# Patient Record
Sex: MALE | Race: WHITE | Employment: OTHER | RURAL
[De-identification: names, ages, dates, MRNs, and addresses within clinical notes are randomized per-mention and may not be internally consistent; named-entity substitution may affect disease eponyms.]

---

## 2017-03-14 RX ORDER — ATORVASTATIN CALCIUM 40 MG/1
40 TABLET, FILM COATED ORAL
Qty: 90 TAB | Refills: 2 | Status: SHIPPED | OUTPATIENT
Start: 2017-03-14 | End: 2017-12-26 | Stop reason: SDUPTHER

## 2017-03-14 RX ORDER — AMLODIPINE BESYLATE 5 MG/1
5 TABLET ORAL DAILY
Qty: 90 TAB | Refills: 2 | Status: SHIPPED | OUTPATIENT
Start: 2017-03-14 | End: 2018-01-08 | Stop reason: SDUPTHER

## 2017-06-28 ENCOUNTER — OFFICE VISIT (OUTPATIENT)
Dept: CARDIOLOGY CLINIC | Age: 82
End: 2017-06-28

## 2017-06-28 VITALS
RESPIRATION RATE: 14 BRPM | WEIGHT: 202 LBS | SYSTOLIC BLOOD PRESSURE: 184 MMHG | OXYGEN SATURATION: 98 % | BODY MASS INDEX: 28.28 KG/M2 | HEART RATE: 73 BPM | HEIGHT: 71 IN | DIASTOLIC BLOOD PRESSURE: 82 MMHG

## 2017-06-28 DIAGNOSIS — E11.9 TYPE 2 DIABETES MELLITUS WITHOUT COMPLICATION, WITH LONG-TERM CURRENT USE OF INSULIN (HCC): ICD-10-CM

## 2017-06-28 DIAGNOSIS — I25.10 ATHEROSCLEROSIS OF NATIVE CORONARY ARTERY OF NATIVE HEART WITHOUT ANGINA PECTORIS: Primary | ICD-10-CM

## 2017-06-28 DIAGNOSIS — I11.9 HYPERTENSIVE HEART DISEASE WITHOUT HEART FAILURE: ICD-10-CM

## 2017-06-28 DIAGNOSIS — Z79.4 TYPE 2 DIABETES MELLITUS WITHOUT COMPLICATION, WITH LONG-TERM CURRENT USE OF INSULIN (HCC): ICD-10-CM

## 2017-06-28 DIAGNOSIS — E78.00 PURE HYPERCHOLESTEROLEMIA: ICD-10-CM

## 2017-06-28 DIAGNOSIS — I77.9 CAROTID ARTERY DISEASE, UNSPECIFIED LATERALITY (HCC): ICD-10-CM

## 2017-06-28 RX ORDER — LOSARTAN POTASSIUM 50 MG/1
50 TABLET ORAL DAILY
Qty: 90 TAB | Refills: 1 | Status: SHIPPED | OUTPATIENT
Start: 2017-06-28 | End: 2018-01-09 | Stop reason: ALTCHOICE

## 2017-06-28 NOTE — MR AVS SNAPSHOT
Visit Information Date & Time Provider Department Dept. Phone Encounter #  
 6/28/2017 11:20 AM Mai Lin MD [facility] [place] [facility] [phone] Your Appointments 1/2/2018 11:00 AM  
ESTABLISHED PATIENT with Mai Lin MD  
[address] [facility]) Appt Note: 6 mo fu $cp  
 [address] [id number] [phone]  
  
   
 [address] [place] [id number] Upcoming Health Maintenance Date Due  
 FOOT EXAM Q1 12/30/1938 EYE EXAM RETINAL OR DILATED Q1 12/30/1938 DTaP/Tdap/Td series (1 - Tdap) 12/30/1949 ZOSTER VACCINE AGE 60> 12/30/1988 GLAUCOMA SCREENING Q2Y 12/30/1993 Pneumococcal 65+ Low/Medium Risk (1 of 2 - PCV13) 12/30/1993 MEDICARE YEARLY EXAM 12/30/1993 LIPID PANEL Q1 7/29/2015 HEMOGLOBIN A1C Q6M 5/4/2016 MICROALBUMIN Q1 11/4/2016 INFLUENZA AGE 9 TO ADULT 8/1/2017 Allergies as of 6/28/2017  Review Complete On: 6/28/2017 By: Mai Lin MD  
  
 Severity Noted Reaction Type Reactions Aspirin  06/14/2016    Other (comments) NOSE BLEED  MG IS TAKEN Pcn [Penicillins]  10/17/2013    Shortness of Breath Itching/hives Current Immunizations  Never Reviewed Name Date Influenza High Dose Vaccine PF 10/11/2016 Not reviewed this visit You Were Diagnosed With   
  
 Codes Comments Atherosclerosis of native coronary artery of native heart without angina pectoris    -  Primary ICD-10-CM: I25.10 ICD-9-CM: 414.01 Hypertensive heart disease without heart failure     ICD-10-CM: I11.9 ICD-9-CM: 402.90 Pure hypercholesterolemia     ICD-10-CM: E78.00 ICD-9-CM: 272.0 Type 2 diabetes mellitus without complication, with long-term current use of insulin (HCC)     ICD-10-CM: E11.9, Z79.4 ICD-9-CM: 250.00, V58.67   
 Carotid artery disease, unspecified laterality ([facility]     ICD-10-CM: I77.9 ICD-9-CM: 776. 9 Vitals BP Pulse Resp Height(growth percentile) Weight(growth percentile) SpO2  
 184/82 (BP 1 Location: Right arm, BP Patient Position: Sitting) 73 14 5' 11\" (1.803 m) 202 lb (91.6 kg) 98% BMI Smoking Status 28.17 kg/m2 Former Smoker Vitals History BMI and BSA Data Body Mass Index Body Surface Area  
 28.17 kg/m 2 2.14 m 2 Preferred Pharmacy Pharmacy Name Phone [facility] [place] [address] [phone] Your Updated Medication List  
  
   
This list is accurate as of: 6/28/17 11:46 AM.  Always use your most recent med list. amLODIPine 5 mg tablet Commonly known as:  Thao Rafter Take 1 Tab by mouth daily. aspirin delayed-release 81 mg tablet Take 81 mg by mouth daily. atorvastatin 40 mg tablet Commonly known as:  LIPITOR Take 1 Tab by mouth nightly. HumaLOG Mix 75-25 100 unit/mL (75-25) injection Generic drug:  insulin lispro protamine/insulin lispro  
by SubCUTAneous route. 32 units in the AM  
  
 Iron 325 mg (65 mg iron) tablet Generic drug:  ferrous sulfate Take  by mouth Daily (before breakfast). losartan 50 mg tablet Commonly known as:  COZAAR Take 1 Tab by mouth daily. VITAMIN D3 2,000 unit Tab Generic drug:  cholecalciferol (vitamin D3) Take  by mouth daily. Prescriptions Sent to Pharmacy Refills  
 losartan (COZAAR) 50 mg tablet 1 Sig: Take 1 Tab by mouth daily. Class: Normal  
 Pharmacy: [address] [facility] [address] Ph #: [phone] Route: Oral  
  
We Performed the Following AMB POC EKG ROUTINE W/ 12 LEADS, INTER & REP [79867 CPT(R)] Introducing [facility]!    
 Lynnette Judge introduces [employer] patient portal. Now you can access parts of your medical record, email your doctor's office, and request medication refills online. 1. In your internet browser, go to [email or web address] 2. Click on the First Time User? Click Here link in the Sign In box. You will see the New Member Sign Up page. 3. Enter your [employer] Access Code exactly as it appears below. You will not need to use this code after youve completed the sign-up process. If you do not sign up before the expiration date, you must request a new code. · [employer] Access Code: [email or web address] Expires: 2017 11:46 AM 
 
4. Enter the last four digits of your Social Security Number (xxxx) and Date of Birth (mm/dd/yyyy) as indicated and click Submit. You will be taken to the next sign-up page. 5. Create a [employer] ID. This will be your [employer] login ID and cannot be changed, so think of one that is secure and easy to remember. 6. Create a [employer] password. You can change your password at any time. 7. Enter your Password Reset Question and Answer. This can be used at a later time if you forget your password. 8. Enter your e-mail address. You will receive e-mail notification when new information is available in [address] 9. Click Sign Up. You can now view and download portions of your medical record. 10. Click the Download Summary menu link to download a portable copy of your medical information. If you have questions, please visit the Frequently Asked Questions section of the [employer] website. Remember, [employer] is NOT to be used for urgent needs. For medical emergencies, dial 911. Now available from your iPhone and Android! Please provide this summary of care documentation to your next provider. If you have any questions after today's visit, please call [phone].

## 2017-06-28 NOTE — PROGRESS NOTES
PATIENT ID VERIFIED WITH TWO PATIENT IDENTIFIERS. MEDICATION REVIEWED AND APPROVED BY DR. Humberto Brown.

## 2017-06-28 NOTE — PROGRESS NOTES
Fredy Larkin is a 80 y.o. male is here for routine f/u. Overall doing ok. Lost wife last November. Stays active. Continues to see Endocrine (Dr Tana Hinkle). Checks BP, glucose at home. Labile BP, some elevation. Occas dizziness. No CV sx or complaints. The patient denies chest pain/ shortness of breath, orthopnea, PND, LE edema, palpitations, syncope, presyncope or fatigue. Patient Active Problem List    Diagnosis Date Noted    Coronary atherosclerosis of native coronary artery     HCVD (hypertensive cardiovascular disease)     Pure hypercholesterolemia     Diabetes mellitus, type II ([facility]     [facility]       No primary care provider on file. Past Medical History:   Diagnosis Date    [facility]     Coronary atherosclerosis of native coronary artery     Diabetes mellitus, type II (HCC)     DJD (degenerative joint disease)     HCVD (hypertensive cardiovascular disease)     Pure hypercholesterolemia       Past Surgical History:   Procedure Laterality Date    HX CATARACT REMOVAL      HX HEART CATHETERIZATION  2/2010    LVEDP 9, mild ant hypo EF 55-60%, LAD 70% 99%, mod D1, OM3 30%, PDA 90%    HX PTCA  2/2010    Stent-LAD 2.5X18 ANDREA    HX PTCA  3/2010    Stent PDA ANDREA     Allergies   Allergen Reactions    Aspirin Other (comments)     NOSE BLEED  MG IS TAKEN    Pcn [Penicillins] Shortness of Breath     Itching/hives      Family History   Problem Relation Age of Onset    Heart Disease Mother     Cancer Father       Social History     Social History    Marital status:      Spouse name: N/A    Number of children: N/A    Years of education: N/A     Occupational History    Not on file.      Social History Main Topics    Smoking status: Former Smoker     Quit date: 10/17/1963    Smokeless tobacco: Not on file    Alcohol use Not on file    Drug use: Not on file    Sexual activity: Not on file     Other Topics Concern    Not on file Social History Narrative      Current Outpatient Prescriptions   Medication Sig    amLODIPine (NORVASC) 5 mg tablet Take 1 Tab by mouth daily.  atorvastatin (LIPITOR) 40 mg tablet Take 1 Tab by mouth nightly.  losartan (COZAAR) 25 mg tablet Take 1 Tab by mouth daily.  cholecalciferol, vitamin D3, (VITAMIN D3) 2,000 unit tab Take  by mouth daily.  aspirin delayed-release 81 mg tablet Take 81 mg by mouth daily.  ferrous sulfate (IRON) 325 mg (65 mg iron) tablet Take  by mouth Daily (before breakfast).  insulin mixture 75-25 (HUMALOG MIX 75-25) 100 unit/mL (75-25) Susp by SubCUTAneous route. 32 units in the AM     No current facility-administered medications for this visit. Review of Symptoms:    CONST  No weight change. No fever, chills, sweats    ENT No visual changes, URI sx, sore throat    CV  See HPI   RESP  No cough, or sputum, wheezing. Also see HPI   GI  No abdominal pain or change in bowel habits. No heartburn or dysphagia. No melena or rectal bleeding.   No dysuria, urgency, frequency, hematuria   MSKEL  No joint pain, swelling. No muscle pain. SKIN  No rash or lesions. NEURO  No headache, syncope, or seizure. No weakness, loss of sensation, or paresthesias. PSYCH  No low mood or depression  No anxiety. HE/LYMPH  No easy bruising, abnormal bleeding, or enlarged glands.         Physical ExamPhysical Exam:    Visit Vitals    BP (!) 210/88 (BP 1 Location: Right arm, BP Patient Position: Sitting)    Pulse 73    Resp 14    Ht 5' 11\" (1.803 m)    Wt 202 lb (91.6 kg)    SpO2 98%    BMI 28.17 kg/m2     Gen: NAD  HEENT:  PERRL, throat clear  Neck: no adenopathy, no thyromegaly, no JVD   Heart:  Regular,Nl S1S2,  no murmur, gallop or rub.   Lungs:  clear  Abdomen:   Soft, non-tender, bowel sounds are active.   Extremities:  No edema  Pulse: symmetric  Neuro: A&O times 3, No focal neuro deficits    Cardiographics    ECG: normal EKG, normal sinus rhythm, unchanged from previous tracings    Labs:   Lab Results   Component Value Date/Time    Sodium 140 02/24/2010 04:10 AM    Sodium 135 02/23/2010 09:30 AM    Sodium 137 02/04/2010 04:00 AM    Potassium 4.1 02/24/2010 04:10 AM    Potassium 4.6 02/23/2010 09:30 AM    Potassium 4.0 02/04/2010 04:00 AM    Chloride 105 02/24/2010 04:10 AM    Chloride 100 02/23/2010 09:30 AM    Chloride 102 02/04/2010 04:00 AM    CO2 24 02/24/2010 04:10 AM    CO2 28 02/23/2010 09:30 AM    CO2 29 02/04/2010 04:00 AM    Anion gap 11 02/24/2010 04:10 AM    Anion gap 7 02/23/2010 09:30 AM    Anion gap 6 02/04/2010 04:00 AM    Glucose 172 02/24/2010 04:10 AM    Glucose 206 02/23/2010 09:30 AM    Glucose 109 02/04/2010 04:00 AM    BUN 22 02/24/2010 04:10 AM    BUN 23 02/23/2010 09:30 AM    BUN 23 02/04/2010 04:00 AM    Creatinine 1.2 02/24/2010 04:10 AM    Creatinine 1.5 02/23/2010 09:30 AM    Creatinine 1.2 02/04/2010 04:00 AM    BUN/Creatinine ratio 18 02/24/2010 04:10 AM    BUN/Creatinine ratio 15 02/23/2010 09:30 AM    BUN/Creatinine ratio 19 02/04/2010 04:00 AM    GFR est AA >60 02/24/2010 04:10 AM    GFR est AA 58 02/23/2010 09:30 AM    GFR est AA >60 02/04/2010 04:00 AM    GFR est non-AA >60 02/24/2010 04:10 AM    GFR est non-AA 48 02/23/2010 09:30 AM    GFR est non-AA >60 02/04/2010 04:00 AM    Calcium 8.8 02/24/2010 04:10 AM    Calcium 9.4 02/23/2010 09:30 AM    Calcium 8.7 02/04/2010 04:00 AM    Bilirubin, total 0.6 02/23/2010 09:30 AM    AST (SGOT) 35 02/23/2010 09:30 AM    Alk. phosphatase 93 02/23/2010 09:30 AM    Protein, total 7.9 02/23/2010 09:30 AM    Albumin 4.3 02/23/2010 09:30 AM    Globulin 3.6 02/23/2010 09:30 AM    A-G Ratio 1.2 02/23/2010 09:30 AM    ALT (SGPT) 42 02/23/2010 09:30 AM     No results found for: CPK, CPKX, CPX  No results found for: CHOL, CHOLX, CHLST, CHOLV, 941111, HDL, LDL, LDLC, DLDLP, TGLX, TRIGL, TRIGP, CHHD, CHHDX  No results found for this or any previous visit.     Assessment:         Patient Active Problem List    Diagnosis Date Noted    Coronary atherosclerosis of native coronary artery     HCVD (hypertensive cardiovascular disease)     Pure hypercholesterolemia     Diabetes mellitus, type II ([facility]     Carotid artery disease ([facility]       Overall doing ok. Lost wife last November. Stays active. Continues to see Endocrine (Dr Katya Edge). Checks BP, glucose at home. Labile BP, some elevation. Occas dizziness. No CV sx or complaints. Plan:     Doing well with no adverse cardiac symptoms, however BP with suboptimal control--will increase losartan to 50mg every day, continue amlodipine 5mg. DM and Lipids and labs followed by Endocrine. Home BP monitoring. .  Continue current care and f/u in 6 months.     Leslie Carson MD

## 2017-12-27 RX ORDER — ATORVASTATIN CALCIUM 40 MG/1
TABLET, FILM COATED ORAL
Qty: 90 TAB | Refills: 2 | Status: SHIPPED | OUTPATIENT
Start: 2017-12-27 | End: 2018-09-18 | Stop reason: SDUPTHER

## 2018-01-09 ENCOUNTER — OFFICE VISIT (OUTPATIENT)
Dept: CARDIOLOGY CLINIC | Age: 83
End: 2018-01-09

## 2018-01-09 VITALS
BODY MASS INDEX: 28.98 KG/M2 | WEIGHT: 207 LBS | HEART RATE: 77 BPM | HEIGHT: 71 IN | RESPIRATION RATE: 16 BRPM | SYSTOLIC BLOOD PRESSURE: 142 MMHG | DIASTOLIC BLOOD PRESSURE: 80 MMHG | OXYGEN SATURATION: 98 %

## 2018-01-09 DIAGNOSIS — I11.9 HYPERTENSIVE HEART DISEASE WITHOUT HEART FAILURE: ICD-10-CM

## 2018-01-09 DIAGNOSIS — E11.9 TYPE 2 DIABETES MELLITUS WITHOUT COMPLICATION, WITH LONG-TERM CURRENT USE OF INSULIN (HCC): ICD-10-CM

## 2018-01-09 DIAGNOSIS — I25.10 ATHEROSCLEROSIS OF NATIVE CORONARY ARTERY OF NATIVE HEART WITHOUT ANGINA PECTORIS: Primary | ICD-10-CM

## 2018-01-09 DIAGNOSIS — I77.9 CAROTID ARTERY DISEASE, UNSPECIFIED LATERALITY (HCC): ICD-10-CM

## 2018-01-09 DIAGNOSIS — E78.00 PURE HYPERCHOLESTEROLEMIA: ICD-10-CM

## 2018-01-09 DIAGNOSIS — Z79.4 TYPE 2 DIABETES MELLITUS WITHOUT COMPLICATION, WITH LONG-TERM CURRENT USE OF INSULIN (HCC): ICD-10-CM

## 2018-01-09 RX ORDER — AMLODIPINE BESYLATE 5 MG/1
TABLET ORAL
Qty: 90 TAB | Refills: 2 | Status: SHIPPED | OUTPATIENT
Start: 2018-01-09 | End: 2018-12-07 | Stop reason: SDUPTHER

## 2018-01-09 RX ORDER — LISINOPRIL 10 MG/1
10 TABLET ORAL DAILY
Qty: 90 TAB | Refills: 3 | Status: SHIPPED | OUTPATIENT
Start: 2018-01-09 | End: 2018-12-29 | Stop reason: SDUPTHER

## 2018-01-09 NOTE — PROGRESS NOTES
Verified patient with two patient identifiers. Medications reviewed/approved by Dr. Rylan Villegas. Verbal from Dr. Rylan Villegas to remove the medications that were deleted during the visit. Chief Complaint   Patient presents with    Coronary Artery Disease     6 month follow up    Hypertension    Cholesterol Problem     1. Have you been to the ER, urgent care clinic since your last visit? Hospitalized since your last visit? Yes.    2. Have you seen or consulted any other health care providers outside of the [address] [place] [address] since your last visit? Include any pap smears or colon screening.  Dr. Johanny Miller (endrocrinologist)

## 2018-01-09 NOTE — PROGRESS NOTES
Cata Wiseman is a 80 y.o. male is here for routine f/u. Overall doing ok. Stays active. Continues to see Endocrine (Dr Purnima Crain). Checks BP, glucose at home. Labile BP, some elevation. Occas dizziness--stopped taking the losartan a month ago, sx resolved. No CV sx or complaints. The patient denies chest pain/ shortness of breath, orthopnea, PND, LE edema, palpitations, syncope, presyncope or fatigue. Patient Active Problem List    Diagnosis Date Noted    Coronary atherosclerosis of native coronary artery     HCVD (hypertensive cardiovascular disease)     Pure hypercholesterolemia     Diabetes mellitus, type II ([facility]     [facility]       No primary care provider on file. Past Medical History:   Diagnosis Date    [facility]ot[facility]     Coronary atherosclerosis of native coronary artery     Diabetes mellitus, type II (HCC)     DJD (degenerative joint disease)     HCVD (hypertensive cardiovascular disease)     Pure hypercholesterolemia       Past Surgical History:   Procedure Laterality Date    HX CATARACT REMOVAL      HX HEART CATHETERIZATION  2/2010    LVEDP 9, mild ant hypo EF 55-60%, LAD 70% 99%, mod D1, OM3 30%, PDA 90%    HX PTCA  2/2010    Stent-LAD 2.5X18 ANDREA    HX PTCA  3/2010    Stent PDA ANDREA     Allergies   Allergen Reactions    Aspirin Other (comments)     NOSE BLEED  MG IS TAKEN    Pcn [Penicillins] Shortness of Breath     Itching/hives      Family History   Problem Relation Age of Onset    Heart Disease Mother     Cancer Father       Social History     Social History    Marital status:      Spouse name: N/A    Number of children: N/A    Years of education: N/A     Occupational History    Not on file.      Social History Main Topics    Smoking status: Former Smoker     Quit date: 10/17/1963    Smokeless tobacco: Never Used    Alcohol use Not on file    Drug use: Not on file    Sexual activity: Not on file     Other Topics Concern    Not on file     Social History Narrative      Current Outpatient Prescriptions   Medication Sig    amLODIPine (NORVASC) 5 mg tablet TAKE ONE TABLET BY MOUTH ONCE DAILY    atorvastatin (LIPITOR) 40 mg tablet TAKE ONE TABLET BY MOUTH NIGHTLY    cholecalciferol, vitamin D3, (VITAMIN D3) 2,000 unit tab Take  by mouth daily.  aspirin delayed-release 81 mg tablet Take 81 mg by mouth daily.  ferrous sulfate (IRON) 325 mg (65 mg iron) tablet Take  by mouth Daily (before breakfast).  insulin mixture 75-25 (HUMALOG MIX 75-25) 100 unit/mL (75-25) Susp by SubCUTAneous route. 32 units in the AM    losartan (COZAAR) 50 mg tablet Take 1 Tab by mouth daily. No current facility-administered medications for this visit. Review of Symptoms:    CONST  No weight change. No fever, chills, sweats    ENT No visual changes, URI sx, sore throat    CV  See HPI   RESP  No cough, or sputum, wheezing. Also see HPI   GI  No abdominal pain or change in bowel habits. No heartburn or dysphagia. No melena or rectal bleeding.   No dysuria, urgency, frequency, hematuria   MSKEL  No joint pain, swelling. No muscle pain. SKIN  No rash or lesions. NEURO  No headache, syncope, or seizure. No weakness, loss of sensation, or paresthesias. PSYCH  No low mood or depression  No anxiety. HE/LYMPH  No easy bruising, abnormal bleeding, or enlarged glands.         Physical ExamPhysical Exam:    Visit Vitals    /80 (BP 1 Location: Right arm, BP Patient Position: Sitting)    Pulse 77    Resp 16    Ht 5' 11\" (1.803 m)    Wt 207 lb (93.9 kg)    SpO2 98%    BMI 28.87 kg/m2     Gen: NAD  HEENT:  PERRL, throat clear  Neck: no adenopathy, no thyromegaly, no JVD   Heart:  Regular,Nl S1S2,  no murmur, gallop or rub.   Lungs:  clear  Abdomen:   Soft, non-tender, bowel sounds are active.   Extremities:  No edema  Pulse: symmetric  Neuro: A&O times 3, No focal neuro deficits    Cardiographics    ECG: NSR, PRWP    Labs:   Lab Results   Component Value Date/Time    Sodium 140 02/24/2010 04:10 AM    Sodium 135 02/23/2010 09:30 AM    Sodium 137 02/04/2010 04:00 AM    Potassium 4.1 02/24/2010 04:10 AM    Potassium 4.6 02/23/2010 09:30 AM    Potassium 4.0 02/04/2010 04:00 AM    Chloride 105 02/24/2010 04:10 AM    Chloride 100 02/23/2010 09:30 AM    Chloride 102 02/04/2010 04:00 AM    CO2 24 02/24/2010 04:10 AM    CO2 28 02/23/2010 09:30 AM    CO2 29 02/04/2010 04:00 AM    Anion gap 11 02/24/2010 04:10 AM    Anion gap 7 02/23/2010 09:30 AM    Anion gap 6 02/04/2010 04:00 AM    Glucose 172 02/24/2010 04:10 AM    Glucose 206 02/23/2010 09:30 AM    Glucose 109 02/04/2010 04:00 AM    BUN 22 02/24/2010 04:10 AM    BUN 23 02/23/2010 09:30 AM    BUN 23 02/04/2010 04:00 AM    Creatinine 1.2 02/24/2010 04:10 AM    Creatinine 1.5 02/23/2010 09:30 AM    Creatinine 1.2 02/04/2010 04:00 AM    BUN/Creatinine ratio 18 02/24/2010 04:10 AM    BUN/Creatinine ratio 15 02/23/2010 09:30 AM    BUN/Creatinine ratio 19 02/04/2010 04:00 AM    GFR est AA >60 02/24/2010 04:10 AM    GFR est AA 58 02/23/2010 09:30 AM    GFR est AA >60 02/04/2010 04:00 AM    GFR est non-AA >60 02/24/2010 04:10 AM    GFR est non-AA 48 02/23/2010 09:30 AM    GFR est non-AA >60 02/04/2010 04:00 AM    Calcium 8.8 02/24/2010 04:10 AM    Calcium 9.4 02/23/2010 09:30 AM    Calcium 8.7 02/04/2010 04:00 AM    Bilirubin, total 0.6 02/23/2010 09:30 AM    AST (SGOT) 35 02/23/2010 09:30 AM    Alk. phosphatase 93 02/23/2010 09:30 AM    Protein, total 7.9 02/23/2010 09:30 AM    Albumin 4.3 02/23/2010 09:30 AM    Globulin 3.6 02/23/2010 09:30 AM    A-G Ratio 1.2 02/23/2010 09:30 AM    ALT (SGPT) 42 02/23/2010 09:30 AM     No results found for: CPK, CPKX, CPX  No results found for: CHOL, CHOLX, CHLST, CHOLV, 656446, HDL, LDL, LDLC, DLDLP, TGLX, TRIGL, TRIGP, CHHD, CHHDX  No results found for this or any previous visit.     Assessment: Patient Active Problem List    Diagnosis Date Noted    Coronary atherosclerosis of native coronary artery     HCVD (hypertensive cardiovascular disease)     Pure hypercholesterolemia     Diabetes mellitus, type II ([facility]     Carotid artery disease ([facility]      Overall doing ok. Stays active. Continues to see Endocrine (Dr Katiuska Almanza). Checks BP, glucose at home. Labile BP, some elevation. Occas dizziness--stopped taking the losartan a month ago, sx resolved. No CV sx or complaints. Plan:     Doing well with no adverse cardiac symptoms. Recent lipids/labs per Endocrine reviewed--at target. BP up a bit, and should be on ACEI or ARB--will Lipids and labs followed by PCP. Continue current care and f/u in 6 months.     Melissa Giles MD

## 2018-01-09 NOTE — MR AVS SNAPSHOT
Visit Information Date & Time Provider Department Dept. Phone Encounter #  
 1/9/2018 11:20 AM Kaley Dorsey MD [facility] [phone] [id number] Follow-up Instructions Return in about 6 months (around 7/9/2018). Follow-up and Disposition History Upcoming Health Maintenance Date Due  
 FOOT EXAM Q1 12[birth year]30/1938 EYE EXAM RETINAL OR DILATED Q1 12/30/1938 DTaP/Tdap/Td series (1 - Tdap) 12/30/1949 ZOSTER VACCINE AGE 60> 10/30/1988 GLAUCOMA SCREENING Q2Y 12/30/1993 Pneumococcal 65+ Low/Medium Risk (1 of 2 - PCV13) 12/30/1993 MEDICARE YEARLY EXAM 12/30/1993 LIPID PANEL Q1 7/29/2015 MICROALBUMIN Q1 11/4/2016 HEMOGLOBIN A1C Q6M 6/6/2017 Influenza Age 5 to Adult 8/1/2017 Allergies as of 1/9/2018  Review Complete On: 1/9/2018 By: Kaley Dorsey MD  
  
 Severity Noted Reaction Type Reactions Aspirin  06/14/2016    Other (comments) NOSE BLEED  MG IS TAKEN Pcn [Penicillins]  10/17/2013    Shortness of Breath Itching/hives Current Immunizations  Never Reviewed Name Date Influenza High Dose Vaccine PF 10/11/2016 Not reviewed this visit You Were Diagnosed With   
  
 Codes Comments Atherosclerosis of native coronary artery of native heart without angina pectoris    -  Primary ICD-10-CM: I25.10 ICD-9-CM: 414.01 Hypertensive heart disease without heart failure     ICD-10-CM: I11.9 ICD-9-CM: 402.90 Pure hypercholesterolemia     ICD-10-CM: E78.00 ICD-9-CM: 272.0 Type 2 diabetes mellitus without complication, with long-term current use of insulin (HCC)     ICD-10-CM: E11.9, Z79.4 ICD-9-CM: 250.00, V58.67 Carotid artery disease, unspecified laterality ([facility]     ICD-10-CM: I77.9 ICD-9-CM: 737. 9 Vitals  BP Pulse Resp Height(growth percentile) Weight(growth percentile) SpO2  
 142/80 (BP 1 Location: Right arm, BP Patient Position: Sitting) 77 16 5' 11\" (1.803 m) 207 lb (93.9 kg) 98% BMI Smoking Status 28.87 kg/m2 Former Smoker Vitals History BMI and BSA Data Body Mass Index Body Surface Area  
 28.87 kg/m 2 2.17 m 2 Preferred Pharmacy Pharmacy Name Phone [facility] [address] [phone] Your Updated Medication List  
  
   
This list is accurate as of: 1/9/18 11:56 AM.  Always use your most recent med list. amLODIPine 5 mg tablet Commonly known as:  Gavi Parish TAKE ONE TABLET BY MOUTH ONCE DAILY  
  
 aspirin delayed-release 81 mg tablet Take 81 mg by mouth daily. atorvastatin 40 mg tablet Commonly known as:  LIPITOR  
TAKE ONE TABLET BY MOUTH NIGHTLY HumaLOG Mix 75-25 100 unit/mL (75-25) injection Generic drug:  insulin lispro protamine/insulin lispro  
by SubCUTAneous route. 32 units in the AM  
  
 Iron 325 mg (65 mg iron) tablet Generic drug:  ferrous sulfate Take  by mouth Daily (before breakfast). lisinopril 10 mg tablet Commonly known as:  Luetta Manzanilla Take 1 Tab by mouth daily. losartan 50 mg tablet Commonly known as:  COZAAR Take 1 Tab by mouth daily. VITAMIN D3 2,000 unit Tab Generic drug:  cholecalciferol (vitamin D3) Take  by mouth daily. Prescriptions Sent to Pharmacy Refills  
 lisinopril (PRINIVIL, ZESTRIL) 10 mg tablet 3 Sig: Take 1 Tab by mouth daily. Class: Normal  
 Pharmacy: [facility] [address] Ph #: [phone] Route: Oral  
  
We Performed the Following AMB POC EKG ROUTINE W/ 12 LEADS, INTER & REP [44729 CPT(R)] Follow-up Instructions Return in about 6 months (around 7/9/2018). Introducing hospitals & HEALTH SERVICES! [facility] Bejar[facility] introduces [employer] patient portal. Now you can access parts of your medical record, email your doctor's office, and request medication refills online. 1. In your internet browser, go to [email or web address] 2. Click on the First Time User? Click Here link in the Sign In box. You will see the New Member Sign Up page. 3. Enter your Brandtone Access Code exactly as it appears below. You will not need to use this code after youve completed the sign-up process. If you do not sign up before the expiration date, you must request a new code. · Brandtone Access Code: [email or web address] Expires: 4/9/2018 11:56 AM 
 
4. Enter the last four digits of your Social Security Number (xxxx) and Date of Birth (mm/dd/yyyy) as indicated and click Submit. You will be taken to the next sign-up page. 5. Create a [employer] ID. This will be your Brandtone login ID and cannot be changed, so think of one that is secure and easy to remember. 6. Create a Brandtone password. You can change your password at any time. 7. Enter your Password Reset Question and Answer. This can be used at a later time if you forget your password. 8. Enter your e-mail address. You will receive e-mail notification when new information is available in [address] 9. Click Sign Up. You can now view and download portions of your medical record. 10. Click the Download Summary menu link to download a portable copy of your medical information. If you have questions, please visit the Frequently Asked Questions section of the Brandtone website. Remember, Brandtone is NOT to be used for urgent needs. For medical emergencies, dial 911. Now available from your iPhone and Android! Please provide this summary of care documentation to your next provider. If you have any questions after today's visit, please call [phone].

## 2018-07-26 ENCOUNTER — OFFICE VISIT (OUTPATIENT)
Dept: CARDIOLOGY CLINIC | Age: 83
End: 2018-07-26

## 2018-07-26 VITALS
HEART RATE: 78 BPM | WEIGHT: 198 LBS | HEIGHT: 71 IN | SYSTOLIC BLOOD PRESSURE: 132 MMHG | RESPIRATION RATE: 12 BRPM | BODY MASS INDEX: 27.72 KG/M2 | DIASTOLIC BLOOD PRESSURE: 78 MMHG | OXYGEN SATURATION: 99 %

## 2018-07-26 DIAGNOSIS — Z79.4 TYPE 2 DIABETES MELLITUS WITHOUT COMPLICATION, WITH LONG-TERM CURRENT USE OF INSULIN (HCC): ICD-10-CM

## 2018-07-26 DIAGNOSIS — E11.9 TYPE 2 DIABETES MELLITUS WITHOUT COMPLICATION, WITH LONG-TERM CURRENT USE OF INSULIN (HCC): ICD-10-CM

## 2018-07-26 DIAGNOSIS — E78.00 PURE HYPERCHOLESTEROLEMIA: ICD-10-CM

## 2018-07-26 DIAGNOSIS — I25.10 ATHEROSCLEROSIS OF NATIVE CORONARY ARTERY OF NATIVE HEART WITHOUT ANGINA PECTORIS: Primary | ICD-10-CM

## 2018-07-26 DIAGNOSIS — I10 ESSENTIAL HYPERTENSION: ICD-10-CM

## 2018-07-26 DIAGNOSIS — I77.9 CAROTID ARTERY DISEASE, UNSPECIFIED LATERALITY (HCC): ICD-10-CM

## 2018-07-26 DIAGNOSIS — I11.9 HYPERTENSIVE HEART DISEASE WITHOUT HEART FAILURE: ICD-10-CM

## 2018-07-26 NOTE — PROGRESS NOTES
Jonatan Wilhelm is a 80 y.o. male is here for routine f/u.  Overall doing ok.  Stays active.  Continues to see Endocrine (Dr Shane Chaparro BP, glucose at home. Wandy Arrieta BP, some elevation.  Occas dizziness--stopped taking the losartan a month ago, sx resolved.  No CV sx or complaints. Lifeline screening 4/28/18 with mild bilat ICA, EKG ok, JASON's normal.  The patient denies chest pain/ shortness of breath, orthopnea, PND, LE edema, palpitations, syncope, presyncope or fatigue. Patient Active Problem List  
 Diagnosis Date Noted  Coronary atherosclerosis of native coronary artery  HCVD (hypertensive cardiovascular disease)  Pure hypercholesterolemia  Diabetes mellitus, type II ([facility]  [facility] No primary care provider on file. Past Medical History:  
Diagnosis Date  [facility]  Coronary atherosclerosis of native coronary artery  Diabetes mellitus, type II ([facility]  DJD (degenerative joint disease)  HCVD (hypertensive cardiovascular disease)  Pure hypercholesterolemia Past Surgical History:  
Procedure Laterality Date  HX CATARACT REMOVAL    
 HX HEART CATHETERIZATION  2/2010 LVEDP 9, mild ant hypo EF 55-60%, LAD 70% 99%, mod D1, OM3 30%, PDA 90%  HX PTCA  2/2010 Stent-LAD 2.5X18 ANDREA  
 HX PTCA  3/2010 Stent PDA ANDREA Allergies Allergen Reactions  Aspirin Other (comments) NOSE BLEED  MG IS TAKEN  
 Pcn [Penicillins] Shortness of Breath Itching/hives Family History Problem Relation Age of Onset  Heart Disease Mother  Cancer Father Social History Social History  Marital status:  Spouse name: N/A  
 Number of children: N/A  
 Years of education: N/A Occupational History  Not on file. Social History Main Topics  Smoking status: Former Smoker Quit date: 10/17/1963  Smokeless tobacco: Never Used  Alcohol use Not on file  Drug use: Not on file  Sexual activity: Not on file Other Topics Concern  Not on file Social History Narrative Current Outpatient Prescriptions Medication Sig  
 insulin aspart prot/insuln asp (NOVOLOG MIX 70-30 U-100 INSULN SC) by SubCUTAneous route as needed.  amLODIPine (NORVASC) 5 mg tablet TAKE ONE TABLET BY MOUTH ONCE DAILY  lisinopril (PRINIVIL, ZESTRIL) 10 mg tablet Take 1 Tab by mouth daily.  atorvastatin (LIPITOR) 40 mg tablet TAKE ONE TABLET BY MOUTH NIGHTLY  cholecalciferol, vitamin D3, (VITAMIN D3) 2,000 unit tab Take  by mouth daily.  aspirin delayed-release 81 mg tablet Take 81 mg by mouth daily.  ferrous sulfate (IRON) 325 mg (65 mg iron) tablet Take  by mouth Daily (before breakfast).  insulin mixture 75-25 (HUMALOG MIX 75-25) 100 unit/mL (75-25) Susp by SubCUTAneous route. 32 units in the AM, 10 units at night No current facility-administered medications for this visit. Review of Symptoms: 
 
CONST  No weight change. No fever, chills, sweats ENT No visual changes, URI sx, sore throat CV  See HPI  
RESP  No cough, or sputum, wheezing. Also see HPI  
GI  No abdominal pain or change in bowel habits. No heartburn or dysphagia. No melena or rectal bleeding.   No dysuria, urgency, frequency, hematuria MSKEL  No joint pain, swelling. No muscle pain. SKIN  No rash or lesions. NEURO  No headache, syncope, or seizure. No weakness, loss of sensation, or paresthesias. PSYCH  No low mood or depression No anxiety. HE/LYMPH  No easy bruising, abnormal bleeding, or enlarged glands. Physical ExamPhysical Exam:   
Visit Vitals  Ht 5' 11\" (1.803 m)  Wt 198 lb (89.8 kg)  BMI 27.62 kg/m2 Gen: NAD HEENT:  PERRL, throat clear Neck: no adenopathy, no thyromegaly, no JVD Heart:  Regular,Nl S1S2,  no murmur, gallop or rub.  
Lungs:  clear Abdomen:   Soft, non-tender, bowel sounds are active.  
Extremities: No edema Pulse: symmetric Neuro: A&O times 3, No focal neuro deficits Cardiographics ECG: NSR, first degree AV block, no acute changes Labs:  
Lab Results Component Value Date/Time Sodium 140 02/24/2010 04:10 AM  
 Sodium 135 (L) 02/23/2010 09:30 AM  
 Sodium 137 02/04/2010 04:00 AM  
 Potassium 4.1 02/24/2010 04:10 AM  
 Potassium 4.6 02/23/2010 09:30 AM  
 Potassium 4.0 02/04/2010 04:00 AM  
 Chloride 105 02/24/2010 04:10 AM  
 Chloride 100 02/23/2010 09:30 AM  
 Chloride 102 02/04/2010 04:00 AM  
 CO2 24 02/24/2010 04:10 AM  
 CO2 28 02/23/2010 09:30 AM  
 CO2 29 02/04/2010 04:00 AM  
 Anion gap 11 02/24/2010 04:10 AM  
 Anion gap 7 02/23/2010 09:30 AM  
 Anion gap 6 02/04/2010 04:00 AM  
 Glucose 172 (H) 02/24/2010 04:10 AM  
 Glucose 206 (H) 02/23/2010 09:30 AM  
 Glucose 109 (H) 02/04/2010 04:00 AM  
 BUN 22 (H) 02/24/2010 04:10 AM  
 BUN 23 (H) 02/23/2010 09:30 AM  
 BUN 23 (H) 02/04/2010 04:00 AM  
 Creatinine 1.2 02/24/2010 04:10 AM  
 Creatinine 1.5 (H) 02/23/2010 09:30 AM  
 Creatinine 1.2 02/04/2010 04:00 AM  
 BUN/Creatinine ratio 18 02/24/2010 04:10 AM  
 BUN/Creatinine ratio 15 02/23/2010 09:30 AM  
 BUN/Creatinine ratio 19 02/04/2010 04:00 AM  
 GFR est AA >60 02/24/2010 04:10 AM  
 GFR est AA 58 (L) 02/23/2010 09:30 AM  
 GFR est AA >60 02/04/2010 04:00 AM  
 GFR est non-AA >60 02/24/2010 04:10 AM  
 GFR est non-AA 48 (L) 02/23/2010 09:30 AM  
 GFR est non-AA >60 02/04/2010 04:00 AM  
 Calcium 8.8 02/24/2010 04:10 AM  
 Calcium 9.4 02/23/2010 09:30 AM  
 Calcium 8.7 02/04/2010 04:00 AM  
 Bilirubin, total 0.6 02/23/2010 09:30 AM  
 AST (SGOT) 35 02/23/2010 09:30 AM  
 Alk. phosphatase 93 02/23/2010 09:30 AM  
 Protein, total 7.9 02/23/2010 09:30 AM  
 Albumin 4.3 02/23/2010 09:30 AM  
 Globulin 3.6 02/23/2010 09:30 AM  
 A-G Ratio 1.2 02/23/2010 09:30 AM  
 ALT (SGPT) 42 02/23/2010 09:30 AM  
 
 
Assessment:  
  
  
Patient Active Problem List  
 Diagnosis Date Noted  Coronary atherosclerosis of native coronary artery  HCVD (hypertensive cardiovascular disease)  Pure hypercholesterolemia  Diabetes mellitus, type II ([facility]  Carotid artery disease ([facility]   
 
 Overall doing ok.  Stays active.  Continues to see Endocrine (Dr Sylwia Balderas BP, glucose at home.  Labile BP, some elevation.  Occas dizziness--stopped taking the losartan a month ago, sx resolved.  No CV sx or complaints. Lifeline screening 4/28/18 with mild bilat ICA, EKG ok, JASON's normal. 
 
 Plan:  
 
Doing well with no adverse cardiac symptoms. Lipids and labs followed by PCP. Continue current care and f/u in 6 months.  
 
Sobia Mcgregor MD

## 2018-07-26 NOTE — MR AVS SNAPSHOT
[address] [place] [address] 
 
 
 [address] [id number] [phone] Patient: Slim Born MRN: [id number] Visit Information Date & Time Provider Department Dept. Phone Encounter #  
 7/26/2018 10:00 AM Karan Martinez, [facility] [phone] Follow-up Instructions Return in about 6 months (around 1/26/2019). Follow-up and Disposition History Upcoming Health Maintenance Date Due  
 FOOT EXAM Q1 12/30/1938 EYE EXAM RETINAL OR DILATED Q1 12/30/1938 DTaP/Tdap/Td series (1 - Tdap) 12/30/1949 ZOSTER VACCINE AGE 60> 10/30/1988 GLAUCOMA SCREENING Q2Y 12/30/1993 Pneumococcal 65+ Low/Medium Risk (1 of 2 - PCV13) 12/30/1993 MICROALBUMIN Q1 11/4/2016 MEDICARE YEARLY EXAM 3/14/2018 HEMOGLOBIN A1C Q6M 3/25/2018 Influenza Age 5 to Adult 8/1/2018 LIPID PANEL Q1 9/25/2018 Allergies as of 7/26/2018  Review Complete On: 7/26/2018 By: Karan Martinez MD  
  
 Severity Noted Reaction Type Reactions Aspirin  06/14/2016    Other (comments) NOSE BLEED  MG IS TAKEN Pcn [Penicillins]  10/17/2013    Shortness of Breath Itching/hives Current Immunizations  Never Reviewed Name Date Influenza High Dose Vaccine PF 10/11/2016 Not reviewed this visit You Were Diagnosed With   
  
 Codes Comments Atherosclerosis of native coronary artery of native heart without angina pectoris    -  Primary ICD-10-CM: I25.10 ICD-9-CM: 414.01 Essential hypertension     ICD-10-CM: I10 
ICD-9-CM: 401.9 Hypertensive heart disease without heart failure     ICD-10-CM: I11.9 ICD-9-CM: 402.90 Pure hypercholesterolemia     ICD-10-CM: E78.00 ICD-9-CM: 272.0 Type 2 diabetes mellitus without complication, with long-term current use of insulin (HCC)     ICD-10-CM: E11.9, Z79.4 ICD-9-CM: 250.00, V58.67   
 Carotid artery disease, unspecified laterality ([facility]     ICD-10-CM: I77.9 ICD-9-CM: 308. 9 Vitals BP Pulse Resp Height(growth percentile) Weight(growth percentile) SpO2  
 132/78 (BP 1 Location: Right arm, BP Patient Position: Sitting) 78 12 5' 11\" (1.803 m) 198 lb (89.8 kg) 99% BMI Smoking Status 27.62 kg/m2 Former Smoker Vitals History BMI and BSA Data Body Mass Index Body Surface Area  
 27.62 kg/m 2 2.12 m 2 Preferred Pharmacy Pharmacy Name Phone [address] [phone] Your Updated Medication List  
  
   
This list is accurate as of 7/26/18 10:39 AM.  Always use your most recent med list. amLODIPine 5 mg tablet Commonly known as:  Caterina Darting TAKE ONE TABLET BY MOUTH ONCE DAILY  
  
 aspirin delayed-release 81 mg tablet Take 81 mg by mouth daily. atorvastatin 40 mg tablet Commonly known as:  LIPITOR  
TAKE ONE TABLET BY MOUTH NIGHTLY HumaLOG Mix 75-25(U-100)Insuln 100 unit/mL (75-25) injection Generic drug:  insulin lispro protamine/insulin lispro  
by SubCUTAneous route. 32 units in the AM, 10 units at night Iron 325 mg (65 mg iron) tablet Generic drug:  ferrous sulfate Take  by mouth Daily (before breakfast). lisinopril 10 mg tablet Commonly known as:  Marilynne Shows Take 1 Tab by mouth daily. NOVOLOG MIX 70-30 U-100 INSULN SC  
by SubCUTAneous route as needed. VITAMIN D3 2,000 unit Tab Generic drug:  cholecalciferol (vitamin D3) Take  by mouth daily. We Performed the Following AMB POC EKG ROUTINE W/ 12 LEADS, INTER & REP [04516 CPT(R)] Follow-up Instructions Return in about 6 months (around 1/26/2019). Introducing [facility] & HEALTH SERVICES!    
 [facility] introduces [employer] patient portal. Now you can access parts of your medical record, email your doctor's office, and request medication refills online. 1. In your internet browser, go to [email or web address] 2. Click on the First Time User? Click Here link in the Sign In box. You will see the New Member Sign Up page. 3. Enter your [employer] Access Code exactly as it appears below. You will not need to use this code after youve completed the sign-up process. If you do not sign up before the expiration date, you must request a new code. · [employer] Access Code: [email or web address] Expires: 10/24/2018 10:39 AM 
 
4. Enter the last four digits of your Social Security Number (xxxx) and Date of Birth (mm/dd/yyyy) as indicated and click Submit. You will be taken to the next sign-up page. 5. Create a [employer] ID. This will be your [employer] login ID and cannot be changed, so think of one that is secure and easy to remember. 6. Create a [employer] password. You can change your password at any time. 7. Enter your Password Reset Question and Answer. This can be used at a later time if you forget your password. 8. Enter your e-mail address. You will receive e-mail notification when new information is available in [address] 9. Click Sign Up. You can now view and download portions of your medical record. 10. Click the Download Summary menu link to download a portable copy of your medical information. If you have questions, please visit the Frequently Asked Questions section of the [employer] website. Remember, [employer] is NOT to be used for urgent needs. For medical emergencies, dial 911. Now available from your iPhone and Android! Please provide this summary of care documentation to your next provider. If you have any questions after today's visit, please call [phone].

## 2018-07-26 NOTE — PROGRESS NOTES
PATIENT ID VERIFIED WITH TWO PATIENT IDENTIFIERS. PATIENT MEDICATIONS REVIEWED AND APPROVED BY DR. Ravindra Mason. MEDICATIONS THAT WERE REMOVED FROM THIS VISIT HAVE BEEN APPROVED BY DR. Ravindra Mason. Chief Complaint   Patient presents with    Hypertension     6 MONTH FOLLOW UP    Coronary Artery Disease    Cholesterol Problem       1. Have you been to the ER, urgent care clinic since your last visit? Hospitalized since your last visit? NO    2. Have you seen or consulted any other health care providers outside of the [address] [place] [address] since your last visit? Include any pap smears or colon screening. Endocrinology-Dr. Marco A Chris saw last week for diabetic follow up

## 2018-09-18 RX ORDER — ATORVASTATIN CALCIUM 40 MG/1
TABLET, FILM COATED ORAL
Qty: 90 TAB | Refills: 2 | Status: SHIPPED | OUTPATIENT
Start: 2018-09-18 | End: 2019-06-17 | Stop reason: SDUPTHER

## 2018-12-07 RX ORDER — AMLODIPINE BESYLATE 5 MG/1
TABLET ORAL
Qty: 90 TAB | Refills: 2 | Status: SHIPPED | OUTPATIENT
Start: 2018-12-07 | End: 2019-10-09 | Stop reason: SDUPTHER

## 2019-01-02 RX ORDER — LISINOPRIL 10 MG/1
TABLET ORAL
Qty: 90 TAB | Refills: 0 | Status: SHIPPED | OUTPATIENT
Start: 2019-01-02 | End: 2019-02-06

## 2019-02-06 ENCOUNTER — OFFICE VISIT (OUTPATIENT)
Dept: CARDIOLOGY CLINIC | Age: 84
End: 2019-02-06

## 2019-02-06 VITALS
SYSTOLIC BLOOD PRESSURE: 180 MMHG | HEART RATE: 72 BPM | BODY MASS INDEX: 26.6 KG/M2 | HEIGHT: 71 IN | WEIGHT: 190 LBS | DIASTOLIC BLOOD PRESSURE: 80 MMHG | OXYGEN SATURATION: 99 % | RESPIRATION RATE: 18 BRPM

## 2019-02-06 DIAGNOSIS — E78.00 PURE HYPERCHOLESTEROLEMIA: ICD-10-CM

## 2019-02-06 DIAGNOSIS — E11.9 TYPE 2 DIABETES MELLITUS WITHOUT COMPLICATION, WITH LONG-TERM CURRENT USE OF INSULIN (HCC): ICD-10-CM

## 2019-02-06 DIAGNOSIS — I11.9 HYPERTENSIVE HEART DISEASE WITHOUT HEART FAILURE: ICD-10-CM

## 2019-02-06 DIAGNOSIS — I25.10 ATHEROSCLEROSIS OF NATIVE CORONARY ARTERY OF NATIVE HEART WITHOUT ANGINA PECTORIS: Primary | ICD-10-CM

## 2019-02-06 DIAGNOSIS — I65.29 STENOSIS OF CAROTID ARTERY, UNSPECIFIED LATERALITY: ICD-10-CM

## 2019-02-06 DIAGNOSIS — I10 ESSENTIAL HYPERTENSION: ICD-10-CM

## 2019-02-06 DIAGNOSIS — Z79.4 TYPE 2 DIABETES MELLITUS WITHOUT COMPLICATION, WITH LONG-TERM CURRENT USE OF INSULIN (HCC): ICD-10-CM

## 2019-02-06 RX ORDER — LISINOPRIL 10 MG/1
10 TABLET ORAL 2 TIMES DAILY
Qty: 180 TAB | Refills: 2 | Status: SHIPPED | OUTPATIENT
Start: 2019-02-06 | End: 2020-03-12

## 2019-02-06 NOTE — PROGRESS NOTES
Verified patient with two patient identifiers. Medications reviewed/approved by Dr. Mili Dickens. A verbal from Dr. Mili Dickens was given to remove any medications that were deleted during the visit. Medication(s) removed: none    Chief Complaint   Patient presents with    Coronary Artery Disease     6 month follow up    Hypertension    Carotid Artery Stenosis     disease    Cholesterol Problem     1. Have you been to the ER, urgent care clinic since your last visit? Hospitalized since your last visit?no  2. Have you seen or consulted any other health care providers outside of the [address] [place] [address] since your last visit? Include any pap smears or colon screening.  no

## 2019-02-06 NOTE — PROGRESS NOTES
Randy King is a 80 y.o. male is here for routine f/u. Overall doing ok.  Stays active.  Continues to see Endocrine (Dr Guillen BP, glucose at home. Breanna Moya BP, some elevation.    No CV sx or complaints. [facility] screening 4/28/18 with mild bilat ICA, EKG ok, JASON's normal.   The patient denies chest pain/ shortness of breath, orthopnea, PND, LE edema, palpitations, syncope, presyncope or fatigue. Patient Active Problem List    Diagnosis Date Noted    Coronary atherosclerosis of native coronary artery     HCVD (hypertensive cardiovascular disease)     Pure hypercholesterolemia     [facility] mellitus, [facility] II ([facility]     [facility] disease ([facility]       No primary care provider on file.   Past Medical History:   Diagnosis Date    [facility]ot[facility] artery disease ([facility]     Coronary atherosclerosis of native coronary artery     Diabetes mellitus, type II (HCC)     DJD (degenerative joint disease)     HCVD (hypertensive cardiovascular disease)     Pure hypercholesterolemia       Past Surgical History:   Procedure Laterality Date    HX CATARACT REMOVAL      HX HEART CATHETERIZATION  2/2010    LVEDP 9, mild ant hypo EF 55-60%, LAD 70% 99%, mod D1, OM3 30%, PDA 90%    HX PTCA  2/2010    Stent-LAD 2.5X18 ANDREA    HX PTCA  3/2010    Stent PDA ANDREA     Allergies   Allergen Reactions    Aspirin Other (comments)     NOSE BLEED  MG IS TAKEN    Pcn [Penicillins] Shortness of Breath     Itching/hives      Family History   Problem Relation Age of Onset    Heart Disease Mother     Cancer Father       Social History     Socioeconomic History    Marital status:      Spouse name: Not on file    Number of children: Not on file    Years of education: Not on file    Highest education level: Not on file   Social Needs    Financial resource strain: Not on file    Food insecurity - worry: Not on file    Food insecurity - inability: Not on file    Transportation needs - medical: Not on file  Transportation needs - non-medical: Not on file   Occupational History    Not on file   Tobacco Use    Smoking status: Former Smoker     Last attempt to quit: 10/17/1963     Years since quittin.3    Smokeless tobacco: Never Used   Substance and Sexual Activity    Alcohol use: Not on file    Drug use: Not on file    Sexual activity: Not on file   Other Topics Concern    Not on file   Social History Narrative    Not on file      Current Outpatient Medications   Medication Sig    lisinopril (PRINIVIL, ZESTRIL) 10 mg tablet TAKE ONE TABLET BY MOUTH ONCE DAILY    amLODIPine (NORVASC) 5 mg tablet TAKE ONE TABLET BY MOUTH ONCE DAILY    atorvastatin (LIPITOR) 40 mg tablet TAKE 1 TABLET BY MOUTH NIGHTLY    insulin aspart prot/insuln asp (NOVOLOG MIX 70-30 U-100 INSULN SC) by SubCUTAneous route as needed.  cholecalciferol, vitamin D3, (VITAMIN D3) 2,000 unit tab Take  by mouth daily.  aspirin delayed-release 81 mg tablet Take 81 mg by mouth daily.  ferrous sulfate (IRON) 325 mg (65 mg iron) tablet Take  by mouth Daily (before breakfast).  insulin mixture 75-25 (HUMALOG MIX 75-25) 100 unit/mL (75-25) Susp by SubCUTAneous route. 32 units in the AM, 10 units at night     No current facility-administered medications for this visit. Review of Symptoms:    CONST  No weight change. No fever, chills, sweats    ENT No visual changes, URI sx, sore throat    CV  See HPI   RESP  No cough, or sputum, wheezing. Also see HPI   GI  No abdominal pain or change in bowel habits. No heartburn or dysphagia. No melena or rectal bleeding.   No dysuria, urgency, frequency, hematuria   MSKEL  No joint pain, swelling. No muscle pain. SKIN  No rash or lesions. NEURO  No headache, syncope, or seizure. No weakness, loss of sensation, or paresthesias. PSYCH  No low mood or depression  No anxiety. HE/LYMPH  No easy bruising, abnormal bleeding, or enlarged glands.         Physical ExamPhysical Exam:    Visit Vitals  Resp 18   Ht 5' 11\" (1.803 m)   Wt 190 lb (86.2 kg)   BMI 26.50 kg/m²     Gen: NAD  HEENT:  PERRL, throat clear  Neck: no adenopathy, no thyromegaly, no JVD   Heart:  Regular,Nl S1S2,  no murmur, gallop or rub.   Lungs:  clear  Abdomen:   Soft, non-tender, bowel sounds are active.   Extremities:  No edema  Pulse: symmetric  Neuro: A&O times 3, No focal neuro deficits    Cardiographics    ECG: normal EKG, normal sinus rhythm, unchanged from previous tracings      Labs:   Lab Results   Component Value Date/Time    Sodium 140 02/24/2010 04:10 AM    Sodium 135 (L) 02/23/2010 09:30 AM    Sodium 137 02/04/2010 04:00 AM    Potassium 4.1 02/24/2010 04:10 AM    Potassium 4.6 02/23/2010 09:30 AM    Potassium 4.0 02/04/2010 04:00 AM    Chloride 105 02/24/2010 04:10 AM    Chloride 100 02/23/2010 09:30 AM    Chloride 102 02/04/2010 04:00 AM    CO2 24 02/24/2010 04:10 AM    CO2 28 02/23/2010 09:30 AM    CO2 29 02/04/2010 04:00 AM    Anion gap 11 02/24/2010 04:10 AM    Anion gap 7 02/23/2010 09:30 AM    Anion gap 6 02/04/2010 04:00 AM    Glucose 172 (H) 02/24/2010 04:10 AM    Glucose 206 (H) 02/23/2010 09:30 AM    Glucose 109 (H) 02/04/2010 04:00 AM    BUN 22 (H) 02/24/2010 04:10 AM    BUN 23 (H) 02/23/2010 09:30 AM    BUN 23 (H) 02/04/2010 04:00 AM    Creatinine 1.2 02/24/2010 04:10 AM    Creatinine 1.5 (H) 02/23/2010 09:30 AM    Creatinine 1.2 02/04/2010 04:00 AM    BUN/Creatinine ratio 18 02/24/2010 04:10 AM    BUN/Creatinine ratio 15 02/23/2010 09:30 AM    BUN/Creatinine ratio 19 02/04/2010 04:00 AM    GFR est AA >60 02/24/2010 04:10 AM    GFR est AA 58 (L) 02/23/2010 09:30 AM    GFR est AA >60 02/04/2010 04:00 AM    GFR est non-AA >60 02/24/2010 04:10 AM    GFR est non-AA 48 (L) 02/23/2010 09:30 AM    GFR est non-AA >60 02/04/2010 04:00 AM    Calcium 8.8 02/24/2010 04:10 AM    Calcium 9.4 02/23/2010 09:30 AM    Calcium 8.7 02/04/2010 04:00 AM    Bilirubin, total 0.6 02/23/2010 09:30 AM    AST (SGOT) 35 02/23/2010 09:30 AM    Alk. phosphatase 93 02/23/2010 09:30 AM    Protein, total 7.9 02/23/2010 09:30 AM    Albumin 4.3 02/23/2010 09:30 AM    Globulin 3.6 02/23/2010 09:30 AM    A-G Ratio 1.2 02/23/2010 09:30 AM    ALT (SGPT) 42 02/23/2010 09:30 AM     No results found for: CPK, CPKX, CPX  No results found for: CHOL, CHOLX, CHLST, CHOLV, 730223, HDL, LDL, LDLC, DLDLP, TGLX, TRIGL, TRIGP, CHHD, CHHDX  No results found for this or any previous visit. Assessment:         Patient Active Problem List    Diagnosis Date Noted    Coronary atherosclerosis of native coronary artery     HCVD (hypertensive cardiovascular disease)     Pure hypercholesterolemia     Diabetes mellitus, type II ([facility]     Carotid artery disease ([facility]         Plan:     Doing well with no adverse cardiac symptoms. BP elevated--will increase the lisinopril to 10mg bid (now on every day)  Lipids and labs followed by Endocrine  Some concerns about \"bug\" infestation at home. Continue current care and f/u in 6 months.     Jalen Leone MD

## 2019-02-07 NOTE — PROGRESS NOTES
I roomed this pt on 2/6/19. At first I noticed bite marks on his neck and arms. Asked the pt to lift up his shirt so the ekg could be performed and the pt was reluctant at first but proceeded to slowly lift up his shirt. Once the shirt was removed I noticed open sores, bite marks, scratches on the pts abdomen. I questioned the pt regarding. Pt was hesitant and stated, \"I have a bug problem at home. \"  Asked the pt if he's called an  to evaluate and treat. Pt replied with, \"I have and they told me that they would be back at another time to treat. \" Pt couldn't tell me who the company was or what they had found. Asked the pt if he needed help/assistance but he denied help. I informed Dr. Rofl Irizarry of what I had witnessed post rooming the pt. I did not find any bugs on the pt or in the room post his visit. Closed off the exam room (2) that pt was examined in yesterday and had ENVIRONMENTAL SERVICES clean appropriately. Pt was seen last SUMMER by Dr. Rolf Irizarry (7/26/18). All information from 7/26/18 visit was verbally given to me from MD and rooming MA. Pt was roomed by Josef Cardenas. Dr. Rolf Irizarry and Josef Cardenas approached me after the assessment/rooming process and stated that this particular pt had bugs crawling on him. Pt wasn't questioned regarding during the assessment/rooming process per MA and MD. I personally was not involved in any of the rooming process and all information was provided post.   I closed off that room (exam room 1) as advised by management, Adele Starr and had ENVIRONMENTAL SERVICES examine and clean appropriately. Discussed with my manager Adele Starr regarding yesterdays incident. We have agreed to call Case Management at [facility]. L/m for ron sexton (case management).

## 2019-02-08 NOTE — PROGRESS NOTES
Spoke with Phillip Diaz -  with [facility]. She advised that I call [facility] office and report this case ([employer]). Called [phone]. Spoke with Missael Anaya. Reported this case. They will investigate today.

## 2019-02-08 NOTE — PROGRESS NOTES
Spoke with Genevieve Rodriguez with [facility]. She informed me that all they can do is call the pt and offer funding for extermination of pests. Pt has the right to give his consent for treatment or refuse. They will be in touch.

## 2019-06-17 RX ORDER — ATORVASTATIN CALCIUM 40 MG/1
TABLET, FILM COATED ORAL
Qty: 90 TAB | Refills: 2 | Status: SHIPPED | OUTPATIENT
Start: 2019-06-17 | End: 2019-09-26 | Stop reason: SDUPTHER

## 2019-07-29 ENCOUNTER — TELEPHONE (OUTPATIENT)
Dept: CARDIOLOGY CLINIC | Age: 84
End: 2019-07-29

## 2019-07-29 NOTE — TELEPHONE ENCOUNTER
Called [phone] regarding the APS that was started on 2/6/19. No answer on Reena Palafox ([phone]) and Yandel Hayward ([phone]) extension. L/M for [place] regarding. Called management regarding Ashley Healy). She will discuss with the Practice Administrator, Elvin Parents.     *Pt has an upcoming appt in August.

## 2019-08-22 NOTE — TELEPHONE ENCOUNTER
No return call from DSS. Spoke with management ([facility]) and administration Taco Peralta). Pt will be roomed at the end of the day due to the possibility of having to shut down the office afterwards.  (Per management and administration.)

## 2019-09-26 ENCOUNTER — OFFICE VISIT (OUTPATIENT)
Dept: CARDIOLOGY CLINIC | Age: 84
End: 2019-09-26

## 2019-09-26 VITALS
HEIGHT: 71 IN | WEIGHT: 190 LBS | BODY MASS INDEX: 26.6 KG/M2 | OXYGEN SATURATION: 99 % | RESPIRATION RATE: 16 BRPM | HEART RATE: 95 BPM | SYSTOLIC BLOOD PRESSURE: 162 MMHG | DIASTOLIC BLOOD PRESSURE: 80 MMHG

## 2019-09-26 DIAGNOSIS — I10 ESSENTIAL HYPERTENSION: ICD-10-CM

## 2019-09-26 DIAGNOSIS — I25.10 ATHEROSCLEROSIS OF NATIVE CORONARY ARTERY OF NATIVE HEART WITHOUT ANGINA PECTORIS: Primary | ICD-10-CM

## 2019-09-26 DIAGNOSIS — Z79.4 TYPE 2 DIABETES MELLITUS WITHOUT COMPLICATION, WITH LONG-TERM CURRENT USE OF INSULIN (HCC): ICD-10-CM

## 2019-09-26 DIAGNOSIS — E11.9 TYPE 2 DIABETES MELLITUS WITHOUT COMPLICATION, WITH LONG-TERM CURRENT USE OF INSULIN (HCC): ICD-10-CM

## 2019-09-26 DIAGNOSIS — E78.00 PURE HYPERCHOLESTEROLEMIA: ICD-10-CM

## 2019-09-26 RX ORDER — ATORVASTATIN CALCIUM 40 MG/1
TABLET, FILM COATED ORAL
Qty: 90 TAB | Refills: 2 | Status: SHIPPED | OUTPATIENT
Start: 2019-09-26 | End: 2020-06-17

## 2019-09-26 NOTE — PROGRESS NOTES
Verified patient with two patient identifiers. Medications reviewed/approved by Dr. Jenna Carlin. A verbal order from Dr. Jenna Carlin was received with [facility] to remove any medications that were deleted during the visit. Medication(s) removed:  insulin mixture 75-25 (HUMALOG MIX 75-25) 100 unit/mL (75-25) Susp     Chief Complaint   Patient presents with    Coronary Artery Disease     6 MONTH FOLLOW UP    Hypertension       1. Have you been to the ER, urgent care clinic since your last visit? Hospitalized since your last visit?no  2. Have you seen or consulted any other health care providers outside of the [address] [place] [address] since your last visit? Include any pap smears or colon screening. Yes, ENDOCRINOLOGIST DR. Reinaldo Lancaster LAST SEEN 7/2019 FOR DIABETES.

## 2019-09-26 NOTE — PROGRESS NOTES
Brenda Hartman is a 80 y.o. male is here for routine f/u. Overall doing ok.  Stays active.  Continues to see Endocrine (Dr Pushpa Moncada BP, glucose at home. Clementeen Babinski BP, some elevation.    No CV sx or complaints. [facility] screening 4/28/18 with mild bilat ICA, EKG ok, JASON's normal.  Recent labs with Dr. Rakesh Osman Hb 12.1, glc 138, BUN 28, Cr 1.37, HbA1c 7.2. The patient denies chest pain/ shortness of breath, orthopnea, PND, LE edema, palpitations, syncope, presyncope or fatigue. Patient Active Problem List    Diagnosis Date Noted    Coronary atherosclerosis of native coronary artery     HCVD (hypertensive cardiovascular disease)     Pure hypercholesterolemia     [facility], [facility]     Carotid artery disease ([facility]       No primary care provider on file.   Past Medical History:   Diagnosis Date    [facility]     Coronary atherosclerosis of native coronary artery     Diabetes mellitus, type II (HCC)     DJD (degenerative joint disease)     HCVD (hypertensive cardiovascular disease)     Pure hypercholesterolemia       Past Surgical History:   Procedure Laterality Date    HX CATARACT REMOVAL      HX HEART CATHETERIZATION  2/2010    LVEDP 9, mild ant hypo EF 55-60%, LAD 70% 99%, mod D1, OM3 30%, PDA 90%    HX PTCA  2/2010    Stent-LAD 2.5X18 ANDREA    HX PTCA  3/2010    Stent PDA ANDREA     Allergies   Allergen Reactions    Aspirin Other (comments)     NOSE BLEED  MG IS TAKEN    Pcn [Penicillins] Shortness of Breath     Itching/hives      Family History   Problem Relation Age of Onset    Heart Disease Mother     Cancer Father       Social History     Socioeconomic History    Marital status:      Spouse name: Not on file    Number of children: Not on file    Years of education: Not on file    Highest education level: Not on file   Occupational History    Not on file   Social Needs    Financial resource strain: Not on file   Larimer-Joan insecurity:     Worry: Not on file     Inability: Not on file    Transportation needs:     Medical: Not on file     Non-medical: Not on file   Tobacco Use    Smoking status: Former Smoker     Last attempt to quit: 10/17/1963     Years since quittin.9    Smokeless tobacco: Never Used   Substance and Sexual Activity    Alcohol use: Not on file    Drug use: Not on file    Sexual activity: Not on file   Lifestyle    Physical activity:     Days per week: Not on file     Minutes per session: Not on file    Stress: Not on file   Relationships    Social connections:     Talks on phone: Not on file     Gets together: Not on file     Attends Synagogue service: Not on file     Active member of club or organization: Not on file     Attends meetings of clubs or organizations: Not on file     Relationship status: Not on file    Intimate partner violence:     Fear of current or ex partner: Not on file     Emotionally abused: Not on file     Physically abused: Not on file     Forced sexual activity: Not on file   Other Topics Concern    Not on file   Social History Narrative    Not on file      Current Outpatient Medications   Medication Sig    atorvastatin (LIPITOR) 40 mg tablet TAKE 1 TABLET BY MOUTH NIGHTLY    lisinopril (PRINIVIL, ZESTRIL) 10 mg tablet Take 1 Tab by mouth two (2) times a day.  amLODIPine (NORVASC) 5 mg tablet TAKE ONE TABLET BY MOUTH ONCE DAILY    insulin aspart prot/insuln asp (NOVOLOG MIX 70-30 U-100 INSULN SC) 32 Units by SubCUTAneous route every morning.  cholecalciferol, vitamin D3, (VITAMIN D3) 2,000 unit tab Take  by mouth daily.  aspirin delayed-release 81 mg tablet Take 81 mg by mouth daily.  ferrous sulfate (IRON) 325 mg (65 mg iron) tablet Take  by mouth Daily (before breakfast).  insulin mixture 75-25 (HUMALOG MIX 75-25) 100 unit/mL (75-25) Susp by SubCUTAneous route. 4-5 units as needed     No current facility-administered medications for this visit. Review of Symptoms:    CONST  No weight change. No fever, chills, sweats    ENT No visual changes, URI sx, sore throat    CV  See HPI   RESP  No cough, or sputum, wheezing. Also see HPI   GI  No abdominal pain or change in bowel habits. No heartburn or dysphagia. No melena or rectal bleeding.   No dysuria, urgency, frequency, hematuria   MSKEL  No joint pain, swelling. No muscle pain. SKIN  No rash or lesions. NEURO  No headache, syncope, or seizure. No weakness, loss of sensation, or paresthesias. PSYCH  No low mood or depression  No anxiety. HE/LYMPH  No easy bruising, abnormal bleeding, or enlarged glands.         Physical ExamPhysical Exam:    Visit Vitals  Pulse 95   Resp 16   Ht 5' 11\" (1.803 m)   Wt 190 lb (86.2 kg)   SpO2 99% Comment: ra   BMI 26.50 kg/m²     Gen: NAD  HEENT:  PERRL, throat clear  Neck: no adenopathy, no thyromegaly, no JVD   Heart:  Regular,Nl S1S2,  no murmur, gallop or rub.   Lungs:  clear  Abdomen:   Soft, non-tender, bowel sounds are active.   Extremities:  No edema  Pulse: symmetric  Neuro: A&O times 3, No focal neuro deficits    Cardiographics    ECG: NSR, first degree AV block      Labs:   Lab Results   Component Value Date/Time    Sodium 140 02/24/2010 04:10 AM    Sodium 135 (L) 02/23/2010 09:30 AM    Sodium 137 02/04/2010 04:00 AM    Potassium 4.1 02/24/2010 04:10 AM    Potassium 4.6 02/23/2010 09:30 AM    Potassium 4.0 02/04/2010 04:00 AM    Chloride 105 02/24/2010 04:10 AM    Chloride 100 02/23/2010 09:30 AM    Chloride 102 02/04/2010 04:00 AM    CO2 24 02/24/2010 04:10 AM    CO2 28 02/23/2010 09:30 AM    CO2 29 02/04/2010 04:00 AM    Anion gap 11 02/24/2010 04:10 AM    Anion gap 7 02/23/2010 09:30 AM    Anion gap 6 02/04/2010 04:00 AM    Glucose 172 (H) 02/24/2010 04:10 AM    Glucose 206 (H) 02/23/2010 09:30 AM    Glucose 109 (H) 02/04/2010 04:00 AM    BUN 22 (H) 02/24/2010 04:10 AM    BUN 23 (H) 02/23/2010 09:30 AM    BUN 23 (H) 02/04/2010 04:00 AM Creatinine 1.2 02/24/2010 04:10 AM    Creatinine 1.5 (H) 02/23/2010 09:30 AM    Creatinine 1.2 02/04/2010 04:00 AM    BUN/Creatinine ratio 18 02/24/2010 04:10 AM    BUN/Creatinine ratio 15 02/23/2010 09:30 AM    BUN/Creatinine ratio 19 02/04/2010 04:00 AM    GFR est AA >60 02/24/2010 04:10 AM    GFR est AA 58 (L) 02/23/2010 09:30 AM    GFR est AA >60 02/04/2010 04:00 AM    GFR est non-AA >60 02/24/2010 04:10 AM    GFR est non-AA 48 (L) 02/23/2010 09:30 AM    GFR est non-AA >60 02/04/2010 04:00 AM    Calcium 8.8 02/24/2010 04:10 AM    Calcium 9.4 02/23/2010 09:30 AM    Calcium 8.7 02/04/2010 04:00 AM    Bilirubin, total 0.6 02/23/2010 09:30 AM    AST (SGOT) 35 02/23/2010 09:30 AM    Alk. phosphatase 93 02/23/2010 09:30 AM    Protein, total 7.9 02/23/2010 09:30 AM    Albumin 4.3 02/23/2010 09:30 AM    Globulin 3.6 02/23/2010 09:30 AM    A-G Ratio 1.2 02/23/2010 09:30 AM    ALT (SGPT) 42 02/23/2010 09:30 AM     No results found for: CPK, CPKX, CPX  No results found for: CHOL, CHOLX, CHLST, CHOLV, 987396, HDL, HDLP, LDL, LDLC, DLDLP, TGLX, TRIGL, TRIGP, CHHD, CHHDX  No results found for this or any previous visit. Assessment:         Patient Active Problem List    Diagnosis Date Noted    Coronary atherosclerosis of native coronary artery     HCVD (hypertensive cardiovascular disease)     Pure hypercholesterolemia     [facility]     Carotid artery disease (HCC)      Overall doing ok.  Stays active.  Continues to see Endocrine (Dr Mini Valente BP, glucose at home.  Labile BP, some elevation.    No CV sx or complaints. [facility] screening 4/28/18 with mild bilat ICA, EKG ok, JASON's normal.  Recent labs with Dr. Kenny Silva Hb 12.1, glc 138, BUN 28, Cr 1.37, HbA1c 7.2. Plan:     Doing well with no adverse cardiac symptoms. Recheck lipids/labs--call w/ results  Continue current care and f/u in 6 months.     Jana Lo MD

## 2019-09-27 DIAGNOSIS — I25.10 ATHEROSCLEROSIS OF NATIVE CORONARY ARTERY OF NATIVE HEART WITHOUT ANGINA PECTORIS: ICD-10-CM

## 2019-09-27 DIAGNOSIS — E78.00 PURE HYPERCHOLESTEROLEMIA: ICD-10-CM

## 2019-10-09 RX ORDER — AMLODIPINE BESYLATE 5 MG/1
TABLET ORAL
Qty: 90 TAB | Refills: 2 | Status: SHIPPED | OUTPATIENT
Start: 2019-10-09 | End: 2020-10-15

## 2019-10-11 ENCOUNTER — DOCUMENTATION ONLY (OUTPATIENT)
Dept: CARDIOLOGY CLINIC | Age: 84
End: 2019-10-11

## 2019-10-23 LAB
CHOLEST SERPL-MCNC: 129 MG/DL (ref 100–199)
HDLC SERPL-MCNC: 64 MG/DL
LDLC SERPL CALC-MCNC: 50 MG/DL (ref 0–99)
TRIGL SERPL-MCNC: 74 MG/DL (ref 0–149)
VLDLC SERPL CALC-MCNC: 15 MG/DL (ref 5–40)

## 2020-08-06 ENCOUNTER — VIRTUAL VISIT (OUTPATIENT)
Dept: CARDIOLOGY CLINIC | Age: 85
End: 2020-08-06

## 2020-08-06 DIAGNOSIS — I25.10 ATHEROSCLEROSIS OF NATIVE CORONARY ARTERY OF NATIVE HEART WITHOUT ANGINA PECTORIS: Primary | ICD-10-CM

## 2020-08-06 DIAGNOSIS — I11.9 HYPERTENSIVE HEART DISEASE WITHOUT HEART FAILURE: ICD-10-CM

## 2020-08-06 DIAGNOSIS — Z79.4 TYPE 2 DIABETES MELLITUS WITHOUT COMPLICATION, WITH LONG-TERM CURRENT USE OF INSULIN (HCC): ICD-10-CM

## 2020-08-06 DIAGNOSIS — E78.00 PURE HYPERCHOLESTEROLEMIA: ICD-10-CM

## 2020-08-06 DIAGNOSIS — E11.9 TYPE 2 DIABETES MELLITUS WITHOUT COMPLICATION, WITH LONG-TERM CURRENT USE OF INSULIN (HCC): ICD-10-CM

## 2020-08-06 NOTE — PROGRESS NOTES
The patient was seen by me today by telephone in place of in-person OV ([facility]). Triny Palomares is a 80 y.o. male is here for routine f/u. No current CV sx or complaints. Continues to see Dr. Addison Mcpherson for DM management--following labs. The patient denies chest pain/ shortness of breath, orthopnea, PND, LE edema, palpitations, syncope, presyncope or fatigue. Patient Active Problem List    Diagnosis Date Noted    Coronary atherosclerosis of native coronary artery     HCVD (hypertensive cardiovascular disease)     Pure hypercholesterolemia     Diabetes mellitus, type II ([facility]     [facility]       No primary care provider on file.   Past Medical History:   Diagnosis Date    [facility]     Coronary atherosclerosis of native coronary artery     Diabetes mellitus, type II (HCC)     DJD (degenerative joint disease)     HCVD (hypertensive cardiovascular disease)     Pure hypercholesterolemia       Past Surgical History:   Procedure Laterality Date    HX CATARACT REMOVAL      HX HEART CATHETERIZATION  2/2010    LVEDP 9, mild ant hypo EF 55-60%, LAD 70% 99%, mod D1, OM3 30%, PDA 90%    HX PTCA  2/2010    Stent-LAD 2.5X18 ANDREA    HX PTCA  3/2010    Stent PDA ANDREA     Allergies   Allergen Reactions    Aspirin Other (comments)     NOSE BLEED  MG IS TAKEN    Pcn [Penicillins] Shortness of Breath     Itching/hives      Family History   Problem Relation Age of Onset    Heart Disease Mother     Cancer Father       Social History     Socioeconomic History    Marital status:      Spouse name: Not on file    Number of children: Not on file    Years of education: Not on file    Highest education level: Not on file   Occupational History    Not on file   Social Needs    Financial resource strain: Not on file    Food insecurity     Worry: Not on file     Inability: Not on file    Transportation needs     Medical: Not on file     Non-medical: Not on file   Tobacco Use    Smoking status: Former Smoker     Last attempt to quit: 10/17/1963     Years since quittin.6    Smokeless tobacco: Never Used   Substance and Sexual Activity    Alcohol use: Not on file    Drug use: Not on file    Sexual activity: Not on file   Lifestyle    Physical activity     Days per week: Not on file     Minutes per session: Not on file    Stress: Not on file   Relationships    Social connections     Talks on phone: Not on file     Gets together: Not on file     Attends Hinduism service: Not on file     Active member of club or organization: Not on file     Attends meetings of clubs or organizations: Not on file     Relationship status: Not on file    Intimate partner violence     Fear of current or ex partner: Not on file     Emotionally abused: Not on file     Physically abused: Not on file     Forced sexual activity: Not on file   Other Topics Concern    Not on file   Social History Narrative    Not on file      Current Outpatient Medications   Medication Sig    atorvastatin (LIPITOR) 40 mg tablet Take 1 tablet by mouth nightly    lisinopriL (PRINIVIL, ZESTRIL) 10 mg tablet Take 1 tablet by mouth twice daily    amLODIPine (NORVASC) 5 mg tablet TAKE 1 TABLET BY MOUTH ONCE DAILY    insulin aspart prot/insuln asp (NOVOLOG MIX 70-30 U-100 INSULN SC) 32 Units by SubCUTAneous route every morning.  cholecalciferol, vitamin D3, (VITAMIN D3) 2,000 unit tab Take  by mouth daily.  aspirin delayed-release 81 mg tablet Take 81 mg by mouth daily.  ferrous sulfate (IRON) 325 mg (65 mg iron) tablet Take  by mouth Daily (before breakfast). No current facility-administered medications for this visit. Review of Symptoms:    CONST  No weight change. No fever, chills, sweats    ENT No visual changes, URI sx, sore throat    CV  See HPI   RESP  No cough, or sputum, wheezing. Also see HPI   GI  No abdominal pain or change in bowel habits.   No heartburn or dysphagia. No melena or rectal bleeding.   No dysuria, urgency, frequency, hematuria   MSKEL  No joint pain, swelling. No muscle pain. SKIN  No rash or lesions. NEURO  No headache, syncope, or seizure. No weakness, loss of sensation, or paresthesias. PSYCH  No low mood or depression  No anxiety. HE/LYMPH  No easy bruising, abnormal bleeding, or enlarged glands. Labs:   Lab Results   Component Value Date/Time    Sodium 140 02/24/2010 04:10 AM    Sodium 135 (L) 02/23/2010 09:30 AM    Sodium 137 02/04/2010 04:00 AM    Potassium 4.1 02/24/2010 04:10 AM    Potassium 4.6 02/23/2010 09:30 AM    Potassium 4.0 02/04/2010 04:00 AM    Chloride 105 02/24/2010 04:10 AM    Chloride 100 02/23/2010 09:30 AM    Chloride 102 02/04/2010 04:00 AM    CO2 24 02/24/2010 04:10 AM    CO2 28 02/23/2010 09:30 AM    CO2 29 02/04/2010 04:00 AM    Anion gap 11 02/24/2010 04:10 AM    Anion gap 7 02/23/2010 09:30 AM    Anion gap 6 02/04/2010 04:00 AM    Glucose 172 (H) 02/24/2010 04:10 AM    Glucose 206 (H) 02/23/2010 09:30 AM    Glucose 109 (H) 02/04/2010 04:00 AM    BUN 22 (H) 02/24/2010 04:10 AM    BUN 23 (H) 02/23/2010 09:30 AM    BUN 23 (H) 02/04/2010 04:00 AM    Creatinine 1.2 02/24/2010 04:10 AM    Creatinine 1.5 (H) 02/23/2010 09:30 AM    Creatinine 1.2 02/04/2010 04:00 AM    BUN/Creatinine ratio 18 02/24/2010 04:10 AM    BUN/Creatinine ratio 15 02/23/2010 09:30 AM    BUN/Creatinine ratio 19 02/04/2010 04:00 AM    GFR est AA >60 02/24/2010 04:10 AM    GFR est AA 58 (L) 02/23/2010 09:30 AM    GFR est AA >60 02/04/2010 04:00 AM    GFR est non-AA >60 02/24/2010 04:10 AM    GFR est non-AA 48 (L) 02/23/2010 09:30 AM    GFR est non-AA >60 02/04/2010 04:00 AM    Calcium 8.8 02/24/2010 04:10 AM    Calcium 9.4 02/23/2010 09:30 AM    Calcium 8.7 02/04/2010 04:00 AM    Bilirubin, total 0.6 02/23/2010 09:30 AM    Alk.  phosphatase 93 02/23/2010 09:30 AM    Protein, total 7.9 02/23/2010 09:30 AM    Albumin 4.3 02/23/2010 09:30 AM    Globulin 3.6 02/23/2010 09:30 AM    A-G Ratio 1.2 02/23/2010 09:30 AM    ALT (SGPT) 42 02/23/2010 09:30 AM     No results found for: CPK, CPKX, CPX  Lab Results   Component Value Date/Time    Cholesterol, total 129 10/21/2019 12:00 AM    HDL Cholesterol 64 10/21/2019 12:00 AM    LDL, calculated 50 10/21/2019 12:00 AM    Triglyceride 74 10/21/2019 12:00 AM     No results found for this or any previous visit. Assessment:         Patient Active Problem List    Diagnosis Date Noted    Coronary atherosclerosis of native coronary artery     HCVD (hypertensive cardiovascular disease)     Pure hypercholesterolemia     Diabetes mellitus, type II ([facility]     Carotid artery disease ([facility]      No current CV sx or complaints. Continues to see Dr. Sherif Thompson for DM management--following labs. Plan:     Doing well with no adverse cardiac symptoms. DM,Lipids and labs followed by Dr Verlon Severance 4/28/20 with HbA1c 7.6, cr 1.11, chol 147, LDL 70, HDL 65, TG 60  Continue current care and f/u in 6 months. Cecilio Blank MD    Pursuant to the emergency declaration under the [facility] waiver authority and the [employer] Act, this Virtual Visit was conducted, with patient's consent, to reduce the patient's risk of exposure to COVID-19 and provide continuity of care for an established patient. Services were provided through a telephone discussion virtually to substitute for in-person visit.     Total time spent 15 mins

## 2020-08-06 NOTE — PROGRESS NOTES
Verified patient with two patient identifiers. Medications reviewed/approved by Dr. Qiana Germain. 1. Have you been to the ER, urgent care clinic since your last visit? Hospitalized since your last visit?no    2. Have you seen or consulted any other health care providers outside of the [address] [place] [address] since your last visit? Include any pap smears or colon screening. Yes, endocrinologist - last seen April 2020 by Dr. Babak Tran for diabetes.

## 2020-12-30 ENCOUNTER — HOSPITAL ENCOUNTER (OUTPATIENT)
Dept: PREADMISSION TESTING | Age: 85
Discharge: HOME | End: 2020-12-30
Attending: PLASTIC SURGERY
Payer: MEDICARE

## 2020-12-30 VITALS
HEART RATE: 74 BPM | DIASTOLIC BLOOD PRESSURE: 43 MMHG | BODY MASS INDEX: 26.27 KG/M2 | OXYGEN SATURATION: 100 % | HEIGHT: 71 IN | WEIGHT: 187.61 LBS | SYSTOLIC BLOOD PRESSURE: 169 MMHG | RESPIRATION RATE: 20 BRPM | TEMPERATURE: 98.2 F

## 2020-12-30 LAB
ANION GAP SERPL CALC-SCNC: 4 MMOL/L (ref 5–15)
ATRIAL RATE: 72 BPM
BUN SERPL-MCNC: 29 MG/DL (ref 6–20)
BUN/CREAT SERPL: 21 (ref 12–20)
CALCIUM SERPL-MCNC: 9 MG/DL (ref 8.5–10.1)
CALCULATED P AXIS, ECG09: 88 DEGREES
CALCULATED R AXIS, ECG10: 35 DEGREES
CALCULATED T AXIS, ECG11: 55 DEGREES
CHLORIDE SERPL-SCNC: 103 MMOL/L (ref 97–108)
CO2 SERPL-SCNC: 27 MMOL/L (ref 21–32)
CREAT SERPL-MCNC: 1.35 MG/DL (ref 0.7–1.3)
DIAGNOSIS, 93000: NORMAL
ERYTHROCYTE [DISTWIDTH] IN BLOOD BY AUTOMATED COUNT: 13 % (ref 11.5–14.5)
GLUCOSE SERPL-MCNC: 255 MG/DL (ref 65–100)
HCT VFR BLD AUTO: 35.5 % (ref 36.6–50.3)
HGB BLD-MCNC: 11.5 G/DL (ref 12.1–17)
MCH RBC QN AUTO: 30.2 PG (ref 26–34)
MCHC RBC AUTO-ENTMCNC: 32.4 G/DL (ref 30–36.5)
MCV RBC AUTO: 93.2 FL (ref 80–99)
NRBC # BLD: 0 K/UL (ref 0–0.01)
NRBC BLD-RTO: 0 PER 100 WBC
P-R INTERVAL, ECG05: 216 MS
PLATELET # BLD AUTO: 236 K/UL (ref 150–400)
PMV BLD AUTO: 9.9 FL (ref 8.9–12.9)
POTASSIUM SERPL-SCNC: 4.4 MMOL/L (ref 3.5–5.1)
Q-T INTERVAL, ECG07: 388 MS
QRS DURATION, ECG06: 86 MS
QTC CALCULATION (BEZET), ECG08: 424 MS
RBC # BLD AUTO: 3.81 M/UL (ref 4.1–5.7)
SODIUM SERPL-SCNC: 134 MMOL/L (ref 136–145)
VENTRICULAR RATE, ECG03: 72 BPM
WBC # BLD AUTO: 7 K/UL (ref 4.1–11.1)

## 2020-12-30 PROCEDURE — 36415 COLL VENOUS BLD VENIPUNCTURE: CPT

## 2020-12-30 PROCEDURE — 93005 ELECTROCARDIOGRAM TRACING: CPT

## 2020-12-30 PROCEDURE — 80048 BASIC METABOLIC PNL TOTAL CA: CPT

## 2020-12-30 PROCEDURE — 85027 COMPLETE CBC AUTOMATED: CPT

## 2020-12-30 RX ORDER — INSULIN ASPART 100 [IU]/ML
10 INJECTION, SUSPENSION SUBCUTANEOUS EVERY EVENING
COMMUNITY
End: 2021-01-19 | Stop reason: SDUPTHER

## 2020-12-30 NOTE — PERIOP NOTES
[facility]  Preoperative Instructions    COVID TESTING  Monday January 4 2021  [facility] will call from the hospital to confirm time of arrival    Surgery Date 01/08/21         Time of Arrival 0545  Contact #[phone]    1. On the day of your surgery, please report to the Surgical Services Registration Desk and sign in at your designated time. The Surgery Center is located to the right of the Emergency Room. 2. You must have someone with you to drive you home. You should not drive a car for 24 hours following surgery. Please make arrangements for a friend or family member to stay with you for the first 24 hours after your surgery. 3. Do not have anything to eat or drink (including water, gum, mints, coffee, juice) after midnight ?01/07/21 . ? This may not apply to medications prescribed by your physician. ?(Please note below the special instructions with medications to take the morning of your procedure.)    4. We recommend you do not drink any alcoholic beverages for 24 hours before and after your surgery. 5. Contact your surgeons office for instructions on the following medications: non-steroidal anti-inflammatory drugs (i.e. Advil, Aleve), vitamins, and supplements. (Some surgeons will want you to stop these medications prior to surgery and others may allow you to take them)  **If you are currently taking Plavix, Coumadin, Aspirin and/or other blood-thinning agents, contact your surgeon for instructions. ** Your surgeon will partner with the physician prescribing these medications to determine if it is safe to stop or if you need to continue taking. Please do not stop taking these medications without instructions from your surgeon    6. Wear comfortable clothes. Wear glasses instead of contacts. Do not bring any money or jewelry. Please bring picture ID, insurance card, and any prearranged co-payment or hospital payment.   Do not wear make-up, particularly mascara the morning of your surgery. Do not wear nail polish, particularly if you are having foot /hand surgery. Wear your hair loose or down, no ponytails, buns, rajesh pins or clips. All body piercings must be removed. Please shower with antibacterial soap for three consecutive days before and on the morning of surgery, but do not apply any lotions, powders or deodorants after the shower on the day of surgery. Please use a fresh towels after each shower. Please sleep in clean clothes and change bed linens the night before surgery. Please do not shave for 48 hours prior to surgery. Shaving of the face is acceptable. 7. You should understand that if you do not follow these instructions your surgery may be cancelled. If your physical condition changes (I.e. fever, cold or flu) please contact your surgeon as soon as possible. 8. It is important that you be on time. If a situation occurs where you may be late, please call [phone] (OR Holding Area). 9. If you have any questions and or problems, please call [phone] (Pre-admission Testing). 10. Your surgery time may be subject to change. You will receive a phone call the evening prior if your time changes. 11.  If having outpatient surgery, you must have someone to drive you here, stay with you during the duration of your stay, and to drive you home at time of discharge. Special Instructions:     TAKE ALL MEDICATIONS DAY OF SURGERY EXCEPT:no insulin morning of surgery/no lisinopril      I understand a pre-operative phone call will be made to verify my surgery time. In the event that I am not available, I give permission for a message to be left on my answering service and/or with another person?   yes          ___________________      __________   _________    (Signature of Patient)             (Witness)                (Date and Time)
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
## 2021-01-19 PROBLEM — E11.51 TYPE 2 DIABETES MELLITUS WITH PERIPHERAL VASCULAR DISEASE (HCC): Status: ACTIVE | Noted: 2021-01-19

## 2021-01-19 PROBLEM — Z79.4 CONTROLLED TYPE 2 DIABETES MELLITUS WITHOUT COMPLICATION, WITH LONG-TERM CURRENT USE OF INSULIN (HCC): Status: ACTIVE | Noted: 2021-01-19

## 2021-01-19 PROBLEM — E11.9 CONTROLLED TYPE 2 DIABETES MELLITUS WITHOUT COMPLICATION, WITH LONG-TERM CURRENT USE OF INSULIN (HCC): Status: ACTIVE | Noted: 2021-01-19

## 2021-02-18 ENCOUNTER — VIRTUAL VISIT (OUTPATIENT)
Dept: CARDIOLOGY CLINIC | Age: 86
End: 2021-02-18
Payer: MEDICARE

## 2021-02-18 DIAGNOSIS — I25.10 ATHEROSCLEROSIS OF NATIVE CORONARY ARTERY OF NATIVE HEART WITHOUT ANGINA PECTORIS: Primary | ICD-10-CM

## 2021-02-18 DIAGNOSIS — E78.00 PURE HYPERCHOLESTEROLEMIA: ICD-10-CM

## 2021-02-18 DIAGNOSIS — I11.9 HYPERTENSIVE HEART DISEASE WITHOUT HEART FAILURE: ICD-10-CM

## 2021-02-18 DIAGNOSIS — E11.51 TYPE 2 DIABETES MELLITUS WITH PERIPHERAL VASCULAR DISEASE (HCC): ICD-10-CM

## 2021-02-18 PROCEDURE — 99443 PR PHYS/QHP TELEPHONE EVALUATION 21-30 MIN: CPT | Performed by: INTERNAL MEDICINE

## 2021-02-18 NOTE — PROGRESS NOTES
Verified patient with two patient identifiers. Medications reviewed/approved by Dr. Miah [place]. Chief Complaint   Patient presents with    Coronary Artery Disease     6 month follow up    Hypertension    Cholesterol Problem     1. Have you been to the ER, urgent care clinic since your last visit? Hospitalized since your last visit?no    2. Have you seen or consulted any other health care providers outside of the [address] [place] [address] since your last visit? Include any pap smears or colon screening. No    No reported pain. Glucose 99.

## 2021-02-18 NOTE — PROGRESS NOTES
The patient was seen by me today by telephone visit in place of in-person visit (COVID). Luis Alberto Caraballo is a 80 y.o. male is here for routine f/u. No current CV sx or complaints. Now seeing Dr. Lisa Nguyen as PCP (previously followed by Dr. Anila Merino in [place]--retired) for DM management--following labs The patient denies chest pain/ shortness of breath, orthopnea, PND, LE edema, palpitations, syncope, presyncope or fatigue.        Patient Active Problem List    Diagnosis Date Noted    Type 2 diabetes mellitus with peripheral vascular disease ([facility] 01/19/2021    Bedbug bite 01/19/2021    Controlled type 2 diabetes mellitus without complication, with long-term current use of insulin ([address] 01/19/2021    Coronary atherosclerosis of native coronary artery     HCVD (hypertensive cardiovascular disease)     Pure hypercholesterolemia     Diabetes mellitus, type II ([address]     Carotid artery disease (HCC)       Lowell Cruz MD  Past Medical History:   Diagnosis Date    Carotid artery disease ([address]     Coronary atherosclerosis of native coronary artery     Diabetes mellitus, type II ([address]     DJD (degenerative joint disease)     HCVD (hypertensive cardiovascular disease)     Pure hypercholesterolemia       Past Surgical History:   Procedure Laterality Date    HX CATARACT REMOVAL Bilateral     HX HEART CATHETERIZATION  2/2010    LVEDP 9, mild ant hypo EF 55-60%, LAD 70% 99%, mod D1, OM3 30%, PDA 90%    HX PTCA  2/2010    Stent-LAD 2.5X18 ANDREA    HX PTCA  3/2010    Stent PDA ADNREA     Allergies   Allergen Reactions    Aspirin Other (comments)     NOSE BLEED  MG IS TAKEN  Can take low dose aspirin    Pcn [Penicillins] Shortness of Breath     Itching/hives      Family History   Problem Relation Age of Onset    Heart Disease Mother     Cancer Father       Social History     Socioeconomic History    Marital status: SINGLE     Spouse name: Not on file    Number of children: Not on file    Years of education: Not on file    Highest education level: Not on file   Occupational History    Not on file   Social Needs    Financial resource strain: Not on file    Food insecurity     Worry: Not on file     Inability: Not on file    Transportation needs     Medical: Not on file     Non-medical: Not on file   Tobacco Use    Smoking status: Former Smoker     Quit date: 10/17/1963     Years since quittin.4    Smokeless tobacco: Never Used   Substance and Sexual Activity    Alcohol use: Not on file     Comment: stopped 2018/used to drink heavy    Drug use: Not on file    Sexual activity: Not on file   Lifestyle    Physical activity     Days per week: Not on file     Minutes per session: Not on file    Stress: Not on file   Relationships    Social connections     Talks on phone: Not on file     Gets together: Not on file     Attends Adventist service: Not on file     Active member of club or organization: Not on file     Attends meetings of clubs or organizations: Not on file     Relationship status: Not on file    Intimate partner violence     Fear of current or ex partner: Not on file     Emotionally abused: Not on file     Physically abused: Not on file     Forced sexual activity: Not on file   Other Topics Concern    Not on file   Social History Narrative    Not on file      Current Outpatient Medications   Medication Sig    insulin aspart protamine/insulin aspart (NovoLOG Mix 70-30FlexPen U-100) 100 unit/mL (70-30) inpn 10 Units by SubCUTAneous route every evening.  atorvastatin (LIPITOR) 40 mg tablet Take 1 tablet by mouth nightly    amLODIPine (NORVASC) 5 mg tablet Take 1 tablet by mouth once daily    lisinopriL (PRINIVIL, ZESTRIL) 10 mg tablet Take 1 tablet by mouth twice daily    cholecalciferol, vitamin D3, (VITAMIN D3) 2,000 unit tab Take  by mouth daily.  aspirin delayed-release 81 mg tablet Take 81 mg by mouth daily.     ferrous sulfate (IRON) 325 mg (65 mg iron) tablet Take  by mouth Daily (before breakfast). No current facility-administered medications for this visit. Review of Symptoms:    CONST  No weight change. No fever, chills, sweats    ENT No visual changes, URI sx, sore throat    CV  See HPI   RESP  No cough, or sputum, wheezing. Also see HPI   GI  No abdominal pain or change in bowel habits. No heartburn or dysphagia. No melena or rectal bleeding.   No dysuria, urgency, frequency, hematuria   MSKEL  No joint pain, swelling. No muscle pain. SKIN  No rash or lesions. NEURO  No headache, syncope, or seizure. No weakness, loss of sensation, or paresthesias. PSYCH  No low mood or depression  No anxiety. HE/LYMPH  No easy bruising, abnormal bleeding, or enlarged glands.       Home vitals:      Labs:   Lab Results   Component Value Date/Time    Sodium 134 (L) 12/30/2020 01:27 PM    Sodium 140 02/24/2010 04:10 AM    Sodium 135 (L) 02/23/2010 09:30 AM    Sodium 137 02/04/2010 04:00 AM    Potassium 4.4 12/30/2020 01:27 PM    Potassium 4.1 02/24/2010 04:10 AM    Potassium 4.6 02/23/2010 09:30 AM    Potassium 4.0 02/04/2010 04:00 AM    Chloride 103 12/30/2020 01:27 PM    Chloride 105 02/24/2010 04:10 AM    Chloride 100 02/23/2010 09:30 AM    Chloride 102 02/04/2010 04:00 AM    CO2 27 12/30/2020 01:27 PM    CO2 24 02/24/2010 04:10 AM    CO2 28 02/23/2010 09:30 AM    CO2 29 02/04/2010 04:00 AM    Anion gap 4 (L) 12/30/2020 01:27 PM    Anion gap 11 02/24/2010 04:10 AM    Anion gap 7 02/23/2010 09:30 AM    Anion gap 6 02/04/2010 04:00 AM    Glucose 255 (H) 12/30/2020 01:27 PM    Glucose 172 (H) 02/24/2010 04:10 AM    Glucose 206 (H) 02/23/2010 09:30 AM    Glucose 109 (H) 02/04/2010 04:00 AM    BUN 29 (H) 12/30/2020 01:27 PM    BUN 22 (H) 02/24/2010 04:10 AM    BUN 23 (H) 02/23/2010 09:30 AM    BUN 23 (H) 02/04/2010 04:00 AM    Creatinine 1.35 (H) 12/30/2020 01:27 PM    Creatinine 1.2 02/24/2010 04:10 AM    Creatinine 1.5 (H) 02/23/2010 09:30 AM    Creatinine 1.2 02/04/2010 04:00 AM    BUN/Creatinine ratio 21 (H) 12/30/2020 01:27 PM    BUN/Creatinine ratio 18 02/24/2010 04:10 AM    BUN/Creatinine ratio 15 02/23/2010 09:30 AM    BUN/Creatinine ratio 19 02/04/2010 04:00 AM    GFR est AA 60 (L) 12/30/2020 01:27 PM    GFR est AA >60 02/24/2010 04:10 AM    GFR est AA 58 (L) 02/23/2010 09:30 AM    GFR est AA >60 02/04/2010 04:00 AM    GFR est non-AA 49 (L) 12/30/2020 01:27 PM    GFR est non-AA >60 02/24/2010 04:10 AM    GFR est non-AA 48 (L) 02/23/2010 09:30 AM    GFR est non-AA >60 02/04/2010 04:00 AM    Calcium 9.0 12/30/2020 01:27 PM    Calcium 8.8 02/24/2010 04:10 AM    Calcium 9.4 02/23/2010 09:30 AM    Calcium 8.7 02/04/2010 04:00 AM    Bilirubin, total 0.6 02/23/2010 09:30 AM    Alk. phosphatase 93 02/23/2010 09:30 AM    Protein, total 7.9 02/23/2010 09:30 AM    Albumin 4.3 02/23/2010 09:30 AM    Globulin 3.6 02/23/2010 09:30 AM    A-G Ratio 1.2 02/23/2010 09:30 AM    ALT (SGPT) 42 02/23/2010 09:30 AM     No results found for: CPK, CPKX, CPX  Lab Results   Component Value Date/Time    Cholesterol, total 129 10/21/2019 12:00 AM    HDL Cholesterol 64 10/21/2019 12:00 AM    LDL, calculated 50 10/21/2019 12:00 AM    Triglyceride 74 10/21/2019 12:00 AM     No results found for this or any previous visit. Assessment:         Patient Active Problem List    Diagnosis Date Noted    Type 2 diabetes mellitus with peripheral vascular disease ([facility] 01/19/2021    Bedbug bite 01/19/2021    Controlled type 2 diabetes mellitus without complication, with long-term current use of insulin ([facility] 01/19/2021    Coronary atherosclerosis of native coronary artery     HCVD (hypertensive cardiovascular disease)     Pure hypercholesterolemia     Diabetes mellitus, type II ([facility]     Carotid artery disease ([facility]       No current CV sx or complaints.   Now seeing Dr. Anette Leon as PCP (previously followed by Dr. Althea Carrera in [place]--retired) for DM management--following labs      Plan:     Doing well with no adverse cardiac symptoms. Lipids and labs followed by PCP. Continue current care and f/u in 6 months. Lilliam Echeverria MD     Pursuant to the emergency declaration under the [address] [place] [address], [facility] waiver authority and the [employer] Act, this Virtual Visit was conducted, with patient's consent, to reduce the patient's risk of exposure to COVID-19 and provide continuity of care for an established patient. Services were provided through a telephone  discussion virtually to substitute for in-person visit. TOTAL TIME spent 22 mins.

## 2021-03-10 ENCOUNTER — TELEPHONE (OUTPATIENT)
Dept: FAMILY MEDICINE CLINIC | Age: 86
End: 2021-03-10

## 2021-03-10 NOTE — TELEPHONE ENCOUNTER
Michael Valentin called with [address] [place] [address] and stated Mr. Felix Dominguez is not taking the insulin according to how you prescribed in January but still taking it according to Dr. Mark Stephen.   He is running out since the does  you prescribed was lower so I told them to send refill to Dr. Mark Stephen since he is only following her directions

## 2021-03-11 RX ORDER — INSULIN ASPART 100 [IU]/ML
INJECTION, SUSPENSION SUBCUTANEOUS
Qty: 5 ADJUSTABLE DOSE PRE-FILLED PEN SYRINGE | Refills: 5 | Status: SHIPPED | OUTPATIENT
Start: 2021-03-11

## 2021-03-11 NOTE — TELEPHONE ENCOUNTER
Patient requesting refill on     Requested Prescriptions     Pending Prescriptions Disp Refills    insulin aspart protamine/insulin aspart (NovoLOG Mix 70-30FlexPen U-100) 100 unit/mL (70-30) inpn       Sig: Administer 30 units in the am and 10 units in the evening by subcutaneous injection. Last OV 1/19/2021    Needing refill on new instructions of 30 units in am and 10 in evening per Pharmacy fax.

## 2021-04-20 RX ORDER — ATORVASTATIN CALCIUM 40 MG/1
40 TABLET, FILM COATED ORAL
Qty: 90 TAB | Refills: 1 | Status: SHIPPED | OUTPATIENT
Start: 2021-04-20

## 2021-04-20 RX ORDER — LISINOPRIL 10 MG/1
TABLET ORAL
Qty: 180 TAB | Refills: 1 | Status: SHIPPED | OUTPATIENT
Start: 2021-04-20

## 2021-04-20 RX ORDER — AMLODIPINE BESYLATE 5 MG/1
TABLET ORAL
Qty: 90 TAB | Refills: 1 | Status: SHIPPED | OUTPATIENT
Start: 2021-04-20

## 2021-07-15 ENCOUNTER — APPOINTMENT (OUTPATIENT)
Dept: CT IMAGING | Age: 86
End: 2021-07-15
Attending: EMERGENCY MEDICINE
Payer: MEDICARE

## 2021-07-15 ENCOUNTER — HOSPITAL ENCOUNTER (OUTPATIENT)
Age: 86
Setting detail: OBSERVATION
Discharge: NURSING, REHAB OR LONG-TERM CARE | End: 2021-07-19
Attending: EMERGENCY MEDICINE | Admitting: HOSPITALIST
Payer: MEDICARE

## 2021-07-15 ENCOUNTER — APPOINTMENT (OUTPATIENT)
Dept: GENERAL RADIOLOGY | Age: 86
End: 2021-07-15
Attending: EMERGENCY MEDICINE
Payer: MEDICARE

## 2021-07-15 DIAGNOSIS — R41.82 ALTERED MENTAL STATUS, UNSPECIFIED ALTERED MENTAL STATUS TYPE: Primary | ICD-10-CM

## 2021-07-15 DIAGNOSIS — E11.65 TYPE 2 DIABETES MELLITUS WITH HYPERGLYCEMIA, WITH LONG-TERM CURRENT USE OF INSULIN (HCC): ICD-10-CM

## 2021-07-15 DIAGNOSIS — Z79.4 TYPE 2 DIABETES MELLITUS WITH HYPERGLYCEMIA, WITH LONG-TERM CURRENT USE OF INSULIN (HCC): ICD-10-CM

## 2021-07-15 DIAGNOSIS — N17.9 AKI (ACUTE KIDNEY INJURY) (HCC): ICD-10-CM

## 2021-07-15 LAB
ALBUMIN SERPL-MCNC: 3.6 G/DL (ref 3.5–5)
ALBUMIN/GLOB SERPL: 1 {RATIO} (ref 1.1–2.2)
ALP SERPL-CCNC: 109 U/L (ref 45–117)
ALT SERPL-CCNC: 24 U/L (ref 12–78)
ANION GAP SERPL CALC-SCNC: 11 MMOL/L (ref 5–15)
APPEARANCE UR: CLEAR
AST SERPL-CCNC: 21 U/L (ref 15–37)
ATRIAL RATE: 79 BPM
BACTERIA URNS QL MICRO: ABNORMAL /HPF
BASOPHILS # BLD: 0 K/UL (ref 0–0.1)
BASOPHILS NFR BLD: 0 % (ref 0–1)
BILIRUB SERPL-MCNC: 0.8 MG/DL (ref 0.2–1)
BILIRUB UR QL: NEGATIVE
BUN SERPL-MCNC: 33 MG/DL (ref 6–20)
BUN/CREAT SERPL: 18 (ref 12–20)
CALCIUM SERPL-MCNC: 9.3 MG/DL (ref 8.5–10.1)
CALCULATED R AXIS, ECG10: 61 DEGREES
CALCULATED T AXIS, ECG11: 80 DEGREES
CHLORIDE SERPL-SCNC: 98 MMOL/L (ref 97–108)
CO2 SERPL-SCNC: 28 MMOL/L (ref 21–32)
COLOR UR: ABNORMAL
COMMENT, HOLDF: NORMAL
CREAT SERPL-MCNC: 1.8 MG/DL (ref 0.7–1.3)
DIAGNOSIS, 93000: NORMAL
DIFFERENTIAL METHOD BLD: NORMAL
EOSINOPHIL # BLD: 0.1 K/UL (ref 0–0.4)
EOSINOPHIL NFR BLD: 1 % (ref 0–7)
EPITH CASTS URNS QL MICRO: ABNORMAL /LPF
ERYTHROCYTE [DISTWIDTH] IN BLOOD BY AUTOMATED COUNT: 13 % (ref 11.5–14.5)
GLOBULIN SER CALC-MCNC: 3.7 G/DL (ref 2–4)
GLUCOSE BLD STRIP.AUTO-MCNC: 240 MG/DL (ref 65–117)
GLUCOSE BLD STRIP.AUTO-MCNC: 278 MG/DL (ref 65–117)
GLUCOSE BLD STRIP.AUTO-MCNC: 368 MG/DL (ref 65–117)
GLUCOSE BLD STRIP.AUTO-MCNC: 374 MG/DL (ref 65–117)
GLUCOSE BLD STRIP.AUTO-MCNC: 440 MG/DL (ref 65–117)
GLUCOSE SERPL-MCNC: 400 MG/DL (ref 65–100)
GLUCOSE UR STRIP.AUTO-MCNC: >1000 MG/DL
HCT VFR BLD AUTO: 39.4 % (ref 36.6–50.3)
HGB BLD-MCNC: 13.1 G/DL (ref 12.1–17)
HGB UR QL STRIP: ABNORMAL
IMM GRANULOCYTES # BLD AUTO: 0 K/UL (ref 0–0.04)
IMM GRANULOCYTES NFR BLD AUTO: 0 % (ref 0–0.5)
KETONES UR QL STRIP.AUTO: ABNORMAL MG/DL
LEUKOCYTE ESTERASE UR QL STRIP.AUTO: ABNORMAL
LYMPHOCYTES # BLD: 1.9 K/UL (ref 0.8–3.5)
LYMPHOCYTES NFR BLD: 20 % (ref 12–49)
MCH RBC QN AUTO: 30 PG (ref 26–34)
MCHC RBC AUTO-ENTMCNC: 33.2 G/DL (ref 30–36.5)
MCV RBC AUTO: 90.2 FL (ref 80–99)
MONOCYTES # BLD: 0.6 K/UL (ref 0–1)
MONOCYTES NFR BLD: 6 % (ref 5–13)
NEUTS SEG # BLD: 6.8 K/UL (ref 1.8–8)
NEUTS SEG NFR BLD: 73 % (ref 32–75)
NITRITE UR QL STRIP.AUTO: POSITIVE
NRBC # BLD: 0 K/UL (ref 0–0.01)
NRBC BLD-RTO: 0 PER 100 WBC
P-R INTERVAL, ECG05: 212 MS
PH UR STRIP: 6 [PH] (ref 5–8)
PLATELET # BLD AUTO: 223 K/UL (ref 150–400)
PMV BLD AUTO: 10.9 FL (ref 8.9–12.9)
POTASSIUM SERPL-SCNC: 3.7 MMOL/L (ref 3.5–5.1)
PROT SERPL-MCNC: 7.3 G/DL (ref 6.4–8.2)
PROT UR STRIP-MCNC: NEGATIVE MG/DL
Q-T INTERVAL, ECG07: 390 MS
QRS DURATION, ECG06: 96 MS
QTC CALCULATION (BEZET), ECG08: 447 MS
RBC # BLD AUTO: 4.37 M/UL (ref 4.1–5.7)
RBC #/AREA URNS HPF: ABNORMAL /HPF (ref 0–5)
SAMPLES BEING HELD,HOLD: NORMAL
SERVICE CMNT-IMP: ABNORMAL
SODIUM SERPL-SCNC: 137 MMOL/L (ref 136–145)
SP GR UR REFRACTOMETRY: 1.01 (ref 1–1.03)
TROPONIN I SERPL-MCNC: 0.07 NG/ML
UROBILINOGEN UR QL STRIP.AUTO: 0.2 EU/DL (ref 0.2–1)
VENTRICULAR RATE, ECG03: 79 BPM
WBC # BLD AUTO: 9.4 K/UL (ref 4.1–11.1)
WBC URNS QL MICRO: ABNORMAL /HPF (ref 0–4)

## 2021-07-15 PROCEDURE — 74011250636 HC RX REV CODE- 250/636: Performed by: HOSPITALIST

## 2021-07-15 PROCEDURE — 74011250637 HC RX REV CODE- 250/637: Performed by: HOSPITALIST

## 2021-07-15 PROCEDURE — 74011636637 HC RX REV CODE- 636/637: Performed by: EMERGENCY MEDICINE

## 2021-07-15 PROCEDURE — 99285 EMERGENCY DEPT VISIT HI MDM: CPT

## 2021-07-15 PROCEDURE — 74011250636 HC RX REV CODE- 250/636: Performed by: EMERGENCY MEDICINE

## 2021-07-15 PROCEDURE — 99218 HC RM OBSERVATION: CPT

## 2021-07-15 PROCEDURE — 97116 GAIT TRAINING THERAPY: CPT

## 2021-07-15 PROCEDURE — 70450 CT HEAD/BRAIN W/O DYE: CPT

## 2021-07-15 PROCEDURE — 84484 ASSAY OF TROPONIN QUANT: CPT

## 2021-07-15 PROCEDURE — 96375 TX/PRO/DX INJ NEW DRUG ADDON: CPT

## 2021-07-15 PROCEDURE — 82962 GLUCOSE BLOOD TEST: CPT

## 2021-07-15 PROCEDURE — 80053 COMPREHEN METABOLIC PANEL: CPT

## 2021-07-15 PROCEDURE — 74011636637 HC RX REV CODE- 636/637: Performed by: HOSPITALIST

## 2021-07-15 PROCEDURE — 97162 PT EVAL MOD COMPLEX 30 MIN: CPT

## 2021-07-15 PROCEDURE — 85025 COMPLETE CBC W/AUTO DIFF WBC: CPT

## 2021-07-15 PROCEDURE — 81001 URINALYSIS AUTO W/SCOPE: CPT

## 2021-07-15 PROCEDURE — 71045 X-RAY EXAM CHEST 1 VIEW: CPT

## 2021-07-15 PROCEDURE — 97530 THERAPEUTIC ACTIVITIES: CPT

## 2021-07-15 PROCEDURE — 94761 N-INVAS EAR/PLS OXIMETRY MLT: CPT

## 2021-07-15 PROCEDURE — 93005 ELECTROCARDIOGRAM TRACING: CPT

## 2021-07-15 PROCEDURE — 96361 HYDRATE IV INFUSION ADD-ON: CPT

## 2021-07-15 PROCEDURE — 92610 EVALUATE SWALLOWING FUNCTION: CPT

## 2021-07-15 PROCEDURE — 96374 THER/PROPH/DIAG INJ IV PUSH: CPT

## 2021-07-15 PROCEDURE — 36415 COLL VENOUS BLD VENIPUNCTURE: CPT

## 2021-07-15 RX ORDER — MAGNESIUM SULFATE 100 %
4 CRYSTALS MISCELLANEOUS AS NEEDED
Status: DISCONTINUED | OUTPATIENT
Start: 2021-07-15 | End: 2021-07-19 | Stop reason: HOSPADM

## 2021-07-15 RX ORDER — HYDRALAZINE HYDROCHLORIDE 20 MG/ML
20 INJECTION INTRAMUSCULAR; INTRAVENOUS
Status: DISCONTINUED | OUTPATIENT
Start: 2021-07-15 | End: 2021-07-19 | Stop reason: HOSPADM

## 2021-07-15 RX ORDER — DEXTROSE 50 % IN WATER (D50W) INTRAVENOUS SYRINGE
25-50 AS NEEDED
Status: DISCONTINUED | OUTPATIENT
Start: 2021-07-15 | End: 2021-07-19 | Stop reason: HOSPADM

## 2021-07-15 RX ORDER — FAMOTIDINE 20 MG/1
20 TABLET, FILM COATED ORAL 2 TIMES DAILY
Status: DISCONTINUED | OUTPATIENT
Start: 2021-07-15 | End: 2021-07-19 | Stop reason: HOSPADM

## 2021-07-15 RX ORDER — INSULIN LISPRO 100 [IU]/ML
10 INJECTION, SOLUTION INTRAVENOUS; SUBCUTANEOUS ONCE
Status: COMPLETED | OUTPATIENT
Start: 2021-07-15 | End: 2021-07-15

## 2021-07-15 RX ORDER — ONDANSETRON 2 MG/ML
4 INJECTION INTRAMUSCULAR; INTRAVENOUS
Status: DISCONTINUED | OUTPATIENT
Start: 2021-07-15 | End: 2021-07-19 | Stop reason: HOSPADM

## 2021-07-15 RX ORDER — ENOXAPARIN SODIUM 100 MG/ML
30 INJECTION SUBCUTANEOUS DAILY
Status: DISCONTINUED | OUTPATIENT
Start: 2021-07-16 | End: 2021-07-16

## 2021-07-15 RX ORDER — AMLODIPINE BESYLATE 5 MG/1
5 TABLET ORAL DAILY
Status: DISCONTINUED | OUTPATIENT
Start: 2021-07-16 | End: 2021-07-19 | Stop reason: HOSPADM

## 2021-07-15 RX ORDER — INSULIN GLARGINE 100 [IU]/ML
20 INJECTION, SOLUTION SUBCUTANEOUS
Status: DISCONTINUED | OUTPATIENT
Start: 2021-07-15 | End: 2021-07-16

## 2021-07-15 RX ORDER — ACETAMINOPHEN 650 MG/1
650 SUPPOSITORY RECTAL
Status: DISCONTINUED | OUTPATIENT
Start: 2021-07-15 | End: 2021-07-19 | Stop reason: HOSPADM

## 2021-07-15 RX ORDER — PROMETHAZINE HYDROCHLORIDE 25 MG/1
12.5 TABLET ORAL
Status: DISCONTINUED | OUTPATIENT
Start: 2021-07-15 | End: 2021-07-19 | Stop reason: HOSPADM

## 2021-07-15 RX ORDER — SODIUM CHLORIDE 0.9 % (FLUSH) 0.9 %
5-40 SYRINGE (ML) INJECTION AS NEEDED
Status: DISCONTINUED | OUTPATIENT
Start: 2021-07-15 | End: 2021-07-19 | Stop reason: HOSPADM

## 2021-07-15 RX ORDER — SODIUM CHLORIDE 0.9 % (FLUSH) 0.9 %
5-40 SYRINGE (ML) INJECTION EVERY 8 HOURS
Status: DISCONTINUED | OUTPATIENT
Start: 2021-07-15 | End: 2021-07-19 | Stop reason: HOSPADM

## 2021-07-15 RX ORDER — ASPIRIN 81 MG/1
81 TABLET ORAL DAILY
Status: DISCONTINUED | OUTPATIENT
Start: 2021-07-16 | End: 2021-07-19 | Stop reason: HOSPADM

## 2021-07-15 RX ORDER — INSULIN LISPRO 100 [IU]/ML
INJECTION, SOLUTION INTRAVENOUS; SUBCUTANEOUS
Status: DISCONTINUED | OUTPATIENT
Start: 2021-07-15 | End: 2021-07-19 | Stop reason: HOSPADM

## 2021-07-15 RX ORDER — ATORVASTATIN CALCIUM 40 MG/1
40 TABLET, FILM COATED ORAL
Status: DISCONTINUED | OUTPATIENT
Start: 2021-07-15 | End: 2021-07-19 | Stop reason: HOSPADM

## 2021-07-15 RX ORDER — POLYETHYLENE GLYCOL 3350 17 G/17G
17 POWDER, FOR SOLUTION ORAL DAILY PRN
Status: DISCONTINUED | OUTPATIENT
Start: 2021-07-15 | End: 2021-07-19 | Stop reason: HOSPADM

## 2021-07-15 RX ORDER — ACETAMINOPHEN 325 MG/1
650 TABLET ORAL
Status: DISCONTINUED | OUTPATIENT
Start: 2021-07-15 | End: 2021-07-19 | Stop reason: HOSPADM

## 2021-07-15 RX ADMIN — Medication 10 ML: at 16:14

## 2021-07-15 RX ADMIN — SODIUM CHLORIDE 500 ML: 9 INJECTION, SOLUTION INTRAVENOUS at 11:10

## 2021-07-15 RX ADMIN — INSULIN LISPRO 10 UNITS: 100 INJECTION, SOLUTION INTRAVENOUS; SUBCUTANEOUS at 21:34

## 2021-07-15 RX ADMIN — HYDRALAZINE HYDROCHLORIDE 20 MG: 20 INJECTION INTRAMUSCULAR; INTRAVENOUS at 21:16

## 2021-07-15 RX ADMIN — Medication 10 ML: at 21:14

## 2021-07-15 RX ADMIN — FAMOTIDINE 20 MG: 20 TABLET, FILM COATED ORAL at 17:38

## 2021-07-15 RX ADMIN — HUMAN INSULIN 6 UNITS: 100 INJECTION, SOLUTION SUBCUTANEOUS at 11:58

## 2021-07-15 RX ADMIN — INSULIN LISPRO 3 UNITS: 100 INJECTION, SOLUTION INTRAVENOUS; SUBCUTANEOUS at 16:07

## 2021-07-15 RX ADMIN — INSULIN GLARGINE 20 UNITS: 100 INJECTION, SOLUTION SUBCUTANEOUS at 21:12

## 2021-07-15 RX ADMIN — ATORVASTATIN CALCIUM 40 MG: 40 TABLET, FILM COATED ORAL at 21:12

## 2021-07-15 NOTE — PROGRESS NOTES
Care Management Interventions  PCP Verified by CM: Yes (Maddy Ferguson)  Last Visit to PCP: 01/19/21  Palliative Care Criteria Met (RRAT>21 & CHF Dx)?: No (No MD order for Palliative Care)  Mode of Transport at Discharge: Other (see comment) (POV/ Daughter)  Transition of Care Consult (CM Consult): Discharge Planning  Discharge Durable Medical Equipment: No  Physical Therapy Consult: Yes  Occupational Therapy Consult: Yes  Speech Therapy Consult: Yes  Current Support Network: Own Home, Lives Alone  Confirm Follow Up Transport: Self   Resource Information Provided?: No  Discharge Location  Discharge Placement: Home     Met with the patient to review and discuss the plan of care and disposition plans. Patient lives alone and is pretty much independent. He does not use any assistive devices to ambulate. He stated that he came in today because of garbled speech, weakness, high blood sugars. Patient stated that he did not have a PCP, but it is documented in the chart that he was seen by Dr. Jacques Mayers in January 2021. When I informed the patient of that, he remembered. He stated he had not been back to him because Dr. Jacques Mayers got upset when he told him that his home was infested with bed bugs and in the office at that time he stated 2 were on his feet. So needless to say, Dr. Jacques Mayers has not seen him in the office. Patient stated that he has spent $6000 and 2 heat treatments and still have them in his home. This has been going on for about 3 years!!!!!!!!!!! Informed Nancy Macedo RN about the conversation. He has a daughter, Jama Angelucci and a son, Yola Ramos. Daughter is at bedside and has confirmed the patient's bedbug story. She lives in [place] and son lives in [place]. Patient states that her daughter is a professional  and prepares his meals for him. Care Management Letter given to the patient and encouraged him to call if he should have any needs. Patient is in Observation status.  MOON Observation notification explained to the patient. He verbalized understanding. Copy to him and copy placed in the chart. Patient will be placed in Isolation. Reason for Admission:  Acute Renal Failure, Generalized weakness                  RUR Score:    NA Observation Status                Plan for utilizing home health:   TBD. PCP: First and Last name:  Caprcie Nunn MD    Name of Practice:  [facility]  Are you a current patient: Yes/No:   Yes  Approximate date of last visit:  1/19/21  Can you participate in a virtual visit with your PCP:  No                    Current Advanced Directive/Advance Care Plan: Full Code      Healthcare Decision Maker:   Click here to complete [address] including selection of the Healthcare Decision Maker Relationship (ie \"Primary\")  Patient does not have a documented ACP, but he has named his daughter and son as Healthcare Decision Makers. Daughter is Primary. An ACP document given to the daughter for completion and a brochure                             Transition of Care Plan:      Return home.

## 2021-07-15 NOTE — PROGRESS NOTES
SPEECH PATHOLOGY BEDSIDE SWALLOW EVALUATION  Patient: Lena Ramirez (80 y.o. male)  Date: 7/15/2021  Primary Diagnosis: Acute renal failure (ARF) ([facility]) [N17.9]       Precautions:       ASSESSMENT :  Based on the objective data described below, the patient presents with a grossly functional oropharyngeal swallow with all consistencies tried. Patient reported he is on a Regular/Thin Liquid diet at home. Patient tolerated thin liquid, puree, and solids trials. Patient observed to take successive sips/gulps of thin liquid via straw and cup without difficulty. Patient self-fed all trials, which was observed to be functional yet slow. Significantly delayed cough x1 observed following conversation as SLP was leaving the room, suspect unrelated to PO intake. Patient reported 40 lb weight loss since December yet denied concerns with swallow function and reportedly has never required SLP services. Additionally, he reported to SLP that weight loss is unrelated to his PO intake. Patient reported his speech at time of SLP evaluation is baseline; patient was completely intelligible throughout evaluation. Given that patient's oral/pharyngeal swallow is WFL, suspect that weight loss is unrelated to oropharyngeal swallow function. Recommend Regular/Thin Liquids with general aspiration precautions outlined below. Skilled acute therapy provided by a speech-language pathologist is not indicated at this time. SLP will follow-up x1 to ensure diet tolerance/intake. PLAN :  Recommendations:  -- Recommend Regular/Thin Liquids  -- Meds in Thin Liquid OK  -- Sitting Upright  -- Small Bites/Sips  -- Limit Distractions  -- No acute SLP needs at this time    Discharge Recommendations: None     SUBJECTIVE:   Patient stated I haven't had a baltazar cracker in years.     OBJECTIVE:     Past Medical History:   Diagnosis Date    [facility]ot[facility] ([facility] [address]     Coronary atherosclerosis of native coronary artery     Diabetes mellitus, type II ([facility] [address]     DJD (degenerative joint disease)     HCVD (hypertensive cardiovascular disease)     Pure hypercholesterolemia      Past Surgical History:   Procedure Laterality Date    HX CATARACT REMOVAL Bilateral     HX HEART CATHETERIZATION  2/2010    LVEDP 9, mild ant hypo EF 55-60%, LAD 70% 99%, mod D1, OM3 30%, PDA 90%    HX PTCA  2/2010    Stent-LAD 2.5X18 ANDREA    HX PTCA  3/2010    Stent PDA ANDREA     Prior Level of Function/Home Situation:   Home Situation  Home Environment: Private residence  # Steps to Enter: 0  One/Two Story Residence: One story  Living Alone: Yes  Support Systems: Child(chepe) (has help with housework)  Patient Expects to be Discharged to[de-identified] House  Current DME Used/Available at Home: None    Diet prior to admission: Regular/Thin Liquid  Current Diet: Regular/Thin Liquid    Cognitive and Communication Status:  Neurologic State: Alert  Orientation Level: Oriented X4  Cognition: Follows commands, Appropriate decision making, Appropriate for age attention/concentration  Perception: Appears intact  Perseveration: No perseveration noted  Safety/Judgement: Awareness of environment    Oral Assessment:  Oral Assessment  Labial: No impairment  Dentition: Full;Natural  Oral Hygiene: Moist oral mucosa  Lingual: No impairment  Velum: No impairment  Mandible: No impairment    P.O. Trials:  Patient Position: Upright in chair  Vocal quality prior to P.O.: No impairment  Consistency Presented: Thin liquid;Puree; Solid  How Presented: Self-fed/presented;Cup/sip; Successive swallows;Straw;Spoon     Bolus Acceptance: No impairment  Bolus Formation/Control: No impairment     Propulsion: No impairment  Oral Residue: None  Initiation of Swallow: No impairment  Laryngeal Elevation: Functional  Aspiration Signs/Symptoms: Delayed cough/throat clear              Oral Phase Severity: Other (comment) (WFL)  Pharyngeal Phase Severity : Other (comment) (WFL)    NOMS:   The NOMS functional outcome measure was used to quantify this patient's level of swallowing impairment. Based on the NOMS, the patient was determined to be at level 7 for swallow function     NOMS Swallowing Levels:  Level 1 (CN): NPO  Level 2 (CM): NPO but takes consistency in therapy  Level 3 (CL): Takes less than 50% of nutrition p.o. and continues with nonoral feedings; and/or safe with mod cues; and/or max diet restriction  Level 4 (CK): Safe swallow but needs mod cues; and/or mod diet restriction; and/or still requires some nonoral feeding/supplements  Level 5 (CJ): Safe swallow with min diet restriction; and/or needs min cues  Level 6 (CI): Independent with p.o.; rare cues; usually self cues; may need to avoid some foods or needs extra time  Level 7 ([address] [place] [address]): Independent for all p.o.  JANEL. (2003). National Outcomes Measurement System (NOMS): Adult Speech-Language Pathology User's Guide. Pain:  Pain Scale 1: Numeric (0 - 10)  Pain Intensity 1: 0     After treatment:   Patient left in no apparent distress sitting up in chair, Call bell within reach and Nursing notified    COMMUNICATION/EDUCATION:   Patient was educated regarding his deficit(s) of WFL swallow. He demonstrated Good understanding as evidenced by verbalizing understanding. The patient's plan of care including recommendations, planned interventions, and recommended diet changes were discussed with: Registered nurse.      Thank you for this referral.  TAMI Murrell  Time Calculation: 15 mins

## 2021-07-15 NOTE — PROGRESS NOTES
Problem: Mobility Impaired (Adult and Pediatric)  Goal: *Acute Goals and Plan of Care (Insert Text)  Description: FUNCTIONAL STATUS PRIOR TO ADMISSION: Patient was independent and active without use of DME. Reports driving private vehicle as recently as 2 days ago. HOME SUPPORT PRIOR TO ADMISSION: The patient lived alone with no local support. States children live out of town. Physical Therapy Goals  Initiated 7/15/2021  1. Patient will move from supine to sit and sit to supine  in bed with modified independence within 7 day(s). 2.  Patient will transfer from bed to chair and chair to bed with modified independence using the least restrictive device within 7 day(s). 3.  Patient will perform sit to stand with modified independence within 7 day(s). 4.  Patient will ambulate with modified independence for 450 feet with the least restrictive device within 7 day(s). 5.  Patient will ascend/descend 4 stairs with one handrail(s) with supervision/set-up within 7 day(s). Outcome: Not Met    PHYSICAL THERAPY EVALUATION  Patient: Matthew Chin (22 y.o. male)  Date: 7/15/2021  Primary Diagnosis: Acute renal failure (ARF) ([facility] [N17.9]        Precautions:   Fall; low hearing    ASSESSMENT  Based on the objective data described below, the patient presents with generally decreased strength, decreased endurance, impaired standing balance, and limited functional mobility on day of admission with weakness, decreased PO intake, and renal failure. He offers good participation and tolerates treatment without complaints. BP elevated though pt without associated symptoms - RN aware. Pt educated regarding recommendation for assistance with all mobility, though pt found later standing in room with chair alarm activated. Pt re-educated regarding fall risk and need for assistance. RN notified/present with pt on PT exit.       Current Level of Function Impacting Discharge (mobility/balance): min assist/CGA    Functional Outcome Measure: The patient scored 70 on the Barthel outcome measure which is indicative of 30% functional impairments. Other factors to consider for discharge: none additional     Patient will benefit from skilled therapy intervention to address the above noted impairments. PLAN :  Recommendations and Planned Interventions: bed mobility training, transfer training, gait training, therapeutic exercises, neuromuscular re-education, patient and family training/education, and therapeutic activities      Frequency/Duration: Patient will be followed by physical therapy:  1-2 times/day, 5-7 days/week to address goals. 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 .. 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  States this was his hobby 3 years ago but recently performing daily living tasks is his primary occupation. Pt received supine, agreeable to PT and cleared by RN.       OBJECTIVE DATA SUMMARY:   HISTORY:    Past Medical History:   Diagnosis Date    Carotid artery disease ([facility] [address]     Coronary atherosclerosis of native coronary artery     Diabetes mellitus, type II ([address]     DJD (degenerative joint disease)     HCVD (hypertensive cardiovascular disease)     Pure hypercholesterolemia      Past Surgical History:   Procedure Laterality Date    HX CATARACT REMOVAL Bilateral     HX HEART CATHETERIZATION  2/2010    LVEDP 9, mild ant hypo EF 55-60%, LAD 70% 99%, mod D1, OM3 30%, PDA 90%    HX PTCA  2/2010    Stent-LAD 2.5X18 ANDREA    HX PTCA  3/2010    Stent PDA ANDREA       Personal factors and/or comorbidities impacting plan of care: as above    Home Situation  Home Environment: Private residence  # Steps to Enter: 0  One/Two Story Residence: One story  Living Alone: Yes  Support Systems: Child(chepe) (has help with housework)  Patient Expects to be Discharged to[de-identified] House  Current DME Used/Available at Home: None    EXAMINATION/PRESENTATION/DECISION MAKING:   Critical Behavior:  Neurologic State: Alert  Orientation Level: Oriented X4  Cognition: Follows commands, Appropriate decision making, Appropriate for age attention/concentration  Safety/Judgement: Decreased awareness of need for assistance  Hearing: Auditory  Auditory Impairment: Hard of hearing, bilateral  Skin:  LE exposed skin intact; raised purple area to L forearm - states present for \"years\" and not painful; palms appear with red/purple tint during ambulation, resolved with seated rest.  Edema: none noted LEs  Range Of Motion:  AROM: Generally decreased, functional                       Strength:    Strength: Generally decreased, functional                    Tone & Sensation:   Tone: Normal                              Coordination:  Coordination: Within functional limits    Functional Mobility:  Bed Mobility:     Supine to Sit: Supervision; Adaptive equipment;Bed Modified     Scooting: Supervision  Transfers:  Sit to Stand: Contact guard assistance  Stand to Sit: Contact guard assistance                       Balance:   Sitting: Without support  Sitting - Static: Good (unsupported)  Sitting - Dynamic: Good (unsupported)  Standing: Without support  Standing - Static: Fair  Standing - Dynamic : Fair  Ambulation/Gait Training:  Distance (ft): 100 Feet (ft)  Assistive Device: Gait belt  Ambulation - Level of Assistance: Minimal assistance;Contact guard assistance        Gait Abnormalities: Decreased step clearance; Path deviations        Base of Support: Narrowed     Speed/Mary: Pace decreased (<100 feet/min)                     Flexed trunk, toes extended during stance phase. Functional Measure:  Barthel Index:    Bathin  Bladder: 10  Bowels: 10  Groomin  Dressin  Feeding: 10  Mobility: 10  Stairs: 5  Toilet Use: 5  Transfer (Bed to Chair and Back): 10  Total: 70/100       The Barthel ADL Index: Guidelines  1. The index should be used as a record of what a patient does, not as a record of what a patient could do. 2. The main aim is to establish degree of independence from any help, physical or verbal, however minor and for whatever reason. 3. The need for supervision renders the patient not independent. 4. A patient's performance should be established using the best available evidence. Asking the patient, friends/relatives and nurses are the usual sources, but direct observation and common sense are also important. However direct testing is not needed. 5. Usually the patient's performance over the preceding 24-48 hours is important, but occasionally longer periods will be relevant. 6. Middle categories imply that the patient supplies over 50 per cent of the effort. 7. Use of aids to be independent is allowed. Bibi Vo., Barthel, D.W. (8597). Functional evaluation: the Barthel Index. [address] [facility] (14)2. Beckie Griffin uma Jina, ALVARO, Khang Lacy., Geovanna Valdez., [place], [address] Measuring the change indisability after inpatient rehabilitation; comparison of the responsiveness of the Barthel Index and Functional [place] Measure. Journal of Neurology, Neurosurgery, and Psychiatry, 66(4), 774-227. Yaneth Bravo, N.J.A, SARTHAK Mccain, & Kellee Ford M.A. (2004.) Assessment of post-stroke quality of life in cost-effectiveness studies: The usefulness of the Barthel Index and the EuroQoL-5D.  Quality of Life Research, 15, 357-82            Physical Therapy Evaluation Charge Determination   History Examination Presentation Decision-Making   HIGH Complexity :3+ comorbidities / personal factors will impact the outcome/ POC  HIGH Complexity : 4+ Standardized tests and measures addressing body structure, function, activity limitation and / or participation in recreation  MEDIUM Complexity : Evolving with changing characteristics  Other outcome measures barthel  MEDIUM      Based on the above components, the patient evaluation is determined to be of the following complexity level: MEDIUM    Pain Rating:  Denies pain    Activity Tolerance:   requires rest breaks    After treatment patient left in no apparent distress:   Sitting in chair, Call bell within reach, and Bed / chair alarm activated  After chair alarm activating pt left standing in room with RN providing CGA for pt to restroom. COMMUNICATION/EDUCATION:   The patients plan of care was discussed with: Registered nurse, Case management, and Rehabilitation technician. Fall prevention education was provided and the patient/caregiver indicated understanding., Patient/family have participated as able in goal setting and plan of care. , and Patient/family agree to work toward stated goals and plan of care.     Thank you for this referral.  Shari Longoria, PT, DPT   Time Calculation: 31 mins

## 2021-07-15 NOTE — ED NOTES
TRANSFER - OUT REPORT:    Verbal report given to [facility]) on Erma Hernandez  being transferred to MSU(unit) for routine progression of care       Report consisted of patients Situation, Background, Assessment and   Recommendations(SBAR). Information from the following report(s) SBAR, ED Summary, Med Rec Status and Cardiac Rhythm BBB was reviewed with the receiving nurse. Lines:   Peripheral IV 07/15/21 Right Antecubital (Active)   Site Assessment Clean, dry, & intact 07/15/21 1107   Phlebitis Assessment 0 07/15/21 1107   Infiltration Assessment 0 07/15/21 1107   Dressing Status Clean, dry, & intact 07/15/21 1107   Dressing Type 4 X 4;Transparent 07/15/21 1107   Hub Color/Line Status Green 07/15/21 1107        Opportunity for questions and clarification was provided.       Patient transported with:

## 2021-07-15 NOTE — ROUTINE PROCESS
Bathed and gown changed. Daughter took his clothes that were in the closet with her. Underwear placed in a plastic bag and knotted off. Ambulated to room 138. Provided with new care kit. Room closed off per protocol and EVS made aware.

## 2021-07-15 NOTE — ED PROVIDER NOTES
EMERGENCY DEPARTMENT HISTORY AND PHYSICAL EXAM      Date: 7/15/2021  Patient Name: Christine Simpson    History of Presenting Illness     Chief Complaint   Patient presents with    Blood sugar problem       History Provided By: Patient and Patient's Daughter    HPI: Christine Simpson, 80 y.o. male with PMHx significant for CAD,DM2,high cholesterol, presents ambulatory to the ED with cc of poor blood sugar control. Patient was brought in by his daughter. Patient is without complaints. Patient lives alone. Daughter states last Friday patient had called her and said he is ran out of insulin. She called [address]  and confirmed that he had insulin available with 4 refills. On Monday she had called and he did not answer the phone. She called the 's department for a welfare check. Just parted noted that he was slightly confused but otherwise well. She then contacted him and he told her that he had not been eating much and he had not gone to  his insulin. On Tuesday she came in cooked meals for him shot for him and picked up his insulin. She noticed that he was slightly confused and unsteady on his feet. He states that 2 weeks ago he was in much better condition. He is worried that he has not been eating or taking good care of his blood sugars or himself. This prompted her bringing him to the ER today. She notes that Dr. Ori Plascenciay his PCP and he sees Dr. Lon Mota with cardiology. PMHx: Significant for DM 2, high cholesterol, hypertension, CAD  PSHx: Significant for coronary stents. Cataract surgery. Social Hx: Smoker. Quit in 1963. Currently not drinking alcohol. Stopped in 2018. There are no other complaints, changes, or physical findings at this time. PCP: Michelle Chase., MD    No current facility-administered medications on file prior to encounter.      Current Outpatient Medications on File Prior to Encounter   Medication Sig Dispense Refill    amLODIPine (NORVASC) 5 mg tablet Take 1 tablet by mouth once daily 90 Tab 1    atorvastatin (LIPITOR) 40 mg tablet Take 1 Tab by mouth nightly. 90 Tab 1    lisinopriL (PRINIVIL, ZESTRIL) 10 mg tablet Take 1 tablet by mouth twice daily 180 Tab 1    cholecalciferol, vitamin D3, (VITAMIN D3) 2,000 unit tab Take  by mouth daily.  aspirin delayed-release 81 mg tablet Take 81 mg by mouth daily.  ferrous sulfate (IRON) 325 mg (65 mg iron) tablet Take  by mouth Daily (before breakfast).  insulin aspart protamine/insulin aspart (NovoLOG Mix 70-30FlexPen U-100) 100 unit/mL (70-30) inpn Administer 30 units in the am and 10 units in the evening by subcutaneous injection. 5 Adjustable Dose Pre-filled Pen Syringe 5       Past History     Past Medical History:  Past Medical History:   Diagnosis Date    [facility]otid artery disease ([facility] [address]     Coronary atherosclerosis of native coronary artery     Diabetes mellitus, type II ([facility] [address]     DJD (degenerative joint disease)     HCVD (hypertensive cardiovascular disease)     Pure hypercholesterolemia        Past Surgical History:  Past Surgical History:   Procedure Laterality Date    HX CATARACT REMOVAL Bilateral     HX HEART CATHETERIZATION  2010    LVEDP 9, mild ant hypo EF 55-60%, LAD 70% 99%, mod D1, OM3 30%, PDA 90%    HX PTCA  2010    Stent-LAD 2.5X18 ANDREA    HX PTCA  3/2010    Stent PDA ANDREA       Family History:  Family History   Problem Relation Age of Onset    Heart Disease Mother     Cancer Father        Social History:  Social History     Tobacco Use    Smoking status: Former Smoker     Quit date: 10/17/1963     Years since quittin.7    Smokeless tobacco: Never Used   Vaping Use    Vaping Use: Never used   Substance Use Topics    Alcohol use: Not Currently     Comment: stopped 2018/used to drink heavy    Drug use: Never       Allergies:   Allergies   Allergen Reactions    Aspirin Other (comments)     NOSE BLEED  MG IS TAKEN  Can take low dose aspirin  Pcn [Penicillins] Shortness of Breath     Itching/hives         Review of Systems   Review of Systems   Constitutional: Positive for fatigue. Negative for activity change, chills and fever. HENT: Negative for congestion and sore throat. Eyes: Negative for pain and redness. Respiratory: Negative for cough, chest tightness and shortness of breath. Cardiovascular: Negative for chest pain and palpitations. Gastrointestinal: Negative for abdominal pain, diarrhea, nausea and vomiting. Genitourinary: Negative for dysuria, frequency and urgency. Musculoskeletal: Negative for back pain and neck pain. Skin: Negative for rash. Neurological: Negative for syncope, light-headedness and headaches. Psychiatric/Behavioral: Positive for confusion. All other systems reviewed and are negative. Physical Exam   Physical Exam  Vitals and nursing note reviewed. Constitutional:       General: He is not in acute distress. Appearance: Normal appearance. He is well-developed. He is not diaphoretic. Comments: Calm and pleasant elderly white male. HENT:      Head: Normocephalic. Nose: Nose normal.      Mouth/Throat:      Pharynx: No oropharyngeal exudate. Eyes:      General: No scleral icterus. Conjunctiva/sclera: Conjunctivae normal.      Pupils: Pupils are equal, round, and reactive to light. Neck:      Thyroid: No thyromegaly. Vascular: No JVD. Trachea: No tracheal deviation. Cardiovascular:      Rate and Rhythm: Normal rate and regular rhythm. Heart sounds: No murmur heard. No friction rub. No gallop. Pulmonary:      Effort: Pulmonary effort is normal. No respiratory distress. Breath sounds: Normal breath sounds. No stridor. No wheezing or rales. Abdominal:      General: Bowel sounds are normal. There is no distension. Palpations: Abdomen is soft. Tenderness: There is no abdominal tenderness. There is no guarding or rebound.    Musculoskeletal: General: Normal range of motion. Cervical back: Normal range of motion and neck supple. Lymphadenopathy:      Cervical: No cervical adenopathy. Skin:     General: Skin is warm and dry. Capillary Refill: Capillary refill takes less than 2 seconds. Findings: No erythema or rash. Comments: Purpuric nodule on proximal left forearm approximately 2.5 cm diameter. Neurological:      General: No focal deficit present. Mental Status: He is alert and oriented to person, place, and time. Cranial Nerves: No cranial nerve deficit. Motor: No abnormal muscle tone. Coordination: Coordination normal.      Comments: Very hard of hearing. Alert to person, place, year and month. Knew his birthday. Confused to day the week. Follows commands without difficulty. 5 out of 5 strength in all extremities. Clear speech. No facial asymmetry. Good symmetrical smile. Psychiatric:         Mood and Affect: Mood normal.         Behavior: Behavior normal.             Diagnostic Study Results     Labs -     Recent Results (from the past 12 hour(s))   GLUCOSE, POC    Collection Time: 07/15/21 10:51 AM   Result Value Ref Range    Glucose (POC) 374 (H) 65 - 117 mg/dL    Performed by Robyn Kong    CBC WITH AUTOMATED DIFF    Collection Time: 07/15/21 11:00 AM   Result Value Ref Range    WBC 9.4 4.1 - 11.1 K/uL    RBC 4.37 4.10 - 5.70 M/uL    HGB 13.1 12.1 - 17.0 g/dL    HCT 39.4 36.6 - 50.3 %    MCV 90.2 80.0 - 99.0 FL    MCH 30.0 26.0 - 34.0 PG    MCHC 33.2 30.0 - 36.5 g/dL    RDW 13.0 11.5 - 14.5 %    PLATELET 962 082 - 205 K/uL    MPV 10.9 8.9 - 12.9 FL    NRBC 0.0 0  WBC    ABSOLUTE NRBC 0.00 0.00 - 0.01 K/uL    NEUTROPHILS 73 32 - 75 %    LYMPHOCYTES 20 12 - 49 %    MONOCYTES 6 5 - 13 %    EOSINOPHILS 1 0 - 7 %    BASOPHILS 0 0 - 1 %    IMMATURE GRANULOCYTES 0 0.0 - 0.5 %    ABS. NEUTROPHILS 6.8 1.8 - 8.0 K/UL    ABS. LYMPHOCYTES 1.9 0.8 - 3.5 K/UL    ABS.  MONOCYTES 0.6 0.0 - 1.0 K/UL    ABS. EOSINOPHILS 0.1 0.0 - 0.4 K/UL    ABS. BASOPHILS 0.0 0.0 - 0.1 K/UL    ABS. IMM. GRANS. 0.0 0.00 - 0.04 K/UL    DF AUTOMATED     METABOLIC PANEL, COMPREHENSIVE    Collection Time: 07/15/21 11:00 AM   Result Value Ref Range    Sodium 137 136 - 145 mmol/L    Potassium 3.7 3.5 - 5.1 mmol/L    Chloride 98 97 - 108 mmol/L    CO2 28 21 - 32 mmol/L    Anion gap 11 5 - 15 mmol/L    Glucose 400 (H) 65 - 100 mg/dL    BUN 33 (H) 6 - 20 MG/DL    Creatinine 1.80 (H) 0.70 - 1.30 MG/DL    BUN/Creatinine ratio 18 12 - 20      GFR est AA 43 (L) >60 ml/min/1.73m2    GFR est non-AA 35 (L) >60 ml/min/1.73m2    Calcium 9.3 8.5 - 10.1 MG/DL    Bilirubin, total 0.8 0.2 - 1.0 MG/DL    ALT (SGPT) 24 12 - 78 U/L    AST (SGOT) 21 15 - 37 U/L    Alk. phosphatase 109 45 - 117 U/L    Protein, total 7.3 6.4 - 8.2 g/dL    Albumin 3.6 3.5 - 5.0 g/dL    Globulin 3.7 2.0 - 4.0 g/dL    A-G Ratio 1.0 (L) 1.1 - 2.2     TROPONIN I    Collection Time: 07/15/21 11:00 AM   Result Value Ref Range    Troponin-I, Qt. 0.07 (H) <0.05 ng/mL   SAMPLES BEING HELD    Collection Time: 07/15/21 11:00 AM   Result Value Ref Range    SAMPLES BEING HELD 1blue,1sst     COMMENT        Add-on orders for these samples will be processed based on acceptable specimen integrity and analyte stability, which may vary by analyte.    EKG, 12 LEAD, INITIAL    Collection Time: 07/15/21 11:14 AM   Result Value Ref Range    Ventricular Rate 79 BPM    Atrial Rate 79 BPM    P-R Interval 212 ms    QRS Duration 96 ms    Q-T Interval 390 ms    QTC Calculation (Bezet) 447 ms    Calculated R Axis 61 degrees    Calculated T Axis 80 degrees    Diagnosis       Sinus rhythm with 1st degree AV block with occasional premature ventricular   complexes and premature atrial complexes  Otherwise normal ECG  When compared with ECG of 30-DEC-2020 13:37,  premature ventricular complexes are now present  premature atrial complexes are now present     GLUCOSE, POC    Collection Time: 07/15/21 12:00 PM   Result Value Ref Range    Glucose (POC) 368 (H) 65 - 117 mg/dL    Performed by Runnit Jessica    GLUCOSE, POC    Collection Time: 07/15/21  1:02 PM   Result Value Ref Range    Glucose (POC) 278 (H) 65 - 117 mg/dL    Performed by [employer] W/MICROSCOPIC    Collection Time: 07/15/21  1:08 PM   Result Value Ref Range    Color YELLOW/STRAW      Appearance CLEAR CLEAR      Specific gravity 1.010 1.003 - 1.030      pH (UA) 6.0 5.0 - 8.0      Protein Negative NEG mg/dL    Glucose >1,000 (A) NEG mg/dL    Ketone TRACE (A) NEG mg/dL    Bilirubin Negative NEG      Blood TRACE (A) NEG      Urobilinogen 0.2 0.2 - 1.0 EU/dL    Nitrites Positive (A) NEG      Leukocyte Esterase TRACE (A) NEG      WBC 5-10 0 - 4 /hpf    RBC 0-5 0 - 5 /hpf    Epithelial cells FEW FEW /lpf    Bacteria 1+ (A) NEG /hpf       Radiologic Studies -   CT HEAD WO CONT   Final Result   1. No evidence of intracranial hemorrhage. 2. Evidence of mild cerebral and cerebellar atrophy. XR CHEST SNGL V   Final Result   No evidence of active intrathoracic disease. CT Results  (Last 48 hours)               07/15/21 1129  CT HEAD WO CONT Final result    Impression:  1. No evidence of intracranial hemorrhage. 2. Evidence of mild cerebral and cerebellar atrophy. Narrative:  EXAM: CT HEAD WO CONT       INDICATION: ams       COMPARISON: None. CONTRAST: None. TECHNIQUE: Unenhanced CT of the head was performed using 5 mm images. Brain and   bone windows were generated. CT dose reduction was achieved through use of a   standardized protocol tailored for this examination and automatic exposure   control for dose modulation. FINDINGS:   No calvarial abnormalities are detected. There is no evidence of intracranial   hemorrhage.  There is evidence of mild cerebral and cerebellar atrophy               CXR Results  (Last 48 hours)               07/15/21 1126  XR CHEST SNGL V Final result    Impression:  No evidence of active intrathoracic disease. Narrative:  Chest single view dated 7/15/2021       History is chest pain       A single frontal view of the chest was obtained. The cardiac silhouette is   normal in size. There is no evidence of active lung disease. Some costochondral   calcification is noted bilaterally. Medical Decision Making   I am the first provider for this patient. I reviewed the vital signs, available nursing notes, past medical history, past surgical history, family history and social history. Vital Signs-Reviewed the patient's vital signs. Patient Vitals for the past 12 hrs:   Temp Pulse Resp BP SpO2   07/15/21 1245  79 17 135/63 99 %   07/15/21 1230  86 18 (!) 142/64 98 %   07/15/21 1214  83 16 (!) 159/67 100 %   07/15/21 1052 98 °F (36.7 °C) 91 16 (!) 153/70 98 %       Pulse Oximetry Analysis - 98% on RA    Cardiac Monitor:   Rate: 91 bpm  Rhythm: Normal Sinus Rhythm        ED EKG interpretation:  Rhythm: normal sinus rhythm; and regular . Rate (approx.): 79; Axis: normal; WI Interval: First-degree AV block, WI minimally prolonged at 212 ms; QRS interval: normal ; ST/T wave: normal; This EKG was interpreted by Lady Amrita GLASS,ED Provider. Records Reviewed: Nursing Notes and Old Medical Records    Provider Notes (Medical Decision Making):   DDx: DKA, metabolic abnormality, dehydration, CVA        ED Course:   Initial assessment performed. The patients presenting problems have been discussed, and they are in agreement with the care plan formulated and outlined with them. I have encouraged them to ask questions as they arise throughout their visit. ED Course as of Jul 15 1336   Thu Jul 15, 2021   1118 Daughter states lesion on left forearm has been present for years.   She states he was scheduled to have it removed but he did not wish to drive to [place] to have this done.    [CH]      ED Course User Index  [CH] Patrice Leyva MD         PROGRESS NOTE    Pt reevaluated. Patient feeling better after IV fluids. CBC unremarkable. CMP with hyperglycemia without anion gap. Mild increase in creatinine 1.8 with BUN of 33. Previously normal.  UA still pending. Gave IV insulin to bring down blood sugar. Hydrated with IV fluids. Will plan on admission to hospitalist for GINO and blood sugar control. Head CT without acute findings. Chest x-ray clear. Normal EKG. There is a minimal elevation of troponin I 0.07 in the indeterminate range. I believe this is due to his current renal insufficiency rather than reflecting any cardiac issue. Written by Xochitl Valerio MD     I consulted Dr. Berry Hinson, hospitalist here. I reviewed the patient's history, presentation, labs and imaging findings. He agreed to evaluate the patient for admission. Critical Care Time:   0    Disposition:  Admit    PLAN:  1. Current Discharge Medication List        2. Follow-up Information    None       Return to ED if worse     Diagnosis     Clinical Impression:   1. Altered mental status, unspecified altered mental status type    2. Type 2 diabetes mellitus with hyperglycemia, with long-term current use of insulin ([facility])    3. GINO (acute kidney injury) [facility])              Please note that this dictation was completed with [employer] the [employer] voice recognition software. Quite often unanticipated grammatical, syntax, homophones, and other interpretive errors are inadvertently transcribed by the computer software. Please disregard these errors. Please excuse any errors that have escaped final proofreading.

## 2021-07-15 NOTE — ED TRIAGE NOTES
Pt arrived by POV with complaint of high blood sugar, slurred speech, weight loss and weakness. Pts Son saw patient a week ago and felt pt was getting better but pts daughter has been visiting for 3 days and feel like pt has been getting worse. Pt has reported a 40lb weight loss over the last 4 months. Pt reports blood sugar at home this AM was 167. Blood sugar check in ER is 374. Pt is awake alert and oriented with no complaints at this time. Pt educated on ER flow.

## 2021-07-15 NOTE — PROGRESS NOTES
Problem: Falls - Risk of  Goal: *Absence of Falls  Description: Document Katey Grajeda Fall Risk and appropriate interventions in the flowsheet. Outcome: Progressing Towards Goal  Note: Fall Risk Interventions:            Medication Interventions: Patient to call before getting OOB, Teach patient to arise slowly         History of Falls Interventions: Bed/chair exit alarm         Problem: Patient Education: Go to Patient Education Activity  Goal: Patient/Family Education  Outcome: Progressing Towards Goal     Problem: Diabetes Self-Management  Goal: *Disease process and treatment process  Description: Define diabetes and identify own type of diabetes; list 3 options for treating diabetes. Outcome: Progressing Towards Goal  Goal: *Incorporating nutritional management into lifestyle  Description: Describe effect of type, amount and timing of food on blood glucose; list 3 methods for planning meals. Outcome: Progressing Towards Goal  Goal: *Incorporating physical activity into lifestyle  Description: State effect of exercise on blood glucose levels. Outcome: Progressing Towards Goal  Goal: *Developing strategies to promote health/change behavior  Description: Define the ABC's of diabetes; identify appropriate screenings, schedule and personal plan for screenings. Outcome: Progressing Towards Goal  Goal: *Using medications safely  Description: State effect of diabetes medications on diabetes; name diabetes medication taking, action and side effects. Outcome: Progressing Towards Goal  Goal: *Monitoring blood glucose, interpreting and using results  Description: Identify recommended blood glucose targets  and personal targets. Outcome: Progressing Towards Goal  Goal: *Prevention, detection, treatment of acute complications  Description: List symptoms of hyper- and hypoglycemia; describe how to treat low blood sugar and actions for lowering  high blood glucose level.   Outcome: Progressing Towards Goal  Goal: *Prevention, detection and treatment of chronic complications  Description: Define the natural course of diabetes and describe the relationship of blood glucose levels to long term complications of diabetes.   Outcome: Progressing Towards Goal  Goal: *Developing strategies to address psychosocial issues  Description: Describe feelings about living with diabetes; identify support needed and support network  Outcome: Progressing Towards Goal     Problem: Patient Education: Go to Patient Education Activity  Goal: Patient/Family Education  Outcome: Progressing Towards Goal

## 2021-07-15 NOTE — ROUTINE PROCESS
Bedside and Verbal shift change report given to OBDULIA Martines RN (oncoming nurse) by Kellen Latham RN (offgoing nurse). Report included the following information SBAR, ED Summary, MAR and Recent Results. Wilson score 1  Bed/chair alarm yes in use. If in use it is set at the highest volume. Intravenous fluids were administered, SL  Patient Vitals for the past 12 hrs:   Temp Pulse Resp BP SpO2   07/15/21 1438 97.3 °F (36.3 °C) 75 16 (!) 195/51 100 %   07/15/21 1430 98.8 °F (37.1 °C) 75 17 (!) 166/70 99 %   07/15/21 1359  73 18 (!) 189/81 100 %   07/15/21 1345  80 16 (!) 179/80 100 %   07/15/21 1330  82 18 (!) 185/84 100 %   07/15/21 1315  78 16 (!) 140/66 100 %   07/15/21 1300  83 16 (!) 143/67 100 %   07/15/21 1245  79 17 135/63 99 %   07/15/21 1230  86 18 (!) 142/64 98 %   07/15/21 1214  83 16 (!) 159/67 100 %   07/15/21 1052 98 °F (36.7 °C) 91 16 (!) 153/70 98 %     No flowsheet data found. All lab results for the last 24 hours reviewed.

## 2021-07-15 NOTE — PROGRESS NOTES
Spiritual Care Assessment/Progress Note  Vignesh Stratton      NAME: Cassandra Kirkland      MRN: [id number]  AGE: 80 y.o.  SEX: male  Yazidism Affiliation: Other   Language: English     7/15/2021     Total Time (in minutes): 7     Spiritual Assessment begun in [address] through conversation with:         [x]Patient        [] Family    [] Friend(s)        Reason for Consult: Initial/Spiritual assessment, patient floor     Spiritual beliefs: (Please include comment if needed)     [x] Identifies with a ernst tradition:         [] Supported by a ernst community:            [] Claims no spiritual orientation:           [] Seeking spiritual identity:                [] Adheres to an individual form of spirituality:           [] Not able to assess:                           Identified resources for coping:      [] Prayer                               [] Music                  [] Guided Imagery     [x] Family/friends                 [] Pet visits     [] Devotional reading                         [] Unknown     [] Other:                                           Interventions offered during this visit: (See comments for more details)    Patient Interventions: Affirmation of emotions/emotional suffering, Affirmation of ernst, Iconic (affirming the presence of God/Higher Power), Initial/Spiritual assessment, patient floor, Prayer (assurance of)           Plan of Care:     [x] Support spiritual and/or cultural needs    [] Support AMD and/or advance care planning process      [] Support grieving process   [] Coordinate Rites and/or Rituals    [] Coordination with community clergy   [] No spiritual needs identified at this time   [] Detailed Plan of Care below (See Comments)  [] Make referral to Music Therapy  [] Make referral to Pet Therapy     [] Make referral to Addiction services  [] Make referral to [facility]  [] Make referral to Spiritual Care Partner  [] No future visits requested        [] Follow up upon further referrals     Comments:Initial spiritual assessment in Rm 136. Patient/family shared about his medical issues. Provided empathic listening and spiritual support. Lexa Blackman of  Availability.   [address] [place] [address]

## 2021-07-15 NOTE — ROUTINE PROCESS
TRANSFER - IN REPORT:    Verbal report received from SAMIR Martel RN(name) on Mark Pavon  being received from ED(unit) for routine progression of care      Report consisted of patients Situation, Background, Assessment and   Recommendations(SBAR). Information from the following report(s) SBAR, Kardex and MAR was reviewed with the receiving nurse. Opportunity for questions and clarification was provided. Assessment completed upon patients arrival to unit and care assumed.

## 2021-07-15 NOTE — H&P
History and Physical    Patient: Lorelei Kauffman MRN: [id number]  SSN: [id number]    YOB: 1928  Age: 80 y.o. Sex: male      Subjective:      Chief Complaint: Poor oral intake, generalized weakness    HPI: Lorelei Kauffman is a 80 y.o. male who is a known case of DM, HTN and HLD. Was brought to the hospital with a complaint of having generalized weakness poor oral intake and and drowsiness. Patient states that is since Thursday he has been having generalized weakness and anxiety drowsy. Patient also complains of some forgetfulness. Patient was noticed to have elevated blood sugar readings as he was out of his insulin. This was resumed by his daughter on Monday however patient continued to feel little bit weak and tired so he was brought to ER. On evaluation in ER patient was found to have elevated blood sugar readings and his creatinine was also slightly elevated from his baseline. Volume decision was made to admit the patient for further management.   Hospitalist service was consulted    Past Medical History:   Diagnosis Date    Carotid artery disease ([facility] [address]     Coronary atherosclerosis of native coronary artery     Diabetes mellitus, type II ([facility] [address]     DJD (degenerative joint disease)     HCVD (hypertensive cardiovascular disease)     Pure hypercholesterolemia      Past Surgical History:   Procedure Laterality Date    HX CATARACT REMOVAL Bilateral     HX HEART CATHETERIZATION  2010    LVEDP 9, mild ant hypo EF 55-60%, LAD 70% 99%, mod D1, OM3 30%, PDA 90%    HX PTCA  2010    Stent-LAD 2.5X18 ANDREA    HX PTCA  3/2010    Stent PDA ANDREA      Family History   Problem Relation Age of Onset    Heart Disease Mother     Cancer Father      Social History     Tobacco Use    Smoking status: Former Smoker     Quit date: 10/17/1963     Years since quittin.7    Smokeless tobacco: Never Used   Substance Use Topics    Alcohol use: Not Currently     Comment: stopped 2018/used to drink heavy      Prior to Admission medications    Medication Sig Start Date End Date Taking? Authorizing Provider   amLODIPine (NORVASC) 5 mg tablet Take 1 tablet by mouth once daily 4/20/21  Yes Mary Dolan MD   atorvastatin (LIPITOR) 40 mg tablet Take 1 Tab by mouth nightly. 4/20/21  Yes Mary Dolan MD   lisinopriL (PRINIVIL, ZESTRIL) 10 mg tablet Take 1 tablet by mouth twice daily 4/20/21  Yes Mary Dolan MD   cholecalciferol, vitamin D3, (VITAMIN D3) 2,000 unit tab Take  by mouth daily. Yes Provider, Historical   aspirin delayed-release 81 mg tablet Take 81 mg by mouth daily. Yes Provider, Historical   ferrous sulfate (IRON) 325 mg (65 mg iron) tablet Take  by mouth Daily (before breakfast). Yes Provider, Historical   insulin aspart protamine/insulin aspart (NovoLOG Mix 70-30FlexPen U-100) 100 unit/mL (70-30) inpn Administer 30 units in the am and 10 units in the evening by subcutaneous injection.  3/11/21   Cruz Cruz MD        Allergies   Allergen Reactions    Aspirin Other (comments)     NOSE BLEED  MG IS TAKEN  Can take low dose aspirin    Pcn [Penicillins] Shortness of Breath     Itching/hives       Review of Systems:  Constitutional: No fevers, No chills, No fatigue, No weakness, poor oral intake  Eyes: No visual disturbance  Ears, Nose, Mouth, Throat, and Face: No nasal congestion, No sore throat  Respiratory: No cough, No sputum, No wheezing, No SOB  Cardiovascular: No chest pain, No lower extremity edema, No Palpitations   Gastrointestinal: No nausea, No vomiting, No diarrhea, No constipation, No abdominal pain  Genitourinary: No frequency, No dysuria, No hematuria  Integument/Breast: No rash, No skin lesion(s), No dryness  Musculoskeletal: No arthralgias, No neck pain, No back pain  Neurological: No headaches, No dizziness, No confusion,  No seizures, +ve for slow speech  Behavioral/Psychiatric: No anxiety, No depression      Objective:     Vitals:    07/15/21 1052 07/15/21 1214 07/15/21 1230 07/15/21 1245   BP: (!) 153/70 (!) 159/67 (!) 142/64 135/63   Pulse: 91 83 86 79   Resp: 16 16 18 17   Temp: 98 °F (36.7 °C)      SpO2: 98% 100% 98% 99%   Weight: 74.8 kg (165 lb)           Physical Exam:  General: Alert and Oriented x 3. Cooperative and friendly. No acute distress. HEAD: Normocephalic, atraumatic. Eye: PERRLA, EOMI. Throat and Neck: normal and no erythema or exudates noted. No mass   Lung: Clear to auscultation bilaterally without wheezes, rhonchi. Good air movement bilaterally. Heart: Regular rate and rhythm. Normal S1/S2. NO appreciated murmurs, rubs or gallops. Abdomen: soft, non-tender. Bowel sounds present in all four quadrants. No masses appreciated. Extremities:  able to move all extremities normal, atraumatic  Skin: Clean, dry and intact without appreciated lesions. Neurologic: AOx3. Cranial nerves 2-12 and sensation grossly intact. Psychiatric: non focal, normal affect, normal thought process    Recent Labs     07/15/21  1100   WBC 9.4   HGB 13.1   HCT 39.4        Recent Labs     07/15/21  1100      K 3.7   CL 98   CO2 28   *   BUN 33*   CREA 1.80*   CA 9.3   ALB 3.6   TBILI 0.8   ALT 24     No results for input(s): PH, PCO2, PO2, HCO3, FIO2 in the last 72 hours. CT HEAD WO CONT   Final Result   1. No evidence of intracranial hemorrhage. 2. Evidence of mild cerebral and cerebellar atrophy. XR CHEST SNGL V   Final Result   No evidence of active intrathoracic disease. Assessment:     Active Problems:    Coronary atherosclerosis of native coronary artery ()      Pure hypercholesterolemia ()      Diabetes mellitus, type II ([address] ()      Acute renal failure (ARF) ([facility] [address] (7/15/2021)         Plan:     20-year-old male was brought to the hospital with a complaint of having generalized weakness and slurred speech and dehydration  1. Acute renal failure: Likely due to dehydration from uncontrolled blood sugars. We will control patient blood sugar with Lantus and sliding scale. We will give IV hydration to the patient and monitor patient's renal functions closely during the hospital stay  2. Diabetes mellitus: Start the patient on Lantus 20 units at at bedtime and will give sliding scale coverage as well  3. Hyperlipidemia: Continue patient on Lipitor  4. Hypertension hold patient lisinopril due to acute renal insufficiency and monitor blood pressure closely  5. Generalized debility:  We will get OT PT evaluation of the patient  Patient is a full code  Surrogate decision-maker is patient's daughter and son  Home medication reviewed and reconciled    Signed By: Kandy Smith MD     July 15, 2021

## 2021-07-16 PROBLEM — E86.0 DEHYDRATION, MODERATE: Status: ACTIVE | Noted: 2021-07-16

## 2021-07-16 LAB
ANION GAP SERPL CALC-SCNC: 8 MMOL/L (ref 5–15)
BASOPHILS # BLD: 0 K/UL (ref 0–0.1)
BASOPHILS NFR BLD: 1 % (ref 0–1)
BUN SERPL-MCNC: 25 MG/DL (ref 6–20)
BUN/CREAT SERPL: 20 (ref 12–20)
CALCIUM SERPL-MCNC: 8.9 MG/DL (ref 8.5–10.1)
CHLORIDE SERPL-SCNC: 106 MMOL/L (ref 97–108)
CO2 SERPL-SCNC: 26 MMOL/L (ref 21–32)
COMMENT, HOLDF: NORMAL
CREAT SERPL-MCNC: 1.26 MG/DL (ref 0.7–1.3)
DIFFERENTIAL METHOD BLD: ABNORMAL
EOSINOPHIL # BLD: 0.2 K/UL (ref 0–0.4)
EOSINOPHIL NFR BLD: 3 % (ref 0–7)
ERYTHROCYTE [DISTWIDTH] IN BLOOD BY AUTOMATED COUNT: 13 % (ref 11.5–14.5)
GLUCOSE BLD STRIP.AUTO-MCNC: 185 MG/DL (ref 65–117)
GLUCOSE BLD STRIP.AUTO-MCNC: 219 MG/DL (ref 65–117)
GLUCOSE BLD STRIP.AUTO-MCNC: 258 MG/DL (ref 65–117)
GLUCOSE BLD STRIP.AUTO-MCNC: 298 MG/DL (ref 65–117)
GLUCOSE SERPL-MCNC: 186 MG/DL (ref 65–100)
HCT VFR BLD AUTO: 36.1 % (ref 36.6–50.3)
HGB BLD-MCNC: 12.2 G/DL (ref 12.1–17)
IMM GRANULOCYTES # BLD AUTO: 0 K/UL (ref 0–0.04)
IMM GRANULOCYTES NFR BLD AUTO: 0 % (ref 0–0.5)
LYMPHOCYTES # BLD: 2.3 K/UL (ref 0.8–3.5)
LYMPHOCYTES NFR BLD: 38 % (ref 12–49)
MCH RBC QN AUTO: 30 PG (ref 26–34)
MCHC RBC AUTO-ENTMCNC: 33.8 G/DL (ref 30–36.5)
MCV RBC AUTO: 88.9 FL (ref 80–99)
MONOCYTES # BLD: 0.7 K/UL (ref 0–1)
MONOCYTES NFR BLD: 11 % (ref 5–13)
NEUTS SEG # BLD: 2.9 K/UL (ref 1.8–8)
NEUTS SEG NFR BLD: 47 % (ref 32–75)
NRBC # BLD: 0 K/UL (ref 0–0.01)
NRBC BLD-RTO: 0 PER 100 WBC
PLATELET # BLD AUTO: 189 K/UL (ref 150–400)
PMV BLD AUTO: 10.7 FL (ref 8.9–12.9)
POTASSIUM SERPL-SCNC: 3.3 MMOL/L (ref 3.5–5.1)
RBC # BLD AUTO: 4.06 M/UL (ref 4.1–5.7)
SAMPLES BEING HELD,HOLD: NORMAL
SERVICE CMNT-IMP: ABNORMAL
SODIUM SERPL-SCNC: 140 MMOL/L (ref 136–145)
WBC # BLD AUTO: 6 K/UL (ref 4.1–11.1)

## 2021-07-16 PROCEDURE — 36415 COLL VENOUS BLD VENIPUNCTURE: CPT

## 2021-07-16 PROCEDURE — 96372 THER/PROPH/DIAG INJ SC/IM: CPT

## 2021-07-16 PROCEDURE — 74011636637 HC RX REV CODE- 636/637: Performed by: HOSPITALIST

## 2021-07-16 PROCEDURE — 74011250636 HC RX REV CODE- 250/636: Performed by: HOSPITALIST

## 2021-07-16 PROCEDURE — 85025 COMPLETE CBC W/AUTO DIFF WBC: CPT

## 2021-07-16 PROCEDURE — 97116 GAIT TRAINING THERAPY: CPT

## 2021-07-16 PROCEDURE — 97535 SELF CARE MNGMENT TRAINING: CPT

## 2021-07-16 PROCEDURE — 82962 GLUCOSE BLOOD TEST: CPT

## 2021-07-16 PROCEDURE — 74011636637 HC RX REV CODE- 636/637: Performed by: INTERNAL MEDICINE

## 2021-07-16 PROCEDURE — 97165 OT EVAL LOW COMPLEX 30 MIN: CPT

## 2021-07-16 PROCEDURE — 80048 BASIC METABOLIC PNL TOTAL CA: CPT

## 2021-07-16 PROCEDURE — 99218 HC RM OBSERVATION: CPT

## 2021-07-16 PROCEDURE — 74011250637 HC RX REV CODE- 250/637: Performed by: HOSPITALIST

## 2021-07-16 RX ORDER — INSULIN GLARGINE 100 [IU]/ML
30 INJECTION, SOLUTION SUBCUTANEOUS
Status: DISCONTINUED | OUTPATIENT
Start: 2021-07-16 | End: 2021-07-19 | Stop reason: HOSPADM

## 2021-07-16 RX ORDER — ENOXAPARIN SODIUM 100 MG/ML
40 INJECTION SUBCUTANEOUS DAILY
Status: DISCONTINUED | OUTPATIENT
Start: 2021-07-17 | End: 2021-07-19 | Stop reason: HOSPADM

## 2021-07-16 RX ADMIN — AMLODIPINE BESYLATE 5 MG: 5 TABLET ORAL at 08:53

## 2021-07-16 RX ADMIN — ENOXAPARIN SODIUM 30 MG: 100 INJECTION SUBCUTANEOUS at 08:52

## 2021-07-16 RX ADMIN — INSULIN GLARGINE 30 UNITS: 100 INJECTION, SOLUTION SUBCUTANEOUS at 23:06

## 2021-07-16 RX ADMIN — Medication 10 ML: at 06:29

## 2021-07-16 RX ADMIN — FAMOTIDINE 20 MG: 20 TABLET, FILM COATED ORAL at 08:53

## 2021-07-16 RX ADMIN — INSULIN LISPRO 5 UNITS: 100 INJECTION, SOLUTION INTRAVENOUS; SUBCUTANEOUS at 16:30

## 2021-07-16 RX ADMIN — INSULIN LISPRO 2 UNITS: 100 INJECTION, SOLUTION INTRAVENOUS; SUBCUTANEOUS at 23:06

## 2021-07-16 RX ADMIN — INSULIN LISPRO 5 UNITS: 100 INJECTION, SOLUTION INTRAVENOUS; SUBCUTANEOUS at 11:58

## 2021-07-16 RX ADMIN — Medication 10 ML: at 17:20

## 2021-07-16 RX ADMIN — INSULIN LISPRO 2 UNITS: 100 INJECTION, SOLUTION INTRAVENOUS; SUBCUTANEOUS at 06:29

## 2021-07-16 RX ADMIN — ASPIRIN 81 MG: 81 TABLET, COATED ORAL at 08:53

## 2021-07-16 RX ADMIN — FAMOTIDINE 20 MG: 20 TABLET, FILM COATED ORAL at 17:20

## 2021-07-16 RX ADMIN — ATORVASTATIN CALCIUM 40 MG: 40 TABLET, FILM COATED ORAL at 23:06

## 2021-07-16 NOTE — PROGRESS NOTES
[facility]  Hospitalist Progress Note    NAME: Diogo Torres   :  1928   MRN:  [id number]     Total duration of encounter: 1 day      Interim Hospital Summary: 80 y.o. male who presented on 7/15/2021 with Acute renal failure (ARF) ([facility] [address] He has a past medical history of Carotid artery disease ([facility] [address] Coronary atherosclerosis of native coronary artery, Diabetes mellitus, type II ([facility] [address] DJD (degenerative joint disease), HCVD (hypertensive cardiovascular disease), and Pure hypercholesterolemia. .     Patient admitted for weakness and compliance with his medical regiment. He has had difficulty self administering his insulin due to changing the needles. He has not been eating the appropriate diabetic diet. His daughter is been down intermittently, but lives some 125 miles away in the [facility]. Family members are assisting with placement in assisted living facility        Subjective:     Chief Complaint / Reason for Physician Visit  \"better\". Discussed with RN   Eating okay  Has been taking liquids  Waiting treatment with PT and OT. Review of Systems:  Symptom Y/N Comments  Symptom Y/N Comments   Fever/Chills n   Chest Pain n    Poor Appetite n   Edema n    Cough n   Abdominal Pain n    Sputum n   Joint Pain n    SOB/FLETCHER n   Pruritis/Rash y    Nausea/vomit n   Tolerating PT/OT ?     Diarrhea n   Tolerating Diet y    Constipation n   Other         Current Facility-Administered Medications:     [START ON 2021] enoxaparin (LOVENOX) injection 40 mg, 40 mg, SubCUTAneous, DAILY, Claude Ket, MD    insulin glargine (LANTUS) injection 30 Units, 30 Units, SubCUTAneous, QHS, Claude Ket, MD    sodium chloride (NS) flush 5-40 mL, 5-40 mL, IntraVENous, Q8H, Lindy Marcelino MD, 10 mL at 21 2648    sodium chloride (NS) flush 5-40 mL, 5-40 mL, IntraVENous, PRN, Lindy Marcelino MD    acetaminophen (TYLENOL) tablet 650 mg, 650 mg, Oral, Q6H PRN **OR** acetaminophen (TYLENOL) suppository 650 mg, 650 mg, Rectal, Q6H PRN, Kuldip Ndiaye MD    polyethylene glycol (MIRALAX) packet 17 g, 17 g, Oral, DAILY PRN, Kuldip Ndiaye MD    promethazine (PHENERGAN) tablet 12.5 mg, 12.5 mg, Oral, Q6H PRN **OR** ondansetron (ZOFRAN) injection 4 mg, 4 mg, IntraVENous, Q6H PRN, Kuldip Ndiaye MD    famotidine (PEPCID) tablet 20 mg, 20 mg, Oral, BID, Kuldip Ndiaye MD, 20 mg at 07/16/21 0853    glucose chewable tablet 16 g, 4 Tablet, Oral, PRN, Kuldip Ndiaye MD    dextrose (D50W) injection syrg 12.5-25 g, 25-50 mL, IntraVENous, PRN, Kuldip Ndiaye MD    glucagon [facility]) injection 1 mg, 1 mg, IntraMUSCular, PRN, Kuldip Ndiaye MD    insulin lispro (HUMALOG) injection, , SubCUTAneous, AC&HS, Kuldip Ndiaye MD, 5 Units at 07/16/21 1158    amLODIPine (NORVASC) tablet 5 mg, 5 mg, Oral, DAILY, Kuldip Ndiaye MD, 5 mg at 07/16/21 8740    aspirin delayed-release tablet 81 mg, 81 mg, Oral, DAILY, Kuldip Ndiaye MD, 81 mg at 07/16/21 0853    atorvastatin (LIPITOR) tablet 40 mg, 40 mg, Oral, QHS, Kuldip Ndiaye MD, 40 mg at 07/15/21 2112    hydrALAZINE (APRESOLINE) 20 mg/mL injection 20 mg, 20 mg, IntraVENous, Q6H PRN, Kuldip Ndiaye MD, 20 mg at 07/15/21 2116    Objective:     VITALS:   Patient Vitals for the past 12 hrs:   Temp Pulse Resp BP SpO2   07/16/21 0859 97.6 °F (36.4 °C) 82 20 (!) 126/55 100 %   07/16/21 0800     99 %   07/16/21 0632 98.6 °F (37 °C) 83 18 132/63 99 %       Intake/Output Summary (Last 24 hours) at 7/16/2021 1339  Last data filed at 7/16/2021 0936  Gross per 24 hour   Intake 360 ml   Output    Net 360 ml        PHYSICAL EXAM:  General: WD, WN. Alert, cooperative, no acute distress    EENT:  EOMI. Anicteric sclerae. MMM  Resp:  CTA bilaterally, no wheezing or rales. No accessory muscle use  CV:  Regular  rhythm,  No edema  GI:  Soft, Non distended, Non tender.   +Bowel sounds  Neurologic:  Alert and oriented X 3, normal speech, nonfocal  Psych:   Not anxious nor agitated  Skin:  Erythematous scabbed papules on upper chest / shoulders. No jaundice    LABS:  I reviewed today's most current labs and imaging studies. Pertinent labs include:  Recent Labs     07/16/21  0556 07/15/21  1100   WBC 6.0 9.4   HGB 12.2 13.1   HCT 36.1* 39.4    223     Recent Labs     07/16/21  0556 07/15/21  1100    137   K 3.3* 3.7    98   CO2 26 28   * 400*   BUN 25* 33*   CREA 1.26 1.80*   CA 8.9 9.3   ALB  --  3.6   TBILI  --  0.8   ALT  --  24       RADIOLOGY:  CT HEAD 7/15:  No calvarial abnormalities are detected. There is no evidence of intracranial  hemorrhage. There is evidence of mild cerebral and cerebellar atrophy   IMPRESSION  1. No evidence of intracranial hemorrhage. 2. Evidence of mild cerebral and cerebellar atrophy. pCXR 7/15:  A single frontal view of the chest was obtained. The cardiac silhouette is  normal in size. There is no evidence of active lung disease. Some costochondral  calcification is noted bilaterally.   IMPRESSION:  No evidence of active intrathoracic disease. EKG 7/15:  Sinus rhythm 79 bpm with 1st degree AV block with occasional premature ventricular   complexes and premature atrial complexes   Otherwise normal ECG   When compared with ECG of 30-DEC-2020 13:37,   premature ventricular complexes are now present   premature atrial complexes are now present     Procedures: see electronic medical records for all procedures/Xrays and details which were not copied into this note but were reviewed prior to creation of Plan.         Assessment / Plan:    Principal Problem:    Acute renal failure (ARF) ([address] (7/15/2021)  Active Problems:    Dehydration, moderate (7/16/2021)  No recent toxins  Was given 500cc NS bolus in ED  Cr down to 1.2 (baseline level) today  Correct hyperglycemia  PT/OT to assess functional status      Diabetes mellitus, type II (HCC) ()  Monitor BS qid qc/hs  Advanced Lantus 20u in evening to 30u this evening  CC Humalog by Normal scale   Placement for assistance with meals and Insulin administration  Cannot change his Pen needles easily      Bedbug bite (1/19/2021)  House remains infested per daughter  Needs to remove all soft furniture   Does not plan to return  Family requesting AL placement      Coronary atherosclerosis of native coronary artery ()    Pure hypercholesterolemia ()  Stable  No acute issues  Cont Lipitor, ASA, Lipitor        ______________________________________________________________________  SAFETY:   Code Status:Full  DVT prophylaxis:Lovenox  Stress Ulcer prophylaxis: Pepcid  Bladder catheter:no  Family Contact Info:  Primary Emergency Contact: none, none  Bedded: [facility] Room 138/01  Disposition: TBD, likely home when stable  Admission status:  Observation    Reviewed most current lab test results and cultures  YES  Reviewed most current radiology test results   YES  Review and summation of old records today    NO  Reviewed patient's current orders and MAR    YES  PMH/SH reviewed - no change compared to H&P    Care Plan discussed with:                                   Comments  Patient x     Family  x Daughter   RN x     Care Manager x      Consultant                           Multidiciplinary team rounds were held today with , nursing, pharmacist and clinical coordinator. Patient's plan of care was discussed; medications were reviewed and discharge planning was addressed.         ____________________________________________    Total NON Critical Care TIME:  40   Minutes        Comments   >50% of visit spent in counseling and coordination of care   x      Signed: Alfredo Alexandra MD  [facility] Hospitalist  [phone]

## 2021-07-16 NOTE — PROGRESS NOTES
Problem: Mobility Impaired (Adult and Pediatric)  Goal: *Acute Goals and Plan of Care (Insert Text)  Description: FUNCTIONAL STATUS PRIOR TO ADMISSION: Patient was independent and active without use of DME. Reports driving private vehicle as recently as 2 days ago. HOME SUPPORT PRIOR TO ADMISSION: The patient lived alone with no local support. States children live out of town. Physical Therapy Goals  Initiated 7/15/2021  1. Patient will move from supine to sit and sit to supine  in bed with modified independence within 7 day(s). 2.  Patient will transfer from bed to chair and chair to bed with modified independence using the least restrictive device within 7 day(s). 3.  Patient will perform sit to stand with modified independence within 7 day(s). 4.  Patient will ambulate with modified independence for 450 feet with the least restrictive device within 7 day(s). 5.  Patient will ascend/descend 4 stairs with one handrail(s) with supervision/set-up within 7 day(s). Outcome: Progressing Towards Goal   PHYSICAL THERAPY TREATMENT  Patient: Lorelei Kauffman (49 y.o. male)  Date: 7/16/2021  Diagnosis: Acute renal failure (ARF) ([facility] [N17.9] <principal problem not specified>       Precautions: Fall  Chart, physical therapy assessment, plan of care and goals were reviewed. ASSESSMENT  Patient continues with skilled PT services and is progressing towards goals. Patient able to stand from sit with supervision but initial standing balance required min assist. With ambulation the first 15 feet unsteady requiring min assist then patient able to ambulate remaining distance with CGA. Patient not safe to return home alone at this time. Spoke with daughter who agrees and is looking for assisted living facility.     Current Level of Function Impacting Discharge (mobility/balance): Min assist    Other factors to consider for discharge: *lives alone         PLAN :  Patient continues to benefit from skilled intervention to address the above impairments. Continue treatment per established plan of care. to address goals. Recommendation for discharge: (in order for the patient to meet his/her long term goals)  Physical therapy at least 2 days/week in the home AND ensure assist and/or supervision for safety with out of bed mobility    This discharge recommendation:  Has been made in collaboration with the attending provider and/or case management    IF patient discharges home will need the following DME:will continue to assess       SUBJECTIVE:   Patient stated I will walk.     OBJECTIVE DATA SUMMARY:   Critical Behavior:  Neurologic State: Alert  Orientation Level: Oriented X4  Cognition: Follows commands  Safety/Judgement: Decreased awareness of need for assistance  Functional Mobility Training:  Bed Mobility:  Rolling: Independent  Supine to Sit: Independent   Scooting: Independent      Transfers:  Sit to Stand: Supervision  Stand to Sit: Supervision        Bed to Chair: Contact guard assistance            Balance:  Sitting: With support  Sitting - Static: Good (unsupported)  Sitting - Dynamic: Good (unsupported)  Standing: Without support  Standing - Static: Fair  Standing - Dynamic : Fair  Ambulation/Gait Training:  Distance (ft): 150 Feet (ft)  Assistive Device: Gait belt  Ambulation - Level of Assistance: Minimal assistance (for first few steps then CGA)   Gait Abnormalities: Decreased step clearance   Base of Support: Narrowed   Speed/Mary: Slow       Pain Rating:  No complaint    Activity Tolerance:   Good    After treatment patient left in no apparent distress:   Sitting in chair, Call bell within reach, and Bed / chair alarm activated    COMMUNICATION/COLLABORATION:   The patients plan of care was discussed with: Case management.      Telma Aguilar, PT   Time Calculation: 21 mins

## 2021-07-16 NOTE — PROGRESS NOTES
Shift report given to Hi Harper RN (oncoming nurse) by Tan Doyle RN (off going nurse) to include SBAR, MAR, recent results, safety measures, Hal Benjamin.

## 2021-07-16 NOTE — PROGRESS NOTES
Patient up walking in the sofia with PT. Daughter is visiting and speaking with PT concerning progress and discharge planning. Patient in NAD, no complaints of pain or discomfort. Is significantly hard of hearing and requires speaking very loud to be understood.

## 2021-07-16 NOTE — PROGRESS NOTES
Pt BP reading at 2036 227/93 L arm , pt resting comfortably ini bed NAD noted, will reassess BP in 10 mins. 2044 BP reading 203/91 R arm, pt resting comfortably in bed NAD noted, will inform Nursing Sup as well as MD on call. BP monitor at bed side set to reassess BP in 10mins. 2054 Elizabeth Hameed MD called and placed a  verbal order for  Hydralazine 20mg IV as needed for BP over 160    Hydralazine given at 2116, @2146 /65 Will continue to monitor.

## 2021-07-16 NOTE — PROGRESS NOTES
Spoke with the patient's daughter earlier this morning requesting that her father be placed somewhere until they can do something with the bedbug problem in the home. Plans were being made to check into placing the patient in [facility]. Daughter, Amalia Lazo, [phone] met with Jeannine Dudley,  at the [facility] facility and started filling out paperwork. Reena stated that she spoke with her father this morning and things were a go. After she returned from the facility, she met with her father and he has changed his mind telling her he was not ready to make that move yet and that he was returning home when discharged.

## 2021-07-16 NOTE — PROGRESS NOTES
Problem: Self Care Deficits Care Plan (Adult)  Goal: *Acute Goals and Plan of Care (Insert Text)  Description:   FUNCTIONAL STATUS PRIOR TO ADMISSION: Patient was independent and active without use of DME.     HOME SUPPORT: family support    Occupational Therapy Goals  Initiated 7/16/2021  1. Patient will perform toilet transfer with independence within 7 day(s). 2.  Patient will perform lower body dressing with independence within 7 day(s). 3.  Patient will utilize energy conservation techniques during functional activities with verbal cues within 7 day(s). Outcome: Progressing Towards GoAL  OCCUPATIONAL THERAPY EVALUATION  Patient: Flaquito Valenzuela (30 y.o. male)  Date: 7/16/2021  Primary Diagnosis: Acute renal failure (ARF) ([facility]) [N17.9]       Precautions: skin,Fall    ASSESSMENT  Based on the objective data described below, the patient presents with need for supervision for standing tasks, min assist for toilet transfers and CGA/Min assist for ambulation. Current Level of Function Impacting Discharge (ADLs/self-care): lives alone, unsafe for toilet transfers without use of grab bar which he does not have at home. Not using mobility aid but needs CGA when up, Supervision for standing     Functional Outcome Measure: The patient scored Total: 75/100 on the Barthel Index outcome measure which is indicative of 25% impaired ability to care for basic self needs/dependency on others; inferred 25% dependency on others for instrumental ADLs. Other factors to consider for discharge: lives alone     Patient will benefit from skilled therapy intervention to address the above noted impairments. PLAN :  Recommendations and Planned Interventions: self care training, functional mobility training, and endurance activities    Frequency/Duration: Patient will be followed by occupational therapy 3 times a week to address goals.     Recommendation for discharge: (in order for the patient to meet his/her long term goals)  Occupational therapy at least 2 days/week in the home AND ensure assist and/or supervision for safety with transfers and ambulation unless cleared by PT    This discharge recommendation:  Has been made in collaboration with the attending provider and/or case management    IF patient discharges home will need the following DME: none      SUBJECTIVE:  Patient stated \"I am hard of hearing. \"    OBJECTIVE DATA SUMMARY:  HISTORY:   Past Medical History:  No date: Carotid artery disease ([address]  No date: Coronary atherosclerosis of native coronary artery  No date: Diabetes mellitus, type II (HCC)  No date: DJD (degenerative joint disease)  No date: HCVD (hypertensive cardiovascular disease)  No date: Pure hypercholesterolemiaPast Surgical History:  No date: HX CATARACT REMOVAL; Bilateral  2/2010: HX HEART CATHETERIZATION      Comment:  LVEDP 9, mild ant hypo EF 55-60%, LAD 70% 99%, mod D1,                OM3 30%, PDA 90%  2/2010: HX PTCA      Comment:  Stent-LAD 2.5X18 ANDREA  3/2010: HX PTCA      Comment:  Stent PDA ANDREA    Expanded or extensive additional review of patient history:     Home Situation  Home Environment: Private residence  # Steps to Enter: 0  One/Two Story Residence: One story  Living Alone: Yes  Support Systems: Child(chepe) (has help with housework)  Patient Expects to be Discharged to[de-identified] [employer]  Current DME Used/Available at Home: None  Tub or Shower Type: Shower    Hand dominance: Right    EXAMINATION OF PERFORMANCE DEFICITS:  Cognitive/Behavioral Status:  Neurologic State: Alert  Orientation Level: Oriented X4  Cognition: Follows commands         Hearing:   Auditory  Auditory Impairment: Hard of hearing, bilateral    Vision/Perceptual:    Appears intact, wears glasses    Range of Motion:  Functional to reach behind head and back BUE    Strength:  Functional for ADL tasks     Coordination: Intact to open small packages        Tone & Sensation:  normal            Balance:   Sitting unsupported good, standing needs CGA to Min assist    Functional Mobility and Transfers for ADLs:  Bed Mobility:  Rolling: Independent  Supine to Sit: Independent  Scooting: Independent    Transfers:  Sit to Stand: Contact guard assistance  Stand to Sit: Contact guard assistance  Bed to Chair: Contact guard assistance    ADL Assessment:                                            ADL Intervention and task modifications:            Upper Body Bathing  Bathing Assistance: Set-up  Position Performed: Seated in chair    Lower Body Bathing  Bathing Assistance: Set-up  Position Performed: Seated in chair  Lower Body : Set-up    Upper Body [address]: Minimum  assistance    Lower Body Dressing Assistance  Socks: Independent  Leg Crossed Method Used: Yes            Functional Measure:  Barthel Index:    Bathin  Bladder: 10  Bowels: 10  Groomin  Dressin  Feeding: 10  Mobility: 10  Stairs: 5  Toilet Use: 5  Transfer (Bed to Chair and Back): 10  Total: 75/100       The Barthel ADL Index: Guidelines  1. The index should be used as a record of what a patient does, not as a record of what a patient could do. 2. The main aim is to establish degree of independence from any help, physical or verbal, however minor and for whatever reason. 3. The need for supervision renders the patient not independent. 4. A patient's performance should be established using the best available evidence. Asking the patient, friends/relatives and nurses are the usual sources, but direct observation and common sense are also important. However direct testing is not needed. 5. Usually the patient's performance over the preceding 24-48 hours is important, but occasionally longer periods will be relevant. 6. Middle categories imply that the patient supplies over 50 per cent of the effort. 7. Use of aids to be independent is allowed. Sergio Saleh., Barthel, D.W. (441). Functional evaluation: the Barthel Index.  [address] [facility] ([address], [place].M.F, Estefany Dimas., Barbara Ribera., [facility]. (1999). Measuring the change indisability after inpatient rehabilitation; comparison of the responsiveness of the Barthel Index and Functional [place] Measure. Journal of Neurology, Neurosurgery, and Psychiatry, 66(4), 195-507. MEREDITH Mcclure, SARTHAK Mccain, & Devon Sweet M.A. (2004.) Assessment of post-stroke quality of life in cost-effectiveness studies: The usefulness of the Barthel Index and the EuroQoL-5D. Quality of Life Research, 15, 421-02        Occupational Therapy Evaluation Charge Determination  History Examination Decision-Making  MEDIUM Complexity : Expanded review of history including physical, cognitive and psychosocial  history  LOW Complexity : 1-3 performance deficits relating to physical, cognitive , or psychosocial skils that result in activity limitations and / or participation restrictions  MEDIUM Complexity : Patient may present with comorbidities that affect occupational performnce. Miniml to moderate modification of tasks or assistance (eg, physical or verbal ) with assesment(s) is necessary to enable patient to complete evaluation      Based on the above components, the patient evaluation is determined to be of the following complexity level: LOW   Pain Ratin/10    Activity Tolerance:   Good    After treatment patient left in no apparent distress:    Sitting in chair, Call bell within reach, and Bed / chair alarm activated    COMMUNICATION/EDUCATION:  The patient's plan of care was discussed with: Physical therapist.     Patient/family have participated as able in goal setting and plan of care. This patient's plan of care is appropriate for delegation to [facility].     Thank you for this referral.  Shreyas Winston, OT

## 2021-07-16 NOTE — PROGRESS NOTES
Bedside shift change report given to Nika KOEHLER (oncoming nurse) by Maria Ines Oliveros  (offgoing nurse). Report included the following information SBAR, Kardex, Intake/Output, MAR, Recent Results and Med Rec Status.

## 2021-07-16 NOTE — PROGRESS NOTES
Speech Pathology Note    Chart reviewed and spoke with RN. Initial SLP evaluation on 7/15 revealed a grossly functional oral/pharyngeal swallow and Regular/Thin Liquids was recommended. SLP to follow up to ensure PO intake. Patient reported eating a \"thanksgiving dinner\" last night and was observed to clear his plate at breakfast on this date. Patient's speech intelligibility is unaffected. Patient's oropharyngeal swallow function is WFL. No further acute SLP needs at this time. Will sign off. Please re-consult if needed. Thank you.     Marla Bailey M.S., CF-SLP

## 2021-07-17 LAB
ANION GAP SERPL CALC-SCNC: 8 MMOL/L (ref 5–15)
BASOPHILS # BLD: 0 K/UL (ref 0–0.1)
BASOPHILS NFR BLD: 1 % (ref 0–1)
BUN SERPL-MCNC: 23 MG/DL (ref 6–20)
BUN/CREAT SERPL: 20 (ref 12–20)
CALCIUM SERPL-MCNC: 9.1 MG/DL (ref 8.5–10.1)
CHLORIDE SERPL-SCNC: 106 MMOL/L (ref 97–108)
CO2 SERPL-SCNC: 27 MMOL/L (ref 21–32)
CREAT SERPL-MCNC: 1.15 MG/DL (ref 0.7–1.3)
DIFFERENTIAL METHOD BLD: ABNORMAL
EOSINOPHIL # BLD: 0.2 K/UL (ref 0–0.4)
EOSINOPHIL NFR BLD: 3 % (ref 0–7)
ERYTHROCYTE [DISTWIDTH] IN BLOOD BY AUTOMATED COUNT: 13.1 % (ref 11.5–14.5)
EST. AVERAGE GLUCOSE BLD GHB EST-MCNC: 255 MG/DL
GLUCOSE BLD STRIP.AUTO-MCNC: 111 MG/DL (ref 65–117)
GLUCOSE BLD STRIP.AUTO-MCNC: 168 MG/DL (ref 65–117)
GLUCOSE BLD STRIP.AUTO-MCNC: 224 MG/DL (ref 65–117)
GLUCOSE BLD STRIP.AUTO-MCNC: 257 MG/DL (ref 65–117)
GLUCOSE SERPL-MCNC: 106 MG/DL (ref 65–100)
HBA1C MFR BLD: 10.5 % (ref 4–5.6)
HCT VFR BLD AUTO: 36.2 % (ref 36.6–50.3)
HGB BLD-MCNC: 12.5 G/DL (ref 12.1–17)
IMM GRANULOCYTES # BLD AUTO: 0 K/UL (ref 0–0.04)
IMM GRANULOCYTES NFR BLD AUTO: 0 % (ref 0–0.5)
LYMPHOCYTES # BLD: 2.3 K/UL (ref 0.8–3.5)
LYMPHOCYTES NFR BLD: 38 % (ref 12–49)
MCH RBC QN AUTO: 30.9 PG (ref 26–34)
MCHC RBC AUTO-ENTMCNC: 34.5 G/DL (ref 30–36.5)
MCV RBC AUTO: 89.4 FL (ref 80–99)
MONOCYTES # BLD: 0.6 K/UL (ref 0–1)
MONOCYTES NFR BLD: 10 % (ref 5–13)
NEUTS SEG # BLD: 2.9 K/UL (ref 1.8–8)
NEUTS SEG NFR BLD: 48 % (ref 32–75)
NRBC # BLD: 0 K/UL (ref 0–0.01)
NRBC BLD-RTO: 0 PER 100 WBC
PLATELET # BLD AUTO: 195 K/UL (ref 150–400)
PMV BLD AUTO: 10.9 FL (ref 8.9–12.9)
POTASSIUM SERPL-SCNC: 3.5 MMOL/L (ref 3.5–5.1)
RBC # BLD AUTO: 4.05 M/UL (ref 4.1–5.7)
SERVICE CMNT-IMP: ABNORMAL
SERVICE CMNT-IMP: NORMAL
SODIUM SERPL-SCNC: 141 MMOL/L (ref 136–145)
WBC # BLD AUTO: 6 K/UL (ref 4.1–11.1)

## 2021-07-17 PROCEDURE — 74011250636 HC RX REV CODE- 250/636: Performed by: INTERNAL MEDICINE

## 2021-07-17 PROCEDURE — 82962 GLUCOSE BLOOD TEST: CPT

## 2021-07-17 PROCEDURE — 80048 BASIC METABOLIC PNL TOTAL CA: CPT

## 2021-07-17 PROCEDURE — 97530 THERAPEUTIC ACTIVITIES: CPT

## 2021-07-17 PROCEDURE — 74011250637 HC RX REV CODE- 250/637: Performed by: HOSPITALIST

## 2021-07-17 PROCEDURE — 74011636637 HC RX REV CODE- 636/637: Performed by: INTERNAL MEDICINE

## 2021-07-17 PROCEDURE — 97116 GAIT TRAINING THERAPY: CPT

## 2021-07-17 PROCEDURE — 83036 HEMOGLOBIN GLYCOSYLATED A1C: CPT

## 2021-07-17 PROCEDURE — 85025 COMPLETE CBC W/AUTO DIFF WBC: CPT

## 2021-07-17 PROCEDURE — 99218 HC RM OBSERVATION: CPT

## 2021-07-17 PROCEDURE — 36415 COLL VENOUS BLD VENIPUNCTURE: CPT

## 2021-07-17 PROCEDURE — 74011636637 HC RX REV CODE- 636/637: Performed by: HOSPITALIST

## 2021-07-17 PROCEDURE — 96372 THER/PROPH/DIAG INJ SC/IM: CPT

## 2021-07-17 RX ADMIN — Medication 10 ML: at 00:25

## 2021-07-17 RX ADMIN — ENOXAPARIN SODIUM 40 MG: 40 INJECTION SUBCUTANEOUS at 09:11

## 2021-07-17 RX ADMIN — ASPIRIN 81 MG: 81 TABLET, COATED ORAL at 09:11

## 2021-07-17 RX ADMIN — Medication 10 ML: at 06:56

## 2021-07-17 RX ADMIN — INSULIN LISPRO 5 UNITS: 100 INJECTION, SOLUTION INTRAVENOUS; SUBCUTANEOUS at 11:57

## 2021-07-17 RX ADMIN — AMLODIPINE BESYLATE 5 MG: 5 TABLET ORAL at 09:11

## 2021-07-17 RX ADMIN — INSULIN GLARGINE 30 UNITS: 100 INJECTION, SOLUTION SUBCUTANEOUS at 23:35

## 2021-07-17 RX ADMIN — INSULIN LISPRO 3 UNITS: 100 INJECTION, SOLUTION INTRAVENOUS; SUBCUTANEOUS at 17:06

## 2021-07-17 RX ADMIN — Medication 10 ML: at 23:36

## 2021-07-17 RX ADMIN — ATORVASTATIN CALCIUM 40 MG: 40 TABLET, FILM COATED ORAL at 23:35

## 2021-07-17 RX ADMIN — FAMOTIDINE 20 MG: 20 TABLET, FILM COATED ORAL at 17:07

## 2021-07-17 RX ADMIN — Medication 5 ML: at 17:07

## 2021-07-17 RX ADMIN — FAMOTIDINE 20 MG: 20 TABLET, FILM COATED ORAL at 09:11

## 2021-07-17 NOTE — PROGRESS NOTES
Problem: Falls - Risk of  Goal: *Absence of Falls  Description: Document Sandra Spivey Fall Risk and appropriate interventions in the flowsheet. Outcome: Progressing Towards Goal  Note: Fall Risk Interventions:  Mobility Interventions: Bed/chair exit alarm         Medication Interventions: Bed/chair exit alarm    Elimination Interventions: Bed/chair exit alarm    History of Falls Interventions: Bed/chair exit alarm         Problem: Patient Education: Go to Patient Education Activity  Goal: Patient/Family Education  Outcome: Progressing Towards Goal     Problem: Diabetes Self-Management  Goal: *Disease process and treatment process  Description: Define diabetes and identify own type of diabetes; list 3 options for treating diabetes. Outcome: Progressing Towards Goal  Goal: *Incorporating nutritional management into lifestyle  Description: Describe effect of type, amount and timing of food on blood glucose; list 3 methods for planning meals. Outcome: Progressing Towards Goal  Goal: *Incorporating physical activity into lifestyle  Description: State effect of exercise on blood glucose levels. Outcome: Progressing Towards Goal  Goal: *Developing strategies to promote health/change behavior  Description: Define the ABC's of diabetes; identify appropriate screenings, schedule and personal plan for screenings. Outcome: Progressing Towards Goal  Goal: *Using medications safely  Description: State effect of diabetes medications on diabetes; name diabetes medication taking, action and side effects. Outcome: Progressing Towards Goal  Goal: *Monitoring blood glucose, interpreting and using results  Description: Identify recommended blood glucose targets  and personal targets. Outcome: Progressing Towards Goal  Goal: *Prevention, detection, treatment of acute complications  Description: List symptoms of hyper- and hypoglycemia; describe how to treat low blood sugar and actions for lowering  high blood glucose level.   Outcome: Progressing Towards Goal  Goal: *Prevention, detection and treatment of chronic complications  Description: Define the natural course of diabetes and describe the relationship of blood glucose levels to long term complications of diabetes.   Outcome: Progressing Towards Goal  Goal: *Developing strategies to address psychosocial issues  Description: Describe feelings about living with diabetes; identify support needed and support network  Outcome: Progressing Towards Goal     Problem: Patient Education: Go to Patient Education Activity  Goal: Patient/Family Education  Outcome: Progressing Towards Goal     Problem: Patient Education: Go to Patient Education Activity  Goal: Patient/Family Education  Outcome: Progressing Towards Goal     Problem: Patient Education: Go to Patient Education Activity  Goal: Patient/Family Education  Outcome: Progressing Towards Goal

## 2021-07-17 NOTE — PROGRESS NOTES
[facility]  Hospitalist Progress Note    NAME: Carson Gaspar   :  1928   MRN:  [id number]     Total duration of encounter: 2 days      Interim Hospital Summary: 80 y.o. male who presented on 7/15/2021 with Acute renal failure (ARF) ([facility] He has a past medical history of Carotid artery disease ([facility] [address] Coronary atherosclerosis of native coronary artery, Diabetes mellitus, type II ([facility] [address] [facility] DJD (degenerative joint disease), HCVD (hypertensive cardiovascular disease), and Pure hypercholesterolemia. .     Patient admitted for weakness and compliance with his medical regiment. He has had difficulty self administering his insulin due to changing the needles. He has not been eating the appropriate diabetic diet. His daughter is been down intermittently, but lives some 125 miles away in the [facility]. Family members are assisting with placement in assisted living facility        Subjective:     Chief Complaint / Reason for Physician Visit  \"no c/o\". Discussed with RN   Eating well  Has accepted advice by MD / Family to leave home for now to [facility]    Review of Systems:  Symptom Y/N Comments  Symptom Y/N Comments   Fever/Chills n   Chest Pain n    Poor Appetite n   Edema n    Cough n   Abdominal Pain n    Sputum n   Joint Pain n    SOB/FLETCHER n   Pruritis/Rash y    Nausea/vomit n   Tolerating PT/OT ?     Diarrhea n   Tolerating Diet y    Constipation n   Other         Current Facility-Administered Medications:     enoxaparin (LOVENOX) injection 40 mg, 40 mg, SubCUTAneous, DAILY, Markie Marshall MD, 40 mg at 21 0911    insulin glargine (LANTUS) injection 30 Units, 30 Units, SubCUTAneous, QHS, Markie Marshall MD, 30 Units at 21 2306    sodium chloride (NS) flush 5-40 mL, 5-40 mL, IntraVENous, Q8H, Kuldip Ndiaye MD, 10 mL at 21 0656    sodium chloride (NS) flush 5-40 mL, 5-40 mL, IntraVENous, PRN, Kuldip Ndiaye MD    acetaminophen (TYLENOL) tablet 650 mg, 650 mg, Oral, Q6H PRN **OR** acetaminophen (TYLENOL) suppository 650 mg, 650 mg, Rectal, Q6H PRN, Jeremy Cash MD    polyethylene glycol (MIRALAX) packet 17 g, 17 g, Oral, DAILY PRN, Jeremy Cash MD    promethazine (PHENERGAN) tablet 12.5 mg, 12.5 mg, Oral, Q6H PRN **OR** ondansetron (ZOFRAN) injection 4 mg, 4 mg, IntraVENous, Q6H PRN, Jeremy Cash MD    famotidine (PEPCID) tablet 20 mg, 20 mg, Oral, BID, Jeremy Cash MD, 20 mg at 07/17/21 0911    glucose chewable tablet 16 g, 4 Tablet, Oral, PRN, Jeremy Cash MD    dextrose (D50W) injection syrg 12.5-25 g, 25-50 mL, IntraVENous, PRN, Jeremy Cash MD    glucagon [facility]) injection 1 mg, 1 mg, IntraMUSCular, PRN, Jeremy Cash MD    insulin lispro (HUMALOG) injection, , SubCUTAneous, AC&HS, Jeremy Cash MD, 5 Units at 07/17/21 1157    amLODIPine (NORVASC) tablet 5 mg, 5 mg, Oral, DAILY, Jeremy Cash MD, 5 mg at 07/17/21 0911    aspirin delayed-release tablet 81 mg, 81 mg, Oral, DAILY, Jeremy Cash MD, 81 mg at 07/17/21 0911    atorvastatin (LIPITOR) tablet 40 mg, 40 mg, Oral, QHS, Jeremy Cash MD, 40 mg at 07/16/21 2306    hydrALAZINE (APRESOLINE) 20 mg/mL injection 20 mg, 20 mg, IntraVENous, Q6H PRN, Jeremy Cash MD, 20 mg at 07/15/21 2116    Objective:     VITALS:   Patient Vitals for the past 12 hrs:   Temp Pulse Resp BP SpO2   07/17/21 0729 97.6 °F (36.4 °C) 72 20 (!) 165/73 100 %   07/17/21 0410 97.9 °F (36.6 °C) 67 18 (!) 142/61 99 %       Intake/Output Summary (Last 24 hours) at 7/17/2021 1542  Last data filed at 7/16/2021 1752  Gross per 24 hour   Intake 380 ml   Output    Net 380 ml        PHYSICAL EXAM:  General: WD, WN. Alert, cooperative, no acute distress    EENT:  EOMI. Anicteric sclerae. MMM  Resp:  CTA bilaterally, no wheezing or rales. No accessory muscle use  CV:  Regular  rhythm,  No edema  GI:  Soft, Non distended, Non tender.   +Bowel sounds  Neurologic:  Alert and oriented X 3, normal speech, nonfocal  Psych:   Not anxious nor agitated  Skin:  Erythematous scabbed papules on upper chest / shoulders. No jaundice    LABS:  I reviewed today's most current labs and imaging studies. Pertinent labs include:  Recent Labs     07/17/21  0603 07/16/21  0556 07/15/21  1100   WBC 6.0 6.0 9.4   HGB 12.5 12.2 13.1   HCT 36.2* 36.1* 39.4    189 223     Recent Labs     07/17/21  0603 07/16/21  0556 07/15/21  1100    140 137   K 3.5 3.3* 3.7    106 98   CO2 27 26 28   * 186* 400*   BUN 23* 25* 33*   CREA 1.15 1.26 1.80*   CA 9.1 8.9 9.3   ALB  --   --  3.6   TBILI  --   --  0.8   ALT  --   --  24       RADIOLOGY:  CT HEAD 7/15:  No calvarial abnormalities are detected. There is no evidence of intracranial  hemorrhage. There is evidence of mild cerebral and cerebellar atrophy   IMPRESSION  1. No evidence of intracranial hemorrhage. 2. Evidence of mild cerebral and cerebellar atrophy. pCXR 7/15:  A single frontal view of the chest was obtained. The cardiac silhouette is  normal in size. There is no evidence of active lung disease. Some costochondral  calcification is noted bilaterally.   IMPRESSION:  No evidence of active intrathoracic disease. EKG 7/15:  Sinus rhythm 79 bpm with 1st degree AV block with occasional premature ventricular   complexes and premature atrial complexes   Otherwise normal ECG   When compared with ECG of 30-DEC-2020 13:37,   premature ventricular complexes are now present   premature atrial complexes are now present     Procedures: see electronic medical records for all procedures/Xrays and details which were not copied into this note but were reviewed prior to creation of Plan.         Assessment / Plan:    Principal Problem:    Acute renal failure (ARF) ([address] (7/15/2021)  Active Problems:    Dehydration, moderate (7/16/2021)  No recent toxins  Was given 500cc NS bolus in ED  Cr down to 1.2 yesterday and 1.1 today  Correct hyperglycemia  PT/OT to assess functional status      Diabetes mellitus, type II ([facility] ()  Monitor BS qid qc/hs  Advanced Lantus 20u in evening to 30u   CC Humalog by Normal scale   Placement to [facility] for assistance with meals and Insulin administration      Bedbug bite (1/19/2021)  House remains infested per daughter  Needs to remove all soft furniture   Does not plan to return  Family has arranged for  AL placement [facility] on Monday  [facility] cannot accept over the weekend      Coronary atherosclerosis of native coronary artery ()    Pure hypercholesterolemia ()  Stable  No acute issues  Cont Lipitor, ASA, Lipitor        ______________________________________________________________________  SAFETY:   Code Status:Full  DVT prophylaxis:Lovenox  Stress Ulcer prophylaxis: Pepcid  Bladder catheter:no  Family Contact Info:  Primary Emergency Contact: none, none  Bedded: [facility] Room 138/01  Disposition: TBD, likely home when stable  Admission status:  Observation    Reviewed most current lab test results and cultures  YES  Reviewed most current radiology test results   YES  Review and summation of old records today    NO  Reviewed patient's current orders and MAR    YES  PMH/SH reviewed - no change compared to H&P    Care Plan discussed with:                                   Comments  Patient x     Family  x Daughter   RN x     Care Manager      Consultant                           Multidiciplinary team rounds were held today with , nursing, pharmacist and clinical coordinator. Patient's plan of care was discussed; medications were reviewed and discharge planning was addressed.         ____________________________________________    Total NON Critical Care TIME:  35   Minutes        Comments   >50% of visit spent in counseling and coordination of care   x      Signed: Kassy García MD  [facility] Hospitalist  [phone]

## 2021-07-17 NOTE — PROGRESS NOTES
DISCHARGE DELAY    Noted the documentation by the attending that the patient has now decided to go to assisted living after the attending physician talked to him. On Friday, the patient's daughter initiated the process for assisted living at [facility]. Patient initially agreed but when she returned from the facility, he told her he wasn't going because he was not ready to do that yet.   Patient's home is infested with bedbugs and has been that way for 3 years according to the daughter and the patient

## 2021-07-18 LAB
ANION GAP SERPL CALC-SCNC: 11 MMOL/L (ref 5–15)
APPEARANCE UR: CLEAR
BACTERIA URNS QL MICRO: ABNORMAL /HPF
BASOPHILS # BLD: 0 K/UL (ref 0–0.1)
BASOPHILS NFR BLD: 0 % (ref 0–1)
BILIRUB UR QL: NEGATIVE
BUN SERPL-MCNC: 20 MG/DL (ref 6–20)
BUN/CREAT SERPL: 17 (ref 12–20)
CALCIUM SERPL-MCNC: 8.7 MG/DL (ref 8.5–10.1)
CHLORIDE SERPL-SCNC: 107 MMOL/L (ref 97–108)
CO2 SERPL-SCNC: 26 MMOL/L (ref 21–32)
COLOR UR: ABNORMAL
CREAT SERPL-MCNC: 1.16 MG/DL (ref 0.7–1.3)
DIFFERENTIAL METHOD BLD: ABNORMAL
EOSINOPHIL # BLD: 0.2 K/UL (ref 0–0.4)
EOSINOPHIL NFR BLD: 3 % (ref 0–7)
EPITH CASTS URNS QL MICRO: ABNORMAL /LPF
ERYTHROCYTE [DISTWIDTH] IN BLOOD BY AUTOMATED COUNT: 13.1 % (ref 11.5–14.5)
GLUCOSE BLD STRIP.AUTO-MCNC: 144 MG/DL (ref 65–117)
GLUCOSE BLD STRIP.AUTO-MCNC: 231 MG/DL (ref 65–117)
GLUCOSE BLD STRIP.AUTO-MCNC: 283 MG/DL (ref 65–117)
GLUCOSE BLD STRIP.AUTO-MCNC: 329 MG/DL (ref 65–117)
GLUCOSE SERPL-MCNC: 125 MG/DL (ref 65–100)
GLUCOSE UR STRIP.AUTO-MCNC: >1000 MG/DL
HCT VFR BLD AUTO: 36.2 % (ref 36.6–50.3)
HGB BLD-MCNC: 12.4 G/DL (ref 12.1–17)
HGB UR QL STRIP: ABNORMAL
IMM GRANULOCYTES # BLD AUTO: 0 K/UL (ref 0–0.04)
IMM GRANULOCYTES NFR BLD AUTO: 0 % (ref 0–0.5)
KETONES UR QL STRIP.AUTO: ABNORMAL MG/DL
LEUKOCYTE ESTERASE UR QL STRIP.AUTO: ABNORMAL
LYMPHOCYTES # BLD: 2.1 K/UL (ref 0.8–3.5)
LYMPHOCYTES NFR BLD: 36 % (ref 12–49)
MCH RBC QN AUTO: 30.8 PG (ref 26–34)
MCHC RBC AUTO-ENTMCNC: 34.3 G/DL (ref 30–36.5)
MCV RBC AUTO: 89.8 FL (ref 80–99)
MONOCYTES # BLD: 0.6 K/UL (ref 0–1)
MONOCYTES NFR BLD: 10 % (ref 5–13)
NEUTS SEG # BLD: 3.1 K/UL (ref 1.8–8)
NEUTS SEG NFR BLD: 51 % (ref 32–75)
NITRITE UR QL STRIP.AUTO: NEGATIVE
NRBC # BLD: 0 K/UL (ref 0–0.01)
NRBC BLD-RTO: 0 PER 100 WBC
PH UR STRIP: 6 [PH] (ref 5–8)
PLATELET # BLD AUTO: 198 K/UL (ref 150–400)
PMV BLD AUTO: 10.9 FL (ref 8.9–12.9)
POTASSIUM SERPL-SCNC: 3.4 MMOL/L (ref 3.5–5.1)
PROT UR STRIP-MCNC: NEGATIVE MG/DL
RBC # BLD AUTO: 4.03 M/UL (ref 4.1–5.7)
RBC #/AREA URNS HPF: ABNORMAL /HPF (ref 0–5)
SERVICE CMNT-IMP: ABNORMAL
SODIUM SERPL-SCNC: 144 MMOL/L (ref 136–145)
SP GR UR REFRACTOMETRY: 1.02 (ref 1–1.03)
UA: UC IF INDICATED,UAUC: ABNORMAL
UROBILINOGEN UR QL STRIP.AUTO: 0.2 EU/DL (ref 0.2–1)
WBC # BLD AUTO: 6 K/UL (ref 4.1–11.1)
WBC URNS QL MICRO: ABNORMAL /HPF (ref 0–4)

## 2021-07-18 PROCEDURE — 74011636637 HC RX REV CODE- 636/637: Performed by: INTERNAL MEDICINE

## 2021-07-18 PROCEDURE — 80048 BASIC METABOLIC PNL TOTAL CA: CPT

## 2021-07-18 PROCEDURE — 82962 GLUCOSE BLOOD TEST: CPT

## 2021-07-18 PROCEDURE — 99218 HC RM OBSERVATION: CPT

## 2021-07-18 PROCEDURE — 96372 THER/PROPH/DIAG INJ SC/IM: CPT

## 2021-07-18 PROCEDURE — 74011250637 HC RX REV CODE- 250/637: Performed by: INTERNAL MEDICINE

## 2021-07-18 PROCEDURE — 87086 URINE CULTURE/COLONY COUNT: CPT

## 2021-07-18 PROCEDURE — 74011636637 HC RX REV CODE- 636/637: Performed by: HOSPITALIST

## 2021-07-18 PROCEDURE — 74011250636 HC RX REV CODE- 250/636: Performed by: INTERNAL MEDICINE

## 2021-07-18 PROCEDURE — 74011250637 HC RX REV CODE- 250/637: Performed by: HOSPITALIST

## 2021-07-18 PROCEDURE — 36415 COLL VENOUS BLD VENIPUNCTURE: CPT

## 2021-07-18 PROCEDURE — 81001 URINALYSIS AUTO W/SCOPE: CPT

## 2021-07-18 PROCEDURE — 85025 COMPLETE CBC W/AUTO DIFF WBC: CPT

## 2021-07-18 RX ORDER — CIPROFLOXACIN 250 MG/1
250 TABLET, FILM COATED ORAL EVERY 12 HOURS
Status: COMPLETED | OUTPATIENT
Start: 2021-07-18 | End: 2021-07-18

## 2021-07-18 RX ORDER — LISINOPRIL 10 MG/1
10 TABLET ORAL DAILY
Status: DISCONTINUED | OUTPATIENT
Start: 2021-07-18 | End: 2021-07-19 | Stop reason: HOSPADM

## 2021-07-18 RX ADMIN — Medication 10 ML: at 22:00

## 2021-07-18 RX ADMIN — ENOXAPARIN SODIUM 40 MG: 40 INJECTION SUBCUTANEOUS at 09:11

## 2021-07-18 RX ADMIN — ATORVASTATIN CALCIUM 40 MG: 40 TABLET, FILM COATED ORAL at 22:23

## 2021-07-18 RX ADMIN — AMLODIPINE BESYLATE 5 MG: 5 TABLET ORAL at 09:11

## 2021-07-18 RX ADMIN — LISINOPRIL 10 MG: 10 TABLET ORAL at 12:03

## 2021-07-18 RX ADMIN — INSULIN LISPRO 7 UNITS: 100 INJECTION, SOLUTION INTRAVENOUS; SUBCUTANEOUS at 16:30

## 2021-07-18 RX ADMIN — INSULIN GLARGINE 30 UNITS: 100 INJECTION, SOLUTION SUBCUTANEOUS at 22:23

## 2021-07-18 RX ADMIN — FAMOTIDINE 20 MG: 20 TABLET, FILM COATED ORAL at 17:07

## 2021-07-18 RX ADMIN — INSULIN LISPRO 2 UNITS: 100 INJECTION, SOLUTION INTRAVENOUS; SUBCUTANEOUS at 07:15

## 2021-07-18 RX ADMIN — CIPROFLOXACIN 250 MG: 250 TABLET ORAL at 19:53

## 2021-07-18 RX ADMIN — ASPIRIN 81 MG: 81 TABLET, COATED ORAL at 09:11

## 2021-07-18 RX ADMIN — Medication 5 ML: at 17:07

## 2021-07-18 RX ADMIN — INSULIN LISPRO 2 UNITS: 100 INJECTION, SOLUTION INTRAVENOUS; SUBCUTANEOUS at 22:23

## 2021-07-18 RX ADMIN — Medication 10 ML: at 06:00

## 2021-07-18 RX ADMIN — INSULIN LISPRO 5 UNITS: 100 INJECTION, SOLUTION INTRAVENOUS; SUBCUTANEOUS at 12:03

## 2021-07-18 RX ADMIN — FAMOTIDINE 20 MG: 20 TABLET, FILM COATED ORAL at 09:11

## 2021-07-18 NOTE — PROGRESS NOTES
[facility]  Hospitalist Progress Note    NAME: Loyda Grant   :  1928   MRN:  [id number]     Total duration of encounter: 3 days      Interim Hospital Summary: 80 y.o. male who presented on 7/15/2021 with Acute renal failure (ARF) ([facility] [address] He has a past medical history of Carotid artery disease ([facility] [address] Coronary atherosclerosis of native coronary artery, Diabetes mellitus, type II ([facility] [address] DJD (degenerative joint disease), HCVD (hypertensive cardiovascular disease), and Pure hypercholesterolemia. .     Patient admitted for weakness and compliance with his medical regiment. He has had difficulty self administering his insulin due to changing the needles. He has not been eating the appropriate diabetic diet. His daughter is been down intermittently, but lives some 125 miles away in the [facility]. Family members are assisting with placement in assisted living facility        Subjective:     Chief Complaint / Reason for Physician Visit  \"no c/o\". Discussed with RN   Eating well  Has accepted advice by MD / Family to leave home for now to [facility]  No itch or skin c/o  Slept well last night    Review of Systems:  Symptom Y/N Comments  Symptom Y/N Comments   Fever/Chills n   Chest Pain n    Poor Appetite n   Edema n    Cough n   Abdominal Pain n    Sputum n   Joint Pain n    SOB/FLETCHER n   Pruritis/Rash y    Nausea/vomit n   Tolerating PT/OT ?     Diarrhea n   Tolerating Diet y    Constipation n   Other         Current Facility-Administered Medications:     lisinopriL (PRINIVIL, ZESTRIL) tablet 10 mg, 10 mg, Oral, DAILY, Amandeep Horner MD    enoxaparin (LOVENOX) injection 40 mg, 40 mg, SubCUTAneous, DAILY, Amandeep Horner MD, 40 mg at 21 0911    insulin glargine (LANTUS) injection 30 Units, 30 Units, SubCUTAneous, QHS, Amandeep Horner MD, 30 Units at 21 2335    sodium chloride (NS) flush 5-40 mL, 5-40 mL, IntraVENous, Q8H, Leonard Navarrete MD, 10 mL at 21 0600    sodium chloride (NS) flush 5-40 mL, 5-40 mL, IntraVENous, PRN, Lieutenant Reggie MD    acetaminophen (TYLENOL) tablet 650 mg, 650 mg, Oral, Q6H PRN **OR** acetaminophen (TYLENOL) suppository 650 mg, 650 mg, Rectal, Q6H PRN, Lieutenant Reggie MD    polyethylene glycol (MIRALAX) packet 17 g, 17 g, Oral, DAILY PRN, Lieutenant Reggie MD    promethazine (PHENERGAN) tablet 12.5 mg, 12.5 mg, Oral, Q6H PRN **OR** ondansetron (ZOFRAN) injection 4 mg, 4 mg, IntraVENous, Q6H PRN, Lieutenant Reggie MD    famotidine (PEPCID) tablet 20 mg, 20 mg, Oral, BID, Lieutenant Reggie MD, 20 mg at 07/18/21 0911    glucose chewable tablet 16 g, 4 Tablet, Oral, PRN, Lieutenant Reggie MD    dextrose (D50W) injection syrg 12.5-25 g, 25-50 mL, IntraVENous, PRN, Lieutenant Reggie MD    glucagon [facility]) injection 1 mg, 1 mg, IntraMUSCular, PRN, Lieutenant Reggie MD    insulin lispro (HUMALOG) injection, , SubCUTAneous, AC&HS, Lieutenant Reggie MD, 2 Units at 07/18/21 0715    amLODIPine (NORVASC) tablet 5 mg, 5 mg, Oral, DAILY, Lieutenant Reggie MD, 5 mg at 07/18/21 0911    aspirin delayed-release tablet 81 mg, 81 mg, Oral, DAILY, Lieutenant Reggie MD, 81 mg at 07/18/21 0911    atorvastatin (LIPITOR) tablet 40 mg, 40 mg, Oral, QHS, Lieutenant Reggie MD, 40 mg at 07/17/21 2335    hydrALAZINE (APRESOLINE) 20 mg/mL injection 20 mg, 20 mg, IntraVENous, Q6H PRN, Lieutenant Reggie MD, 20 mg at 07/15/21 2116    Objective:     VITALS:   Patient Vitals for the past 12 hrs:   Temp Pulse Resp BP SpO2   07/18/21 0750 98 °F (36.7 °C) 82 20 (!) 178/94 96 %       Intake/Output Summary (Last 24 hours) at 7/18/2021 1029  Last data filed at 7/18/2021 0529  Gross per 24 hour   Intake 220 ml   Output    Net 220 ml        PHYSICAL EXAM:  General: WD, WN. Alert, cooperative, no acute distress    EENT:  EOMI. Anicteric sclerae. MMM  Resp:  CTA bilaterally, no wheezing or rales. No accessory muscle use  CV:  Regular  rhythm,  No edema  GI:  Soft, Non distended, Non tender.   +Bowel sounds  Neurologic:  Alert and oriented X 3, normal speech, nonfocal  Psych:   Not anxious nor agitated  Skin:  Erythematous scabbed papules on upper chest / shoulders. No jaundice    LABS:  I reviewed today's most current labs and imaging studies. Pertinent labs include:  Recent Labs     07/18/21  0631 07/17/21  0603 07/16/21  0556   WBC 6.0 6.0 6.0   HGB 12.4 12.5 12.2   HCT 36.2* 36.2* 36.1*    195 189     Recent Labs     07/18/21  0631 07/17/21  0603 07/16/21  0556 07/15/21  1100 07/15/21  1100    141 140   < > 137   K 3.4* 3.5 3.3*   < > 3.7    106 106   < > 98   CO2 26 27 26   < > 28   * 106* 186*   < > 400*   BUN 20 23* 25*   < > 33*   CREA 1.16 1.15 1.26   < > 1.80*   CA 8.7 9.1 8.9   < > 9.3   ALB  --   --   --   --  3.6   TBILI  --   --   --   --  0.8   ALT  --   --   --   --  24    < > = values in this interval not displayed. RADIOLOGY:  CT HEAD 7/15:  No calvarial abnormalities are detected. There is no evidence of intracranial  hemorrhage. There is evidence of mild cerebral and cerebellar atrophy   IMPRESSION  1. No evidence of intracranial hemorrhage. 2. Evidence of mild cerebral and cerebellar atrophy. pCXR 7/15:  A single frontal view of the chest was obtained. The cardiac silhouette is  normal in size. There is no evidence of active lung disease. Some costochondral  calcification is noted bilaterally.   IMPRESSION:  No evidence of active intrathoracic disease. EKG 7/15:  Sinus rhythm 79 bpm with 1st degree AV block with occasional premature ventricular   complexes and premature atrial complexes   Otherwise normal ECG   When compared with ECG of 30-DEC-2020 13:37,   premature ventricular complexes are now present   premature atrial complexes are now present     Procedures: see electronic medical records for all procedures/Xrays and details which were not copied into this note but were reviewed prior to creation of Plan.         Assessment / Plan:    Principal Problem:    Acute renal failure (ARF) (HCC) (7/15/2021)  Active Problems:    Dehydration, moderate (7/16/2021)  No recent toxins  Was given 500cc NS bolus in ED  Cr down 1.8 on admission to 1.1 today  Corrected hyperglycemia  PT/OT to assess functional status      Diabetes mellitus, type II (HCC) ()  Monitor BS qid qc/hs  Advanced Lantus 20u in evening to 30u   CC Humalog by Normal scale (given 8 units today Sat)  Placement to [facility] for assistance with meals and Insulin administration  Likely d/c on PTA tx Novolog 70/30 30u AM / 10u PM mealtimes      Bedbug bite (1/19/2021)  House remains infested per daughter  Needs to remove all soft furniture   Does not plan to return  Family has arranged for  AL placement [facility] on Monday  [facility] cannot accept over the weekend   No skin w/o since admission, no additional tx seems indicated      Coronary atherosclerosis of native coronary artery ()    Pure hypercholesterolemia ()  Stable  No acute issues  Cont Lipitor, ASA, Lisinopril, Norvasc  ______________________________________________________________________  SAFETY:   Code Status:Full  DVT prophylaxis:Lovenox  Stress Ulcer prophylaxis: Pepcid  Bladder catheter:no  Family Contact Info:  Primary Emergency Contact: none, none  Bedded: [facility] Room 138/01  Disposition: TBD, likely adm CAL Monday  Admission status:  Observation    Reviewed most current lab test results and cultures  YES  Reviewed most current radiology test results   YES  Review and summation of old records today    NO  Reviewed patient's current orders and MAR    YES  PMH/SH reviewed - no change compared to H&P    Care Plan discussed with:                                   Comments  Patient x     Family  x Daughter   RN x     Care Manager      Consultant                           Multidiciplinary team rounds were held today with , nursing, pharmacist and clinical coordinator.   Patient's plan of care was discussed; medications were reviewed and discharge planning was addressed.         ____________________________________________    Total NON Critical Care TIME:  35   Minutes        Comments   >50% of visit spent in counseling and coordination of care   x      Signed: Bradnon Martinez MD  [facility] Hospitalist  [phone]

## 2021-07-18 NOTE — PROGRESS NOTES
Problem: Falls - Risk of  Goal: *Absence of Falls  Description: Document Dave Weber Fall Risk and appropriate interventions in the flowsheet. Outcome: Progressing Towards Goal  Note: Fall Risk Interventions:  Mobility Interventions: Bed/chair exit alarm, Patient to call before getting OOB         Medication Interventions: Bed/chair exit alarm, Patient to call before getting OOB    Elimination Interventions: Bed/chair exit alarm, Call light in reach    History of Falls Interventions: Bed/chair exit alarm, Door open when patient unattended         Problem: Patient Education: Go to Patient Education Activity  Goal: Patient/Family Education  Outcome: Resolved/Met     Problem: Diabetes Self-Management  Goal: *Disease process and treatment process  Description: Define diabetes and identify own type of diabetes; list 3 options for treating diabetes. Outcome: Progressing Towards Goal  Goal: *Incorporating nutritional management into lifestyle  Description: Describe effect of type, amount and timing of food on blood glucose; list 3 methods for planning meals. Outcome: Progressing Towards Goal  Goal: *Incorporating physical activity into lifestyle  Description: State effect of exercise on blood glucose levels.   Outcome: Progressing Towards Goal  Goal: *Developing strategies to address psychosocial issues  Description: Describe feelings about living with diabetes; identify support needed and support network  Outcome: Progressing Towards Goal     Problem: Patient Education: Go to Patient Education Activity  Goal: Patient/Family Education  Outcome: Resolved/Met

## 2021-07-18 NOTE — PROGRESS NOTES
Problem: Falls - Risk of  Goal: *Absence of Falls  Description: Document Ledy Iris Fall Risk and appropriate interventions in the flowsheet.   Outcome: Progressing Towards Goal  Note: Fall Risk Interventions:  Mobility Interventions: Bed/chair exit alarm, Patient to call before getting OOB         Medication Interventions: Bed/chair exit alarm, Patient to call before getting OOB    Elimination Interventions: Bed/chair exit alarm    History of Falls Interventions: Bed/chair exit alarm, Door open when patient unattended

## 2021-07-18 NOTE — PROGRESS NOTES
Bedside and Verbal shift change report given to Angy Myles RN (oncoming nurse) by Geoffrey Victoria RN   (offgoing nurse). Report included the following information SBAR and Kardex.  kristi 3 alarm on

## 2021-07-19 ENCOUNTER — TELEPHONE (OUTPATIENT)
Dept: FAMILY MEDICINE CLINIC | Age: 86
End: 2021-07-19

## 2021-07-19 VITALS
SYSTOLIC BLOOD PRESSURE: 140 MMHG | BODY MASS INDEX: 23.4 KG/M2 | TEMPERATURE: 97.8 F | DIASTOLIC BLOOD PRESSURE: 61 MMHG | OXYGEN SATURATION: 98 % | HEART RATE: 80 BPM | RESPIRATION RATE: 20 BRPM | HEIGHT: 71 IN | WEIGHT: 167.1 LBS

## 2021-07-19 PROBLEM — N30.90 CYSTITIS: Status: ACTIVE | Noted: 2021-07-19

## 2021-07-19 LAB
GLUCOSE BLD STRIP.AUTO-MCNC: 111 MG/DL (ref 65–117)
GLUCOSE BLD STRIP.AUTO-MCNC: 288 MG/DL (ref 65–117)
GLUCOSE BLD STRIP.AUTO-MCNC: 317 MG/DL (ref 65–117)
SERVICE CMNT-IMP: ABNORMAL
SERVICE CMNT-IMP: ABNORMAL
SERVICE CMNT-IMP: NORMAL

## 2021-07-19 PROCEDURE — 97535 SELF CARE MNGMENT TRAINING: CPT

## 2021-07-19 PROCEDURE — 74011250637 HC RX REV CODE- 250/637: Performed by: INTERNAL MEDICINE

## 2021-07-19 PROCEDURE — 82962 GLUCOSE BLOOD TEST: CPT

## 2021-07-19 PROCEDURE — 74011250636 HC RX REV CODE- 250/636: Performed by: INTERNAL MEDICINE

## 2021-07-19 PROCEDURE — 74011250637 HC RX REV CODE- 250/637: Performed by: HOSPITALIST

## 2021-07-19 PROCEDURE — 96372 THER/PROPH/DIAG INJ SC/IM: CPT

## 2021-07-19 PROCEDURE — 74011636637 HC RX REV CODE- 636/637: Performed by: HOSPITALIST

## 2021-07-19 PROCEDURE — 99218 HC RM OBSERVATION: CPT

## 2021-07-19 RX ORDER — CIPROFLOXACIN 250 MG/1
250 TABLET, FILM COATED ORAL EVERY 12 HOURS
Status: DISCONTINUED | OUTPATIENT
Start: 2021-07-19 | End: 2021-07-19 | Stop reason: HOSPADM

## 2021-07-19 RX ORDER — ACETAMINOPHEN 325 MG/1
650 TABLET ORAL
Status: SHIPPED | COMMUNITY
Start: 2021-07-19

## 2021-07-19 RX ORDER — CIPROFLOXACIN 250 MG/1
250 TABLET, FILM COATED ORAL EVERY 12 HOURS
Qty: 10 TABLET | Refills: 0 | OUTPATIENT
Start: 2021-07-19 | End: 2021-07-24

## 2021-07-19 RX ADMIN — FAMOTIDINE 20 MG: 20 TABLET, FILM COATED ORAL at 10:22

## 2021-07-19 RX ADMIN — LISINOPRIL 10 MG: 10 TABLET ORAL at 10:22

## 2021-07-19 RX ADMIN — AMLODIPINE BESYLATE 5 MG: 5 TABLET ORAL at 10:23

## 2021-07-19 RX ADMIN — Medication 10 ML: at 13:00

## 2021-07-19 RX ADMIN — INSULIN LISPRO 7 UNITS: 100 INJECTION, SOLUTION INTRAVENOUS; SUBCUTANEOUS at 12:14

## 2021-07-19 RX ADMIN — ENOXAPARIN SODIUM 40 MG: 40 INJECTION SUBCUTANEOUS at 10:23

## 2021-07-19 RX ADMIN — INSULIN LISPRO 5 UNITS: 100 INJECTION, SOLUTION INTRAVENOUS; SUBCUTANEOUS at 16:43

## 2021-07-19 RX ADMIN — Medication 10 ML: at 06:00

## 2021-07-19 RX ADMIN — CIPROFLOXACIN 250 MG: 250 TABLET ORAL at 10:21

## 2021-07-19 RX ADMIN — ASPIRIN 81 MG: 81 TABLET, COATED ORAL at 10:23

## 2021-07-19 NOTE — TELEPHONE ENCOUNTER
Patient is being discharged from [facility] today. [facility]STEN appointment is scheduled for 07/27/21.

## 2021-07-19 NOTE — DISCHARGE SUMMARY
[facility]  Hospitalist Discharge Summary    Patient ID:  Erma Hernandez  [id number]  80 y.o.  12/30/1928    PCP on record: Darshana Peña MD    Admit date: 7/15/2021  Discharge date and time: 7/19/2021     DISCHARGE DIAGNOSIS:    Principal Problem:    Acute renal failure (ARF) ([facility] [address] (7/15/2021)    Active Problems:    Dehydration, moderate (7/16/2021)    Diabetes mellitus, type II ([address] ()    Cystitis (7/19/2021)    Bedbug bite (1/19/2021)    Coronary atherosclerosis of native coronary artery ()    Pure hypercholesterolemia ()    CONSULTATIONS:  None    Excerpted HPI from H&P of Nabila Alvarado MD:  Erma Hernandez is a 80 y.o. male who is a known case of DM, HTN and HLD. Was brought to the hospital with a complaint of having generalized weakness poor oral intake and and drowsiness. Patient states that is since Thursday he has been having generalized weakness and anxiety drowsy. Patient also complains of some forgetfulness. Patient was noticed to have elevated blood sugar readings as he was out of his insulin. This was resumed by his daughter on Monday however patient continued to feel little bit weak and tired so he was brought to ER. On evaluation in ER patient was found to have elevated blood sugar readings and his creatinine was also slightly elevated from his baseline. Volume decision was made to admit the patient for further management. Hospitalist service was consulted  ______________________________________________________________________  DISCHARGE SUMMARY/HOSPITAL COURSE:  for full details see H&P, daily progress notes, labs, consult notes.      Patient admitted for weakness and compliance with his medical regiment. He has had difficulty self administering his insulin due to changing the needles. He has not been eating the appropriate diabetic diet. His daughter is been down intermittently, but lives some 125 miles away in the [facility].   Family members are assisting with placement in assisted living facility    Principal Problem:    Acute renal failure (ARF) ([address] (7/15/2021)  Active Problems:    Dehydration, moderate (7/16/2021)  No recent toxins  Was given 500cc NS bolus in ED  Cr down 1.8 to 1.1 today post hydration  Corrected hyperglycemia  PT/OT for functional rehab  Stable at baseline on discharge       Diabetes mellitus, type II (HCC) ()  Monitor BS qid qc/hs  Advanced Lantus 20u in evening to 30u   CC Humalog by Normal scale   Placement to [facility] for assistance with meals and Insulin administration  Resume tx - PTA med Novulin 70/30 twice daily       Bedbug bite (1/19/2021)  House remains infested per daughter  Needs to remove all soft furniture   Family does not have plan for hime to return home  Family has arranged for  AL placement [facility] on Monday  [facility] cannot accept over the weekend       Coronary atherosclerosis of native coronary artery ()    Pure hypercholesterolemia ()  Stable  No acute issues  Cont Lipitor, ASA, Lipitor  _______________________________________________________________________  Patient seen and examined by me on discharge day. Pertinent Findings:  Visit Vitals  BP (!) 140/61 (BP 1 Location: Left upper arm, BP Patient Position: At rest)   Pulse 80   Temp 97.8 °F (36.6 °C)   Resp 20   Ht 5' 11\" (1.803 m)   Wt 75.8 kg (167 lb 1.6 oz)   SpO2 98%   BMI 23.31 kg/m²     Gen:    Not in distress  Chest: Nonlabored respiration, Clear lungs  CVS:   Regular rhythm.   No edema  Abd:  Soft, not distended, not tender  Neuro:  Alert, nonfocal, weak    LABS:  Results for Laxmi Gordon (MRN [id number]) as of 7/19/2021 12:14   7/17/2021 06:45 7/17/2021 11:51 7/17/2021 16:14 7/17/2021 22:58 7/18/2021 05:07 7/18/2021 11:37 7/18/2021 16:31 7/18/2021 22:17 7/19/2021 06:41 7/19/2021 11:37   GLU -  257 (H) 224 (H) 168 (H) 144 (H) 283 (H) 329 (H) 231 (H) 111 317 (H)     Results for Laxmi Gordon (MRN [id number]) as of 7/19/2021 12:14   7/15/2021 11:00 7/16/2021 05:56 7/17/2021 06:03 7/18/2021 06:31   WBC 9.4 6.0 6.0 6.0   NRBC 0.0 0.0 0.0 0.0   RBC 4.37 4.06 (L) 4.05 (L) 4.03 (L)   HGB 13.1 12.2 12.5 12.4   HCT 39.4 36.1 (L) 36.2 (L) 36.2 (L)   MCV 90.2 88.9 89.4 89.8   MCH 30.0 30.0 30.9 30.8   MCHC 33.2 33.8 34.5 34.3   RDW 13.0 13.0 13.1 13.1   PLATELET 763 981 143 198   MPV 10.9 10.7 10.9 10.9   NEUTROPHILS 73 47 48 51   LYMPHOCYTE 20 38 38 36   MONOCYTES 6 11 10 10   EOSINOPHILS 1 3 3 3     Results for Kam Cole (MRN [id number]) as of 7/19/2021 12:14   7/15/2021 13:08 7/18/2021 17:12   Color YELLOW/STRAW YELLOW/STRAW   Appearance CLEAR CLEAR   Specific gravity 1.010 1.020   pH (UA) 6.0 6.0   Protein Negative Negative   Glucose >1,000 (A) >1,000 (A)   Ketone TRACE (A) TRACE (A)   Blood TRACE (A) TRACE (A)   Bilirubin Negative Negative   Urobilinogen 0.2 0.2   Nitrites Positive (A) Negative   Leukocyte Esterase TRACE (A) TRACE (A)   Epithelial cells FEW FEW   WBC 5-10 20-50   RBC 0-5 0-5   Bacteria 1+ (A) 3+ (A)     Results for Kam Cole (MRN [id number]) as of 7/19/2021 12:14   7/15/2021 11:00 7/16/2021 05:56 7/17/2021 06:03 7/18/2021 06:31   Sodium 137 140 141 144   Potassium 3.7 3.3 (L) 3.5 3.4 (L)   Chloride 98 106 106 107   CO2 28 26 27 26   Anion gap 11 8 8 11   Glucose 400 (H) 186 (H) 106 (H) 125 (H)   BUN 33 (H) 25 (H) 23 (H) 20   Creatinine 1.80 (H) 1.26 1.15 1.16   BUN/Cr ratio 18 20 20 17   Calcium 9.3 8.9 9.1 8.7   GFR  non-AA 35 (L) 54 (L) 59 (L) 59 (L)   Bilirubin, total 0.8      Protein, total 7.3      Albumin 3.6      Globulin 3.7      A-G Ratio 1.0 (L)      ALT 24      AST 21      Alk. phos 109      Troponin-I, Qt. 0.07 (H)      Hg A1c, (calc)   10.5 (H)    Est. ave glu   255      CULTURES  Urine 7/18:  Pending at time of discharge    RADIOLOGY:  CT HEAD 7/15:  No calvarial abnormalities are detected. There is no evidence of intracranial  hemorrhage. There is evidence of mild cerebral and cerebellar atrophy   IMPRESSION  1.  No evidence of intracranial hemorrhage. 2. Evidence of mild cerebral and cerebellar atrophy.     pCXR 7/15:  A single frontal view of the chest was obtained. The cardiac silhouette is  normal in size. There is no evidence of active lung disease. Some costochondral  calcification is noted bilaterally.   IMPRESSION:  No evidence of active intrathoracic disease.     EKG 7/15:  Sinus rhythm 79 bpm with 1st degree AV block with occasional premature ventricular   complexes and premature atrial complexes   Otherwise normal ECG   When compared with ECG of 30-DEC-2020 13:37,   premature ventricular complexes are now present   premature atrial complexes are now present    _______________________________________________________________________  DISCHARGE MEDICATIONS:   Current Discharge Medication List      START taking these medications    Details   acetaminophen (TYLENOL) 325 mg tablet Take 2 Tablets by mouth every six (6) hours as needed. Start date: 7/19/2021      ciprofloxacin HCl (CIPRO) 250 mg tablet Take 1 Tablet by mouth every twelve (12) hours for 5 days. Qty: 10 Tablet, Refills: 0  Start date: 7/19/2021, End date: 7/24/2021         CONTINUE these medications which have NOT CHANGED    Details   amLODIPine (NORVASC) 5 mg tablet Take 1 tablet by mouth once daily  Qty: 90 Tab, Refills: 1      atorvastatin (LIPITOR) 40 mg tablet Take 1 Tab by mouth nightly. Qty: 90 Tab, Refills: 1      lisinopriL (PRINIVIL, ZESTRIL) 10 mg tablet Take 1 tablet by mouth twice daily  Qty: 180 Tab, Refills: 1      cholecalciferol, vitamin D3, (VITAMIN D3) 2,000 unit tab Take  by mouth daily. aspirin delayed-release 81 mg tablet Take 81 mg by mouth daily. ferrous sulfate (IRON) 325 mg (65 mg iron) tablet Take  by mouth Daily (before breakfast).       insulin aspart protamine/insulin aspart (NovoLOG Mix 70-30FlexPen U-100) 100 unit/mL (70-30) inpn Administer 30 units in the am and 10 units in the evening by subcutaneous injection.   Qty: 5 Adjustable Dose Pre-filled Pen Syringe, Refills: 5             My Recommended  Diet: Diabetic  Activity: Ad Sheridan  Wound Care: none  Follow-up labs: f/u urine post treatment, routine    ______________________________________________________________________  DISPOSITION:    Home with Family:    Home with HH/PT/OT/RN:    SNF/LTC: [facility]   TAWNY:    OTHER:        Condition at Discharge:  Stable  _____________________________________________________________________  Follow up with:   PCP : Angelito Cruz MD  Follow-up Information     Follow up With Specialties Details Why Contact Info    Angelito Cruz MD Family Medicine Routine  Family can arrange as desired  [facility] [address]  [address]  [phone]          F/u with House Provider [facility] 1 week    Total time in minutes spent coordinating this discharge (includes going over instructions, follow-up, prescriptions, and preparing report for sign off to her PCP) :35 minutes    Signed:  Argelia Levin MD  [facility] Hospitalist  [phone]

## 2021-07-19 NOTE — PROGRESS NOTES
Problem: Self Care Deficits Care Plan (Adult)  Goal: *Acute Goals and Plan of Care (Insert Text)  Description:   FUNCTIONAL STATUS PRIOR TO ADMISSION: Patient was independent and active without use of DME.     HOME SUPPORT: family support    Occupational Therapy Goals  Initiated 7/16/2021  1. Patient will perform toilet transfer with independence within 7 day(s). 2.  Patient will perform lower body dressing with independence within 7 day(s). 3.  Patient will utilize energy conservation techniques during functional activities with verbal cues within 7 day(s). Outcome: Progressing Towards Goal   OCCUPATIONAL THERAPY TREATMENT  Patient: Matthew Chin (54 y.o. male)  Date: 7/19/2021  Diagnosis: Acute renal failure (ARF) ([facility]) [N17.9] Acute renal failure (ARF) ([facility])       Precautions: Fall  Chart, occupational therapy assessment, plan of care, and goals were reviewed. ASSESSMENT  Patient continues with skilled OT services and is progressing towards goals. OTR informed pt due to leave today. He was up in chair and willing to perform dressing activity and grooming to prep for departure. New shoes were difficult to get on especially since he didn't want to change out of non-skid to regular socks which were in his closet. Set-up and supervision in standing for dressing UE and LE although he did not want to change protective undergarment into regular underwear. He was able to ambulate w/o use of mobility aid and supervision into the bathroom for grooming but was unsteady once on the way back, grabbing door handle to steady although the door was open and should not have been used. Current Level of Function Impacting Discharge (ADLs): supervision when up and walking    Other factors to consider for discharge: going to assisted living         PLAN :  Patient continues to benefit from skilled intervention to address the above impairments.   Continue treatment per established plan of care to address goals. Recommend with staff: monitor when up and moving    Recommend next OT session: if still here will work on balance and up and moving activities    Recommendation for discharge: (in order for the patient to meet his/her long term goals)  Occupational therapy at least 2 days/week in the home AND ensure assist and/or supervision for safety with walking    This discharge recommendation:  Has been made in collaboration with the attending provider and/or case management    IF patient discharges home will need the following DME: none       SUBJECTIVE:   Patient stated i;m not going to my house I'm going to one of those places.      OBJECTIVE DATA SUMMARY:   Cognitive/Behavioral Status:  Neurologic State: Alert  Orientation Level: Oriented X4  Cognition: Follows commands    Functional Mobility and Transfers for ADLs:    Transfers:  Sit to Stand: Supervision     Bed to Chair: Supervision    Balance:  Sitting: Intact  Sitting - Static: Good (unsupported)  Sitting - Dynamic: Good (unsupported)  Standing: Impaired  Standing - Static: Fair;Unsupported  Standing - Dynamic : Fair;Occasional (steadied on door knob coming out of bathroom)    ADL Intervention:       Grooming  Position Performed: Standing  Washing Face: Supervision  Washing Hands: Supervision  Brushing Teeth: Supervision      Upper Body Dressing Assistance  Pullover Shirt: Set-up    Lower Body Dressing Assistance  Pants With Button/Zipper: Supervision  Shoes with Cloth Laces: Minimum assistance     Pain:  No complaint      Activity Tolerance:   Good    After treatment patient left in no apparent distress:   Sitting in chair, Call bell within reach, and Bed / chair alarm activated    COMMUNICATION/COLLABORATION:   The patients plan of care was discussed with: Physical therapy assistant.      Marielle Hess OT  Time Calculation: 25 mins

## 2021-07-19 NOTE — ROUTINE PROCESS
Discharge instructions reviewed with report called to Luis Christianson per the primary nurse - AVS sent  Personal belongings returned: n/a  Home meds returned: n/a  Children are planning on picking him up at [phone].

## 2021-07-19 NOTE — DISCHARGE INSTRUCTIONS
DISCHARGE SUMMARY from Nurse    PATIENT INSTRUCTIONS:    After general anesthesia or intravenous sedation, for 24 hours or while taking prescription Narcotics:  · Limit your activities  · Do not drive and operate hazardous machinery  · Do not make important personal or business decisions  · Do  not drink alcoholic beverages  · If you have not urinated within 8 hours after discharge, please contact your surgeon on call. Report the following to your surgeon:  · Excessive pain, swelling, redness or odor of or around the surgical area  · Temperature over 100.5  · Nausea and vomiting lasting longer than 4 hours or if unable to take medications  · Any signs of decreased circulation or nerve impairment to extremity: change in color, persistent  numbness, tingling, coldness or increase pain  · Any questions    What to do at Home:  Recommended activity: Activity as tolerated,     If you experience any recurrent symptoms , please follow up with PCP. *  Please give a list of your current medications to your Primary Care Provider. *  Please update this list whenever your medications are discontinued, doses are      changed, or new medications (including over-the-counter products) are added. *  Please carry medication information at all times in case of emergency situations. These are general instructions for a healthy lifestyle:    No smoking/ No tobacco products/ Avoid exposure to second hand smoke  Surgeon General's Warning:  Quitting smoking now greatly reduces serious risk to your health.     Obesity, smoking, and sedentary lifestyle greatly increases your risk for illness    A healthy diet, regular physical exercise & weight monitoring are important for maintaining a healthy lifestyle    You may be retaining fluid if you have a history of heart failure or if you experience any of the following symptoms:  Weight gain of 3 pounds or more overnight or 5 pounds in a week, increased swelling in our hands or feet or shortness of breath while lying flat in bed. Please call your doctor as soon as you notice any of these symptoms; do not wait until your next office visit. The discharge information has been reviewed with the Luis Christianson. The CSL verbalized understanding. Discharge medications reviewed with the CSL and appropriate educational materials and side effects teaching were provided.   ___________________________________________________________________________________________________________________________________

## 2021-07-20 NOTE — PROGRESS NOTES
Transition of Care (TRISTEN) Plan:  Patient discharged to [facility] chosen by his son and daughter. They did not want him going back to his home with bedbugs until something could be done about them. Patient will have home health with [facility] home health for Skilled nursing and PT.    RUR: MARGARITO Observation   RRAT: 18 MODERATE    TRISTEN Transportation:      How is patient being transported at discharge? POV/ Son and daughter    When? 7/19/21    Is transport scheduled? NA    Follow-up appointment and transportation:     PCP? Mike Polanco MD     Specialist?     Time/Date? July 27, 2021  1:40pm,    who is transporting to the follow-up appointment? Family/POV    Is transport for follow up appointment scheduled? NA    Communication plan (with patient/family): Who is being called? The Facility: [facility]    What number(s) is to be used? [phone]    What service provider is calling for [facility] services? PCP and Home Health agency    When are they calling? Probably 24-48 hours prior to appointment/visit      Click here to complete [address] including selection of the Healthcare Decision Maker Relationship (ie \"Primary\")  [email or web address]    Patient named his daughter and son as healthcare decision makers. Care Management Interventions  PCP Verified by CM: Yes (Kina Denis)  Last Visit to PCP: 01/19/21  Palliative Care Criteria Met (RRAT>21 & CHF Dx)?: No (No MD order for Palliative Care)  Mode of Transport at Discharge:  Other (see comment) (POV/ Daughter)  Transition of Care Consult (CM Consult): Discharge Planning  Discharge Durable Medical Equipment: No  Physical Therapy Consult: Yes  Occupational Therapy Consult: Yes  Speech Therapy Consult: Yes  Current Support Network: Own Home, Lives Alone  Confirm Follow Up Transport: Self   Resource Information Provided?: No  Discharge Location  Discharge Placement: Assisted Living (With Home Health: [facility])

## 2021-07-21 LAB
BACTERIA SPEC CULT: NORMAL
CC UR VC: NORMAL
SERVICE CMNT-IMP: NORMAL

## 2021-07-21 NOTE — TELEPHONE ENCOUNTER
Kellen Wright from [facility] called. Pt is in [address] [facility]. Chayito's phone # [phone].

## 2021-07-27 ENCOUNTER — TELEPHONE (OUTPATIENT)
Dept: FAMILY MEDICINE CLINIC | Age: 86
End: 2021-07-27

## 2021-07-28 ENCOUNTER — TELEPHONE (OUTPATIENT)
Dept: FAMILY MEDICINE CLINIC | Age: 86
End: 2021-07-28

## 2021-07-28 NOTE — TELEPHONE ENCOUNTER
Angelita Cochran stated the patient is in [place] and she does not need to evaluate now. He is there because his home is infested with bed bugs and his kids are trying to clean out.

## 2021-08-19 PROCEDURE — 87086 URINE CULTURE/COLONY COUNT: CPT

## 2021-08-19 PROCEDURE — 81003 URINALYSIS AUTO W/O SCOPE: CPT

## 2021-08-20 ENCOUNTER — HOSPITAL ENCOUNTER (OUTPATIENT)
Dept: LAB | Age: 86
Discharge: HOME | End: 2021-08-20
Payer: MEDICARE

## 2021-08-20 LAB
APPEARANCE UR: CLEAR
BILIRUB UR QL: NEGATIVE
COLOR UR: ABNORMAL
GLUCOSE UR STRIP.AUTO-MCNC: >1000 MG/DL
HGB UR QL STRIP: NEGATIVE
KETONES UR QL STRIP.AUTO: NEGATIVE MG/DL
LEUKOCYTE ESTERASE UR QL STRIP.AUTO: NEGATIVE
NITRITE UR QL STRIP.AUTO: NEGATIVE
PH UR STRIP: 6 [PH] (ref 5–8)
PROT UR STRIP-MCNC: NEGATIVE MG/DL
SP GR UR REFRACTOMETRY: 1.01 (ref 1–1.03)
UROBILINOGEN UR QL STRIP.AUTO: 0.2 EU/DL (ref 0.2–1)

## 2021-08-22 LAB
BACTERIA SPEC CULT: NORMAL
BACTERIA SPEC CULT: NORMAL
CC UR VC: NORMAL
SERVICE CMNT-IMP: NORMAL

## 2021-09-06 ENCOUNTER — ANESTHESIA EVENT (OUTPATIENT)
Dept: SURGERY | Age: 86
DRG: 340 | End: 2021-09-06
Payer: MEDICARE

## 2021-09-06 ENCOUNTER — HOSPITAL ENCOUNTER (INPATIENT)
Age: 86
LOS: 2 days | Discharge: NURSING, REHAB OR LONG-TERM CARE | DRG: 340 | End: 2021-09-10
Attending: EMERGENCY MEDICINE | Admitting: SURGERY
Payer: MEDICARE

## 2021-09-06 ENCOUNTER — APPOINTMENT (OUTPATIENT)
Dept: CT IMAGING | Age: 86
End: 2021-09-06
Attending: EMERGENCY MEDICINE
Payer: MEDICARE

## 2021-09-06 ENCOUNTER — HOSPITAL ENCOUNTER (EMERGENCY)
Dept: ULTRASOUND IMAGING | Age: 86
End: 2021-09-06
Attending: EMERGENCY MEDICINE
Payer: MEDICARE

## 2021-09-06 ENCOUNTER — HOSPITAL ENCOUNTER (EMERGENCY)
Age: 86
Discharge: ANOTHER HOSPITAL | End: 2021-09-06
Attending: EMERGENCY MEDICINE
Payer: MEDICARE

## 2021-09-06 ENCOUNTER — ANESTHESIA (OUTPATIENT)
Dept: SURGERY | Age: 86
DRG: 340 | End: 2021-09-06
Payer: MEDICARE

## 2021-09-06 VITALS
OXYGEN SATURATION: 99 % | BODY MASS INDEX: 25.62 KG/M2 | HEART RATE: 78 BPM | DIASTOLIC BLOOD PRESSURE: 73 MMHG | HEIGHT: 71 IN | TEMPERATURE: 98.5 F | WEIGHT: 183 LBS | SYSTOLIC BLOOD PRESSURE: 171 MMHG | RESPIRATION RATE: 16 BRPM

## 2021-09-06 DIAGNOSIS — E11.51 TYPE 2 DIABETES MELLITUS WITH PERIPHERAL VASCULAR DISEASE (HCC): ICD-10-CM

## 2021-09-06 DIAGNOSIS — K35.80 ACUTE APPENDICITIS, UNSPECIFIED ACUTE APPENDICITIS TYPE: Primary | ICD-10-CM

## 2021-09-06 PROBLEM — R54 ADVANCED AGE: Status: ACTIVE | Noted: 2021-09-06

## 2021-09-06 PROBLEM — K35.30 ACUTE APPENDICITIS WITH LOCALIZED PERITONITIS, WITHOUT PERFORATION, ABSCESS, OR GANGRENE: Status: ACTIVE | Noted: 2021-09-06

## 2021-09-06 LAB
ALBUMIN SERPL-MCNC: 3.1 G/DL (ref 3.5–5)
ALBUMIN/GLOB SERPL: 0.8 {RATIO} (ref 1.1–2.2)
ALP SERPL-CCNC: 128 U/L (ref 45–117)
ALT SERPL-CCNC: 29 U/L (ref 12–78)
ANION GAP SERPL CALC-SCNC: 9 MMOL/L (ref 5–15)
AST SERPL-CCNC: 22 U/L (ref 15–37)
BASOPHILS # BLD: 0 K/UL (ref 0–0.1)
BASOPHILS NFR BLD: 0 % (ref 0–1)
BILIRUB SERPL-MCNC: 0.9 MG/DL (ref 0.2–1)
BUN SERPL-MCNC: 14 MG/DL (ref 6–20)
BUN/CREAT SERPL: 11 (ref 12–20)
CALCIUM SERPL-MCNC: 9 MG/DL (ref 8.5–10.1)
CHLORIDE SERPL-SCNC: 99 MMOL/L (ref 97–108)
CO2 SERPL-SCNC: 29 MMOL/L (ref 21–32)
CREAT SERPL-MCNC: 1.25 MG/DL (ref 0.7–1.3)
DIFFERENTIAL METHOD BLD: ABNORMAL
EOSINOPHIL # BLD: 0 K/UL (ref 0–0.4)
EOSINOPHIL NFR BLD: 0 % (ref 0–7)
ERYTHROCYTE [DISTWIDTH] IN BLOOD BY AUTOMATED COUNT: 13.3 % (ref 11.5–14.5)
FLUAV RNA SPEC QL NAA+PROBE: NOT DETECTED
FLUBV RNA SPEC QL NAA+PROBE: NOT DETECTED
GLOBULIN SER CALC-MCNC: 4 G/DL (ref 2–4)
GLUCOSE BLD STRIP.AUTO-MCNC: 319 MG/DL (ref 65–117)
GLUCOSE BLD STRIP.AUTO-MCNC: 328 MG/DL (ref 65–117)
GLUCOSE BLD STRIP.AUTO-MCNC: 357 MG/DL (ref 65–117)
GLUCOSE SERPL-MCNC: 316 MG/DL (ref 65–100)
HCT VFR BLD AUTO: 36.9 % (ref 36.6–50.3)
HGB BLD-MCNC: 12.3 G/DL (ref 12.1–17)
IMM GRANULOCYTES # BLD AUTO: 0.1 K/UL (ref 0–0.04)
IMM GRANULOCYTES NFR BLD AUTO: 0 % (ref 0–0.5)
LIPASE SERPL-CCNC: 57 U/L (ref 73–393)
LYMPHOCYTES # BLD: 1.8 K/UL (ref 0.8–3.5)
LYMPHOCYTES NFR BLD: 12 % (ref 12–49)
MCH RBC QN AUTO: 30.3 PG (ref 26–34)
MCHC RBC AUTO-ENTMCNC: 33.3 G/DL (ref 30–36.5)
MCV RBC AUTO: 90.9 FL (ref 80–99)
MONOCYTES # BLD: 1.1 K/UL (ref 0–1)
MONOCYTES NFR BLD: 8 % (ref 5–13)
NEUTS SEG # BLD: 11.9 K/UL (ref 1.8–8)
NEUTS SEG NFR BLD: 80 % (ref 32–75)
NRBC # BLD: 0 K/UL (ref 0–0.01)
NRBC BLD-RTO: 0 PER 100 WBC
PLATELET # BLD AUTO: 271 K/UL (ref 150–400)
PMV BLD AUTO: 10.5 FL (ref 8.9–12.9)
POTASSIUM SERPL-SCNC: 3.9 MMOL/L (ref 3.5–5.1)
PROT SERPL-MCNC: 7.1 G/DL (ref 6.4–8.2)
RBC # BLD AUTO: 4.06 M/UL (ref 4.1–5.7)
SARS-COV-2, COV2: NOT DETECTED
SERVICE CMNT-IMP: ABNORMAL
SODIUM SERPL-SCNC: 137 MMOL/L (ref 136–145)
WBC # BLD AUTO: 14.9 K/UL (ref 4.1–11.1)

## 2021-09-06 PROCEDURE — 77030009851 HC PCH RTVR ENDOSC AMR -B: Performed by: SURGERY

## 2021-09-06 PROCEDURE — 76210000016 HC OR PH I REC 1 TO 1.5 HR: Performed by: SURGERY

## 2021-09-06 PROCEDURE — 96365 THER/PROPH/DIAG IV INF INIT: CPT

## 2021-09-06 PROCEDURE — 36415 COLL VENOUS BLD VENIPUNCTURE: CPT

## 2021-09-06 PROCEDURE — 77030012799 HC TRCR GELPRT BLN AMR -B: Performed by: SURGERY

## 2021-09-06 PROCEDURE — 77030040361 HC SLV COMPR DVT MDII -B: Performed by: SURGERY

## 2021-09-06 PROCEDURE — 2709999900 HC NON-CHARGEABLE SUPPLY: Performed by: SURGERY

## 2021-09-06 PROCEDURE — 99284 EMERGENCY DEPT VISIT MOD MDM: CPT

## 2021-09-06 PROCEDURE — 77030008606 HC TRCR ENDOSC KII AMR -B: Performed by: SURGERY

## 2021-09-06 PROCEDURE — 74177 CT ABD & PELVIS W/CONTRAST: CPT

## 2021-09-06 PROCEDURE — 77030034628 HC LIGASURE LAP SEAL DIV MD COVD -F: Performed by: SURGERY

## 2021-09-06 PROCEDURE — 76060000033 HC ANESTHESIA 1 TO 1.5 HR: Performed by: SURGERY

## 2021-09-06 PROCEDURE — 77030005513 HC CATH URETH FOL11 MDII -B: Performed by: SURGERY

## 2021-09-06 PROCEDURE — 77030008771 HC TU NG SALEM SUMP -A: Performed by: ANESTHESIOLOGY

## 2021-09-06 PROCEDURE — 99222 1ST HOSP IP/OBS MODERATE 55: CPT | Performed by: SURGERY

## 2021-09-06 PROCEDURE — 96366 THER/PROPH/DIAG IV INF ADDON: CPT

## 2021-09-06 PROCEDURE — 77030027876 HC STPLR ENDOSC FLX PWR J&J -G1: Performed by: SURGERY

## 2021-09-06 PROCEDURE — 74011000250 HC RX REV CODE- 250: Performed by: SURGERY

## 2021-09-06 PROCEDURE — 77030008756 HC TU IRR SUC STRY -B: Performed by: SURGERY

## 2021-09-06 PROCEDURE — 77030008595 HC TRCR ENDOSC AMR -A: Performed by: SURGERY

## 2021-09-06 PROCEDURE — 74011250636 HC RX REV CODE- 250/636: Performed by: SURGERY

## 2021-09-06 PROCEDURE — 77030019908 HC STETH ESOPH SIMS -A: Performed by: ANESTHESIOLOGY

## 2021-09-06 PROCEDURE — 74011000250 HC RX REV CODE- 250: Performed by: NURSE ANESTHETIST, CERTIFIED REGISTERED

## 2021-09-06 PROCEDURE — 74011250636 HC RX REV CODE- 250/636: Performed by: NURSE ANESTHETIST, CERTIFIED REGISTERED

## 2021-09-06 PROCEDURE — 74011636637 HC RX REV CODE- 636/637

## 2021-09-06 PROCEDURE — 77030008684 HC TU ET CUF COVD -B: Performed by: ANESTHESIOLOGY

## 2021-09-06 PROCEDURE — 44970 LAPAROSCOPY APPENDECTOMY: CPT | Performed by: SURGERY

## 2021-09-06 PROCEDURE — 77030031139 HC SUT VCRL2 J&J -A: Performed by: SURGERY

## 2021-09-06 PROCEDURE — 51702 INSERT TEMP BLADDER CATH: CPT

## 2021-09-06 PROCEDURE — 74011250637 HC RX REV CODE- 250/637: Performed by: SURGERY

## 2021-09-06 PROCEDURE — 77030012407 HC DRN WND BARD -B: Performed by: SURGERY

## 2021-09-06 PROCEDURE — 77030002916 HC SUT ETHLN J&J -A: Performed by: SURGERY

## 2021-09-06 PROCEDURE — 74011250636 HC RX REV CODE- 250/636

## 2021-09-06 PROCEDURE — 77030026438 HC STYL ET INTUB CARD -A: Performed by: ANESTHESIOLOGY

## 2021-09-06 PROCEDURE — 99218 HC RM OBSERVATION: CPT

## 2021-09-06 PROCEDURE — 74011636637 HC RX REV CODE- 636/637: Performed by: SURGERY

## 2021-09-06 PROCEDURE — 77030002933 HC SUT MCRYL J&J -A: Performed by: SURGERY

## 2021-09-06 PROCEDURE — 77030020061 HC IV BLD WRMR ADMIN SET 3M -B: Performed by: ANESTHESIOLOGY

## 2021-09-06 PROCEDURE — 85025 COMPLETE CBC W/AUTO DIFF WBC: CPT

## 2021-09-06 PROCEDURE — 82962 GLUCOSE BLOOD TEST: CPT

## 2021-09-06 PROCEDURE — 83690 ASSAY OF LIPASE: CPT

## 2021-09-06 PROCEDURE — 80053 COMPREHEN METABOLIC PANEL: CPT

## 2021-09-06 PROCEDURE — 77030013567 HC DRN WND RESERV BARD -A: Performed by: SURGERY

## 2021-09-06 PROCEDURE — 87636 SARSCOV2 & INF A&B AMP PRB: CPT

## 2021-09-06 PROCEDURE — 0DTJ4ZZ RESECTION OF APPENDIX, PERCUTANEOUS ENDOSCOPIC APPROACH: ICD-10-PCS | Performed by: SURGERY

## 2021-09-06 PROCEDURE — 76010000149 HC OR TIME 1 TO 1.5 HR: Performed by: SURGERY

## 2021-09-06 PROCEDURE — 77030009963 HC RELD STPLR ECR J&J -C: Performed by: SURGERY

## 2021-09-06 PROCEDURE — 74011250636 HC RX REV CODE- 250/636: Performed by: EMERGENCY MEDICINE

## 2021-09-06 PROCEDURE — 88304 TISSUE EXAM BY PATHOLOGIST: CPT

## 2021-09-06 PROCEDURE — 74011000636 HC RX REV CODE- 636: Performed by: EMERGENCY MEDICINE

## 2021-09-06 RX ORDER — AMLODIPINE BESYLATE 5 MG/1
5 TABLET ORAL DAILY
Status: DISCONTINUED | OUTPATIENT
Start: 2021-09-07 | End: 2021-09-06

## 2021-09-06 RX ORDER — PROPOFOL 10 MG/ML
INJECTION, EMULSION INTRAVENOUS AS NEEDED
Status: DISCONTINUED | OUTPATIENT
Start: 2021-09-06 | End: 2021-09-06 | Stop reason: HOSPADM

## 2021-09-06 RX ORDER — INSULIN LISPRO 100 [IU]/ML
13 INJECTION, SOLUTION INTRAVENOUS; SUBCUTANEOUS ONCE
Status: COMPLETED | OUTPATIENT
Start: 2021-09-06 | End: 2021-09-06

## 2021-09-06 RX ORDER — ONDANSETRON 2 MG/ML
4 INJECTION INTRAMUSCULAR; INTRAVENOUS
Status: DISCONTINUED | OUTPATIENT
Start: 2021-09-06 | End: 2021-09-11 | Stop reason: HOSPADM

## 2021-09-06 RX ORDER — SUCCINYLCHOLINE CHLORIDE 20 MG/ML
INJECTION INTRAMUSCULAR; INTRAVENOUS AS NEEDED
Status: DISCONTINUED | OUTPATIENT
Start: 2021-09-06 | End: 2021-09-06 | Stop reason: HOSPADM

## 2021-09-06 RX ORDER — DEXMEDETOMIDINE HYDROCHLORIDE 100 UG/ML
INJECTION, SOLUTION INTRAVENOUS AS NEEDED
Status: DISCONTINUED | OUTPATIENT
Start: 2021-09-06 | End: 2021-09-06 | Stop reason: HOSPADM

## 2021-09-06 RX ORDER — AMLODIPINE BESYLATE 5 MG/1
5 TABLET ORAL DAILY
Status: DISCONTINUED | OUTPATIENT
Start: 2021-09-06 | End: 2021-09-11 | Stop reason: HOSPADM

## 2021-09-06 RX ORDER — HYDRALAZINE HYDROCHLORIDE 20 MG/ML
10 INJECTION INTRAMUSCULAR; INTRAVENOUS
Status: DISCONTINUED | OUTPATIENT
Start: 2021-09-06 | End: 2021-09-11 | Stop reason: HOSPADM

## 2021-09-06 RX ORDER — DEXTROSE, SODIUM CHLORIDE, AND POTASSIUM CHLORIDE 5; .45; .15 G/100ML; G/100ML; G/100ML
INJECTION INTRAVENOUS
Status: COMPLETED
Start: 2021-09-06 | End: 2021-09-06

## 2021-09-06 RX ORDER — METRONIDAZOLE 500 MG/100ML
500 INJECTION, SOLUTION INTRAVENOUS EVERY 8 HOURS
Status: DISCONTINUED | OUTPATIENT
Start: 2021-09-07 | End: 2021-09-11 | Stop reason: HOSPADM

## 2021-09-06 RX ORDER — SODIUM CHLORIDE 0.9 % (FLUSH) 0.9 %
5-40 SYRINGE (ML) INJECTION EVERY 8 HOURS
Status: DISCONTINUED | OUTPATIENT
Start: 2021-09-06 | End: 2021-09-06 | Stop reason: HOSPADM

## 2021-09-06 RX ORDER — ONDANSETRON 2 MG/ML
INJECTION INTRAMUSCULAR; INTRAVENOUS AS NEEDED
Status: DISCONTINUED | OUTPATIENT
Start: 2021-09-06 | End: 2021-09-06 | Stop reason: HOSPADM

## 2021-09-06 RX ORDER — SODIUM CHLORIDE, SODIUM LACTATE, POTASSIUM CHLORIDE, CALCIUM CHLORIDE 600; 310; 30; 20 MG/100ML; MG/100ML; MG/100ML; MG/100ML
INJECTION, SOLUTION INTRAVENOUS
Status: DISCONTINUED | OUTPATIENT
Start: 2021-09-06 | End: 2021-09-06 | Stop reason: HOSPADM

## 2021-09-06 RX ORDER — LEVOFLOXACIN 5 MG/ML
750 INJECTION, SOLUTION INTRAVENOUS
Status: DISCONTINUED | OUTPATIENT
Start: 2021-09-06 | End: 2021-09-06 | Stop reason: HOSPADM

## 2021-09-06 RX ORDER — SODIUM CHLORIDE 0.9 % (FLUSH) 0.9 %
5-40 SYRINGE (ML) INJECTION AS NEEDED
Status: DISCONTINUED | OUTPATIENT
Start: 2021-09-06 | End: 2021-09-06 | Stop reason: HOSPADM

## 2021-09-06 RX ORDER — METRONIDAZOLE 500 MG/100ML
500 INJECTION, SOLUTION INTRAVENOUS
Status: COMPLETED | OUTPATIENT
Start: 2021-09-06 | End: 2021-09-06

## 2021-09-06 RX ORDER — INSULIN LISPRO 100 [IU]/ML
15 INJECTION, SOLUTION INTRAVENOUS; SUBCUTANEOUS ONCE
Status: DISCONTINUED | OUTPATIENT
Start: 2021-09-06 | End: 2021-09-06

## 2021-09-06 RX ORDER — POTASSIUM CHLORIDE AND SODIUM CHLORIDE 450; 150 MG/100ML; MG/100ML
INJECTION, SOLUTION INTRAVENOUS CONTINUOUS
Status: DISCONTINUED | OUTPATIENT
Start: 2021-09-06 | End: 2021-09-11 | Stop reason: HOSPADM

## 2021-09-06 RX ORDER — ASPIRIN 81 MG/1
81 TABLET ORAL DAILY
Status: DISCONTINUED | OUTPATIENT
Start: 2021-09-07 | End: 2021-09-11 | Stop reason: HOSPADM

## 2021-09-06 RX ORDER — DEXTROSE 50 % IN WATER (D50W) INTRAVENOUS SYRINGE
12.5-25 AS NEEDED
Status: DISCONTINUED | OUTPATIENT
Start: 2021-09-06 | End: 2021-09-11 | Stop reason: HOSPADM

## 2021-09-06 RX ORDER — FENTANYL CITRATE 50 UG/ML
25 INJECTION, SOLUTION INTRAMUSCULAR; INTRAVENOUS
Status: DISCONTINUED | OUTPATIENT
Start: 2021-09-06 | End: 2021-09-06 | Stop reason: HOSPADM

## 2021-09-06 RX ORDER — ONDANSETRON 2 MG/ML
4 INJECTION INTRAMUSCULAR; INTRAVENOUS AS NEEDED
Status: DISCONTINUED | OUTPATIENT
Start: 2021-09-06 | End: 2021-09-06 | Stop reason: HOSPADM

## 2021-09-06 RX ORDER — BUPIVACAINE HYDROCHLORIDE 5 MG/ML
INJECTION, SOLUTION EPIDURAL; INTRACAUDAL AS NEEDED
Status: DISCONTINUED | OUTPATIENT
Start: 2021-09-06 | End: 2021-09-06 | Stop reason: HOSPADM

## 2021-09-06 RX ORDER — LEVOFLOXACIN 5 MG/ML
750 INJECTION, SOLUTION INTRAVENOUS
Status: DISCONTINUED | OUTPATIENT
Start: 2021-09-06 | End: 2021-09-11 | Stop reason: HOSPADM

## 2021-09-06 RX ORDER — MAGNESIUM SULFATE 100 %
4 CRYSTALS MISCELLANEOUS AS NEEDED
Status: DISCONTINUED | OUTPATIENT
Start: 2021-09-06 | End: 2021-09-11 | Stop reason: HOSPADM

## 2021-09-06 RX ORDER — HYDROMORPHONE HYDROCHLORIDE 1 MG/ML
0.5 INJECTION, SOLUTION INTRAMUSCULAR; INTRAVENOUS; SUBCUTANEOUS
Status: DISCONTINUED | OUTPATIENT
Start: 2021-09-06 | End: 2021-09-11 | Stop reason: HOSPADM

## 2021-09-06 RX ORDER — HYDROCODONE BITARTRATE AND ACETAMINOPHEN 5; 325 MG/1; MG/1
1 TABLET ORAL
Status: DISCONTINUED | OUTPATIENT
Start: 2021-09-06 | End: 2021-09-11 | Stop reason: HOSPADM

## 2021-09-06 RX ORDER — SODIUM CHLORIDE 0.9 % (FLUSH) 0.9 %
5-40 SYRINGE (ML) INJECTION AS NEEDED
Status: DISCONTINUED | OUTPATIENT
Start: 2021-09-06 | End: 2021-09-11 | Stop reason: HOSPADM

## 2021-09-06 RX ORDER — LIDOCAINE HYDROCHLORIDE 20 MG/ML
INJECTION, SOLUTION EPIDURAL; INFILTRATION; INTRACAUDAL; PERINEURAL AS NEEDED
Status: DISCONTINUED | OUTPATIENT
Start: 2021-09-06 | End: 2021-09-06 | Stop reason: HOSPADM

## 2021-09-06 RX ORDER — DEXTROSE, SODIUM CHLORIDE, AND POTASSIUM CHLORIDE 5; .45; .15 G/100ML; G/100ML; G/100ML
125 INJECTION INTRAVENOUS CONTINUOUS
Status: DISCONTINUED | OUTPATIENT
Start: 2021-09-06 | End: 2021-09-06

## 2021-09-06 RX ORDER — ROCURONIUM BROMIDE 10 MG/ML
INJECTION, SOLUTION INTRAVENOUS AS NEEDED
Status: DISCONTINUED | OUTPATIENT
Start: 2021-09-06 | End: 2021-09-06 | Stop reason: HOSPADM

## 2021-09-06 RX ORDER — INSULIN LISPRO 100 [IU]/ML
INJECTION, SOLUTION INTRAVENOUS; SUBCUTANEOUS EVERY 6 HOURS
Status: DISCONTINUED | OUTPATIENT
Start: 2021-09-06 | End: 2021-09-11 | Stop reason: HOSPADM

## 2021-09-06 RX ORDER — NEOSTIGMINE METHYLSULFATE 1 MG/ML
INJECTION, SOLUTION INTRAVENOUS AS NEEDED
Status: DISCONTINUED | OUTPATIENT
Start: 2021-09-06 | End: 2021-09-06 | Stop reason: HOSPADM

## 2021-09-06 RX ORDER — FENTANYL CITRATE 50 UG/ML
INJECTION, SOLUTION INTRAMUSCULAR; INTRAVENOUS AS NEEDED
Status: DISCONTINUED | OUTPATIENT
Start: 2021-09-06 | End: 2021-09-06 | Stop reason: HOSPADM

## 2021-09-06 RX ORDER — SODIUM CHLORIDE 0.9 % (FLUSH) 0.9 %
5-40 SYRINGE (ML) INJECTION EVERY 8 HOURS
Status: DISCONTINUED | OUTPATIENT
Start: 2021-09-06 | End: 2021-09-11 | Stop reason: HOSPADM

## 2021-09-06 RX ORDER — GLYCOPYRROLATE 0.2 MG/ML
INJECTION INTRAMUSCULAR; INTRAVENOUS AS NEEDED
Status: DISCONTINUED | OUTPATIENT
Start: 2021-09-06 | End: 2021-09-06 | Stop reason: HOSPADM

## 2021-09-06 RX ADMIN — INSULIN LISPRO 13 UNITS: 100 INJECTION, SOLUTION INTRAVENOUS; SUBCUTANEOUS at 21:27

## 2021-09-06 RX ADMIN — INSULIN HUMAN 3 UNITS: 100 INJECTION, SOLUTION PARENTERAL at 17:30

## 2021-09-06 RX ADMIN — ONDANSETRON HYDROCHLORIDE 4 MG: 2 INJECTION, SOLUTION INTRAMUSCULAR; INTRAVENOUS at 16:26

## 2021-09-06 RX ADMIN — FENTANYL CITRATE 25 MCG: 50 INJECTION, SOLUTION INTRAMUSCULAR; INTRAVENOUS at 16:41

## 2021-09-06 RX ADMIN — INSULIN LISPRO 10 UNITS: 100 INJECTION, SOLUTION INTRAVENOUS; SUBCUTANEOUS at 23:56

## 2021-09-06 RX ADMIN — Medication 10 ML: at 21:26

## 2021-09-06 RX ADMIN — METRONIDAZOLE 500 MG: 500 INJECTION, SOLUTION INTRAVENOUS at 23:56

## 2021-09-06 RX ADMIN — POTASSIUM CHLORIDE, DEXTROSE MONOHYDRATE AND SODIUM CHLORIDE 125 ML/HR: 150; 5; 450 INJECTION, SOLUTION INTRAVENOUS at 18:38

## 2021-09-06 RX ADMIN — NEOSTIGMINE METHYLSULFATE 3 MG: 1 INJECTION, SOLUTION INTRAVENOUS at 17:01

## 2021-09-06 RX ADMIN — LIDOCAINE HYDROCHLORIDE 40 MG: 20 INJECTION, SOLUTION INTRAVENOUS at 16:03

## 2021-09-06 RX ADMIN — PROPOFOL 150 MG: 10 INJECTION, EMULSION INTRAVENOUS at 16:03

## 2021-09-06 RX ADMIN — AMLODIPINE BESYLATE 5 MG: 5 TABLET ORAL at 20:04

## 2021-09-06 RX ADMIN — FENTANYL CITRATE 50 MCG: 50 INJECTION, SOLUTION INTRAMUSCULAR; INTRAVENOUS at 16:18

## 2021-09-06 RX ADMIN — FENTANYL CITRATE 25 MCG: 50 INJECTION, SOLUTION INTRAMUSCULAR; INTRAVENOUS at 15:59

## 2021-09-06 RX ADMIN — LEVOFLOXACIN 750 MG: 5 INJECTION, SOLUTION INTRAVENOUS at 20:05

## 2021-09-06 RX ADMIN — DEXMEDETOMIDINE HYDROCHLORIDE 10 MCG: 100 INJECTION, SOLUTION, CONCENTRATE INTRAVENOUS at 16:24

## 2021-09-06 RX ADMIN — ROCURONIUM BROMIDE 30 MG: 10 INJECTION INTRAVENOUS at 16:03

## 2021-09-06 RX ADMIN — SODIUM CHLORIDE 80 MCG: 900 INJECTION, SOLUTION INTRAVENOUS at 16:14

## 2021-09-06 RX ADMIN — GLYCOPYRROLATE 0.5 MG: 0.2 INJECTION, SOLUTION INTRAMUSCULAR; INTRAVENOUS at 17:01

## 2021-09-06 RX ADMIN — LIDOCAINE HYDROCHLORIDE 60 MG: 20 INJECTION, SOLUTION INTRAVENOUS at 16:23

## 2021-09-06 RX ADMIN — Medication 10 ML: at 21:27

## 2021-09-06 RX ADMIN — DEXTROSE, SODIUM CHLORIDE, AND POTASSIUM CHLORIDE 125 ML/HR: 5; .45; .15 INJECTION INTRAVENOUS at 18:38

## 2021-09-06 RX ADMIN — METRONIDAZOLE 500 MG: 500 INJECTION, SOLUTION INTRAVENOUS at 10:59

## 2021-09-06 RX ADMIN — METRONIDAZOLE 500 MG: 500 INJECTION, SOLUTION INTRAVENOUS at 15:59

## 2021-09-06 RX ADMIN — Medication 1 AMPULE: at 20:05

## 2021-09-06 RX ADMIN — POTASSIUM CHLORIDE AND SODIUM CHLORIDE: 450; 150 INJECTION, SOLUTION INTRAVENOUS at 22:21

## 2021-09-06 RX ADMIN — SODIUM CHLORIDE, POTASSIUM CHLORIDE, SODIUM LACTATE AND CALCIUM CHLORIDE: 600; 310; 30; 20 INJECTION, SOLUTION INTRAVENOUS at 16:00

## 2021-09-06 RX ADMIN — IOPAMIDOL 95 ML: 612 INJECTION, SOLUTION INTRAVENOUS at 08:39

## 2021-09-06 RX ADMIN — SUCCINYLCHOLINE CHLORIDE 140 MG: 20 INJECTION, SOLUTION INTRAMUSCULAR; INTRAVENOUS at 16:03

## 2021-09-06 NOTE — ANESTHESIA POSTPROCEDURE EVALUATION
Procedure(s):  APPENDECTOMY LAPAROSCOPIC. general    Anesthesia Post Evaluation      Multimodal analgesia: multimodal analgesia used between 6 hours prior to anesthesia start to PACU discharge  Patient location during evaluation: bedside  Patient participation: complete - patient participated  Level of consciousness: awake  Pain management: adequate  Airway patency: patent  Anesthetic complications: no  Cardiovascular status: acceptable  Respiratory status: acceptable  Hydration status: acceptable  Post anesthesia nausea and vomiting:  controlled  Final Post Anesthesia Temperature Assessment:  Normothermia (36.0-37.5 degrees C)      INITIAL Post-op Vital signs:   Vitals Value Taken Time   /72 09/06/21 1845   Temp     Pulse 92 09/06/21 1848   Resp 28 09/06/21 1848   SpO2 99 % 09/06/21 1848   Vitals shown include unvalidated device data.

## 2021-09-06 NOTE — OP NOTES
[address]  OPERATIVE REPORT     PATIENT:  Scottie James    YOB: 1928    PATIENT ID: [id number]    DATE OF OPERATION: 9/6/2021    PREOPERATIVE DIAGNOSIS:  Acute Appendicitis    POSTOPERATIVE DIAGNOSIS:  Perforated Gangrenous Appendicitis with Abscess    PROCEDURE:  Laparoscopic Appendectomy and drain placement    SURGEON:  Olinda Lerma MD, FACS. ANESTHESIA:  General Endotrachial Anesthesia  Circ-1: Neptali Hernandez  Scrub RN-1: Wei Toledo Asst-1: Estuardo Stock RN      FINDINGS:  Perforated gangrenous appendicitis with walled off abscess    SPECIMENS REMOVED:  Appendix and Mesoappendix    DESCRIPTION OF OPERATION:  After appropriate consent was obtained, the patient who was competent was brought to the operating room, made comfortable in the supine position, and administered general endotracheal anesthesia. Ash catheter was placed, and the patient was prepped and draped in standard fashion. A 0.5% Marcaine solution was used to infiltrate the planned trocar sites. A 15 blade was then used to incise just under the umbilicus and this was extended sharply down to and through the midline fascia. A Guerra trocar was placed, and the abdominal cavity was insufflated with CO2 gas to 15 mm Hg pressure. Under direct visualization, two 5 mm trocars were placed in the lower abdomen. Using standard laparoscopic technique, the abdominal cavity was explored. The appendix was identified and found to be pathologically enlarged and inflamed, densely adherent to the side wall. Mobilization identified a walled off abscess cavity under pressure and this was aspirated from the field. A 5 mm perforation was noted near the base of the appendix. The base of the appendix was identified, and a window was made in the mesentery at the appendiceal base. An Endo-CINDY vascular staple cartridge was then fired across the appendix at the base, ligating and dividing it.   The tissue was very inflamed but there was no evidence of communication with the cecum. The Ligasure was used to divide the mesoappendix; and when this was all freed, the appendix was placed in an endoscopic retrieval bag and withdrawn from the abdomen through the umbilical trocar site. A drain was placed over the staple line and brought out through the low midline trocar site. The abdominal cavity was further explored, and no other pathologic findings noted, particularly there were no fluid collections. The trocars were removed under direct visualization without evidence of bleeding. The umbilical fascial defect was approximated with 0-Vicryl suture, and the skin incisions were closed with 5-0 Monocryl subcuticular stitch. The wounds were cleaned and dried and dressed with Dermabond. The patient was awakened, extubated, and transferred to the recovery room in good condition. There were no operative complications. There was minimal blood loss during the case. Gisela Valle MD, [facility]

## 2021-09-06 NOTE — H&P
Surgery History and Physcial    Subjective:      Diogo Torres is a 80 y.o. male who presented to [facility] this morning for evaluation of abdominal pain. The pain is located in the RLQ without radiation. Pain is described as sharp and stabbing and measures 7/10 in intensity. Onset of pain was 15 hours ago. Aggravating factors include movement. Alleviating factors include none. Associated symptoms include decreased appetite. PMH significant for CAD, IDDM, PVD   PSH significant for PTCA with stents.   Pt takes aspirin but no anticoagulation    Patient Active Problem List    Diagnosis Date Noted    Acute appendicitis with localized peritonitis, without perforation, abscess, or gangrene 09/06/2021    Advanced age 09/06/2021    Cystitis 07/19/2021    Dehydration, moderate 07/16/2021    Acute renal failure (ARF) ([address] 07/15/2021    Type 2 diabetes mellitus with peripheral vascular disease ([address] 01/19/2021    Bedbug bite 01/19/2021    Controlled type 2 diabetes mellitus without complication, with long-term current use of insulin ([address] 01/19/2021    Coronary atherosclerosis of native coronary artery     HCVD (hypertensive cardiovascular disease)     Pure hypercholesterolemia     Diabetes mellitus, type II ([address]     Carotid artery disease (HCC)      Past Medical History:   Diagnosis Date    Carotid artery disease ([address]     Coronary atherosclerosis of native coronary artery     Diabetes mellitus, type II ([address]     DJD (degenerative joint disease)     HCVD (hypertensive cardiovascular disease)     Pure hypercholesterolemia       Past Surgical History:   Procedure Laterality Date    HX CATARACT REMOVAL Bilateral     HX HEART CATHETERIZATION  2/2010    LVEDP 9, mild ant hypo EF 55-60%, LAD 70% 99%, mod D1, OM3 30%, PDA 90%    HX PTCA  2/2010    Stent-LAD 2.5X18 ANDREA    HX PTCA  3/2010    Stent PDA ANDREA      Social History     Tobacco Use    Smoking status: Former Smoker     Quit date: 10/17/1963     Years since quittin.9    Smokeless tobacco: Never Used   Substance Use Topics    Alcohol use: Not Currently     Comment: stopped 2018/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 Authorizing Provider   acetaminophen (TYLENOL) 325 mg tablet Take 2 Tablets by mouth every six (6) hours as needed. 21   Deniz Levine MD   amLODIPine (NORVASC) 5 mg tablet Take 1 tablet by mouth once daily 21   Roberto Dao MD   atorvastatin (LIPITOR) 40 mg tablet Take 1 Tab by mouth nightly. 21   Roberto Dao MD   lisinopriL (PRINIVIL, ZESTRIL) 10 mg tablet Take 1 tablet by mouth twice daily 21   Roberto Dao MD   insulin aspart protamine/insulin aspart (NovoLOG Mix 70-30FlexPen U-100) 100 unit/mL (70-30) inpn Administer 30 units in the am and 10 units in the evening by subcutaneous injection. 3/11/21   Nehemias Cruz MD   cholecalciferol, vitamin D3, (VITAMIN D3) 2,000 unit tab Take  by mouth daily. Provider, Historical   aspirin delayed-release 81 mg tablet Take 81 mg by mouth daily. Provider, Historical   ferrous sulfate (IRON) 325 mg (65 mg iron) tablet Take  by mouth Daily (before breakfast). Provider, Historical     Allergies   Allergen Reactions    Aspirin Other (comments)     NOSE BLEED  MG IS TAKEN  Can take low dose aspirin    Pcn [Penicillins] Shortness of Breath     Itching/hives         Review of Systems   Constitutional: Negative for chills, diaphoresis and fever. Respiratory: Negative for shortness of breath and wheezing. Cardiovascular: Negative for chest pain and palpitations. Gastrointestinal: Positive for abdominal pain. Negative for diarrhea, nausea and vomiting. Musculoskeletal: Negative for myalgias. Hematological: Does not bruise/bleed easily. All other systems reviewed and are negative. Objective:     Visit Vitals  BP (!) 166/62 (BP 1 Location: Left upper arm, BP Patient Position: At rest)   Pulse 95   Temp 98.4 °F (36.9 °C)   Resp 17   Ht 5' 11\" (1.803 m)   Wt 183 lb (83 kg)   SpO2 99%   BMI 25.52 kg/m²       Physical Exam  Constitutional:       General: He is not in acute distress. Appearance: He is well-developed. HENT:      Head: Normocephalic and atraumatic. Eyes:      General: No scleral icterus. Pupils: Pupils are equal, round, and reactive to light. Cardiovascular:      Rate and Rhythm: Normal rate and regular rhythm. Heart sounds: Normal heart sounds. Pulmonary:      Breath sounds: Normal breath sounds. No wheezing or rales. Abdominal:      General: Bowel sounds are normal. There is no distension. Palpations: Abdomen is soft. There is no mass. Tenderness: There is abdominal tenderness in the right lower quadrant. There is rebound. There is no guarding. Positive signs include Rovsing's sign and McBurney's sign. Negative signs include Berry's sign and psoas sign. Comments: - heel tap   Musculoskeletal:         General: Normal range of motion. Lymphadenopathy:      Cervical: No cervical adenopathy. Neurological:      General: No focal deficit present. Mental Status: He is alert and oriented to person, place, and time.          Imaging:  images and reports reviewed    Lab Review:    Recent Results (from the past 24 hour(s))   CBC WITH AUTOMATED DIFF    Collection Time: 09/06/21  8:21 AM   Result Value Ref Range    WBC 14.9 (H) 4.1 - 11.1 K/uL    RBC 4.06 (L) 4.10 - 5.70 M/uL    HGB 12.3 12.1 - 17.0 g/dL    HCT 36.9 36.6 - 50.3 %    MCV 90.9 80.0 - 99.0 FL    MCH 30.3 26.0 - 34.0 PG    MCHC 33.3 30.0 - 36.5 g/dL    RDW 13.3 11.5 - 14.5 %    PLATELET 833 694 - 212 K/uL    MPV 10.5 8.9 - 12.9 FL    NRBC 0.0 0  WBC    ABSOLUTE NRBC 0.00 0.00 - 0.01 K/uL    NEUTROPHILS 80 (H) 32 - 75 %    LYMPHOCYTES 12 12 - 49 %    MONOCYTES 8 5 - 13 % EOSINOPHILS 0 0 - 7 %    BASOPHILS 0 0 - 1 %    IMMATURE GRANULOCYTES 0 0.0 - 0.5 %    ABS. NEUTROPHILS 11.9 (H) 1.8 - 8.0 K/UL    ABS. LYMPHOCYTES 1.8 0.8 - 3.5 K/UL    ABS. MONOCYTES 1.1 (H) 0.0 - 1.0 K/UL    ABS. EOSINOPHILS 0.0 0.0 - 0.4 K/UL    ABS. BASOPHILS 0.0 0.0 - 0.1 K/UL    ABS. IMM. GRANS. 0.1 (H) 0.00 - 0.04 K/UL    DF AUTOMATED     METABOLIC PANEL, COMPREHENSIVE    Collection Time: 09/06/21  8:21 AM   Result Value Ref Range    Sodium 137 136 - 145 mmol/L    Potassium 3.9 3.5 - 5.1 mmol/L    Chloride 99 97 - 108 mmol/L    CO2 29 21 - 32 mmol/L    Anion gap 9 5 - 15 mmol/L    Glucose 316 (H) 65 - 100 mg/dL    BUN 14 6 - 20 MG/DL    Creatinine 1.25 0.70 - 1.30 MG/DL    BUN/Creatinine ratio 11 (L) 12 - 20      GFR est AA >60 >60 ml/min/1.73m2    GFR est non-AA 54 (L) >60 ml/min/1.73m2    Calcium 9.0 8.5 - 10.1 MG/DL    Bilirubin, total 0.9 0.2 - 1.0 MG/DL    ALT (SGPT) 29 12 - 78 U/L    AST (SGOT) 22 15 - 37 U/L    Alk. phosphatase 128 (H) 45 - 117 U/L    Protein, total 7.1 6.4 - 8.2 g/dL    Albumin 3.1 (L) 3.5 - 5.0 g/dL    Globulin 4.0 2.0 - 4.0 g/dL    A-G Ratio 0.8 (L) 1.1 - 2.2     LIPASE    Collection Time: 09/06/21  8:21 AM   Result Value Ref Range    Lipase 57 (L) 73 - 393 U/L   COVID-19 WITH INFLUENZA A/B    Collection Time: 09/06/21 12:00 PM   Result Value Ref Range    SARS-CoV-2 Not detected NOTD      Influenza A by PCR Not detected NOTD      Influenza B by PCR Not detected NOTD           Assessment:     Abdominal pain, suspect acute uncomplicated appendicitis in independent male of advanced age. Plan:     1. I recommend proceeding with Surgery:  Appendectomy and Laparoscopy. 2. Discussed aspects of surgical intervention, methods, risks including by not limited to infection, bleeding, hematoma, and perforation of the intestines or solid organs, conversion to open operation, negative exploration, and the risks of general anesthetic.   The patient understands the risks; any and all questions were answered to the patient's satisfaction.

## 2021-09-06 NOTE — ED PROVIDER NOTES
EMERGENCY DEPARTMENT HISTORY AND PHYSICAL EXAM          Date: 2021  Patient Name: Matthew Chin    History of Presenting Illness     Chief Complaint   Patient presents with    Abdominal Pain       History Provided By: Patient    HPI: Matthew Chin is a 80 y.o. male, pmhx listed below, who presents to the ED c/o right-sided abdominal pain. Patient reports pain started yesterday. Pain is colicky, intermittent. Continued throughout the night last night. No vomiting or nausea. No history of similar pain. Last bowel movement was 2 days ago, patient reports this is usual for him. Denies fever. Denies history of abdominal surgery. PCP: Tim Alfredo NP    There are no other complaints, changes, or physical findings at this time.          Past History       Past Medical History:  Past Medical History:   Diagnosis Date    Carotid artery disease ([facility] [address]     Coronary atherosclerosis of native coronary artery     Diabetes mellitus, type II ([facility] [address]     DJD (degenerative joint disease)     HCVD (hypertensive cardiovascular disease)     Pure hypercholesterolemia        Past Surgical History:  Past Surgical History:   Procedure Laterality Date    HX CATARACT REMOVAL Bilateral     HX HEART CATHETERIZATION  2010    LVEDP 9, mild ant hypo EF 55-60%, LAD 70% 99%, mod D1, OM3 30%, PDA 90%    HX PTCA  2010    Stent-LAD 2.5X18 ANDREA    HX PTCA  3/2010    Stent PDA ANDREA       Family History:  Family History   Problem Relation Age of Onset    Heart Disease Mother     Cancer Father        Social History:  Social History     Tobacco Use    Smoking status: Former Smoker     Quit date: 10/17/1963     Years since quittin.9    Smokeless tobacco: Never Used   Vaping Use    Vaping Use: Never used   Substance Use Topics    Alcohol use: Not Currently     Comment: stopped 2018/used to drink heavy    Drug use: Never       Current Facility-Administered Medications   Medication Dose Route Frequency Provider Last Rate Last Admin    levoFLOXacin (LEVAQUIN) 750 mg in D5W IVPB  750 mg IntraVENous NOW Suzi Hardy  mL/hr at 09/06/21 1220 750 mg at 09/06/21 1220     Current Outpatient Medications   Medication Sig Dispense Refill    acetaminophen (TYLENOL) 325 mg tablet Take 2 Tablets by mouth every six (6) hours as needed.  amLODIPine (NORVASC) 5 mg tablet Take 1 tablet by mouth once daily 90 Tab 1    atorvastatin (LIPITOR) 40 mg tablet Take 1 Tab by mouth nightly. 90 Tab 1    lisinopriL (PRINIVIL, ZESTRIL) 10 mg tablet Take 1 tablet by mouth twice daily 180 Tab 1    insulin aspart protamine/insulin aspart (NovoLOG Mix 70-30FlexPen U-100) 100 unit/mL (70-30) inpn Administer 30 units in the am and 10 units in the evening by subcutaneous injection. 5 Adjustable Dose Pre-filled Pen Syringe 5    cholecalciferol, vitamin D3, (VITAMIN D3) 2,000 unit tab Take  by mouth daily.  aspirin delayed-release 81 mg tablet Take 81 mg by mouth daily.  ferrous sulfate (IRON) 325 mg (65 mg iron) tablet Take  by mouth Daily (before breakfast). Allergies: Allergies   Allergen Reactions    Aspirin Other (comments)     NOSE BLEED  MG IS TAKEN  Can take low dose aspirin    Pcn [Penicillins] Shortness of Breath     Itching/hives         Review of Systems   Review of Systems   Constitutional: Negative for chills and fever. HENT: Negative for ear pain. Eyes: Negative for pain. Respiratory: Negative for shortness of breath. Cardiovascular: Negative for chest pain. Gastrointestinal: Positive for abdominal pain. Genitourinary: Negative for flank pain. Musculoskeletal: Negative for back pain. Skin: Negative for rash. Neurological: Negative for headaches. Psychiatric/Behavioral: Negative for agitation. Physical Exam     Vital Signs-Reviewed the patient's vital signs.   Patient Vitals for the past 12 hrs:   Temp Pulse Resp BP SpO2   09/06/21 1204 98.5 °F (36.9 °C) 78 16 (!) 171/73 99 %   09/06/21 0747 99.1 °F (37.3 °C) 96 15 (!) 168/81 99 %       Physical Exam  Vitals reviewed. HENT:      Head: Normocephalic and atraumatic. Mouth/Throat:      Mouth: Mucous membranes are moist.   Cardiovascular:      Rate and Rhythm: Normal rate and regular rhythm. Pulmonary:      Effort: Pulmonary effort is normal.      Breath sounds: Normal breath sounds. Abdominal:      Palpations: Abdomen is soft. Tenderness: There is abdominal tenderness in the right lower quadrant. Musculoskeletal:         General: Normal range of motion. Cervical back: Normal range of motion. Skin:     General: Skin is warm and dry. Neurological:      Mental Status: He is alert and oriented to person, place, and time. Psychiatric:         Mood and Affect: Mood normal.         Diagnostic Study Results     Labs -     Recent Results (from the past 12 hour(s))   CBC WITH AUTOMATED DIFF    Collection Time: 09/06/21  8:21 AM   Result Value Ref Range    WBC 14.9 (H) 4.1 - 11.1 K/uL    RBC 4.06 (L) 4.10 - 5.70 M/uL    HGB 12.3 12.1 - 17.0 g/dL    HCT 36.9 36.6 - 50.3 %    MCV 90.9 80.0 - 99.0 FL    MCH 30.3 26.0 - 34.0 PG    MCHC 33.3 30.0 - 36.5 g/dL    RDW 13.3 11.5 - 14.5 %    PLATELET 965 781 - 750 K/uL    MPV 10.5 8.9 - 12.9 FL    NRBC 0.0 0  WBC    ABSOLUTE NRBC 0.00 0.00 - 0.01 K/uL    NEUTROPHILS 80 (H) 32 - 75 %    LYMPHOCYTES 12 12 - 49 %    MONOCYTES 8 5 - 13 %    EOSINOPHILS 0 0 - 7 %    BASOPHILS 0 0 - 1 %    IMMATURE GRANULOCYTES 0 0.0 - 0.5 %    ABS. NEUTROPHILS 11.9 (H) 1.8 - 8.0 K/UL    ABS. LYMPHOCYTES 1.8 0.8 - 3.5 K/UL    ABS. MONOCYTES 1.1 (H) 0.0 - 1.0 K/UL    ABS. EOSINOPHILS 0.0 0.0 - 0.4 K/UL    ABS. BASOPHILS 0.0 0.0 - 0.1 K/UL    ABS. IMM.  GRANS. 0.1 (H) 0.00 - 0.04 K/UL    DF AUTOMATED     METABOLIC PANEL, COMPREHENSIVE    Collection Time: 09/06/21  8:21 AM   Result Value Ref Range    Sodium 137 136 - 145 mmol/L    Potassium 3.9 3.5 - 5.1 mmol/L    Chloride 99 97 - 108 mmol/L    CO2 29 21 - 32 mmol/L    Anion gap 9 5 - 15 mmol/L    Glucose 316 (H) 65 - 100 mg/dL    BUN 14 6 - 20 MG/DL    Creatinine 1.25 0.70 - 1.30 MG/DL    BUN/Creatinine ratio 11 (L) 12 - 20      GFR est AA >60 >60 ml/min/1.73m2    GFR est non-AA 54 (L) >60 ml/min/1.73m2    Calcium 9.0 8.5 - 10.1 MG/DL    Bilirubin, total 0.9 0.2 - 1.0 MG/DL    ALT (SGPT) 29 12 - 78 U/L    AST (SGOT) 22 15 - 37 U/L    Alk. phosphatase 128 (H) 45 - 117 U/L    Protein, total 7.1 6.4 - 8.2 g/dL    Albumin 3.1 (L) 3.5 - 5.0 g/dL    Globulin 4.0 2.0 - 4.0 g/dL    A-G Ratio 0.8 (L) 1.1 - 2.2     LIPASE    Collection Time: 09/06/21  8:21 AM   Result Value Ref Range    Lipase 57 (L) 73 - 393 U/L       Radiologic Studies -   CT ABD PELV W CONT   Final Result   Acute uncomplicated appendicitis with puree appendiceal inflammation but no   abscess. The findings were called to Dr. Suresh Delarosa on 9/6/2021 at 10:15 AM by Dr. Jerri Hayden. [facility]    (Results Pending)     CT Results  (Last 48 hours)               09/06/21 0948  CT ABD PELV W CONT Final result    Impression:  Acute uncomplicated appendicitis with puree appendiceal inflammation but no   abscess. The findings were called to Dr. Suresh Delarosa on 9/6/2021 at 10:15 AM by Dr. Jerri Hadyen. [phone]       Narrative:  EXAM: CT ABD PELV W CONT       INDICATION: R sided abd pain       COMPARISON: None        CONTRAST: 100 mL of Isovue-370. TECHNIQUE:    Following the uneventful intravenous administration of contrast, thin axial   images were obtained through the abdomen and pelvis. Coronal and sagittal   reconstructions were generated. Oral contrast was not administered. CT dose   reduction was achieved through use of a standardized protocol tailored for this   examination and automatic exposure control for dose modulation. FINDINGS:    LOWER THORAX: Lung bases clear. Mild cardiomegaly with coronary artery   calcifications and/or stents. LIVER: No mass.    BILIARY TREE: No calcified gallstones. CBD is not dilated. SPLEEN: within normal limits. PANCREAS: No mass or ductal dilatation. ADRENALS: Unremarkable. KIDNEYS: No mass, calculus, or hydronephrosis. STOMACH: Unremarkable. SMALL BOWEL: No dilatation or wall thickening. COLON: No dilatation or wall thickening. Fecal retention. Mild diverticulosis. APPENDIX: Distended and fluid-filled, measuring up to 1.3 cm (2-51). Haziness of   the adjacent fat. No definite wall discontinuity. Findings consistent with acute   appendicitis. No adjacent abscess. No pelvic fluid collection. PERITONEUM: No ascites or pneumoperitoneum. RETROPERITONEUM: Atherosclerotic aorta without aneurysm. No lymphadenopathy. REPRODUCTIVE ORGANS: Mild enlargement of the prostate. URINARY BLADDER: No mass or calculus. BONES: Multilevel degenerative disc disease. No destructive osseous lesions. ABDOMINAL WALL: No mass or hernia. ADDITIONAL COMMENTS: N/A               CXR Results  (Last 48 hours)    None            Medical Decision Making   I am the first provider for this patient. I reviewed the vital signs, available nursing notes, past medical history, past surgical history, family history and social history. Records Reviewed: Nursing Notes, old medical records    Provider Notes (Medical Decision Making):   MDM: 59-year-old male with right lower quadrant abdominal pain. Intermittent nature of symptoms makes history concerning for ureteral colic. Tenderness also concerning for acute appendicitis, diverticulitis. We will plan for lab work and CT scan. Unclear disposition pending results of testing. Initial assessment performed. The patients presenting problems have been discussed, and they are in agreement with the care plan formulated and outlined with them. I have encouraged them to ask questions as they arise throughout their visit.     PROGRESS NOTE:  ED Course as of Sep 06 1239   Mon Sep 06, 2021   1022 Case discussed with Dr. Johnny Jolly. Pt may require transfer, will await callback. [PV]   0453 Pt evaluated by Dr. Johnny Jolly at bedside. As per his discussion with Anesthesiologist, we are unable to perform surgery here. Will initiate transfer. [PV]   2707 Case discussed with Dr. Trinidad Newton, [facility] Surgery. He recommends ED to ED transfer in order to facilitate surgery. [PV]   3199 Case discussed with Dr. Angelo Prajapati, ER Physician. Patient accepted for transfer. [PV]      ED Course User Index  [PV] Jessica Block MD        Critical Care Time:   CRITICAL CARE NOTE :    12:38 PM    IMPENDING DETERIORATION - acute intra-abdominal infection    ASSOCIATED RISK FACTORS - age, DM, elevated WBC count    MANAGEMENT- Transfer    INTERPRETATION -  CT Scan    INTERVENTIONS - IV abx    CASE REVIEW - Hospitalist/Intensivist    TREATMENT RESPONSE -Stable    PERFORMED BY - Self      NOTES   :      I have spent 75 minutes of critical care time involved in lab review, consultations with specialist, family decision- making, bedside attention and documentation. During this entire length of time I was immediately available to the patient . Omer Love MD      Diagnosis     Clinical Impression:   1. Acute appendicitis, unspecified acute appendicitis type            Disposition:  Transferred to Another Facility    Current Discharge Medication List            Please note, this dictation was completed with [employer], the computer voice recognition software. Quite often unanticipated grammatical, syntax, homophones, and other interpretive errors are inadvertently transcribed by the computer software. Please disregard these errors. Please excuse any errors that have escaped final proof reading.

## 2021-09-06 NOTE — ED NOTES
TRANSFER - OUT REPORT:    Verbal report given to STEVE Nicolas RN on Enoch Cervantes  being transferred to [facility] ED for ordered procedure       Report consisted of patients Situation, Background, Assessment and   Recommendations(SBAR). Information from the following report(s) SBAR, Kardex, ED Summary, Procedure Summary, MAR, Recent Results and Med Rec Status was reviewed with the receiving nurse. Lines:   Peripheral IV 09/06/21 Right Antecubital (Active)   Site Assessment Clean, dry, & intact 09/06/21 0827   Phlebitis Assessment 0 09/06/21 0827   Infiltration Assessment 0 09/06/21 0827   Dressing Status Clean, dry, & intact 09/06/21 0827        Opportunity for questions and clarification was provided.       Patient transported with:LIZ

## 2021-09-06 NOTE — PROGRESS NOTES
TRANSFER - IN REPORT:    Verbal report received from Leonel Nuñez (name) on Mamta Wills  being received from PACU (unit) for routine post - op      Report consisted of patients Situation, Background, Assessment and   Recommendations(SBAR). Information from the following report(s) SBAR, Kardex, Procedure Summary, Intake/Output, MAR, Recent Results and Cardiac Rhythm NSR was reviewed with the receiving nurse. Opportunity for questions and clarification was provided. Bedside shift change report given to Micky Efrain Amaury  (oncoming nurse) by Cata Kelley (offgoing nurse). Report included the following information Procedure Summary.

## 2021-09-06 NOTE — ANESTHESIA PREPROCEDURE EVALUATION
Relevant Problems   CARDIOVASCULAR   (+) Coronary atherosclerosis of native coronary artery      RENAL FAILURE   (+) Acute renal failure (ARF) (HCC)      ENDOCRINE   (+) Controlled type 2 diabetes mellitus without complication, with long-term current use of insulin (HCC)   (+) Diabetes mellitus, type II (HCC)   (+) Type 2 diabetes mellitus with peripheral vascular disease (HCC)       Anesthetic History   No history of anesthetic complications            Review of Systems / Medical History  Patient summary reviewed and pertinent labs reviewed    Pulmonary  Within defined limits                 Neuro/Psych   Within defined limits           Cardiovascular              CAD and hyperlipidemia    Exercise tolerance: <4 METS  Comments: EKG  Sinus rhythm with 1st degree AV block with occasional premature ventricular   complexes and premature atrial complexes   Otherwise normal ECG    GI/Hepatic/Renal         Renal disease: CRI       Endo/Other    Diabetes: using insulin    Arthritis     Other Findings   Comments: CT A/P  Acute uncomplicated appendicitis with puree appendiceal inflammation but no  abscess.            Physical Exam    Airway  Mallampati: II  TM Distance: > 6 cm  Neck ROM: normal range of motion   Mouth opening: Normal     Cardiovascular    Rhythm: regular  Rate: normal         Dental  No notable dental hx       Pulmonary  Breath sounds clear to auscultation               Abdominal  GI exam deferred       Other Findings            Anesthetic Plan    ASA: 3, emergent  Anesthesia type: general    Monitoring Plan: BIS      Induction: Intravenous  Anesthetic plan and risks discussed with: Patient

## 2021-09-06 NOTE — ED NOTES
1433: Assumed care of patient from [facility] at this time. Patient placed on monitor x 3, SR x 2 , call light in reach. Pt Transfer with complaints of RLQ pain starting at midnight, pt denies N/V. CT showed a large fluid filled appendix. Pt has recieved Flagyl and Levaquin. 1437:Patient up to the bedside commode with RN assist  [phone]: Doctor Arnol miller served at this time about patient arrival  :  Anesthesia at bedside  1555: Patient taken to OR by OR Nurse Franco Bethea

## 2021-09-06 NOTE — PROGRESS NOTES
Spoke with Cayden Bentley at [facility] and arranged ALS transport for this patient to [facility] ED. Requested information provided. ETA 1230-- SAMIR Quiroz RN made aware.

## 2021-09-06 NOTE — ED NOTES
Made aware that Pt may have bed assigned at [facility]. Spoke with charge RN, Krunal Delong, on [facility] surgical tele unit. Informed that Pt is still being admitted through ED and no additional report given.

## 2021-09-07 LAB
GLUCOSE BLD STRIP.AUTO-MCNC: 194 MG/DL (ref 65–117)
GLUCOSE BLD STRIP.AUTO-MCNC: 260 MG/DL (ref 65–117)
GLUCOSE BLD STRIP.AUTO-MCNC: 330 MG/DL (ref 65–117)
SERVICE CMNT-IMP: ABNORMAL

## 2021-09-07 PROCEDURE — 74011250637 HC RX REV CODE- 250/637: Performed by: SURGERY

## 2021-09-07 PROCEDURE — 74011636637 HC RX REV CODE- 636/637: Performed by: SURGERY

## 2021-09-07 PROCEDURE — 74011250636 HC RX REV CODE- 250/636: Performed by: NURSE PRACTITIONER

## 2021-09-07 PROCEDURE — 99218 HC RM OBSERVATION: CPT

## 2021-09-07 PROCEDURE — 99233 SBSQ HOSP IP/OBS HIGH 50: CPT | Performed by: CLINICAL NURSE SPECIALIST

## 2021-09-07 PROCEDURE — 82962 GLUCOSE BLOOD TEST: CPT

## 2021-09-07 PROCEDURE — 74011636637 HC RX REV CODE- 636/637: Performed by: NURSE PRACTITIONER

## 2021-09-07 PROCEDURE — 74011250636 HC RX REV CODE- 250/636: Performed by: SURGERY

## 2021-09-07 RX ORDER — INSULIN LISPRO 100 [IU]/ML
4 INJECTION, SOLUTION INTRAVENOUS; SUBCUTANEOUS
Status: DISCONTINUED | OUTPATIENT
Start: 2021-09-07 | End: 2021-09-11 | Stop reason: HOSPADM

## 2021-09-07 RX ORDER — ENOXAPARIN SODIUM 100 MG/ML
30 INJECTION SUBCUTANEOUS EVERY 24 HOURS
Status: DISCONTINUED | OUTPATIENT
Start: 2021-09-07 | End: 2021-09-11 | Stop reason: HOSPADM

## 2021-09-07 RX ORDER — INSULIN GLARGINE 100 [IU]/ML
15 INJECTION, SOLUTION SUBCUTANEOUS DAILY
Status: DISCONTINUED | OUTPATIENT
Start: 2021-09-07 | End: 2021-09-11 | Stop reason: HOSPADM

## 2021-09-07 RX ADMIN — METRONIDAZOLE 500 MG: 500 INJECTION, SOLUTION INTRAVENOUS at 08:38

## 2021-09-07 RX ADMIN — POTASSIUM CHLORIDE AND SODIUM CHLORIDE: 450; 150 INJECTION, SOLUTION INTRAVENOUS at 08:38

## 2021-09-07 RX ADMIN — Medication 10 ML: at 21:21

## 2021-09-07 RX ADMIN — Medication 1 AMPULE: at 09:29

## 2021-09-07 RX ADMIN — INSULIN GLARGINE 15 UNITS: 100 INJECTION, SOLUTION SUBCUTANEOUS at 14:35

## 2021-09-07 RX ADMIN — INSULIN LISPRO 4 UNITS: 100 INJECTION, SOLUTION INTRAVENOUS; SUBCUTANEOUS at 12:28

## 2021-09-07 RX ADMIN — ASPIRIN 81 MG: 81 TABLET, COATED ORAL at 08:38

## 2021-09-07 RX ADMIN — ENOXAPARIN SODIUM 30 MG: 30 INJECTION, SOLUTION INTRAVENOUS; SUBCUTANEOUS at 11:51

## 2021-09-07 RX ADMIN — HYDROCODONE BITARTRATE AND ACETAMINOPHEN 1 TABLET: 5; 325 TABLET ORAL at 17:44

## 2021-09-07 RX ADMIN — Medication 10 ML: at 14:35

## 2021-09-07 RX ADMIN — INSULIN LISPRO 3 UNITS: 100 INJECTION, SOLUTION INTRAVENOUS; SUBCUTANEOUS at 06:40

## 2021-09-07 RX ADMIN — POTASSIUM CHLORIDE AND SODIUM CHLORIDE: 450; 150 INJECTION, SOLUTION INTRAVENOUS at 19:47

## 2021-09-07 RX ADMIN — Medication 1 AMPULE: at 20:37

## 2021-09-07 RX ADMIN — INSULIN LISPRO 4 UNITS: 100 INJECTION, SOLUTION INTRAVENOUS; SUBCUTANEOUS at 18:22

## 2021-09-07 RX ADMIN — INSULIN LISPRO 10 UNITS: 100 INJECTION, SOLUTION INTRAVENOUS; SUBCUTANEOUS at 19:05

## 2021-09-07 RX ADMIN — METRONIDAZOLE 500 MG: 500 INJECTION, SOLUTION INTRAVENOUS at 15:25

## 2021-09-07 NOTE — PROGRESS NOTES
Surgery NP Progress Note    Loyda Grant  [id number]  male  80 y.o.  1928    s/p APPENDECTOMY LAPAROSCOPIC on 2021   Pt seen with Dr. Beltran Nolen      Assessment:   Principal Problem:    Acute appendicitis with localized peritonitis, without perforation, abscess, or gangrene (2021)    Active Problems:    Type 2 diabetes mellitus with peripheral vascular disease ([facility] [address] (2021)      Advanced age (2021)        Expected post-op progress. Plan/Recommendations/Medical Decision Making:     - Mobilize with nursing and OOB to chair for meals  - Advance diet  - Pain management- Continue current pain control methods.   - Continue IV abx.   - AM labs. - Possibly home tomorrow if labs normalizing and tolerating diet. Subjective:     Patient feeling well. Tolerating liquids. Objective:     Blood pressure (!) 144/70, pulse (!) 101, temperature 97.5 °F (36.4 °C), resp. rate 20, height 5' 11\" (1.803 m), weight 83 kg (183 lb), SpO2 95 %. Temp (24hrs), Av.9 °F (36.6 °C), Min:97.3 °F (36.3 °C), Max:98.5 °F (36.9 °C)      Recent Results (from the past 48 hour(s))   CBC WITH AUTOMATED DIFF    Collection Time: 21  8:21 AM   Result Value Ref Range    WBC 14.9 (H) 4.1 - 11.1 K/uL    RBC 4.06 (L) 4.10 - 5.70 M/uL    HGB 12.3 12.1 - 17.0 g/dL    HCT 36.9 36.6 - 50.3 %    MCV 90.9 80.0 - 99.0 FL    MCH 30.3 26.0 - 34.0 PG    MCHC 33.3 30.0 - 36.5 g/dL    RDW 13.3 11.5 - 14.5 %    PLATELET 804 947 - 966 K/uL    MPV 10.5 8.9 - 12.9 FL    NRBC 0.0 0  WBC    ABSOLUTE NRBC 0.00 0.00 - 0.01 K/uL    NEUTROPHILS 80 (H) 32 - 75 %    LYMPHOCYTES 12 12 - 49 %    MONOCYTES 8 5 - 13 %    EOSINOPHILS 0 0 - 7 %    BASOPHILS 0 0 - 1 %    IMMATURE GRANULOCYTES 0 0.0 - 0.5 %    ABS. NEUTROPHILS 11.9 (H) 1.8 - 8.0 K/UL    ABS. LYMPHOCYTES 1.8 0.8 - 3.5 K/UL    ABS. MONOCYTES 1.1 (H) 0.0 - 1.0 K/UL    ABS. EOSINOPHILS 0.0 0.0 - 0.4 K/UL    ABS. BASOPHILS 0.0 0.0 - 0.1 K/UL    ABS. IMM.  GRANS. 0.1 (H) 0.00 - 0.04 K/UL    DF AUTOMATED     METABOLIC PANEL, COMPREHENSIVE    Collection Time: 09/06/21  8:21 AM   Result Value Ref Range    Sodium 137 136 - 145 mmol/L    Potassium 3.9 3.5 - 5.1 mmol/L    Chloride 99 97 - 108 mmol/L    CO2 29 21 - 32 mmol/L    Anion gap 9 5 - 15 mmol/L    Glucose 316 (H) 65 - 100 mg/dL    BUN 14 6 - 20 MG/DL    Creatinine 1.25 0.70 - 1.30 MG/DL    BUN/Creatinine ratio 11 (L) 12 - 20      GFR est AA >60 >60 ml/min/1.73m2    GFR est non-AA 54 (L) >60 ml/min/1.73m2    Calcium 9.0 8.5 - 10.1 MG/DL    Bilirubin, total 0.9 0.2 - 1.0 MG/DL    ALT (SGPT) 29 12 - 78 U/L    AST (SGOT) 22 15 - 37 U/L    Alk. phosphatase 128 (H) 45 - 117 U/L    Protein, total 7.1 6.4 - 8.2 g/dL    Albumin 3.1 (L) 3.5 - 5.0 g/dL    Globulin 4.0 2.0 - 4.0 g/dL    A-G Ratio 0.8 (L) 1.1 - 2.2     LIPASE    Collection Time: 09/06/21  8:21 AM   Result Value Ref Range    Lipase 57 (L) 73 - 393 U/L   COVID-19 WITH INFLUENZA A/B    Collection Time: 09/06/21 12:00 PM   Result Value Ref Range    SARS-CoV-2 Not detected NOTD      Influenza A by PCR Not detected NOTD      Influenza B by PCR Not detected NOTD     GLUCOSE, POC    Collection Time: 09/06/21  5:29 PM   Result Value Ref Range    Glucose (POC) 319 (H) 65 - 117 mg/dL    Performed by Elvia Robins RN    GLUCOSE, POC    Collection Time: 09/06/21  8:04 PM   Result Value Ref Range    Glucose (POC) 357 (H) 65 - 117 mg/dL    Performed by [employer] (PCT)    GLUCOSE, POC    Collection Time: 09/06/21 11:13 PM   Result Value Ref Range    Glucose (POC) 328 (H) 65 - 117 mg/dL    Performed by [employer] (PCT)    GLUCOSE, POC    Collection Time: 09/07/21  6:19 AM   Result Value Ref Range    Glucose (POC) 194 (H) 65 - 117 mg/dL    Performed by Yumiko Pritchard (CORRY)        Pt resting in bed. NAD   Incisions CDI. Abd soft and appropriately tender. Drain  in place. Serosang output. SCDs for mechanical DVT proph while in bed     Body mass index is 25.52 kg/m². Reference: BMI greater than 30 is classified as obesity and greater than 40 is classified as morbid obesity.      Last 3 Recorded Weights in this Encounter    09/06/21 1434   Weight: 83 kg (183 lb)         Tyree Ram NP   MSN, APRN, FNP-C, CWOCN-AP    09/07/21

## 2021-09-07 NOTE — DIABETES MGMT
[address] [facility]    CLINICAL NURSE SPECIALIST CONSULT     Initial Presentation   Enoch Cervantes is a 80 y.o. male admitted from the ER with abdominal pain. Denies Nausea or vomiting. Afebrile. Hypertensive. O2 sats 99%  CT: Large fluid filled appendix. HX:   Past Medical History:   Diagnosis Date    Carotid artery disease ([facility] [address]     Coronary atherosclerosis of native coronary artery     Diabetes mellitus, type II ([facility] [address]     DJD (degenerative joint disease)     HCVD (hypertensive cardiovascular disease)     Pure hypercholesterolemia       INITIAL DX:    Acute appendicitis     Current Treatment     TX: 9/6/21 Laparoscopic Appendectomy and drain placement  ABx. Insulin    Consulted by Provider for advanced diabetes nursing assessment and care for:   [x] Inpatient management strategy    Hospital Course   Clinical progress has been uncomplicated. Diabetes History   Patient reports being diagnosed with diabetes in 1990. He was not overweight and denies losing weight at the time. Negative family history of diabetes. He was initially treated with pills and then transitioned to insulin in the 2000s. Diabetes-related Medical History  Macrovascular disease  CAD    Diabetes Medication History  Drug class Currently in use Discontinued Never used   Biguanide      DDP-4 inhibitor       Sulfonylurea      Thiazolidinedione      GLP-1 RA      SGLT-2 inhibitors      Basal insulin      Bolus insulin      Fixed Dose  Combinations Novolog insulin 70/30 mix   30 units in AM and 10 units in PM       Subjective   I'm hard of hearing    Patient reports the following home diabetes self-management practices:   Eating pattern   [x] Eating a carbohydrate-controlled mealplan  [x] Breakfast Scrambled eggs, sausage/ramos with biscuit. Coffee  [x] Lunch  Shelbiana & yogurt. Water  [x] Dinner  Steak or pork chop with salad & vegetables. Sometimes rice.  Water  Physical activity pattern   [x] Not employing a physical activity program to control BG  [x] Aerobic exercise Gathers wood for burning. Used to walk regularly  Monitoring pattern   [x] Testing BGs sufficiently to inform self-management adjustments  [x] Breakfast 100s  [x] Dinner  100s  Taking medications pattern  [x] Consistent administration  [x] Affordable    Overall evaluation:    [x] Not achieving A1c target with drug therapy & self-care practices     Objective   Physical exam  General Normal weight male in no acute distress. Conversant and cooperative  Neuro  Alert, oriented   Vital Signs Afebrile. Normotensive  Visit Vitals  /62 (BP 1 Location: Left upper arm, BP Patient Position: At rest;Supine)   Pulse 92   Temp 97.3 °F (36.3 °C)   Resp 20   Ht 5' 11\" (1.803 m)   Wt 83 kg (183 lb)   SpO2 97%   BMI 25.52 kg/m²     Laboratory  Tests 9/6 7/17/21   A1c  10.5%       Anion gap 9    Serum triglycerides     WBC 14.9    Serum creatinine 1.25    GFR 54    AST 22    ALT 29      Factors impacting BG management  Factor Dose Comments   Nutrition:  Standard meals       Pain Dilaudid & Norco available    Infection Levaquin Q48 hrs  Flagyl Q8 hrs Afebrile. WBC 14.9     Blood glucose pattern        Assessment and Plan   Nursing Diagnosis Risk for unstable blood glucose pattern   Nursing Intervention Domain 5250 Decision-making Support   Nursing Interventions Examined current inpatient diabetes control   Explored factors facilitating and impeding inpatient management  Identified self-management practices impeding diabetes control      Evaluation   This normal weight  male, with Type 2 diabetes, did not achieve diabetes control prior to admission, as evidenced by A1c of 10.5%. Since this gentleman is used to giving insulin at home for his main strategy, would continue basal insulin with mealtime when he is eating.     Recommendations     [x] Use of Subcutaneous Insulin Order set (2651)  Insulin Dosing Specific recommendation   Basal (Based on weight, BMI & GFR) [x]        0.2 units/kg/D   Lantus 15 units D   Nutritional                                      (Based on CHO/dextrose load) [x] Normal sensitivity   Humalog insulin 4 units with meals   Corrective                                       (Useful in adjusting insulin dosing)   [x] HIGH sensitivity      [x] Carb-controlled mealplan    [x] Referral to  [x] [facility] (Phone [phone] to schedule appointment) for [facility]    Billing Code(s)   [x] 99695 IP subsequent hospital care - 35 minutes    Before making these care recommendations, I personally reviewed the hospitalization record, including notes, laboratory & diagnostic data and current medications, and examined the patient at the bedside (circumstances permitting) before making care recommendations.      Total minutes: Yanira Cervantes, CNS  Diabetes Clinical Nurse Specialist  [facility]  Access via [facility]

## 2021-09-07 NOTE — PROGRESS NOTES
Problem: Falls - Risk of  Goal: *Absence of Falls  Description: Document [place] Fall Risk and appropriate interventions in the flowsheet.   Outcome: Progressing Towards Goal  Note: Fall Risk Interventions:  Mobility Interventions: Patient to call before getting OOB         Medication Interventions: Teach patient to arise slowly, Patient to call before getting OOB    Elimination Interventions: Call light in reach, Bed/chair exit alarm, Patient to call for help with toileting needs              Problem: Diabetes Maintenance:Admission  Goal: Activity/Safety  Outcome: Progressing Towards Goal  Goal: Medications  Outcome: Progressing Towards Goal  Goal: Treatments/Interventions/Procedures  Outcome: Progressing Towards Goal     Problem: Diabetes Maintenance:Ongoing  Goal: Activity/Safety  Outcome: Progressing Towards Goal  Goal: Medications  Outcome: Progressing Towards Goal     Problem: Diabetes Maintenance:Discharge Outcomes  Goal: *Blood glucose at patient's target range or approaching  Outcome: Progressing Towards Goal

## 2021-09-07 NOTE — PROGRESS NOTES
MD notified of BG of 357.  Gave order to change IV fluids to 0.45 NS with 20K @125 and to give 13 units of insulin

## 2021-09-07 NOTE — PROGRESS NOTES
.End of Shift Note    Bedside shift change report given to ANDREI Blackwell  (oncoming nurse) by Bennett Heard (offgoing nurse). Report included the following information SBAR, Kardex, MAR and Recent Results    Shift worked:  7p-7a     Shift summary and any significant changes:     Patient experienced asymptomatic hypertension and received scheduled norvasc per MD orders, BP within normal range. Patient BG was elevate, MD made aware and order given to give 13 units of insulin and d/c original IV fluids    Concerns for physician to address:  none     Zone phone for oncoming shift:   [phone]     Activity:  Activity Level: Up with Assistance  Number times ambulated in hallways past shift: 0  Number of times OOB to chair past shift: 0    Cardiac:   Cardiac Monitoring: Yes      Cardiac Rhythm: Sinus Rhythm    Access:   Current line(s): PIV     Genitourinary:   Urinary status: voiding    Respiratory:   O2 Device: Nasal cannula  Chronic home O2 use?: NO  Incentive spirometer at bedside: YES     GI:  Last Bowel Movement Date: 09/04/21  Current diet:  ADULT DIET Clear Liquid  Passing flatus: YES  Tolerating current diet: YES       Pain Management:   Patient states pain is manageable on current regimen: N/A    Skin:  Papa Score: 20  Interventions: increase time out of bed    Patient Safety:  Fall Score:  Total Score: 3  Interventions: bed/chair alarm, assistive device (walker, cane, etc), gripper socks and pt to call before getting OOB  High Fall Risk: Yes    Length of Stay:  Expected LOS: - - -  Actual LOS: 0      Bennett Heard

## 2021-09-07 NOTE — ED PROVIDER NOTES
EMERGENCY DEPARTMENT HISTORY AND PHYSICAL EXAM      Please note that this dictation was completed with the assistance of \"Dragon\", the computer voice recognition software. Quite often unanticipated grammatical, syntax, homophones, and other interpretive errors are inadvertently transcribed by the computer software. Please disregard these errors and any errors that have escaped final proofreading. Thank you. Patient Name: Kayla Oates  : 1928  MRN: [id number]  History of Presenting Illness     Chief Complaint   Patient presents with    Abdominal Pain     Pt Transfer with complaints of RLQ pain starting at midnight, pt denies N/V. CT showed a large fluid filled appendix. Pt has recieved Flagyl and Levoquin. History Provided By: Patient    HPI: Kayla Oates, 80 y.o. male with past medical history as documented below presents to the ED with c/o of moderate to severe RLQ pain with associated nausea. Pt was found to have acute appendicitis per CT at OSH. Pt did receive IV Flagyl and Levaquin PTA. Pt notes continued RLQ pain, worsened with movement. Reports nausea, no emesis. Pt denies any other exacerbating or ameliorating factors. Additionally, pt specifically denies any recent fever, chills, headache, CP, SOB, lightheadedness, dizziness, numbness, weakness, lower extremity swelling, heart palpitations, urinary sxs, diarrhea, constipation, melena, hematochezia, cough, or congestion. There are no other complaints, changes or physical findings pertinent to the HPI at this time.     PCP: Simone Paredes NP    Past History   Past Medical History:  Past Medical History:   Diagnosis Date    [facility]otid artery disease ([facility] [address]     Coronary atherosclerosis of native coronary artery     Diabetes mellitus, type II ([facility] [address]     DJD (degenerative joint disease)     HCVD (hypertensive cardiovascular disease)     Pure hypercholesterolemia        Past Surgical History:  Past Surgical History: Procedure Laterality Date    HX CATARACT REMOVAL Bilateral     HX HEART CATHETERIZATION  2010    LVEDP 9, mild ant hypo EF 55-60%, LAD 70% 99%, mod D1, OM3 30%, PDA 90%    HX PTCA  2010    Stent-LAD 2.5X18 ANDREA    HX PTCA  3/2010    Stent PDA ANDREA       Family History:  Family history reviewed and non-contributory  Family History   Problem Relation Age of Onset    Heart Disease Mother     Cancer Father          Social History:  Social History     Tobacco Use    Smoking status: Former Smoker     Quit date: 10/17/1963     Years since quittin.9    Smokeless tobacco: Never Used   Vaping Use    Vaping Use: Never used   Substance Use Topics    Alcohol use: Not Currently     Comment: stopped 2018/used to drink heavy    Drug use: Never       Allergies: Allergies   Allergen Reactions    Aspirin Other (comments)     NOSE BLEED  MG IS TAKEN  Can take low dose aspirin    Pcn [Penicillins] Shortness of Breath     Itching/hives       Current Medications:  Current Facility-Administered Medications on File Prior to Encounter   Medication Dose Route Frequency Provider Last Rate Last Admin    [COMPLETED] iopamidoL (ISOVUE 300) 61 % contrast injection 100 mL  100 mL IntraVENous RAD ONCE Annmarie Fowler MD   95 mL at 21 0839    [COMPLETED] metroNIDAZOLE (FLAGYL) IVPB premix 500 mg  500 mg IntraVENous NOW Annmarie Fowler MD   Stopped at 21 1230    [DISCONTINUED] levoFLOXacin (LEVAQUIN) 750 mg in D5W IVPB  750 mg IntraVENous NOW Annmarie Fowler MD   Continued On External Transport at 21 1240     Current Outpatient Medications on File Prior to Encounter   Medication Sig Dispense Refill    acetaminophen (TYLENOL) 325 mg tablet Take 2 Tablets by mouth every six (6) hours as needed.  amLODIPine (NORVASC) 5 mg tablet Take 1 tablet by mouth once daily 90 Tab 1    atorvastatin (LIPITOR) 40 mg tablet Take 1 Tab by mouth nightly.  90 Tab 1    lisinopriL (PRINIVIL, ZESTRIL) 10 mg tablet Take 1 tablet by mouth twice daily 180 Tab 1    insulin aspart protamine/insulin aspart (NovoLOG Mix 70-30FlexPen U-100) 100 unit/mL (70-30) inpn Administer 30 units in the am and 10 units in the evening by subcutaneous injection. 5 Adjustable Dose Pre-filled Pen Syringe 5    cholecalciferol, vitamin D3, (VITAMIN D3) 2,000 unit tab Take  by mouth daily.  aspirin delayed-release 81 mg tablet Take 81 mg by mouth daily.  ferrous sulfate (IRON) 325 mg (65 mg iron) tablet Take  by mouth Daily (before breakfast). Review of Systems   A complete ROS was reviewed by me today and all other systems negative, unless otherwise specified below:  Review of Systems   Constitutional: Negative. Negative for chills and fever. HENT: Negative. Negative for congestion and sore throat. Eyes: Negative. Respiratory: Negative. Negative for cough, chest tightness, shortness of breath and wheezing. Cardiovascular: Negative. Negative for chest pain, palpitations and leg swelling. Gastrointestinal: Positive for abdominal pain and nausea. Negative for abdominal distention, blood in stool, constipation, diarrhea and vomiting. Endocrine: Negative. Genitourinary: Negative. Negative for dysuria, flank pain, frequency, hematuria and urgency. Musculoskeletal: Negative. Negative for arthralgias, back pain and myalgias. Skin: Negative. Negative for color change and rash. Neurological: Negative. Negative for dizziness, syncope, speech difficulty, weakness, light-headedness, numbness and headaches. Hematological: Negative. Psychiatric/Behavioral: Negative. Negative for confusion and self-injury. The patient is not nervous/anxious. All other systems reviewed and are negative. Physical Exam   Physical Exam  Vitals and nursing note reviewed. Constitutional:       Appearance: He is well-developed. He is not toxic-appearing. HENT:      Head: Normocephalic and atraumatic. Mouth/Throat:      Pharynx: No posterior oropharyngeal erythema. Eyes:      Conjunctiva/sclera: Conjunctivae normal.      Pupils: Pupils are equal, round, and reactive to light. Cardiovascular:      Rate and Rhythm: Normal rate and regular rhythm. Heart sounds: Normal heart sounds. No murmur heard. No friction rub. No gallop. Pulmonary:      Effort: Pulmonary effort is normal. No respiratory distress. Breath sounds: Normal breath sounds. No wheezing or rales. Chest:      Chest wall: No tenderness. Abdominal:      General: Bowel sounds are normal. There is no distension. Palpations: Abdomen is soft. There is no mass. Tenderness: There is abdominal tenderness in the right lower quadrant. There is no guarding or rebound. Musculoskeletal:         General: No tenderness or deformity. Normal range of motion. Cervical back: Normal range of motion. Skin:     General: Skin is warm. Findings: No rash. Neurological:      Mental Status: He is alert and oriented to person, place, and time. Cranial Nerves: No cranial nerve deficit. Motor: No abnormal muscle tone. Coordination: Coordination normal.      Deep Tendon Reflexes: Reflexes normal.   Psychiatric:         Behavior: Behavior is cooperative. Diagnostic Study Results     Labs -   I have personally reviewed and interpreted all available laboratory results.    Recent Results (from the past 24 hour(s))   CBC WITH AUTOMATED DIFF    Collection Time: 09/06/21  8:21 AM   Result Value Ref Range    WBC 14.9 (H) 4.1 - 11.1 K/uL    RBC 4.06 (L) 4.10 - 5.70 M/uL    HGB 12.3 12.1 - 17.0 g/dL    HCT 36.9 36.6 - 50.3 %    MCV 90.9 80.0 - 99.0 FL    MCH 30.3 26.0 - 34.0 PG    MCHC 33.3 30.0 - 36.5 g/dL    RDW 13.3 11.5 - 14.5 %    PLATELET 880 571 - 597 K/uL    MPV 10.5 8.9 - 12.9 FL    NRBC 0.0 0  WBC    ABSOLUTE NRBC 0.00 0.00 - 0.01 K/uL    NEUTROPHILS 80 (H) 32 - 75 %    LYMPHOCYTES 12 12 - 49 %    MONOCYTES 8 5 - 13 %    EOSINOPHILS 0 0 - 7 %    BASOPHILS 0 0 - 1 %    IMMATURE GRANULOCYTES 0 0.0 - 0.5 %    ABS. NEUTROPHILS 11.9 (H) 1.8 - 8.0 K/UL    ABS. LYMPHOCYTES 1.8 0.8 - 3.5 K/UL    ABS. MONOCYTES 1.1 (H) 0.0 - 1.0 K/UL    ABS. EOSINOPHILS 0.0 0.0 - 0.4 K/UL    ABS. BASOPHILS 0.0 0.0 - 0.1 K/UL    ABS. IMM. GRANS. 0.1 (H) 0.00 - 0.04 K/UL    DF AUTOMATED     METABOLIC PANEL, COMPREHENSIVE    Collection Time: 09/06/21  8:21 AM   Result Value Ref Range    Sodium 137 136 - 145 mmol/L    Potassium 3.9 3.5 - 5.1 mmol/L    Chloride 99 97 - 108 mmol/L    CO2 29 21 - 32 mmol/L    Anion gap 9 5 - 15 mmol/L    Glucose 316 (H) 65 - 100 mg/dL    BUN 14 6 - 20 MG/DL    Creatinine 1.25 0.70 - 1.30 MG/DL    BUN/Creatinine ratio 11 (L) 12 - 20      GFR est AA >60 >60 ml/min/1.73m2    GFR est non-AA 54 (L) >60 ml/min/1.73m2    Calcium 9.0 8.5 - 10.1 MG/DL    Bilirubin, total 0.9 0.2 - 1.0 MG/DL    ALT (SGPT) 29 12 - 78 U/L    AST (SGOT) 22 15 - 37 U/L    Alk. phosphatase 128 (H) 45 - 117 U/L    Protein, total 7.1 6.4 - 8.2 g/dL    Albumin 3.1 (L) 3.5 - 5.0 g/dL    Globulin 4.0 2.0 - 4.0 g/dL    A-G Ratio 0.8 (L) 1.1 - 2.2     LIPASE    Collection Time: 09/06/21  8:21 AM   Result Value Ref Range    Lipase 57 (L) 73 - 393 U/L   COVID-19 WITH INFLUENZA A/B    Collection Time: 09/06/21 12:00 PM   Result Value Ref Range    SARS-CoV-2 Not detected NOTD      Influenza A by PCR Not detected NOTD      Influenza B by PCR Not detected NOTD     GLUCOSE, POC    Collection Time: 09/06/21  5:29 PM   Result Value Ref Range    Glucose (POC) 319 (H) 65 - 117 mg/dL    Performed by Daylin Meza RN    GLUCOSE, POC    Collection Time: 09/06/21  8:04 PM   Result Value Ref Range    Glucose (POC) 357 (H) 65 - 117 mg/dL    Performed by Jessica Griffith (PCT)        Radiologic Studies -   I have personally reviewed and interpreted all available imaging studies and agree with radiology interpretation and report.    No orders to display     CT Results (Last 48 hours)               09/06/21 0948  CT ABD PELV W CONT Final result    Impression:  Acute uncomplicated appendicitis with puree appendiceal inflammation but no   abscess. The findings were called to Dr. Eulogio Montiel on 9/6/2021 at 10:15 AM by Dr. Marielle Fernandez. [id number]       Narrative:  EXAM: CT ABD PELV W CONT       INDICATION: R sided abd pain       COMPARISON: None        CONTRAST: 100 mL of Isovue-370. TECHNIQUE:    Following the uneventful intravenous administration of contrast, thin axial   images were obtained through the abdomen and pelvis. Coronal and sagittal   reconstructions were generated. Oral contrast was not administered. CT dose   reduction was achieved through use of a standardized protocol tailored for this   examination and automatic exposure control for dose modulation. FINDINGS:    LOWER THORAX: Lung bases clear. Mild cardiomegaly with coronary artery   calcifications and/or stents. LIVER: No mass. BILIARY TREE: No calcified gallstones. CBD is not dilated. SPLEEN: within normal limits. PANCREAS: No mass or ductal dilatation. ADRENALS: Unremarkable. KIDNEYS: No mass, calculus, or hydronephrosis. STOMACH: Unremarkable. SMALL BOWEL: No dilatation or wall thickening. COLON: No dilatation or wall thickening. Fecal retention. Mild diverticulosis. APPENDIX: Distended and fluid-filled, measuring up to 1.3 cm (2-51). Haziness of   the adjacent fat. No definite wall discontinuity. Findings consistent with acute   appendicitis. No adjacent abscess. No pelvic fluid collection. PERITONEUM: No ascites or pneumoperitoneum. RETROPERITONEUM: Atherosclerotic aorta without aneurysm. No lymphadenopathy. REPRODUCTIVE ORGANS: Mild enlargement of the prostate. URINARY BLADDER: No mass or calculus. BONES: Multilevel degenerative disc disease. No destructive osseous lesions. ABDOMINAL WALL: No mass or hernia.    ADDITIONAL COMMENTS: N/A               CXR Results  (Last 50 hours)    None          Medical Decision Making   I reviewed the vital signs, available nursing notes, past medical history, past surgical history, family history and social history. Vital Signs-Reviewed the patient's vital signs. Patient Vitals for the past 24 hrs:   Temp Pulse Resp BP SpO2   09/06/21 2130    (!) 153/68    09/06/21 2000  97      09/06/21 1905 97.7 °F (36.5 °C) 98 20 (!) 180/81 98 %   09/06/21 1845 97.7 °F (36.5 °C) 92 23 (!) 179/72 99 %   09/06/21 1830  92 24 (!) 169/74 98 %   09/06/21 1815  89 22 (!) 167/66 100 %   09/06/21 1800  86 25 (!) 161/62 100 %   09/06/21 1745  82 21 (!) 163/56 99 %   09/06/21 1740  80 22 (!) 160/66 100 %   09/06/21 1735  79 23 (!) 168/60 100 %   09/06/21 1730  77 24 (!) 150/50 97 %   09/06/21 1728  76 20 (!) 161/48 97 %   09/06/21 1725 97.3 °F (36.3 °C) 78 21 (!) 161/48 97 %   09/06/21 1545  84 22 (!) 151/57 99 %   09/06/21 1441     99 %   09/06/21 1434 98.4 °F (36.9 °C) 95 17 (!) 166/62 99 %         Records Reviewed: Nursing Notes, Old Medical Records, Previous electrocardiograms, Previous Radiology Studies and Previous Laboratory Studies    Provider Notes (Medical Decision Making):   Pt presents with acute abdominal pain; vital signs stable with currently a non-peritoneal exam; DDx includes: Gastroenteritis, hepatitis, pancreatitis, obstruction, appendicitis, viral illness, IBD, diverticulitis, mesenteric ischemia, AAA or descending dissection, ACS, kidney stone. Will get labs, treat symptomatically and obtain serial abdominal exams to determine if additional imaging is indicated. Will reassess and monitor closely. ED Course:   I am the first physician for this patient's ED visit today. Initial assessment performed. I discussed presenting problems, concerns and my formulated plan for today's visit with the patient and any available family members at bedside. I encouraged them to ask questions as they arise throughout the visit.      Social History     Tobacco Use    Smoking status: Former Smoker     Quit date: 10/17/1963     Years since quittin.9    Smokeless tobacco: Never Used   Vaping Use    Vaping Use: Never used   Substance Use Topics    Alcohol use: Not Currently     Comment: stopped 2018/used to drink heavy    Drug use: Never       I reviewed our electronic medical record system for any past medical records that were available that may contribute to the patient's current condition, the nursing notes and vital signs from today's visit.       ED Orders Placed :  Orders Placed This Encounter    ADULT DIET Clear Liquid    CARDIAC MONITORING    POC URINE PREGNANCY TEST    VITAL SIGNS    AMBULATE WITH ASSISTANCE    OUT OF BED IN CHAIR    NOTIFY PROVIDER: VITAL SIGNS CHANGES    INTAKE AND OUTPUT    INCENTIVE SPIROMETRY    ADVANCE DIET AS TOLERATED (nurse communication)    APPLY/MAINTAIN SEQUENTIAL COMPRESSION DEVICE    POC GLUCOSE    FULL CODE    GLUCOSE, POC    GLUCOSE, POC    TRANSFER PATIENT    metroNIDAZOLE (FLAGYL) IVPB premix 500 mg    sodium chloride (NS) flush 5-40 mL    sodium chloride (NS) flush 5-40 mL    DISCONTD: sodium chloride (NS) flush 5-40 mL    DISCONTD: sodium chloride (NS) flush 5-40 mL    DISCONTD: fentaNYL citrate (PF) injection 25 mcg    DISCONTD: ondansetron (ZOFRAN) injection 4 mg    DISCONTD: bupivacaine (PF) (MARCAINE) 0.5 % (5 mg/mL) injection    DISCONTD: amLODIPine (NORVASC) tablet 5 mg    aspirin delayed-release tablet 81 mg    DISCONTD: dextrose 5% - 0.45% NaCl with KCl 20 mEq/L infusion    sodium chloride (NS) flush 5-40 mL    sodium chloride (NS) flush 5-40 mL    HYDROcodone-acetaminophen (NORCO) 5-325 mg per tablet 1 Tablet    HYDROmorphone (DILAUDID) injection 0.5 mg    ondansetron (ZOFRAN) injection 4 mg    insulin lispro (HUMALOG) injection    glucose chewable tablet 16 g    dextrose (D50W) injection syrg 12.5-25 g    glucagon (GLUCAGEN) injection 1 mg    levoFLOXacin (LEVAQUIN) 750 mg in D5W IVPB    metroNIDAZOLE (FLAGYL) IVPB premix 500 mg    alcohol 62% (NOZIN) nasal  1 Ampule    insulin regular (NOVOLIN R, HUMULIN R) injection 3 Units    insulin regular (NOVOLIN R, HUMULIN R) 100 unit/mL injection    dextrose 5% - 0.45% NaCl with KCl 20 mEq/L 20 mEq/L infusion    insulin regular (NOVOLIN R, HUMULIN R) 100 unit/mL injection    hydrALAZINE (APRESOLINE) 20 mg/mL injection 10 mg    amLODIPine (NORVASC) tablet 5 mg    0.45% sodium chloride with KCl 20 mEq/L infusion    DISCONTD: insulin lispro (HUMALOG) injection 15 Units    insulin lispro (HUMALOG) injection 13 Units    INITIAL PHYSICIAN ORDER: OBSERVATION/OUTPATIENT IN A BED OBSERVATION; Surgical; Yes; appendicitis       ED Medications Administered:  Medications   sodium chloride (NS) flush 5-40 mL (10 mL IntraVENous Given 9/6/21 2126)   sodium chloride (NS) flush 5-40 mL (has no administration in time range)   aspirin delayed-release tablet 81 mg (has no administration in time range)   sodium chloride (NS) flush 5-40 mL (10 mL IntraVENous Given 9/6/21 2127)   sodium chloride (NS) flush 5-40 mL (has no administration in time range)   HYDROcodone-acetaminophen (NORCO) 5-325 mg per tablet 1 Tablet (has no administration in time range)   HYDROmorphone (DILAUDID) injection 0.5 mg (has no administration in time range)   ondansetron (ZOFRAN) injection 4 mg (has no administration in time range)   insulin lispro (HUMALOG) injection (has no administration in time range)   glucose chewable tablet 16 g (has no administration in time range)   dextrose (D50W) injection syrg 12.5-25 g (has no administration in time range)   glucagon (GLUCAGEN) injection 1 mg (has no administration in time range)   levoFLOXacin (LEVAQUIN) 750 mg in D5W IVPB (750 mg IntraVENous New Bag 9/6/21 2005)   metroNIDAZOLE (FLAGYL) IVPB premix 500 mg (has no administration in time range)   alcohol 62% (NOZIN) nasal  1 Ampule (1 Ampule Topical Given 9/6/21 2005)   insulin regular (NOVOLIN R, HUMULIN R) 100 unit/mL injection (has no administration in time range)   hydrALAZINE (APRESOLINE) 20 mg/mL injection 10 mg (has no administration in time range)   amLODIPine (NORVASC) tablet 5 mg (5 mg Oral Given 9/6/21 2004)   0.45% sodium chloride with KCl 20 mEq/L infusion ( IntraVENous New Bag 9/6/21 2221)   metroNIDAZOLE (FLAGYL) IVPB premix 500 mg (500 mg IntraVENous Given 9/6/21 1559)   insulin regular (NOVOLIN R, HUMULIN R) injection 3 Units (3 Units IntraVENous Given 9/6/21 1730)   insulin lispro (HUMALOG) injection 13 Units (13 Units SubCUTAneous Given 9/6/21 2127)         Consult Note:  Akhil Dixon MD spoke with Dr. Ferrell Medico  Specialty: Surgeon  Discussed pt's hx, disposition, and available diagnostic and imaging results. Reviewed care plans. Agree with management and plan thus far. Consultant will evaluate pt. Disposition:   ADMIT  Given the patient's current clinical presentation, I have a high level of concern for decompensation if discharged from the emergency department. Patient is being admitted to the hospital.  The results of their tests and reasons for their admission have been discussed with them and/or available family. They convey agreement and understanding for the need to be admitted and for their admission diagnosis. Consultation has been made with the inpatient physician specialist for hospitalization. Diagnosis   Clinical Impression:  1. Acute appendicitis, unspecified acute appendicitis type    2. Type 2 diabetes mellitus with peripheral vascular disease (HCC)        Attestation:  Charly Garcia MD, am the attending of record for this patient. I personally performed the services described in this documentation on this date, 9/6/2021 for patient, Jelly Eason. I have reviewed the chart and verified that the record is accurate and complete.

## 2021-09-07 NOTE — PROGRESS NOTES
Transition of Care Plan:    RUR: OBS  Disposition: Kristofer Esquivel JOSE  Follow up appointments: PCP, Specialist  DME needed:none identified  Transportation at [id number] [place] [address] or means to access home:      [facility]  IM Medicare Letter: n/a OBS  Is patient a BCPI-A Bundle:  n/a         If yes, was Bundle Letter given?:     Caregiver Contact:Reena Holbrook [phone]  Discharge Caregiver contacted prior to discharge? CM will contact prior to d/c.    Reason for Admission:  Acute Appendicitis with localized Peritonitis                     RUR Score:          OBS           Plan for utilizing home health:    As needed      PCP: First and Last name:  Ivana Acevedo NP     Name of Practice:    Are you a current patient:yes Yes/No:    Approximate date of last visit:unsure    Can you participate in a virtual visit with your PCP: in office                    Current Advanced Directive/Advance Care Plan: Full Code    Alex 13 (ACP) Conversation      Date of Conversation: 9/6/2021  Conducted with: Patient with Decision Making Capacity    Healthcare Decision Maker:     Primary Decision Maker: Leslye Flores - Daughter - [phone]    Secondary Decision Maker: Luis Fernando KOEHLER - [phone]  Click here to complete [address] including selection of the Healthcare Decision Maker Relationship (ie \"Primary\")          Content/Action Overview:    Has ACP document(s) on file - reflects the patient's care preferences  Reviewed DNR/DNI and patient elects Full Code (Attempt Resuscitation)    Length of Voluntary ACP Conversation in minutes:  <16 minutes (Non-Billable)    Liv Barker                          Primary Decision Maker: Leslye Mark - Daughter - [phone]    Secondary Decision Maker: Luis Fernando KOEHLER - [phone]                  Transition of Care Plan:     Kristofer Rauls [facility]    CM met with pt at bedside to introduce self/role, verify demographics, insurance and PCP. CM also discussed d/c plan. Pt is a 81 yo, , single male who is under OBS at [facility] for his above dx. Pt sees the physician at [facility]. Pt uses the [facility]. Pt has been a resident for four months at the [facility]. Pt has a supportive son and daughter. Pt does not use any DME. Pt does drive. Pt can complete his ADLs independently. Pt denied a hx of HH, SNF or IPR. Pt's daughter will transport at d/c. CM will continue to assess for d/c needs. Care Management Interventions  PCP Verified by CM:  Yes (Pt sees the physician at [address] [facility] (RRAT>21 & CHF Dx)?: No  Mode of Transport at Discharge:  (daughter)  Transition of Care Consult (CM Consult): Discharge Planning, Assisted Living [facility])  Discharge Durable Medical Equipment: No  Physical Therapy Consult: No  Occupational Therapy Consult: No  Speech Therapy Consult: No  Current Support Network: Assisted Living  Confirm Follow Up Transport: Self  The Patient and/or Patient Representative was Provided with a Choice of Provider and Agrees with the Discharge Plan?: Yes  Name of the Patient Representative Who was Provided with a Choice of Provider and Agrees with the Discharge Plan: Lieutenant Chang  Discharge Location  Discharge Placement: Assisted Living [facility])    JOSSY Barvo  Care Management, [address] [place] [address]

## 2021-09-08 PROBLEM — Z90.49 S/P APPENDECTOMY: Status: ACTIVE | Noted: 2021-09-08

## 2021-09-08 LAB
ERYTHROCYTE [DISTWIDTH] IN BLOOD BY AUTOMATED COUNT: 13.7 % (ref 11.5–14.5)
GLUCOSE BLD STRIP.AUTO-MCNC: 100 MG/DL (ref 65–117)
GLUCOSE BLD STRIP.AUTO-MCNC: 174 MG/DL (ref 65–117)
GLUCOSE BLD STRIP.AUTO-MCNC: 191 MG/DL (ref 65–117)
GLUCOSE BLD STRIP.AUTO-MCNC: 208 MG/DL (ref 65–117)
HCT VFR BLD AUTO: 37.4 % (ref 36.6–50.3)
HGB BLD-MCNC: 11.9 G/DL (ref 12.1–17)
MCH RBC QN AUTO: 30.1 PG (ref 26–34)
MCHC RBC AUTO-ENTMCNC: 31.8 G/DL (ref 30–36.5)
MCV RBC AUTO: 94.4 FL (ref 80–99)
NRBC # BLD: 0 K/UL (ref 0–0.01)
NRBC BLD-RTO: 0 PER 100 WBC
PLATELET # BLD AUTO: 260 K/UL (ref 150–400)
PMV BLD AUTO: 11 FL (ref 8.9–12.9)
RBC # BLD AUTO: 3.96 M/UL (ref 4.1–5.7)
SERVICE CMNT-IMP: ABNORMAL
SERVICE CMNT-IMP: NORMAL
WBC # BLD AUTO: 15.6 K/UL (ref 4.1–11.1)

## 2021-09-08 PROCEDURE — 74011636637 HC RX REV CODE- 636/637: Performed by: SURGERY

## 2021-09-08 PROCEDURE — C9113 INJ PANTOPRAZOLE SODIUM, VIA: HCPCS | Performed by: SURGERY

## 2021-09-08 PROCEDURE — 74011250637 HC RX REV CODE- 250/637: Performed by: SURGERY

## 2021-09-08 PROCEDURE — 97162 PT EVAL MOD COMPLEX 30 MIN: CPT

## 2021-09-08 PROCEDURE — 74011000250 HC RX REV CODE- 250: Performed by: SURGERY

## 2021-09-08 PROCEDURE — 36415 COLL VENOUS BLD VENIPUNCTURE: CPT

## 2021-09-08 PROCEDURE — 99218 HC RM OBSERVATION: CPT

## 2021-09-08 PROCEDURE — 85027 COMPLETE CBC AUTOMATED: CPT

## 2021-09-08 PROCEDURE — 97166 OT EVAL MOD COMPLEX 45 MIN: CPT

## 2021-09-08 PROCEDURE — 99232 SBSQ HOSP IP/OBS MODERATE 35: CPT | Performed by: CLINICAL NURSE SPECIALIST

## 2021-09-08 PROCEDURE — 82962 GLUCOSE BLOOD TEST: CPT

## 2021-09-08 PROCEDURE — 97535 SELF CARE MNGMENT TRAINING: CPT

## 2021-09-08 PROCEDURE — 74011250636 HC RX REV CODE- 250/636: Performed by: SURGERY

## 2021-09-08 PROCEDURE — 65270000029 HC RM PRIVATE

## 2021-09-08 PROCEDURE — 74011636637 HC RX REV CODE- 636/637: Performed by: NURSE PRACTITIONER

## 2021-09-08 PROCEDURE — 97530 THERAPEUTIC ACTIVITIES: CPT

## 2021-09-08 PROCEDURE — 74011250636 HC RX REV CODE- 250/636: Performed by: NURSE PRACTITIONER

## 2021-09-08 RX ADMIN — Medication 10 ML: at 23:33

## 2021-09-08 RX ADMIN — Medication 1 AMPULE: at 21:00

## 2021-09-08 RX ADMIN — INSULIN LISPRO 4 UNITS: 100 INJECTION, SOLUTION INTRAVENOUS; SUBCUTANEOUS at 14:23

## 2021-09-08 RX ADMIN — SODIUM CHLORIDE 40 MG: 9 INJECTION, SOLUTION INTRAMUSCULAR; INTRAVENOUS; SUBCUTANEOUS at 02:45

## 2021-09-08 RX ADMIN — LEVOFLOXACIN 750 MG: 5 INJECTION, SOLUTION INTRAVENOUS at 20:29

## 2021-09-08 RX ADMIN — Medication 10 ML: at 06:00

## 2021-09-08 RX ADMIN — Medication 10 ML: at 14:39

## 2021-09-08 RX ADMIN — INSULIN LISPRO 4 UNITS: 100 INJECTION, SOLUTION INTRAVENOUS; SUBCUTANEOUS at 08:43

## 2021-09-08 RX ADMIN — ENOXAPARIN SODIUM 30 MG: 30 INJECTION, SOLUTION INTRAVENOUS; SUBCUTANEOUS at 14:23

## 2021-09-08 RX ADMIN — METRONIDAZOLE 500 MG: 500 INJECTION, SOLUTION INTRAVENOUS at 00:07

## 2021-09-08 RX ADMIN — INSULIN LISPRO 6 UNITS: 100 INJECTION, SOLUTION INTRAVENOUS; SUBCUTANEOUS at 14:24

## 2021-09-08 RX ADMIN — METRONIDAZOLE 500 MG: 500 INJECTION, SOLUTION INTRAVENOUS at 08:42

## 2021-09-08 RX ADMIN — METRONIDAZOLE 500 MG: 500 INJECTION, SOLUTION INTRAVENOUS at 15:25

## 2021-09-08 RX ADMIN — HYDROMORPHONE HYDROCHLORIDE 0.5 MG: 1 INJECTION, SOLUTION INTRAMUSCULAR; INTRAVENOUS; SUBCUTANEOUS at 23:32

## 2021-09-08 RX ADMIN — Medication 1 AMPULE: at 08:42

## 2021-09-08 RX ADMIN — HYDROMORPHONE HYDROCHLORIDE 0.5 MG: 1 INJECTION, SOLUTION INTRAMUSCULAR; INTRAVENOUS; SUBCUTANEOUS at 20:29

## 2021-09-08 RX ADMIN — ASPIRIN 81 MG: 81 TABLET, COATED ORAL at 08:43

## 2021-09-08 RX ADMIN — AMLODIPINE BESYLATE 5 MG: 5 TABLET ORAL at 08:43

## 2021-09-08 RX ADMIN — INSULIN LISPRO 3 UNITS: 100 INJECTION, SOLUTION INTRAVENOUS; SUBCUTANEOUS at 17:49

## 2021-09-08 RX ADMIN — Medication 10 ML: at 23:32

## 2021-09-08 RX ADMIN — METRONIDAZOLE 500 MG: 500 INJECTION, SOLUTION INTRAVENOUS at 23:32

## 2021-09-08 RX ADMIN — INSULIN LISPRO 4 UNITS: 100 INJECTION, SOLUTION INTRAVENOUS; SUBCUTANEOUS at 17:49

## 2021-09-08 RX ADMIN — INSULIN LISPRO 3 UNITS: 100 INJECTION, SOLUTION INTRAVENOUS; SUBCUTANEOUS at 05:30

## 2021-09-08 RX ADMIN — INSULIN GLARGINE 15 UNITS: 100 INJECTION, SOLUTION SUBCUTANEOUS at 08:43

## 2021-09-08 NOTE — PROGRESS NOTES
Admit Date: 2021    POD 2 Days Post-Op    Procedure:  Procedure(s):  APPENDECTOMY LAPAROSCOPIC    Subjective:     Patient has no complaints. Tolerating diet well. Objective:     Blood pressure 131/60, pulse (!) 177, temperature 98 °F (36.7 °C), resp. rate 20, height 5' 11\" (1.803 m), weight 183 lb (83 kg), SpO2 92 %.     Temp (24hrs), Av.7 °F (36.5 °C), Min:97.3 °F (36.3 °C), Max:98.1 °F (36.7 °C)      Physical Exam:  GENERAL: alert, cooperative, no distress, appears stated age, LUNG: clear to auscultation bilaterally, HEART: regular rate and rhythm, ABDOMEN: soft, NT, wounds c/d/i, ELOY o/p serous, EXTREMITIES:  extremities normal, atraumatic, no cyanosis or edema    Labs:   Recent Results (from the past 24 hour(s))   GLUCOSE, POC    Collection Time: 21 12:28 PM   Result Value Ref Range    Glucose (POC) 260 (H) 65 - 117 mg/dL    Performed by Mayra Israel PCT    GLUCOSE, POC    Collection Time: 21  6:55 PM   Result Value Ref Range    Glucose (POC) 330 (H) 65 - 117 mg/dL    Performed by Shanda Rouse (TRV RN)    GLUCOSE, POC    Collection Time: 21 12:06 AM   Result Value Ref Range    Glucose (POC) 100 65 - 117 mg/dL    Performed by Hussain Craig (SMITA FORBES)    CBC W/O DIFF    Collection Time: 21  2:29 AM   Result Value Ref Range    WBC 15.6 (H) 4.1 - 11.1 K/uL    RBC 3.96 (L) 4.10 - 5.70 M/uL    HGB 11.9 (L) 12.1 - 17.0 g/dL    HCT 37.4 36.6 - 50.3 %    MCV 94.4 80.0 - 99.0 FL    MCH 30.1 26.0 - 34.0 PG    MCHC 31.8 30.0 - 36.5 g/dL    RDW 13.7 11.5 - 14.5 %    PLATELET 015 650 - 350 K/uL    MPV 11.0 8.9 - 12.9 FL    NRBC 0.0 0  WBC    ABSOLUTE NRBC 0.00 0.00 - 0.01 K/uL   GLUCOSE, POC    Collection Time: 21  5:10 AM   Result Value Ref Range    Glucose (POC) 191 (H) 65 - 117 mg/dL    Performed by Hussain Craig (SMITA RN)        Data Review images and reports reviewed    Assessment:     Principal Problem:    Acute appendicitis with localized peritonitis, without perforation, abscess, or gangrene (9/6/2021)    Active Problems:    Type 2 diabetes mellitus with peripheral vascular disease ([facility] [address] (1/19/2021)      Advanced age (9/6/2021)        Plan/Recommendations/Medical Decision Making:     Continue present treatment   WBC remains elevated  Will give one more day iv antibiotics and likely transition to po and home tomorrow.     Ana Lilia Chew MD  [facility]

## 2021-09-08 NOTE — PROGRESS NOTES
Problem: Self Care Deficits Care Plan (Adult)  Goal: *Acute Goals and Plan of Care (Insert Text)  Description:   FUNCTIONAL STATUS PRIOR TO ADMISSION: Independent with ADL. Admits to x2 falls. Drives. HOME SUPPORT: Resided at [facility]. Required assist with medication management, meals and housekeeping only. Occupational Therapy Goals  Initiated 9/8/2021  1. Patient will perform self-feeding with independence within 7 day(s). 2.  Patient will perform grooming standing at the sink with independence within 7 day(s). 3.  Patient will perform upper body dressing with independence within 7 day(s). 4.  Patient will perform lower body dressing with independence within 7 days. 5.  Patient will perform toilet transfers with independence within 7 day(s). 6.  Patient will perform all aspects of toileting with independence within 7 day(s). 7.  Patient will perform self-care item retrieval from various heights with independence within 7 days. Outcome: Not Met   OCCUPATIONAL THERAPY EVALUATION  Patient: Marya Ch (58 y.o. male)  Date: 9/8/2021  Primary Diagnosis: Acute appendicitis with localized peritonitis, without perforation, abscess, or gangrene [K35.30]  Procedure(s) (LRB):  APPENDECTOMY LAPAROSCOPIC (N/A) 2 Days Post-Op   Precautions:  Fall    ASSESSMENT  Based on the objective data described below, the patient presents with asymptomatic hypotension, confusion (A&Ox1 person), mildly delayed processing, decreased activity tolerance, decreased dynamic sitting balance, decreased static/dynamic standing balance and GW on POD #2 s/p appendectomy following acute appendicitis and peritonitis this admission. Pt confused on arrival, stating it was \"2001\" and unaware of surgical procedure he underwent this admission, requiring orientation and education. Pts daughter present confirming Pt is independent at baseline and receives assist with med management, meals and housekeeping only.  Pt with hypotension from supine<>standing, ultimately tolerating brief LB dressing task at EOB followed by taking x3 steps toward [facility] with Min Assist x2 this date. Upon standing, Pt required Total A with rear buddy d/t episode of bowel incontinence. Pt donned sock with Max A (Mod A for L, Max A for R) sitting unsupported EOB. Pt will require intensive rehab once medically cleared in order to maximize a safe return to his independent PLOF. He is a high fall risk and not safe to return to [facility] at this time. Pt a good candidate for intensive rehab and can tolerate 3 hours of therapy daily. Vitals:    09/08/21 1245 09/08/21 1252 09/08/21 1256 09/08/21 1302   BP: (!) 141/63 131/63 119/61 (!) 113/54   BP 1 Location: Left upper arm Left upper arm Left upper arm Left upper arm   BP Patient Position: Supine Sitting Standing Standing  Comment: after side steps   Pulse: 100 (!) 102 (!) 109 (!) 112   Temp:       Resp:       Height:       Weight:       SpO2:           Current Level of Function Impacting Discharge (ADLs/self-care): Setup to Max A with ADL, SBA to Min A with bed mobility, Min Assist x2 taking a few steps to [facility]. Functional Outcome Measure: The patient scored Total: 15/100 on the Barthel Index outcome measure which is indicative of 85% impaired ability to care for basic self needs/dependency on others; inferred 85% dependency on others for instrumental ADLs. Other factors to consider for discharge: Confusion, Fall risk     Patient will benefit from skilled therapy intervention to address the above noted impairments. PLAN :  Recommendations and Planned Interventions: self care training, functional mobility training, therapeutic exercise, balance training, therapeutic activities, endurance activities, patient education and home safety training    Frequency/Duration: Patient will be followed by occupational therapy 5 times a week to address goals.     Recommendation for discharge: (in order for the patient to meet his/her long term goals)  Therapy 3 hours per day 5-7 days per week    This discharge recommendation:  Has been made in collaboration with the attending provider and/or case management    IF patient discharges home will need the following DME: Defer to rehab       SUBJECTIVE:   Patient stated \"I do all that on my own\" and It's 2001. \"    OBJECTIVE DATA SUMMARY:   HISTORY:   Past Medical History:   Diagnosis Date    Carotid artery disease ([facility] [address]     Coronary atherosclerosis of native coronary artery     Diabetes mellitus, type II ([facility] [address]     DJD (degenerative joint disease)     HCVD (hypertensive cardiovascular disease)     Pure hypercholesterolemia      Past Surgical History:   Procedure Laterality Date    HX CATARACT REMOVAL Bilateral     HX HEART CATHETERIZATION  2/2010    LVEDP 9, mild ant hypo EF 55-60%, LAD 70% 99%, mod D1, OM3 30%, PDA 90%    HX PTCA  2/2010    Stent-LAD 2.5X18 ANDREA    HX PTCA  3/2010    Stent PDA ANDREA       Expanded or extensive additional review of patient history:     Home Situation  Home Environment: Assisted living  # Steps to Enter: 0  One/Two Story Residence: One story  Living Alone: Yes  Support Systems: Child(chepe)  Patient Expects to be Discharged to[de-identified] Assisted living  Current DME Used/Available at Home: Grab bars, Wheelchair, Walker, rolling  Tub or Shower Type: Shower (built-in seat)    Hand dominance: Right    EXAMINATION OF PERFORMANCE DEFICITS:  Cognitive/Behavioral Status:  Neurologic State: Alert  Orientation Level: Oriented to person;Disoriented to place; Disoriented to situation;Disoriented to time (thought it was 2001, unaware of surgery performed)  Cognition: Follows commands;Decreased attention/concentration;Memory loss  Perception: Cues to maintain midline in standing  Perseveration: No perseveration noted  Safety/Judgement: Fall prevention;Lack of insight into deficits    Skin: ELOY drain and IV line intact    Edema: None observed    Hearing:   Auditory  Auditory Impairment: Hard of hearing, bilateral, None    Vision/Perceptual:                           Acuity: Within Defined Limits         Range of Motion:  AROM: Generally decreased, functional  PROM: Generally decreased, functional                      Strength:  Strength: Generally decreased, functional                Coordination:  Coordination: Generally decreased, functional  Fine Motor Skills-Upper: Left Intact; Right Intact    Gross Motor Skills-Upper: Left Intact; Right Intact    Tone & Sensation:  Tone: Normal  Sensation: Intact                      Balance:  Sitting: Impaired  Sitting - Static: Good (unsupported)  Sitting - Dynamic: Fair (occasional)  Standing: Impaired; With support  Standing - Static: Fair  Standing - Dynamic : Fair    Functional Mobility and Transfers for ADLs:  Bed Mobility:  Supine to Sit: Stand-by assistance; Additional time;Bed Modified; Adaptive equipment (HOB elevated; use of bed railing)  Sit to Supine: Minimum assistance;Assist x1;Additional time  Scooting: Stand-by assistance; Additional time    Transfers:  Sit to Stand: Minimum assistance;Assist x2  Stand to Sit: Minimum assistance    ADL Assessment:  Feeding: Setup    Oral Facial Hygiene/Grooming: Minimum assistance (supine or seated)    Bathing: Moderate assistance    Upper Body Dressing: Minimum assistance    Lower Body Dressing: Maximum assistance    Toileting: Maximum assistance     ADL Intervention and task modifications: Instructed Pt to avoid trunk flexion s/p abdominal surgery to minimize pain. Instructed Pt to attempt legs crossed method or tailor sit for LB dressing 2/2 episode of bowel incontinence and soiling socks. Grooming  Washing Hands: Set-up    Lower Body Dressing Assistance  Socks: Maximum assistance (Mod A for L, Max A for R )  Leg Crossed Method Used: Yes (crossed LLE only)  Position Performed: Seated edge of bed    Toileting  Bowel Hygiene:  Total assistance (dependent) (standing EOB 2/2 episode of bowel incontinence)  Cues: Verbal cues provided (to maintain static standing balance)    Cognitive Retraining  Safety/Judgement: Fall prevention;Lack of insight into deficits    Functional Measure:  Barthel Index:    Bathin  Bladder: 5  Bowels: 0  Groomin  Dressin  Feedin  Mobility: 0  Stairs: 0  Toilet Use: 0  Transfer (Bed to Chair and Back): 5  Total: 15/100        The Barthel ADL Index: Guidelines  1. The index should be used as a record of what a patient does, not as a record of what a patient could do. 2. The main aim is to establish degree of independence from any help, physical or verbal, however minor and for whatever reason. 3. The need for supervision renders the patient not independent. 4. A patient's performance should be established using the best available evidence. Asking the patient, friends/relatives and nurses are the usual sources, but direct observation and common sense are also important. However direct testing is not needed. 5. Usually the patient's performance over the preceding 24-48 hours is important, but occasionally longer periods will be relevant. 6. Middle categories imply that the patient supplies over 50 per cent of the effort. 7. Use of aids to be independent is allowed. Humble Dobson., Barthel, D.W. (2228). Functional evaluation: the Barthel Index. [facility] (14)2. ALVARO Schroeder, Ruby Silverman., Gumaro Osborne., [place], [address] [place] [address] (). Measuring the change indisability after inpatient rehabilitation; comparison of the responsiveness of the Barthel Index and Functional [place] Measure. Journal of Neurology, Neurosurgery, and Psychiatry, 66(4), 489-932. CHRISTAL Dumas.A, SARTHAK Mccain, & Gloria Kirkland, M.A. (2004.) Assessment of post-stroke quality of life in cost-effectiveness studies: The usefulness of the Barthel Index and the EuroQoL-5D.  Quality of Life Research, 15, 863-94     Occupational Therapy Evaluation Charge Determination History Examination Decision-Making   LOW Complexity : Brief history review  LOW Complexity : 1-3 performance deficits relating to physical, cognitive , or psychosocial skils that result in activity limitations and / or participation restrictions  LOW Complexity : No comorbidities that affect functional and no verbal or physical assistance needed to complete eval tasks       Based on the above components, the patient evaluation is determined to be of the following complexity level: LOW   Pain Rating:  Denied pain. Activity Tolerance:   Fair. BP stable. After treatment patient left in no apparent distress:    Supine in bed, Call bell within reach, Caregiver / family present, and Side rails x 3    COMMUNICATION/EDUCATION:   The patients plan of care was discussed with: Physical therapist, Registered nurse, and Patient . Home safety education was provided and the patient/caregiver indicated understanding., Patient/family have participated as able in goal setting and plan of care. , and Patient/family agree to work toward stated goals and plan of care.     Thank you for this referral.  Seema Rhodes, OTR/L  Time Calculation: 37 mins

## 2021-09-08 NOTE — PROGRESS NOTES
Problem: Falls - Risk of  Goal: *Absence of Falls  Description: Document Dheeraj Gómez Fall Risk and appropriate interventions in the flowsheet.   Outcome: Progressing Towards Goal  Note: Fall Risk Interventions:  Mobility Interventions: Patient to call before getting OOB         Medication Interventions: Teach patient to arise slowly    Elimination Interventions: Call light in reach              Problem: Diabetes Maintenance:Admission  Goal: Activity/Safety  Outcome: Progressing Towards Goal

## 2021-09-08 NOTE — PROGRESS NOTES
VS for PT evaluation as follows:    Vitals:    09/08/21 1245 09/08/21 1252 09/08/21 1256 09/08/21 1302   BP: (!) 141/63 131/63 119/61 (!) 113/54   BP 1 Location: Left upper arm Left upper arm Left upper arm Left upper arm   BP Patient Position: Supine Sitting Standing Standing  Comment: after side steps   Pulse: 100 (!) 102 (!) 109 (!) 112   Temp:       Resp:       Height:       Weight:       SpO2:

## 2021-09-08 NOTE — PROGRESS NOTES
End of Shift Note    Bedside shift change report given to Rishi(oncoming nurse) by Jose Choudhury RN (offgoing nurse). Report included the following information SBAR, Kardex, Intake/Output, MAR and Recent Results    Shift worked:  7P-7A     Shift summary and any significant changes:    Pt complained of indigestion msg sent to MD Dakotah Medina protonix ordered. Concerns for physician to address: None      Zone phone for oncoming shift:  [phone]       Activity:  Activity Level: Bed Rest  Number times ambulated in hallways past shift: 0  Number of times OOB to chair past shift: 0    Cardiac:   Cardiac Monitoring: Yes      Cardiac Rhythm: Sinus Rhythm    Access:   Current line(s): PIV     Genitourinary:   Urinary status: voiding    Respiratory:   O2 Device: None (Room air)  Chronic home O2 use?: No  Incentive spirometer at bedside: YES  Actual Volume (ml): 1000 ml  GI:  Last Bowel Movement Date: 09/07/21  Current diet:  ADULT DIET Regular  Passing flatus: YES  Tolerating current diet: YES       Pain Management:   Patient states pain is manageable on current regimen: YES    Skin:  Papa Score: 20  Interventions: increase time out of bed    Patient Safety:  Fall Score:  Total Score: 3  Interventions: bed/chair alarm  High Fall Risk: Yes    Length of Stay:  Expected LOS: - - -  Actual LOS: 0      Jose Choudhury RN

## 2021-09-08 NOTE — DIABETES MGMT
[address] [facility]    CLINICAL NURSE SPECIALIST CONSULT     Initial Presentation   Angelica Jeong is a 80 y.o. male admitted from the ER with abdominal pain. Denies Nausea or vomiting. Afebrile. Hypertensive. O2 sats 99%  CT: Large fluid filled appendix. HX:   Past Medical History:   Diagnosis Date    Carotid artery disease ([facility] [address]     Coronary atherosclerosis of native coronary artery     Diabetes mellitus, type II ([facility] [address]     DJD (degenerative joint disease)     HCVD (hypertensive cardiovascular disease)     Pure hypercholesterolemia       INITIAL DX:    Acute appendicitis     Current Treatment     TX: 9/6/21 Laparoscopic Appendectomy and drain placement  ABx. Insulin    Consulted by Provider for advanced diabetes nursing assessment and care for:   [x] Inpatient management strategy    Hospital Course   Clinical progress has been uncomplicated. 9/8/21 Daughter reports that patient is not eating much today. IV antibiotics continuing for one more day before transition to PO. PT/OT working with patient. Issues with drops in BP with ambulation. Plans to move to rehab after discharge. Diabetes History   Patient reports being diagnosed with diabetes in 1990. He was not overweight and denies losing weight at the time. Negative family history of diabetes. He was initially treated with pills and then transitioned to insulin in the 2000s.     Diabetes-related Medical History  Macrovascular disease  CAD    Diabetes Medication History  Drug class Currently in use Discontinued Never used   Biguanide      DDP-4 inhibitor       Sulfonylurea      Thiazolidinedione      GLP-1 RA      SGLT-2 inhibitors      Basal insulin      Bolus insulin      Fixed Dose  Combinations Novolog insulin 70/30 mix   30 units in AM and 10 units in PM       Subjective   Conversation with daughter    Patient reports the following home diabetes self-management practices:   Eating pattern   [x] Eating a carbohydrate-controlled mealplan  [x] Breakfast Scrambled eggs, sausage/ramos with biscuit. Coffee  [x] Lunch  [place] & yogurt. Water  [x] Dinner  Steak or pork chop with salad & vegetables. Sometimes rice. Water  Physical activity pattern   [x] Not employing a physical activity program to control BG  [x] Aerobic exercise Gathers wood for burning. Used to walk regularly  Monitoring pattern   [x] Testing BGs sufficiently to inform self-management adjustments  [x] Breakfast 100s  [x] Dinner  100s  Taking medications pattern  [x] Consistent administration  [x] Affordable    Overall evaluation:    [x] Not achieving A1c target with drug therapy & self-care practices     Objective   Physical exam  General Normal weight male. Quiet. Cooperative  Neuro  Alert, oriented   Vital Signs Afebrile. Normotensive  Visit Vitals  /64 (BP 1 Location: Left upper arm, BP Patient Position: At rest)   Pulse 97   Temp 98.3 °F (36.8 °C)   Resp 20   Ht 5' 11\" (1.803 m)   Wt 83 kg (183 lb)   SpO2 94%   BMI 25.52 kg/m²     Laboratory  Tests 9/8 9/6 7/17/21   A1c   10.5%   BG  316    Anion gap  9    Serum triglycerides      WBC 15.6 14.9    Serum creatinine  1.25    GFR  54    AST  22    ALT  29      Factors impacting BG management  Factor Dose Comments   Nutrition:  Standard meals      Not eating well per daughter   Pain Dilaudid & Norco available Rated @0   Infection Levaquin Q48 hrs  Flagyl Q8 hrs Afebrile.  WBC rising     Blood glucose pattern        Assessment and Plan   Nursing Diagnosis Risk for unstable blood glucose pattern   Nursing Intervention Domain 0904 Decision-making Support   Nursing Interventions Examined current inpatient diabetes control   Explored factors facilitating and impeding inpatient management  Identified self-management practices impeding diabetes control      Evaluation   This normal weight  male, with Type 2 diabetes, did not achieve diabetes control prior to admission, as evidenced by A1c of 10.5%. Since this gentleman is used to giving insulin at home for his main strategy, basal insulin with mealtime when was started 9/7/21. BGs coming down but not at target yet. Since he isn't eating much, would recommend advancing basal insulin dose closer to his home dose. Recommendations     [x] Use of Subcutaneous Insulin Order set (2959)  Insulin Dosing Specific recommendation   Basal                                      (Based on weight, BMI & GFR) [x]        0.2 units/kg/D   Lantus 28 units D   Nutritional                                      (Based on CHO/dextrose load) [x] Normal sensitivity   Humalog insulin 4 units with consumed meals   Corrective                                       (Useful in adjusting insulin dosing)   [x] HIGH sensitivity      [x] Carb-controlled mealplan    [x] Referral to  [x] [facility] (Phone [phone] to schedule appointment) for [facility]    Billing Code(s)   [x] 98146 IP subsequent hospital care - 25 minutes    Before making these care recommendations, I personally reviewed the hospitalization record, including notes, laboratory & diagnostic data and current medications, and examined the patient at the bedside (circumstances permitting) before making care recommendations.      Total minutes: [address], CNS  Diabetes Clinical Nurse Specialist  [facility]  Access via [address] [place] [address]

## 2021-09-08 NOTE — PROGRESS NOTES
Problem: Mobility Impaired (Adult and Pediatric)  Goal: *Acute Goals and Plan of Care (Insert Text)  Description: FUNCTIONAL STATUS PRIOR TO ADMISSION: Patient was independent and active without use of DME. Patient was independent for basic and instrumental ADLs. Reports 2 GLFs within the past year. Drives. HOME SUPPORT PRIOR TO ADMISSION: The patient lived alone with assisted to provide assistance (assist with meals, laundry, cleaning). Physical Therapy Goals  Initiated 9/8/2021  1. Patient will move from supine to sit and sit to supine , scoot up and down, and roll side to side in bed with independence within 7 day(s). 2.  Patient will transfer from bed to chair and chair to bed with independence using the least restrictive device within 7 day(s). 3.  Patient will perform sit to stand with independence within 7 day(s). 4.  Patient will ambulate with independence for 150 feet with the least restrictive device within 7 day(s). Outcome: Not Met   PHYSICAL THERAPY EVALUATION  Patient: Marya Ch (23 y.o. male)  Date: 9/8/2021  Primary Diagnosis: Acute appendicitis with localized peritonitis, without perforation, abscess, or gangrene [K35.30]  Procedure(s) (LRB):  APPENDECTOMY LAPAROSCOPIC (N/A) 2 Days Post-Op   Precautions:   Fall, L ELOY drain, [place]    ASSESSMENT  Based on the objective data described below, the patient presents with impaired functional mobility and decreased independence secondary to impaired sitting and standing balance, generalized weakness, increased abdominal soreness, impaired cognition, impaired gait mechanics, decreased ROM, and decreased activity tolerance POD# 2 lap appendectomy. Received supine in bed with DTR at bedside and agreeable to PT evaluation. VS noted below, however patient asymptomatic. Pt is [place], however in addition exhibited impaired motor planning and processing which limited his independence and functional mobility.  Required significant time and multimodal cueing to progress mobility. Gait training limited as patient with progressive decline in BP with positional changes and upright positioning. Pt incontinent of stool in standing, requiring total A for toileting hygiene. Requires overall min A x 2 for OOB mobility at this time, however patient is fully independent at baseline. Pt was returned supine in bed and left with all needs met, RN aware, and DTR present following therapy session. Patient will require IP rehab at discharge to address functional impairments as noted above as patient with significant decline in function, has limited local support, and is at an increased risk for falls at this time. Vitals:    09/08/21 1245 09/08/21 1252 09/08/21 1256 09/08/21 1302   BP: (!) 141/63 131/63 119/61 (!) 113/54   BP 1 Location: Left upper arm Left upper arm Left upper arm Left upper arm   BP Patient Position: Supine Sitting Standing Standing  Comment: after side steps   Pulse: 100 (!) 102 (!) 109 (!) 112   Temp:       Resp:       Height:       Weight:       SpO2:            Current Level of Function Impacting Discharge (mobility/balance): min/SBA for bed mobility; min A x 2 for transfers and side steps at EOB with HHA x 2    Functional Outcome Measure: The patient scored 15/100 on the Barthel Index outcome measure which is indicative of 85% impairment. Other factors to consider for discharge: increased risk for falls; marked decline from baseline mobility; limited local support     Patient will benefit from skilled therapy intervention to address the above noted impairments. PLAN :  Recommendations and Planned Interventions: bed mobility training, transfer training, gait training, therapeutic exercises, patient and family training/education, and therapeutic activities      Frequency/Duration: Patient will be followed by physical therapy:  5 times a week to address goals.     Recommendation for discharge: (in order for the patient to meet his/her long term goals)  Therapy 3 hours per day 5-7 days per week    This discharge recommendation:  Has been made in collaboration with the attending provider and/or case management    IF patient discharges home will need the following DME: to be determined (TBD)         SUBJECTIVE:   Patient stated I fell twice.     OBJECTIVE DATA SUMMARY:   HISTORY:    Past Medical History:   Diagnosis Date    Carotid artery disease ([facility] [address]     Coronary atherosclerosis of native coronary artery     Diabetes mellitus, type II (HCC)     DJD (degenerative joint disease)     HCVD (hypertensive cardiovascular disease)     Pure hypercholesterolemia      Past Surgical History:   Procedure Laterality Date    HX CATARACT REMOVAL Bilateral     HX HEART CATHETERIZATION  2/2010    LVEDP 9, mild ant hypo EF 55-60%, LAD 70% 99%, mod D1, OM3 30%, PDA 90%    HX PTCA  2/2010    Stent-LAD 2.5X18 ANDREA    HX PTCA  3/2010    Stent PDA ANDREA       Personal factors and/or comorbidities impacting plan of care: CAD; DM; DJD    Home Situation  Home Environment: Assisted living  # Steps to Enter: 0  One/Two Story Residence: One story  Living Alone: Yes  Support Systems: Child(chepe)  Patient Expects to be Discharged to[de-identified] Assisted living  Current DME Used/Available at Home: Grab bars, Wheelchair, Walker, rolling  Tub or Shower Type: Shower (built-in seat)    EXAMINATION/PRESENTATION/DECISION MAKING:   Critical Behavior:  Neurologic State: Alert  Orientation Level: Oriented to person  Cognition: Follows commands     Hearing:   Auditory  Auditory Impairment: Hard of hearing, bilateral  Skin:  Surgical site  Edema: None  Range Of Motion:  AROM: Generally decreased, functional           PROM: Generally decreased, functional           Strength:    Strength: Generally decreased, functional                    Tone & Sensation:   Tone: Normal              Sensation: Intact               Coordination:  Coordination: Generally decreased, functional  Vision:      Functional Mobility:  Bed Mobility:     Supine to Sit: Stand-by assistance; Additional time;Bed Modified; Adaptive equipment (HOB elevated; use of bed railing)  Sit to Supine: Minimum assistance;Assist x1;Additional time  Scooting: Stand-by assistance; Additional time  Transfers:  Sit to Stand: Minimum assistance;Assist x2  Stand to Sit: Minimum assistance                       Balance:   Sitting: Impaired  Sitting - Static: Good (unsupported)  Sitting - Dynamic: Fair (occasional)  Standing: Impaired; With support  Standing - Static: Fair  Standing - Dynamic : Fair  Ambulation/Gait Training:  Distance (ft): 4 Feet (ft) (side steps at EOB towards [facility])  Assistive Device: Gait belt (HHA x 2)  Ambulation - Level of Assistance: Minimal assistance;Assist x2; Additional time     Gait Description (WDL): Exceptions to WDL  Gait Abnormalities: Decreased step clearance;Shuffling gait (increased trunk flexion)        Base of Support: Widened     Speed/Mary: Slow;Shuffled  Step Length: Left shortened;Right shortened         Functional Measure:  Barthel Index:    Bathin  Bladder: 5  Bowels: 0  Groomin  Dressin  Feedin  Mobility: 0  Stairs: 0  Toilet Use: 0  Transfer (Bed to Chair and Back): 5  Total: 15/100       The Barthel ADL Index: Guidelines  1. The index should be used as a record of what a patient does, not as a record of what a patient could do. 2. The main aim is to establish degree of independence from any help, physical or verbal, however minor and for whatever reason. 3. The need for supervision renders the patient not independent. 4. A patient's performance should be established using the best available evidence. Asking the patient, friends/relatives and nurses are the usual sources, but direct observation and common sense are also important. However direct testing is not needed. 5. Usually the patient's performance over the preceding 24-48 hours is important, but occasionally longer periods will be relevant.   6. Middle categories imply that the patient supplies over 50 per cent of the effort. 7. Use of aids to be independent is allowed. Yi Schmidt, Barthel, DFrancisWFrancis ([id number]). Functional evaluation: the Barthel Index. [address] (14)2. Ryder GravALVARO Monten[place], Kathaleen Boas., tomas Schaffer., [place], [address] (1999). Measuring the change indisability after inpatient rehabilitation; comparison of the responsiveness of the Barthel Index and Functional Power Measure. Journal of Neurology, Neurosurgery, and Psychiatry, 66(4), 198-531. Melody Koehler, N.J.A, SARTHAK Mccain, & Karo Sosa M.A. (2004.) Assessment of post-stroke quality of life in cost-effectiveness studies: The usefulness of the Barthel Index and the EuroQoL-5D. Quality of Life Research, 15, 167-17           Physical Therapy Evaluation Charge Determination   History Examination Presentation Decision-Making   HIGH Complexity :3+ comorbidities / personal factors will impact the outcome/ POC  HIGH Complexity : 4+ Standardized tests and measures addressing body structure, function, activity limitation and / or participation in recreation  MEDIUM Complexity : Evolving with changing characteristics  Other outcome measures Barthel Index  HIGH       Based on the above components, the patient evaluation is determined to be of the following complexity level: MEDIUM    Pain Rating:  No pain, however did report abdominal soreness    Activity Tolerance:   Fair, SpO2 stable on RA, requires rest breaks, and signs and symptoms of orthostatic hypotension    After treatment patient left in no apparent distress:   Supine in bed, Call bell within reach, Caregiver / family present, and Side rails x 3 (Bed alarm not working - discussed with RN)    COMMUNICATION/EDUCATION:   The patients plan of care was discussed with: Physical therapist, Occupational therapist, Registered nurse, and NP .      Fall prevention education was provided and the patient/caregiver indicated understanding., Patient/family have participated as able in goal setting and plan of care. , and Patient/family agree to work toward stated goals and plan of care.     Thank you for this referral.  Vic Mata, PT, DPT   Time Calculation: 35 mins

## 2021-09-08 NOTE — PROGRESS NOTES
Transition of Care Plan:     RUR: OBS  Disposition: SNF (need to obtain choices from daughter)  Follow up appointments: PCP, Specialist  DME needed:none identified  Transportation at [address] [facility] or means to access home:      ALF IM Medicare Letter: n/a OBS  Is patient a BCPI-A Bundle:  n/a                    If yes, was Bundle Letter given?:     Caregiver Contact:Reena Curtis  Discharge Caregiver contacted prior to discharge? CM will contact prior to d/c.     3:32 p.m. Pt's daughter discussed options with her brother and they chose [facility] and [facility]. CM reviewed FOC and placed signed copy on chart. CM sent referrals. CM stopped by NP in [place] as well as pt's daughter requesting a list of SNFs as therapy is recommending it at this point. CM provided list to daughter. Daughter is going to get with her brother and review and provide choices.      JOSSY Norris  Care Management, [address] [place] [address]

## 2021-09-09 LAB
ANION GAP SERPL CALC-SCNC: 7 MMOL/L (ref 5–15)
BUN SERPL-MCNC: 24 MG/DL (ref 6–20)
BUN/CREAT SERPL: 22 (ref 12–20)
CALCIUM SERPL-MCNC: 8.2 MG/DL (ref 8.5–10.1)
CHLORIDE SERPL-SCNC: 108 MMOL/L (ref 97–108)
CO2 SERPL-SCNC: 23 MMOL/L (ref 21–32)
CREAT SERPL-MCNC: 1.11 MG/DL (ref 0.7–1.3)
ERYTHROCYTE [DISTWIDTH] IN BLOOD BY AUTOMATED COUNT: 13.6 % (ref 11.5–14.5)
GLUCOSE BLD STRIP.AUTO-MCNC: 126 MG/DL (ref 65–117)
GLUCOSE BLD STRIP.AUTO-MCNC: 127 MG/DL (ref 65–117)
GLUCOSE BLD STRIP.AUTO-MCNC: 218 MG/DL (ref 65–117)
GLUCOSE BLD STRIP.AUTO-MCNC: 223 MG/DL (ref 65–117)
GLUCOSE BLD STRIP.AUTO-MCNC: 249 MG/DL (ref 65–117)
GLUCOSE BLD STRIP.AUTO-MCNC: 64 MG/DL (ref 65–117)
GLUCOSE BLD STRIP.AUTO-MCNC: 93 MG/DL (ref 65–117)
GLUCOSE SERPL-MCNC: 148 MG/DL (ref 65–100)
HCT VFR BLD AUTO: 35.3 % (ref 36.6–50.3)
HGB BLD-MCNC: 11.8 G/DL (ref 12.1–17)
MCH RBC QN AUTO: 30.9 PG (ref 26–34)
MCHC RBC AUTO-ENTMCNC: 33.4 G/DL (ref 30–36.5)
MCV RBC AUTO: 92.4 FL (ref 80–99)
NRBC # BLD: 0 K/UL (ref 0–0.01)
NRBC BLD-RTO: 0 PER 100 WBC
PLATELET # BLD AUTO: 245 K/UL (ref 150–400)
PMV BLD AUTO: 10.7 FL (ref 8.9–12.9)
POTASSIUM SERPL-SCNC: 3.4 MMOL/L (ref 3.5–5.1)
RBC # BLD AUTO: 3.82 M/UL (ref 4.1–5.7)
SERVICE CMNT-IMP: ABNORMAL
SERVICE CMNT-IMP: NORMAL
SODIUM SERPL-SCNC: 138 MMOL/L (ref 136–145)
WBC # BLD AUTO: 12.4 K/UL (ref 4.1–11.1)

## 2021-09-09 PROCEDURE — 80048 BASIC METABOLIC PNL TOTAL CA: CPT

## 2021-09-09 PROCEDURE — 97535 SELF CARE MNGMENT TRAINING: CPT

## 2021-09-09 PROCEDURE — 74011636637 HC RX REV CODE- 636/637: Performed by: NURSE PRACTITIONER

## 2021-09-09 PROCEDURE — 74011250636 HC RX REV CODE- 250/636: Performed by: SURGERY

## 2021-09-09 PROCEDURE — 74011250636 HC RX REV CODE- 250/636: Performed by: NURSE PRACTITIONER

## 2021-09-09 PROCEDURE — 82962 GLUCOSE BLOOD TEST: CPT

## 2021-09-09 PROCEDURE — 85027 COMPLETE CBC AUTOMATED: CPT

## 2021-09-09 PROCEDURE — 74011250637 HC RX REV CODE- 250/637: Performed by: SURGERY

## 2021-09-09 PROCEDURE — 97530 THERAPEUTIC ACTIVITIES: CPT

## 2021-09-09 PROCEDURE — 97110 THERAPEUTIC EXERCISES: CPT

## 2021-09-09 PROCEDURE — 65270000029 HC RM PRIVATE

## 2021-09-09 PROCEDURE — 36415 COLL VENOUS BLD VENIPUNCTURE: CPT

## 2021-09-09 PROCEDURE — 74011636637 HC RX REV CODE- 636/637: Performed by: SURGERY

## 2021-09-09 RX ADMIN — HYDROMORPHONE HYDROCHLORIDE 0.5 MG: 1 INJECTION, SOLUTION INTRAMUSCULAR; INTRAVENOUS; SUBCUTANEOUS at 20:08

## 2021-09-09 RX ADMIN — Medication 10 ML: at 13:09

## 2021-09-09 RX ADMIN — POTASSIUM CHLORIDE AND SODIUM CHLORIDE: 450; 150 INJECTION, SOLUTION INTRAVENOUS at 03:30

## 2021-09-09 RX ADMIN — Medication 10 ML: at 22:54

## 2021-09-09 RX ADMIN — METRONIDAZOLE 500 MG: 500 INJECTION, SOLUTION INTRAVENOUS at 09:30

## 2021-09-09 RX ADMIN — Medication 10 ML: at 06:00

## 2021-09-09 RX ADMIN — Medication 1 AMPULE: at 09:28

## 2021-09-09 RX ADMIN — INSULIN LISPRO 4 UNITS: 100 INJECTION, SOLUTION INTRAVENOUS; SUBCUTANEOUS at 13:08

## 2021-09-09 RX ADMIN — ENOXAPARIN SODIUM 30 MG: 30 INJECTION, SOLUTION INTRAVENOUS; SUBCUTANEOUS at 12:00

## 2021-09-09 RX ADMIN — INSULIN LISPRO 4 UNITS: 100 INJECTION, SOLUTION INTRAVENOUS; SUBCUTANEOUS at 17:06

## 2021-09-09 RX ADMIN — Medication 10 ML: at 22:55

## 2021-09-09 RX ADMIN — HYDROMORPHONE HYDROCHLORIDE 0.5 MG: 1 INJECTION, SOLUTION INTRAMUSCULAR; INTRAVENOUS; SUBCUTANEOUS at 22:53

## 2021-09-09 RX ADMIN — INSULIN LISPRO 4 UNITS: 100 INJECTION, SOLUTION INTRAVENOUS; SUBCUTANEOUS at 16:55

## 2021-09-09 RX ADMIN — INSULIN LISPRO 4 UNITS: 100 INJECTION, SOLUTION INTRAVENOUS; SUBCUTANEOUS at 09:27

## 2021-09-09 RX ADMIN — Medication 1 AMPULE: at 20:08

## 2021-09-09 RX ADMIN — AMLODIPINE BESYLATE 5 MG: 5 TABLET ORAL at 09:28

## 2021-09-09 RX ADMIN — POTASSIUM CHLORIDE AND SODIUM CHLORIDE: 450; 150 INJECTION, SOLUTION INTRAVENOUS at 17:06

## 2021-09-09 RX ADMIN — ASPIRIN 81 MG: 81 TABLET, COATED ORAL at 09:00

## 2021-09-09 RX ADMIN — METRONIDAZOLE 500 MG: 500 INJECTION, SOLUTION INTRAVENOUS at 16:55

## 2021-09-09 RX ADMIN — INSULIN GLARGINE 15 UNITS: 100 INJECTION, SOLUTION SUBCUTANEOUS at 09:27

## 2021-09-09 NOTE — PROGRESS NOTES
Transition of Care Plan:     RUR: 19%  Disposition: SNF-[facility] (auth started 9/9/21)  Follow up appointments: PCP, Specialist  DME needed:none identified  Transportation at [address] [place] [address] or means to access home:      ALF IM Medicare Letter: needed at d/c  Is patient a BCPI-A Bundle:  n/a                    If yes, was Bundle Letter given?:     Caregiver Contact:Reena Curtis  Discharge Caregiver contacted prior to discharge?        CM will contact prior to d/c.    CM checked referrals and both [address] [place] [address] [facility]. CM discussed with pt's daughter and she would like to proceed with [facility]. CM will accept in [facility] and send info to [facility] to start authorization at [phone] and [phone].     JOSSY Garcia  Care Management, [address] [place] [address]

## 2021-09-09 NOTE — PROGRESS NOTES
Problem: Mobility Impaired (Adult and Pediatric)  Goal: *Acute Goals and Plan of Care (Insert Text)  Description: FUNCTIONAL STATUS PRIOR TO ADMISSION: Patient was independent and active without use of DME. Patient was independent for basic and instrumental ADLs. Reports 2 GLFs within the past year. Drives. HOME SUPPORT PRIOR TO ADMISSION: The patient lived alone with [facility] to provide assistance (assist with meals, laundry, cleaning). Physical Therapy Goals  Initiated 9/8/2021  1. Patient will move from supine to sit and sit to supine , scoot up and down, and roll side to side in bed with independence within 7 day(s). 2.  Patient will transfer from bed to chair and chair to bed with independence using the least restrictive device within 7 day(s). 3.  Patient will perform sit to stand with independence within 7 day(s). 4.  Patient will ambulate with independence for 150 feet with the least restrictive device within 7 day(s). Outcome: Progressing Towards Goal   PHYSICAL THERAPY TREATMENT  Patient: Selena Galindo (89 y.o. male)  Date: 9/9/2021  Diagnosis: Acute appendicitis with localized peritonitis, without perforation, abscess, or gangrene [K35.30]  S/P appendectomy [Z90.49] Acute appendicitis with localized peritonitis, without perforation, abscess, or gangrene  Procedure(s) (LRB):  APPENDECTOMY LAPAROSCOPIC (N/A) 3 Days Post-Op  Precautions: Fall  Chart, physical therapy assessment, plan of care and goals were reviewed. ASSESSMENT  Patient continues with skilled PT services and is progressing towards goals. RN clears pt for PT. Pt is hesitant to participate in session, but was agreeable to sitting EOB for ex. Exercises increased to standing tranfers with HHA. Cues needed for hand placement. Pt note improvement of LE feedback and notes that he will be ready to ambulation on next session. Pt will benefit from placement to increase ind with mobility.         Current Level of Function Impacting Discharge (mobility/balance): Min-mod assist for Bed mobility     Other factors to consider for discharge: Hesitant and unsure of himself         PLAN :  Patient continues to benefit from skilled intervention to address the above impairments. Continue treatment per established plan of care. to address goals. Recommendation for discharge: (in order for the patient to meet his/her long term goals)  Therapy up to 5 days/week in SNF setting    This discharge recommendation:  Has been made in collaboration with the attending provider and/or case management    IF patient discharges home will need the following DME: gait belt and rolling walker       SUBJECTIVE:   Patient stated I was already up with a girl from therapy.     OBJECTIVE DATA SUMMARY:   Critical Behavior:  Neurologic State: Alert  Orientation Level: Oriented to person, Disoriented to place, Disoriented to situation, Disoriented to time (thought it was 2001, unaware of surgery performed)  Cognition: Follows commands, Decreased attention/concentration, Memory loss  Safety/Judgement: Fall prevention, Lack of insight into deficits  Functional Mobility Training:  Bed Mobility:  Rolling: Stand-by assistance  Supine to Sit: Minimum assistance  Sit to Supine: Minimum assistance  Scooting: Stand-by assistance  Transfers:  Sit to Stand: Minimum assistance  Stand to Sit: Minimum assistance  Bed to Chair: Minimum assistance  Balance:  Sitting: Impaired  Sitting - Static: Good (unsupported)  Sitting - Dynamic: Fair (occasional)  Standing: Impaired  Standing - Static: Fair  Standing - Dynamic : Fair  Ambulation/Gait Training:  Refused  Therapeutic Exercises:       EXERCISE   Sets   Reps   Active Active Assist   Passive Self ROM   Comments   Ankle Pumps 1 10  [x] [] [] []    Quad Sets/Glut Sets 1 10 [x] [] [] []    Hamstring Sets   [] [] [] []    Short Arc Quads   [] [] [] []    Heel Slides   [] [] [] []    Straight Leg Raises   [] [] [] []    Hip Abd/Add 1 15 [x] [] [] []    Long Arc Quads 1 15 [x] [] [] []    Seated Marching 1 20 [x] [] [] []    Standing Marching   [] [] [] []    Sit to Stand 1 10 [x] [] [] []        Pain Rating:  Pain in:  Pain out:   Activity Tolerance:   Poor    After treatment patient left in no apparent distress:   Supine in bed, Call bell within reach, Bed / chair alarm activated, and Side rails x 3    COMMUNICATION/COLLABORATION:   The patients plan of care was discussed with: Registered nurse.      Gene November, PTA   Time Calculation: 35 mins

## 2021-09-09 NOTE — PROGRESS NOTES
Spiritual Care Partner Volunteer visited patient at [address] in [facility] on 9/9/2021     Documented by:  Ruth Craven M.Div,., [address] [address] Provider   Paging Service [phone])

## 2021-09-09 NOTE — PROGRESS NOTES
Problem: Self Care Deficits Care Plan (Adult)  Goal: *Acute Goals and Plan of Care (Insert Text)  Description:   FUNCTIONAL STATUS PRIOR TO ADMISSION: Independent with ADL. Admits to x2 falls. Drives. HOME SUPPORT: Resided at [facility]. Required assist with medication management, meals and housekeeping only. Occupational Therapy Goals  Initiated 9/8/2021  1. Patient will perform self-feeding with independence within 7 day(s). 2.  Patient will perform grooming standing at the sink with independence within 7 day(s). 3.  Patient will perform upper body dressing with independence within 7 day(s). 4.  Patient will perform lower body dressing with independence within 7 days. 5.  Patient will perform toilet transfers with independence within 7 day(s). 6.  Patient will perform all aspects of toileting with independence within 7 day(s). 7.  Patient will perform self-care item retrieval from various heights with independence within 7 days. Outcome: Progressing Towards Goal    OCCUPATIONAL THERAPY TREATMENT  Patient: Mina Michele (25 y.o. male)  Date: 9/9/2021  Diagnosis: Acute appendicitis with localized peritonitis, without perforation, abscess, or gangrene [K35.30]  S/P appendectomy [Z90.49] Acute appendicitis with localized peritonitis, without perforation, abscess, or gangrene  Procedure(s) (LRB):  APPENDECTOMY LAPAROSCOPIC (N/A) 3 Days Post-Op  Precautions: Fall  Chart, occupational therapy assessment, plan of care, and goals were reviewed. ASSESSMENT  Patient continues with skilled OT services and is progressing towards goals. This patient is very [facility] making communication difficult with masks. He is agreeable and ;motivated for attempted toileting--incontinent of stool with standing, changed plan to SPT to [facility], max A to D for posterior hygiene due to difficulty with reaching for task with multiple attempts. Min-mod A SPT EOB to BSC to armchair with some slight ataxia and unsteadiness noted.  Seated grooming in chair level. Ed nurse and patient to tx to chair TID for meals for progression of ADL tasks as well as [facility] tx as able for toileting. He is currently using male purwic for urination. Will benefit from SNF for rehab currently as he was I PTA at [facility] for simple ADL tasks. Continue per POC. Visit Vitals  BP (!) (P) 146/90 (BP 1 Location: Left upper arm, BP Patient Position: Sitting)   Pulse (P) 87   Temp 98.6 °F (37 °C)   Resp 18   Ht 5' 11\" (1.803 m)   Wt 83 kg (183 lb)   SpO2 98%   BMI 25.52 kg/m²         Current Level of Function Impacting Discharge (ADLs): see below    Other factors to consider for discharge: needs SNF         PLAN :  Patient continues to benefit from skilled intervention to address the above impairments. Continue treatment per established plan of care to address goals. Recommend with staff: to chair for meals    Recommend next OT session: per POC    Recommendation for discharge: (in order for the patient to meet his/her long term goals)  Therapy up to 5 days/week in SNF setting    This discharge recommendation:  Has not yet been discussed the attending provider and/or case management    IF patient discharges home will need the following DME:tbd       SUBJECTIVE:   Patient stated I'll try.     OBJECTIVE DATA SUMMARY:   Cognitive/Behavioral Status:                      Functional Mobility and Transfers for ADLs:  Bed Mobility:  Supine to Sit: Stand-by assistance; Adaptive equipment; Additional time;Bed Modified  Scooting: Stand-by assistance;Contact guard assistance; Additional time; Adaptive equipment    Transfers:  Sit to Stand: Minimum assistance  Functional Transfers  Bathroom Mobility: Dependent/total assistance (unalbe due to bowel incontinence, tx to [facility] instead)  Toilet Transfer :  Moderate assistance (ataxic tx, unsteady)  Bed to Chair: Moderate assistance    Balance:  Sitting: Impaired  Sitting - Static: Good (unsupported)  Sitting - Dynamic: Fair (occasional)  Standing: Impaired; With support;Pull to stand  Standing - Static: Fair  Standing - Dynamic : Fair    ADL Intervention:       Grooming  Position Performed: Seated in chair  Washing Face: Set-up; Supervision  Washing Hands: Set-up; Supervision  Brushing Teeth: Set-up; Supervision  Brushing/Combing Hair: Set-up  Cues: Verbal cues provided                   Lower Body Dressing Assistance  Protective Undergarmet: Total assistance (dependent)    Toileting  Bladder Hygiene: Total assistance (dependent) (male purwic)  Bowel Hygiene: Total assistance (dependent)  Clothing Management: Maximum assistance  Cues: Verbal cues provided; Tactile cues provided;Physical assistance for pants up;Physical assistance for pants down;Visual cues provided         Therapeutic Exercises:       Pain:  8/10 in abdomen \"moderate\" pain per his report    Activity Tolerance:   Fair and requires frequent rest breaks    After treatment patient left in no apparent distress:   Sitting in chair, Call bell within reach, and Caregiver / family present    COMMUNICATION/COLLABORATION:   The patients plan of care was discussed with: Registered nurse.      CHRISTINE Lee/BUCKY  Time Calculation: 45 mins

## 2021-09-09 NOTE — PROGRESS NOTES
Admit Date: 2021    POD 3 Days Post-Op    Procedure:  Procedure(s):  APPENDECTOMY LAPAROSCOPIC    Subjective:     Patient has no complaints. Tolerating diet well. Still no chair in room to be OOB. Hypotensive with standing with PT yesterday. Objective:     Blood pressure (!) 144/63, pulse (!) 101, temperature 98.5 °F (36.9 °C), resp. rate 18, height 5' 11\" (1.803 m), weight 183 lb (83 kg), SpO2 96 %.     Temp (24hrs), Av °F (36.7 °C), Min:97.6 °F (36.4 °C), Max:98.5 °F (36.9 °C)      Physical Exam:  GENERAL: alert, cooperative, no distress, appears stated age, LUNG: clear to auscultation bilaterally, HEART: regular rate and rhythm, ABDOMEN: soft, NT, wounds c/d/i, ELOY o/p serous, EXTREMITIES:  extremities normal, atraumatic, no cyanosis or edema    Labs:   Recent Results (from the past 24 hour(s))   GLUCOSE, POC    Collection Time: 21 12:29 PM   Result Value Ref Range    Glucose (POC) 208 (H) 65 - 117 mg/dL    Performed by Bakari WHEATLEY    GLUCOSE, POC    Collection Time: 21  5:46 PM   Result Value Ref Range    Glucose (POC) 174 (H) 65 - 117 mg/dL    Performed by Ellyn Saavedra 82, POC    Collection Time: 21 12:25 AM   Result Value Ref Range    Glucose (POC) 64 (L) 65 - 117 mg/dL    Performed by Emma Alvarado RN    GLUCOSE, POC    Collection Time: 21 12:27 AM   Result Value Ref Range    Glucose (POC) 93 65 - 117 mg/dL    Performed by Emma Alvarado RN    GLUCOSE, POC    Collection Time: 21  6:32 AM   Result Value Ref Range    Glucose (POC) 127 (H) 65 - 117 mg/dL    Performed by Emma Alvarado RN    CBC W/O DIFF    Collection Time: 21  7:04 AM   Result Value Ref Range    WBC 12.4 (H) 4.1 - 11.1 K/uL    RBC 3.82 (L) 4.10 - 5.70 M/uL    HGB 11.8 (L) 12.1 - 17.0 g/dL    HCT 35.3 (L) 36.6 - 50.3 %    MCV 92.4 80.0 - 99.0 FL    MCH 30.9 26.0 - 34.0 PG    MCHC 33.4 30.0 - 36.5 g/dL    RDW 13.6 11.5 - 14.5 %    PLATELET 544 851 - 388 K/uL    MPV 10.7 8.9 - 12.9 FL    NRBC 0.0 0  WBC    ABSOLUTE NRBC 0.00 0.00 - 0.74 K/uL   METABOLIC PANEL, BASIC    Collection Time: 09/09/21  7:04 AM   Result Value Ref Range    Sodium 138 136 - 145 mmol/L    Potassium 3.4 (L) 3.5 - 5.1 mmol/L    Chloride 108 97 - 108 mmol/L    CO2 23 21 - 32 mmol/L    Anion gap 7 5 - 15 mmol/L    Glucose 148 (H) 65 - 100 mg/dL    BUN 24 (H) 6 - 20 MG/DL    Creatinine 1.11 0.70 - 1.30 MG/DL    BUN/Creatinine ratio 22 (H) 12 - 20      GFR est AA >60 >60 ml/min/1.73m2    GFR est non-AA >60 >60 ml/min/1.73m2    Calcium 8.2 (L) 8.5 - 10.1 MG/DL       Data Review images and reports reviewed    Assessment:     Principal Problem:    Acute appendicitis with localized peritonitis, without perforation, abscess, or gangrene (9/6/2021)    Active Problems:    Type 2 diabetes mellitus with peripheral vascular disease ([facility] [address] (1/19/2021)      Advanced age (9/6/2021)      S/P appendectomy (9/8/2021)        Plan/Recommendations/Medical Decision Making:     Continue present treatment   WBC normalized  D/c ELOY  Will require SNF Rehab placement, requiring 2 more overnight stays prior to transfer.   Continue iv antibiotics   OOB to chair today    Tamela Headley MD  [facility]

## 2021-09-10 VITALS
TEMPERATURE: 98.5 F | DIASTOLIC BLOOD PRESSURE: 63 MMHG | HEIGHT: 71 IN | WEIGHT: 183 LBS | HEART RATE: 80 BPM | OXYGEN SATURATION: 95 % | BODY MASS INDEX: 25.62 KG/M2 | RESPIRATION RATE: 18 BRPM | SYSTOLIC BLOOD PRESSURE: 114 MMHG

## 2021-09-10 LAB
ANION GAP SERPL CALC-SCNC: 10 MMOL/L (ref 5–15)
BUN SERPL-MCNC: 22 MG/DL (ref 6–20)
BUN/CREAT SERPL: 21 (ref 12–20)
CALCIUM SERPL-MCNC: 8 MG/DL (ref 8.5–10.1)
CHLORIDE SERPL-SCNC: 109 MMOL/L (ref 97–108)
CO2 SERPL-SCNC: 21 MMOL/L (ref 21–32)
COVID-19 RAPID TEST, COVR: NOT DETECTED
CREAT SERPL-MCNC: 1.06 MG/DL (ref 0.7–1.3)
GLUCOSE BLD STRIP.AUTO-MCNC: 139 MG/DL (ref 65–117)
GLUCOSE BLD STRIP.AUTO-MCNC: 197 MG/DL (ref 65–117)
GLUCOSE BLD STRIP.AUTO-MCNC: 212 MG/DL (ref 65–117)
GLUCOSE SERPL-MCNC: 121 MG/DL (ref 65–100)
POTASSIUM SERPL-SCNC: 3.2 MMOL/L (ref 3.5–5.1)
SERVICE CMNT-IMP: ABNORMAL
SODIUM SERPL-SCNC: 140 MMOL/L (ref 136–145)
SOURCE, COVRS: NORMAL

## 2021-09-10 PROCEDURE — 97530 THERAPEUTIC ACTIVITIES: CPT

## 2021-09-10 PROCEDURE — 97116 GAIT TRAINING THERAPY: CPT

## 2021-09-10 PROCEDURE — 74011636637 HC RX REV CODE- 636/637: Performed by: SURGERY

## 2021-09-10 PROCEDURE — 36415 COLL VENOUS BLD VENIPUNCTURE: CPT

## 2021-09-10 PROCEDURE — 74011250636 HC RX REV CODE- 250/636: Performed by: NURSE PRACTITIONER

## 2021-09-10 PROCEDURE — 74011250636 HC RX REV CODE- 250/636: Performed by: SURGERY

## 2021-09-10 PROCEDURE — 97110 THERAPEUTIC EXERCISES: CPT

## 2021-09-10 PROCEDURE — 97535 SELF CARE MNGMENT TRAINING: CPT

## 2021-09-10 PROCEDURE — 74011636637 HC RX REV CODE- 636/637: Performed by: NURSE PRACTITIONER

## 2021-09-10 PROCEDURE — 87635 SARS-COV-2 COVID-19 AMP PRB: CPT

## 2021-09-10 PROCEDURE — 80048 BASIC METABOLIC PNL TOTAL CA: CPT

## 2021-09-10 PROCEDURE — 74011250637 HC RX REV CODE- 250/637: Performed by: SURGERY

## 2021-09-10 PROCEDURE — 82962 GLUCOSE BLOOD TEST: CPT

## 2021-09-10 RX ORDER — HYDROCODONE BITARTRATE AND ACETAMINOPHEN 5; 325 MG/1; MG/1
1 TABLET ORAL
Qty: 15 TABLET | Refills: 0 | Status: SHIPPED | OUTPATIENT
Start: 2021-09-10 | End: 2021-09-13

## 2021-09-10 RX ORDER — HYDROCODONE BITARTRATE AND ACETAMINOPHEN 5; 325 MG/1; MG/1
1 TABLET ORAL
Qty: 15 TABLET | Refills: 0 | Status: SHIPPED | OUTPATIENT
Start: 2021-09-10 | End: 2021-09-10

## 2021-09-10 RX ADMIN — Medication 1 AMPULE: at 09:08

## 2021-09-10 RX ADMIN — ASPIRIN 81 MG: 81 TABLET, COATED ORAL at 09:08

## 2021-09-10 RX ADMIN — ENOXAPARIN SODIUM 30 MG: 30 INJECTION, SOLUTION INTRAVENOUS; SUBCUTANEOUS at 12:00

## 2021-09-10 RX ADMIN — POTASSIUM CHLORIDE AND SODIUM CHLORIDE: 450; 150 INJECTION, SOLUTION INTRAVENOUS at 14:20

## 2021-09-10 RX ADMIN — INSULIN GLARGINE 15 UNITS: 100 INJECTION, SOLUTION SUBCUTANEOUS at 09:11

## 2021-09-10 RX ADMIN — INSULIN LISPRO 4 UNITS: 100 INJECTION, SOLUTION INTRAVENOUS; SUBCUTANEOUS at 14:08

## 2021-09-10 RX ADMIN — INSULIN LISPRO 3 UNITS: 100 INJECTION, SOLUTION INTRAVENOUS; SUBCUTANEOUS at 14:08

## 2021-09-10 RX ADMIN — POTASSIUM CHLORIDE AND SODIUM CHLORIDE: 450; 150 INJECTION, SOLUTION INTRAVENOUS at 04:11

## 2021-09-10 RX ADMIN — METRONIDAZOLE 500 MG: 500 INJECTION, SOLUTION INTRAVENOUS at 09:12

## 2021-09-10 RX ADMIN — INSULIN LISPRO 4 UNITS: 100 INJECTION, SOLUTION INTRAVENOUS; SUBCUTANEOUS at 09:08

## 2021-09-10 RX ADMIN — HYDROMORPHONE HYDROCHLORIDE 0.5 MG: 1 INJECTION, SOLUTION INTRAMUSCULAR; INTRAVENOUS; SUBCUTANEOUS at 04:11

## 2021-09-10 RX ADMIN — AMLODIPINE BESYLATE 5 MG: 5 TABLET ORAL at 09:08

## 2021-09-10 RX ADMIN — METRONIDAZOLE 500 MG: 500 INJECTION, SOLUTION INTRAVENOUS at 00:00

## 2021-09-10 RX ADMIN — Medication 10 ML: at 14:09

## 2021-09-10 RX ADMIN — Medication 10 ML: at 14:10

## 2021-09-10 RX ADMIN — METRONIDAZOLE 500 MG: 500 INJECTION, SOLUTION INTRAVENOUS at 16:40

## 2021-09-10 NOTE — PROGRESS NOTES
Admit Date: 2021    POD 4 Days Post-Op    Procedure:  Procedure(s):  APPENDECTOMY LAPAROSCOPIC    Subjective:     Patient has no complaints. Tolerating diet well. Objective:     Blood pressure 132/72, pulse 87, temperature 97.6 °F (36.4 °C), resp. rate 18, height 5' 11\" (1.803 m), weight 183 lb (83 kg), SpO2 97 %.     Temp (24hrs), Av.1 °F (36.7 °C), Min:97.5 °F (36.4 °C), Max:98.6 °F (37 °C)      Physical Exam:  GENERAL: alert, cooperative, no distress, appears stated age, LUNG: clear to auscultation bilaterally, HEART: regular rate and rhythm, ABDOMEN: soft, NT, wounds c/d/i, ELOY o/p serous, EXTREMITIES:  extremities normal, atraumatic, no cyanosis or edema    Labs:   Recent Results (from the past 24 hour(s))   GLUCOSE, POC    Collection Time: 21  1:07 PM   Result Value Ref Range    Glucose (POC) 223 (H) 65 - 117 mg/dL    Performed by [facility] PCT    GLUCOSE, POC    Collection Time: 21  3:42 PM   Result Value Ref Range    Glucose (POC) 249 (H) 65 - 117 mg/dL    Performed by [facility] PCT    GLUCOSE, POC    Collection Time: 21 11:50 PM   Result Value Ref Range    Glucose (POC) 126 (H) 65 - 117 mg/dL    Performed by Brenda Keys PCT    METABOLIC PANEL, BASIC    Collection Time: 09/10/21  4:16 AM   Result Value Ref Range    Sodium 140 136 - 145 mmol/L    Potassium 3.2 (L) 3.5 - 5.1 mmol/L    Chloride 109 (H) 97 - 108 mmol/L    CO2 21 21 - 32 mmol/L    Anion gap 10 5 - 15 mmol/L    Glucose 121 (H) 65 - 100 mg/dL    BUN 22 (H) 6 - 20 MG/DL    Creatinine 1.06 0.70 - 1.30 MG/DL    BUN/Creatinine ratio 21 (H) 12 - 20      GFR est AA >60 >60 ml/min/1.73m2    GFR est non-AA >60 >60 ml/min/1.73m2    Calcium 8.0 (L) 8.5 - 10.1 MG/DL   GLUCOSE, POC    Collection Time: 09/10/21  5:59 AM   Result Value Ref Range    Glucose (POC) 139 (H) 65 - 117 mg/dL    Performed by Brenda WHEATLEY        Data Review images and reports reviewed    Assessment:     Principal Problem:    Acute appendicitis with localized peritonitis, without perforation, abscess, or gangrene (9/6/2021)    Active Problems:    Type 2 diabetes mellitus with peripheral vascular disease ([facility] [address] (1/19/2021)      Advanced age (9/6/2021)      S/P appendectomy (9/8/2021)        Plan/Recommendations/Medical Decision Making:     Continue present treatment   WBC normalized  Will require SNF Rehab placement, awaiting [employer] authorization Continue iv antibiotics   OOB to chair today    Aly Dubon MD  [id number] [facility]

## 2021-09-10 NOTE — PROGRESS NOTES
Transition of Care Plan:     RUR: 20%  Disposition: SNF-[place]   Follow up appointments: PCP, Specialist  DME needed:none identified  Transportation at [address] [facility] or means to access home:  n/a  IM Medicare Letter: provided  Is patient a BCPI-A Bundle:  n/a                    If yes, was Bundle Letter given?:     Caregiver Contact:Reena Curtis  Discharge Caregiver contacted prior to discharge?        CM will contact prior to d/c. Pt is clear for d/c from a CM standpoint. 3:06 p.m. CM spoke with pt's son, Mr. Rosa Goldberg, to inform him of [employer]'s approval.  CM verbally reviewed 2IM letter with son and will place copy on chart. Mr. Rosa Goldberg in agreement with plan. CM informed pt's nurse, Andi Lozada. CM will schedule transportation.         CM called [employer] at [phone] to check status of authorization. [place]OM[place] spoke with Radha Huitron who stated authorization had been approved starting today. SIOMARA called and spoke with Bandar in admissions to inform. Bandar stated she will need a rapid Covid test.  CM informed NP, Jaime Gomez. Pt will go into room 401A. Nursing can call report to [phone]. Their fax is [phone]. Transition of Care Plan to SNF/Rehab    SNF/Rehab Transition:  Patient has been accepted to [facility] and meets criteria for admission. Patient will transported by [facility] and expected to leave at 6:30 p.m. Communication to Patient/Family:  Met with patient and Mr. Odell Pérez (identified care giver) and they are agreeable to the transition plan. Communication to SNF/Rehab:  Bedside RN, Andi Lozada, has been notified to update the transition plan to the facility and call report (phone number [phone]). Discharge information has been updated on the AVS.     Discharge instructions to be fax'd to facility at [facility] # [phone]). Nursing Please include all hard scripts for controlled substances, med rec and dc summary, and AVS in packet.      Reviewed and confirmed with facility, Nicholas Moya, can manage the patient care needs for the following:     Luma Lezama with (X) only those applicable:    Medication:  [x]  Medications will be available at the facility  []  IV Antibiotics   []  Controlled Substance - hard copy to be sent with patient   []  Weekly Labs   Documents:  [] Hard RX  [] MAR  [] Kardex  [x] AVS  [x]Transfer Summary  [x]Discharge   Equipment:  []  CPAP/BiPAP  []  Wound Vacuum  []  Ash or Urinary Device  []  PICC/Central Line  []  Nebulizer  []  Ventilator   Treatment:  []Isolation (for MRSA, VRE, etc.)  []Surgical Drain Management  []Tracheostomy Care  []Dressing Changes  []Dialysis with transportation and chair time   []PEG Care  []Oxygen  []Daily Weights for Heart Failure   Dietary:  []Any diet limitations  []Tube Feedings   []Total Parenteral Management (TPN)   Eligible for Medicaid Long Term Services and Supports  Yes:  [] Eligible for medical assistance or will become eligible within 180 days and UAI completed. [] Provider/Patient and/or support system has requested screening. [] UAI copy provided to patient or responsible party,  [] UAI unavailable at discharge will send once processed to SNF provider. [] UAI unavailable at discharged mailed to patient  No:   [x] Private pay and is not financially eligible for Medicaid within the next 180 days. [] Reside out-of-state.   [] A residents of a state owned/operated facility that is licensed  by [facility] or [facility]  [] Enrollment in [facility] hospice services  [] [address]  [] Patient /Family declines to have screening completed or provide financial information for screening     Financial Resources:  Medicaid    [] Initiated and application pending   [] Full coverage     Advanced Care Plan:  []Surrogate Decision Maker of Care  []POA  [x]Communicated Code Status  (\"Full\")    Other

## 2021-09-10 NOTE — PROGRESS NOTES
Patient was due to be picked up by [facility] at 1830. I called to see when the new ETA was and it is now 2030. His IV is out, COVID is (-), paperwork ready, report given to JUAN JOSE Gillespie at [facility]. He is going to room 401A. Informed patient that his children Mónica Robins and Lisbeth Corral) know what the plan is.

## 2021-09-10 NOTE — PROGRESS NOTES
Problem: Mobility Impaired (Adult and Pediatric)  Goal: *Acute Goals and Plan of Care (Insert Text)  Description: FUNCTIONAL STATUS PRIOR TO ADMISSION: Patient was independent and active without use of DME. Patient was independent for basic and instrumental ADLs. Reports 2 GLFs within the past year. Drives. HOME SUPPORT PRIOR TO ADMISSION: The patient lived alone with [facility] to provide assistance (assist with meals, laundry, cleaning). Physical Therapy Goals  Initiated 9/8/2021  1. Patient will move from supine to sit and sit to supine , scoot up and down, and roll side to side in bed with independence within 7 day(s). 2.  Patient will transfer from bed to chair and chair to bed with independence using the least restrictive device within 7 day(s). 3.  Patient will perform sit to stand with independence within 7 day(s). 4.  Patient will ambulate with independence for 150 feet with the least restrictive device within 7 day(s). Outcome: Progressing Towards Goal   PHYSICAL THERAPY TREATMENT  Patient: Cathleen James (15 y.o. male)  Date: 9/10/2021  Diagnosis: Acute appendicitis with localized peritonitis, without perforation, abscess, or gangrene [K35.30]  S/P appendectomy [Z90.49] Acute appendicitis with localized peritonitis, without perforation, abscess, or gangrene  Procedure(s) (LRB):  APPENDECTOMY LAPAROSCOPIC (N/A) 4 Days Post-Op  Precautions: Fall  Chart, physical therapy assessment, plan of care and goals were reviewed. ASSESSMENT  Patient continues with skilled PT services and is progressing towards goals. Pt received in bed, incontinent of bowel. He required increased assistance to roll this session and total assistance for rear buddy care and donning the brief. Pt demonstrates sup>sitting edge of bed with min a x 1. Once seated edge of bed, demonstrates good sitting balance. Noted increased alertness, coordination, and motor planning this session.   He was able to stand and take a few steps to the bedside chair. He was unsteady, so gait trial occurred with a RW. After instruction given how to properly ambulate with the RW, pt able to with cga. His mentation is improving, but he continues to have difficulty with word finding. He endorses confusion, but was A&O x4 this date. Pt participated in seated BLE exercises, with goal to increase his BP. Pt mildly hypotensive, but stable by the end of the session. RN made aware. Pt left sitting in recliner with BLE elevated. SNF rehab recommended at discharge. BP:  sitting 130/78,   sitting post ambulation 108/76,  seated post LE exercises 107/42,  seated post LE exercises 109/51      Current Level of Function Impacting Discharge (mobility/balance): min a x 1 bed mobility, CGA transfers and gait with RW    Other factors to consider for discharge:  Fall risk, mild confusion, functioning below his baseline         PLAN :  Patient continues to benefit from skilled intervention to address the above impairments. Continue treatment per established plan of care. to address goals.     Recommendation for discharge: (in order for the patient to meet his/her long term goals)  Therapy up to 5 days/week in SNF setting    This discharge recommendation:  Has been made in collaboration with the attending provider and/or case management    IF patient discharges home will need the following DME: to be determined (TBD)       SUBJECTIVE:   Patient stated what is my Blood pressure    OBJECTIVE DATA SUMMARY:   Critical Behavior:  Neurologic State: Alert  Orientation Level: Appropriate for age, Oriented X4  Cognition: Follows commands, Decreased attention/concentration, Memory loss  Safety/Judgement: Fall prevention, Lack of insight into deficits  Functional Mobility Training:  Bed Mobility:  Rolling: Minimum assistance  Supine to Sit: Minimum assistance     Scooting: Supervision        Transfers:  Sit to Stand: Contact guard assistance  Stand to Sit: Contact guard assistance        Bed to Chair: Contact guard assistance (RW)         Balance:  Sitting: Intact  Sitting - Static: Good (unsupported)  Sitting - Dynamic: Good (unsupported)  Standing: Impaired  Standing - Static: Constant support;Good;Fair  Standing - Dynamic : Constant support;Good;Fair  Ambulation/Gait Training:  Distance (ft): 35 Feet (ft) (3, 32)  Assistive Device: Gait belt;Walker, rolling  Ambulation - Level of Assistance: Contact guard assistance        Gait Abnormalities: Decreased step clearance (slight increased trunk flexion)        Base of Support: Widened     Speed/Mary: Pace decreased (<100 feet/min)  Step Length: Left shortened;Right shortened         Pain Rating:  Reports abdominal pain, but doesn't rate the pain    Activity Tolerance:   Fair and SpO2 stable on RA    After treatment patient left in no apparent distress:   Sitting in chair and Call bell within reach, chair alarm activated    COMMUNICATION/COLLABORATION:   The patients plan of care was discussed with: Occupational therapist and Registered nurse.      Elana Corey PT   Time Calculation: 53 mins

## 2021-09-10 NOTE — PROGRESS NOTES
Message sent to Dr. Brittanie Rossi re: Pt needs hard script for hydrocodone brought with him to [facility], looks like a prescription was sent to his pharmacy [facility] in [place]. He is getting picked up at 1900 tonight to be brought there.

## 2021-09-10 NOTE — DISCHARGE INSTRUCTIONS
Appendectomy: What to Expect at Home  Your Recovery     Your doctor removed your appendix either by making many small cuts, called incisions, in your belly (laparoscopic surgery) or through open surgery. In open surgery, the doctor makes one large incision. The incisions leave scars that usually fade over time. After your surgery, it is normal to feel weak and tired for several days after you return home. Your belly may be swollen and may be painful. If you had laparoscopic surgery, you may have pain in your shoulder for about 24 hours. You may also feel sick to your stomach and have diarrhea, constipation, gas, or a headache. This usually goes away in a few days. Your recovery time depends on the type of surgery you had. If you had laparoscopic surgery, you will probably be able to return to work or a normal routine 1 to 3 weeks after surgery. If you had an open surgery, it may take 2 to 4 weeks. If your appendix ruptured, you may have a drain in your incision. Your body will work fine without an appendix. You will not have to make any changes in your diet or lifestyle. This care sheet gives you a general idea about how long it will take for you to recover. But each person recovers at a different pace. Follow the steps below to get better as quickly as possible. How can you care for yourself at home? Activity  · Rest when you feel tired. Getting enough sleep will help you recover. · Try to walk each day. Start by walking a little more than you did the day before. Bit by bit, increase the amount you walk. Walking boosts blood flow and helps prevent pneumonia and constipation. · For about 2 weeks, avoid lifting anything that would make you strain. This may include a child, heavy grocery bags and milk containers, a heavy briefcase or backpack, cat litter or dog food bags, or a vacuum .   · Avoid strenuous activities, such as bicycle riding, jogging, weight lifting, or aerobic exercise, until your doctor says it is okay. · You may be able to take showers (unless you have a drain near your incision) 24 to 48 hours after surgery. Pat the incision dry. Do not take a bath for the first 2 weeks, or until your doctor tells you it is okay. If you have a drain near your incision, follow your doctor's instructions. · You may drive when you are no longer taking pain medicine and can quickly move your foot from the gas pedal to the brake. You must also be able to sit comfortably for a long period of time, even if you do not plan on going far. You might get caught in traffic. · You will probably be able to go back to work in 1 to 3 weeks. If you had an open surgery, it may take 3 to 4 weeks. · Your doctor will tell you when you can have sex again. Diet  · You can eat your normal diet. If your stomach is upset, try bland, low-fat foods like plain rice, broiled chicken, toast, and yogurt. · Drink plenty of fluids (unless your doctor tells you not to). · You may notice that your bowel movements are not regular right after your surgery. This is common. Try to avoid constipation and straining with bowel movements. You may want to take a fiber supplement every day. If you have not had a bowel movement after a couple of days, ask your doctor about taking a mild laxative. Medicines  · If your appendix ruptured, you will need to take antibiotics. Take them as directed. Do not stop taking them just because you feel better. You need to take the full course of antibiotics. · Be safe with medicines. Take pain medicines exactly as directed. ¨ If the doctor gave you a prescription medicine for pain, take it as prescribed. ¨ If you are not taking a prescription pain medicine, take an over-the-counter medicine such as acetaminophen (Tylenol), ibuprofen (Advil, Motrin), or naproxen (Aleve). Read and follow all instructions on the label. ¨ Do not take two or more pain medicines at the same time unless the doctor told you to. Many pain medicines have acetaminophen, which is Tylenol. Too much Tylenol can be harmful. · If you think your pain medicine is making you sick to your stomach:  ¨ Take your medicine after meals (unless your doctor has told you not to). ¨ Ask your doctor for a different pain medicine. Incision care  · If you had an open surgery, you may have staples in your incision. The doctor will take these out in 7 to 10 days. · If you have strips of tape on the incision, leave the tape on for a week or until it falls off. · You may wash the area with warm, soapy water 24 to 48 hours after your surgery, unless your doctor tells you not to. Pat the area dry. · Keep the area clean and dry. You may cover it with a gauze bandage if it weeps or rubs against clothing. Change the bandage every day. · If your appendix ruptured, you may have an incision with packing in it. Change the packing as often as your doctor tells you to. ¨ Packing changes may hurt at first. Taking pain medicine about half an hour before you change the dressing can help. ¨ If your dressing sticks to your wound, try soaking it with warm water for about 10 minutes before you remove it. You can do this in the shower or by placing a wet washcloth over the dressing. ¨ Remove the old packing and flush the incision with water. Gently pat the top area dry. ¨ The size of the incision determines how much gauze you need to put inside. Fold the gauze over once, but do not wad it up so that it hurts. Put it in the wound carefully. You want to keep the sides of the wound from touching. A cotton swab may help you push the gauze in as needed. ¨ Put a gauze pad over the wound, and tape it down. ¨ You may notice greenish gray fluid seeping from your wound as you start to heal. This is normal. It is a sign that your wound is healing. Follow-up care is a key part of your treatment and safety.  Be sure to make and go to all appointments, and call your doctor if you are having problems. It's also a good idea to know your test results and keep a list of the medicines you take. When should you call for help? Call 911 anytime you think you may need emergency care. For example, call if:  · You passed out (lost consciousness). · You have sudden chest pain and shortness of breath, or you cough up blood. · You have severe trouble breathing. Call your doctor now or seek immediate medical care if:  · You are sick to your stomach and cannot drink fluids. · You have severe diarrhea. · You have pain that does not get better when you take your pain medicine. · You have signs of infection, such as:  ¨ Increased pain, swelling, warmth, or redness. ¨ Red streaks leading from the wound. ¨ Pus draining from the wound. ¨ A fever. · You have loose stitches, or your incision comes open. · Bright red blood has soaked through a large bandage. · You have signs of a blood clot, such as:  ¨ Pain in your calf, back of knee, thigh, or groin. ¨ Redness and swelling in your leg or groin. Watch closely for any changes in your health, and be sure to contact your doctor if:  · You had a laparoscopic surgery and your shoulder pain lasts more than 24 hours. · You have leakage around your drain or you have no new fluid in the drain for 24 hours. · The amount of drainage suddenly increases, or the color and texture changes. · You do not have a bowel movement after taking a laxative. Where can you learn more? Go to [employer]  Enter X801 in the search box to learn more about \"Appendectomy: What to Expect at Home. \"   © 8696-7337 [employer], Incorporated. Care instructions adapted under license by [employer] (which disclaims liability or warranty for this information).  This care instruction is for use with your licensed healthcare professional. If you have questions about a medical condition or this instruction, always ask your healthcare professional. [employer] disclaims any warranty or liability for your use of this information.   Content Version: 77.1.159117; Current as of: November 14, 2014

## 2021-09-10 NOTE — PROGRESS NOTES
Problem: Self Care Deficits Care Plan (Adult)  Goal: *Acute Goals and Plan of Care (Insert Text)  Description:   FUNCTIONAL STATUS PRIOR TO ADMISSION: Independent with ADL. Admits to x2 falls. Drives. HOME SUPPORT: Resided at [facility]. Required assist with medication management, meals and housekeeping only. Occupational Therapy Goals  Initiated 9/8/2021  1. Patient will perform self-feeding with independence within 7 day(s). 2.  Patient will perform grooming standing at the sink with independence within 7 day(s). 3.  Patient will perform upper body dressing with independence within 7 day(s). 4.  Patient will perform lower body dressing with independence within 7 days. 5.  Patient will perform toilet transfers with independence within 7 day(s). 6.  Patient will perform all aspects of toileting with independence within 7 day(s). 7.  Patient will perform self-care item retrieval from various heights with independence within 7 days. Outcome: Progressing Towards Goal   OCCUPATIONAL THERAPY TREATMENT  Patient: Matthew Chin (40 y.o. male)  Date: 9/10/2021  Diagnosis: Acute appendicitis with localized peritonitis, without perforation, abscess, or gangrene [K35.30]  S/P appendectomy [Z90.49] Acute appendicitis with localized peritonitis, without perforation, abscess, or gangrene  Procedure(s) (LRB):  APPENDECTOMY LAPAROSCOPIC (N/A) 4 Days Post-Op  Precautions: Fall  Chart, occupational therapy assessment, plan of care, and goals were reviewed. ASSESSMENT  Patient continues with skilled OT services and is progressing towards goals. Pt with mild hypotension yet stable at end of session. Exhibits improving alertness, activity tolerance, strength and coordination this date. Pt is extremely [place] and benefits from low tone when communicating education/instruction. Pt was received supine in bed following episode of bowel incontinence requiring Total A for rear buddy and donning protective undergarment.  Pt completed supine<>sit with Min A and required Max A to don socks d/t p! with trunk flexion post-op and decreased BLE AROM. Pt completed bed<>chair transfer with only CGA using RW. Pts mentation is improving since initial OT evaluation, yet he continues to exhibit difficulty with word finding and admits to confusion although he was A&Ox4. Continue to recommend SNF level rehab once medically cleared. Current Level of Function Impacting Discharge (ADLs): Setup to Max A with ADL, Min A with bed mobility, CGA with short distance transfers using RW. Other factors to consider for discharge: Fall risk, Mild confusion, From halfway. PLAN :  Patient continues to benefit from skilled intervention to address the above impairments. Continue treatment per established plan of care to address goals. Recommend with staff: x1 assist to bathroom for toileting needs. OOB to chair all meals. Recommend next OT session: Continue POC. Toileting. Trial long handled AE for distal LB dressing. Recommendation for discharge: (in order for the patient to meet his/her long term goals)  Therapy up to 5 days/week in SNF setting    This discharge recommendation:  Has been made in collaboration with the attending provider and/or case management    IF patient discharges home will need the following DME: Defer to rehab. Will benefit from long handled AE to don LB clothing items. SUBJECTIVE:   Patient stated I don't feel dizzy. \"    OBJECTIVE DATA SUMMARY:   Cognitive/Behavioral Status:  Neurologic State: Alert  Orientation Level: Appropriate for age;Oriented X4  Cognition: Follows commands;Decreased attention/concentration;Memory loss     Functional Mobility and Transfers for ADLs:  Bed Mobility:  Rolling: Minimum assistance  Supine to Sit: Minimum assistance  Scooting: Supervision    Transfers:  Sit to Stand: Contact guard assistance  Functional Transfers  Toilet Transfer : Contact guard assistance  Bed to Chair: Contact guard assistance (RW)    Balance:  Sitting: Intact  Sitting - Static: Good (unsupported)  Sitting - Dynamic: Good (unsupported)  Standing: Impaired  Standing - Static: Constant support;Good;Fair  Standing - Dynamic : Constant support;Good;Fair    ADL Intervention:     Educated Pt on importance of active engagement in ADL and remaining OOB in chair 3x daily for meals and basic ADL to maximize strength/endurance. Pt briefly crossed LLE however unable to cross RLE 2/2 decreased AROM and difficulty coordinating tailor sit position. Will benefit from long handled AE. Lower Body Dressing Assistance  Protective Undergarmet: Total assistance (dependent) (supine)  Socks: Maximum assistance (Max A for L, Total for R d/t impaired hip AROM)  Position Performed: Seated edge of bed    Toileting  Bowel Hygiene: Total assistance (dependent)  Cues: Verbal cues provided (for rolling)    Therapeutic Exercises:  2x5 b/l shoulder presses  2x5 b/l ankle pumps  1x10 reps shoulder flexion    Pain:  C/o 8/10 abdominal pain following bed<>chair transfer that subsided and Pt verbalized \"feeling much better\" after a few minutes. Activity Tolerance:   Fair and mild hypotension   HR stable     After treatment patient left in no apparent distress:   Sitting in chair, Call bell within reach, and Bed / chair alarm activated    COMMUNICATION/COLLABORATION:   The patients plan of care was discussed with: Physical therapist, Registered nurse, and Patient .      Jasmin Crane, CHANDNIR/L  Time Calculation: 51 mins

## 2021-09-10 NOTE — DISCHARGE SUMMARY
Post- Surgical Discharge Summary    Patient ID:  Diogo Torres  [id number]  male  80 y.o.  12/30/1928    Admit date: 9/6/2021    Discharge date: 9/10/2021    Admitting Physician: Emir Morocho MD     Consulting Physician(s):   Treatment Team: Attending Provider: Elizabeth Darden MD; Consulting Provider: Elizabeth Darden MD; Utilization Review: Eden Patton RN; Consulting Provider: NANCY Landeros; Care Manager: Grace Morley; Tech: Oscar Coronado; Primary Nurse: Sade Quiroz RN    Date of Surgery:   9/6/2021     Preoperative Diagnosis:  Acute appendicitis    Postoperative Diagnosis:   perforated appendicitis with abscess    Procedure(s):  APPENDECTOMY LAPAROSCOPIC     Anesthesia Type:   General     Surgeon: Elizabeth Darden MD                            HPI:  Pt is a 80 y.o. male who has a history of Acute appendicitis who presents at this time for a lap appendectomy following the failure of conservative management. Problem List:   Problem List as of 9/10/2021 Date Reviewed: 9/6/2021        Codes Class Noted - Resolved    S/P appendectomy ICD-10-CM: Z90.49  ICD-9-CM: V45.89  9/8/2021 - Present        * (Principal) Acute appendicitis with localized peritonitis, without perforation, abscess, or gangrene ICD-10-CM: K35.30  ICD-9-CM: 540.9  9/6/2021 - Present        Advanced age ICD-10-CM: R48  ICD-9-CM: 797  9/6/2021 - Present        Cystitis ICD-10-CM: N30.90  ICD-9-CM: 595.9  7/19/2021 - Present        Dehydration, moderate ICD-10-CM: E86.0  ICD-9-CM: 276.51  7/16/2021 - Present        Acute renal failure (ARF) ([facility] ICD-10-CM: N17.9  ICD-9-CM: 584.9  7/15/2021 - Present        Type 2 diabetes mellitus with peripheral vascular disease ([facility] ICD-10-CM: E11.51  ICD-9-CM: 250.70, 443.81  1/19/2021 - Present        Bedbug bite ICD-10-CM: W57. XXXA  ICD-9-CM: 919.4  1/19/2021 - Present        Controlled type 2 diabetes mellitus without complication, with long-term current use of insulin (HCC) ICD-10-CM: E11.9, Z79.4  ICD-9-CM: 250.00, V58.67  1/19/2021 - Present        Coronary atherosclerosis of native coronary artery ICD-10-CM: I25.10  ICD-9-CM: 414.01  Unknown - Present        HCVD (hypertensive cardiovascular disease) ICD-10-CM: I11.9  ICD-9-CM: 402.90  Unknown - Present        Pure hypercholesterolemia ICD-10-CM: E78.00  ICD-9-CM: 272.0  Unknown - Present        Diabetes mellitus, type II ([facility] ICD-10-CM: E11.9  ICD-9-CM: 250.00  Unknown - Present        Carotid artery disease ([facility] ICD-10-CM: I77.9  ICD-9-CM: 447.9  Unknown - Present               Hospital Course: The patient underwent surgery. Intra-operative complications: None; patient tolerated the procedure well. Was taken to the PACU in stable condition and then transferred to the surgical floor. [id number] is final. No additional intervention needed. Perioperative Antibiotics: Levaquin and Metronidazole    Post Op complications: None    Incisions - clean, dry and intact. No significant erythema or swelling. Wound(s) appear to be healing without any evidence of infection. Patient mobilized with nursing and was found to be safe and steady with ambulation. Discharged to: SNF    Condition on Discharge: Stable     Discharge instructions:    - Take pain medications as prescribed  - Diet Regular  - Discharge activity:    - Activity as tolerated    - Ambulate several times a day   - No heavy lifting for 4 weeks   - Do not drive for two weeks or while on opioid pain medications  - Wound Care: Keep wound(s) clean and dry. See discharge instruction sheet. Allergies:     Allergies   Allergen Reactions    Aspirin Other (comments)     NOSE BLEED  MG IS TAKEN  Can take low dose aspirin    Pcn [Penicillins] Shortness of Breath     Itching/hives              -DISCHARGE MEDICATION LIST     Current Discharge Medication List      START taking these medications    Details   HYDROcodone-acetaminophen (NORCO) 5-325 mg per tablet Take 1 Tablet by mouth every four (4) hours as needed for Pain for up to 3 days. Max Daily Amount: 6 Tablets. Qty: 15 Tablet, Refills: 0  Start date: 9/10/2021, End date: 9/13/2021    Associated Diagnoses: Acute appendicitis, unspecified acute appendicitis type         CONTINUE these medications which have NOT CHANGED    Details   acetaminophen (TYLENOL) 325 mg tablet Take 2 Tablets by mouth every six (6) hours as needed. amLODIPine (NORVASC) 5 mg tablet Take 1 tablet by mouth once daily  Qty: 90 Tab, Refills: 1      atorvastatin (LIPITOR) 40 mg tablet Take 1 Tab by mouth nightly. Qty: 90 Tab, Refills: 1      lisinopriL (PRINIVIL, ZESTRIL) 10 mg tablet Take 1 tablet by mouth twice daily  Qty: 180 Tab, Refills: 1      insulin aspart protamine/insulin aspart (NovoLOG Mix 70-30FlexPen U-100) 100 unit/mL (70-30) inpn Administer 30 units in the am and 10 units in the evening by subcutaneous injection. Qty: 5 Adjustable Dose Pre-filled Pen Syringe, Refills: 5      cholecalciferol, vitamin D3, (VITAMIN D3) 2,000 unit tab Take  by mouth daily. aspirin delayed-release 81 mg tablet Take 81 mg by mouth daily. ferrous sulfate (IRON) 325 mg (65 mg iron) tablet Take  by mouth Daily (before breakfast). per medical continuation form      -Follow up in office in 2 weeks      Signed:  Hector Rubin.  Britta Alberts  MSN, APRN, FNP-C, [facility]  Surgical Nurse Practitioner    9/10/2021  2:51 PM

## 2021-09-11 NOTE — PROGRESS NOTES
Transport at bedside to pick pt up, pt discharged with all personal belonging family is aware of discharge at this time.

## 2021-09-13 ENCOUNTER — PATIENT OUTREACH (OUTPATIENT)
Dept: CASE MANAGEMENT | Age: 86
End: 2021-09-13

## 2021-09-13 NOTE — PROGRESS NOTES
Transition of care outreach postponed for 14 days due to patient's discharge to SNF.   orange 9/6-9/10/21 ED [facility] apendicitis d/c [facility] f/u 14d

## 2021-09-15 ENCOUNTER — PATIENT OUTREACH (OUTPATIENT)
Dept: CASE MANAGEMENT | Age: 86
End: 2021-09-15

## 2021-09-21 NOTE — PROGRESS NOTES
Post Acute Facility Update     *The information contained in this note was received during a weekly care coordination call with the post acute facility*    Current Facility: [address] [place] [address] ([facility])  Anticipated Discharge Date: 4 weeks, TBD  Facility Nursing Update: no current updates  Facility Social Work Update: no current updates  Bundle Program Status: none  Upper Extremity Assistance: Stand by Assist - Presence and Cueing  Lower Extremity Assistance: Moderate Assistance   Bed Mobility: Independent  Transfers: Minimal Assistance   Ambulation: Contact Guard Assist - hands on patient for balance   How far (in feet) is the patient ambulating?  50  Device Used: walker  Barriers to Discharge: TBRODO ODELL, RN  [employer]

## 2021-09-22 ENCOUNTER — PATIENT OUTREACH (OUTPATIENT)
Dept: CASE MANAGEMENT | Age: 86
End: 2021-09-22

## 2021-09-23 NOTE — PROGRESS NOTES
Post Acute Facility Update     *The information contained in this note was received during a weekly care coordination call with the post acute facility*    Current Facility: [address] (SNF)  Anticipated Discharge Date: 3 weeks, date TBD    Facility Nursing Update: no current updates  Facility Social Work Update: no current updates  Bundle Program Status: none  Upper Extremity Assistance: Stand by Assist - Presence and Cueing  Lower Extremity Assistance: Minimal Assistance   Bed Mobility: Stand by Assist - Presence and Cueing  Transfers: Contact Guard Assist - hands on patient for balance   Ambulation: Contact Guard Assist - hands on patient for balance   How far (in feet) is the patient ambulating?  150  Device Used: walker  Barriers to Discharge: stairs supervision    Yumiko SUGGSN, RN  [employer]

## 2021-09-29 ENCOUNTER — PATIENT OUTREACH (OUTPATIENT)
Dept: CASE MANAGEMENT | Age: 86
End: 2021-09-29

## 2021-09-30 NOTE — PROGRESS NOTES
[address] [place] [address] Dr Discharge Note    *The information contained in this note was received during a weekly care coordination call with the post acute facility*      Patient was discharged from [facility] ([facility]) on 9/27/2021. Patient discharged back to [facility] in [place] [address]. PCP: Humble Hankins NP  TRISTEN appointment:  Seen by in-house MD    Home Health/Outpatient orders at discharge: [address]: No coverage for [address] ordered at discharge: None  [facility] received prior to discharge: N/A    LPN Care Coordinator managing patient: SAMIR Shahid. Community Care Team will sign off at this time. Medications were not reconciled and general patient assessment was not completed during this skilled nursing facility outreach.      JOSSY Morales  [facility] Team

## 2021-10-01 ENCOUNTER — PATIENT OUTREACH (OUTPATIENT)
Dept: CASE MANAGEMENT | Age: 86
End: 2021-10-01

## 2021-10-01 NOTE — PROGRESS NOTES
Transition of care outreach postponed for 14 days due to patient's discharge to SNF.   D/C to [address] [place] [address] 9/10 and to [facility] 9/27/21 follow up 14d

## 2021-10-12 ENCOUNTER — PATIENT OUTREACH (OUTPATIENT)
Dept: CASE MANAGEMENT | Age: 86
End: 2021-10-12

## 2021-10-12 NOTE — PROGRESS NOTES
D/C to [address] [place] [address] 9/10 and to [facility] 9/27/21  Still admitted at 30d signing off

## 2022-03-18 PROBLEM — N17.9 ACUTE RENAL FAILURE (ARF) (HCC): Status: ACTIVE | Noted: 2021-07-15

## 2022-03-18 PROBLEM — E11.51 TYPE 2 DIABETES MELLITUS WITH PERIPHERAL VASCULAR DISEASE (HCC): Status: ACTIVE | Noted: 2021-01-19

## 2022-03-18 PROBLEM — N30.90 CYSTITIS: Status: ACTIVE | Noted: 2021-07-19
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
## 2022-03-19 PROBLEM — Z79.4 CONTROLLED TYPE 2 DIABETES MELLITUS WITHOUT COMPLICATION, WITH LONG-TERM CURRENT USE OF INSULIN (HCC): Status: ACTIVE | Noted: 2021-01-19

## 2022-03-19 PROBLEM — E11.9 CONTROLLED TYPE 2 DIABETES MELLITUS WITHOUT COMPLICATION, WITH LONG-TERM CURRENT USE OF INSULIN (HCC): Status: ACTIVE | Noted: 2021-01-19

## 2022-03-19 PROBLEM — R54 ADVANCED AGE: Status: ACTIVE | Noted: 2021-09-06

## 2022-03-19 PROBLEM — Z90.49 S/P APPENDECTOMY: Status: ACTIVE | Noted: 2021-09-08

## 2022-03-19 PROBLEM — E86.0 DEHYDRATION, MODERATE: Status: ACTIVE | Noted: 2021-07-16

## 2022-03-20 PROBLEM — K35.30 ACUTE APPENDICITIS WITH LOCALIZED PERITONITIS, WITHOUT PERFORATION, ABSCESS, OR GANGRENE: Status: ACTIVE | Noted: 2021-09-06

## 2022-05-06 ENCOUNTER — HOSPITAL ENCOUNTER (OUTPATIENT)
Dept: LAB | Age: 87
Discharge: HOME | End: 2022-05-06
Payer: MEDICARE

## 2022-05-06 LAB
ALBUMIN SERPL-MCNC: 3.6 G/DL (ref 3.5–5)
ALBUMIN/GLOB SERPL: 1 {RATIO} (ref 1.1–2.2)
ALP SERPL-CCNC: 101 U/L (ref 45–117)
ALT SERPL-CCNC: 31 U/L (ref 12–78)
ANION GAP SERPL CALC-SCNC: 9 MMOL/L (ref 5–15)
AST SERPL-CCNC: 28 U/L (ref 15–37)
BASOPHILS # BLD: 0 K/UL (ref 0–0.1)
BASOPHILS NFR BLD: 0 % (ref 0–1)
BILIRUB SERPL-MCNC: 0.6 MG/DL (ref 0.2–1)
BUN SERPL-MCNC: 22 MG/DL (ref 6–20)
BUN/CREAT SERPL: 17 (ref 12–20)
CALCIUM SERPL-MCNC: 9.5 MG/DL (ref 8.5–10.1)
CHLORIDE SERPL-SCNC: 102 MMOL/L (ref 97–108)
CO2 SERPL-SCNC: 29 MMOL/L (ref 21–32)
CREAT SERPL-MCNC: 1.26 MG/DL (ref 0.7–1.3)
DIFFERENTIAL METHOD BLD: ABNORMAL
EOSINOPHIL # BLD: 0.2 K/UL (ref 0–0.4)
EOSINOPHIL NFR BLD: 2 % (ref 0–7)
ERYTHROCYTE [DISTWIDTH] IN BLOOD BY AUTOMATED COUNT: 13.2 % (ref 11.5–14.5)
EST. AVERAGE GLUCOSE BLD GHB EST-MCNC: 169 MG/DL
GLOBULIN SER CALC-MCNC: 3.6 G/DL (ref 2–4)
GLUCOSE SERPL-MCNC: 154 MG/DL (ref 65–100)
HBA1C MFR BLD: 7.5 % (ref 4–5.6)
HCT VFR BLD AUTO: 37.6 % (ref 36.6–50.3)
HGB BLD-MCNC: 12.4 G/DL (ref 12.1–17)
IMM GRANULOCYTES # BLD AUTO: 0 K/UL (ref 0–0.04)
IMM GRANULOCYTES NFR BLD AUTO: 0 % (ref 0–0.5)
LYMPHOCYTES # BLD: 3.3 K/UL (ref 0.8–3.5)
LYMPHOCYTES NFR BLD: 47 % (ref 12–49)
MAGNESIUM SERPL-MCNC: 1.9 MG/DL (ref 1.6–2.4)
MCH RBC QN AUTO: 30.6 PG (ref 26–34)
MCHC RBC AUTO-ENTMCNC: 33 G/DL (ref 30–36.5)
MCV RBC AUTO: 92.8 FL (ref 80–99)
MONOCYTES # BLD: 0.6 K/UL (ref 0–1)
MONOCYTES NFR BLD: 8 % (ref 5–13)
NEUTS SEG # BLD: 3 K/UL (ref 1.8–8)
NEUTS SEG NFR BLD: 43 % (ref 32–75)
NRBC # BLD: 0 K/UL (ref 0–0.01)
NRBC BLD-RTO: 0 PER 100 WBC
PLATELET # BLD AUTO: 223 K/UL (ref 150–400)
PMV BLD AUTO: 9.9 FL (ref 8.9–12.9)
POTASSIUM SERPL-SCNC: 4.1 MMOL/L (ref 3.5–5.1)
PROT SERPL-MCNC: 7.2 G/DL (ref 6.4–8.2)
RBC # BLD AUTO: 4.05 M/UL (ref 4.1–5.7)
SODIUM SERPL-SCNC: 140 MMOL/L (ref 136–145)
WBC # BLD AUTO: 7 K/UL (ref 4.1–11.1)

## 2022-05-06 PROCEDURE — 80053 COMPREHEN METABOLIC PANEL: CPT

## 2022-05-06 PROCEDURE — 83036 HEMOGLOBIN GLYCOSYLATED A1C: CPT

## 2022-05-06 PROCEDURE — 83735 ASSAY OF MAGNESIUM: CPT

## 2022-05-06 PROCEDURE — 82607 VITAMIN B-12: CPT

## 2022-05-06 PROCEDURE — 36415 COLL VENOUS BLD VENIPUNCTURE: CPT

## 2022-05-06 PROCEDURE — 82306 VITAMIN D 25 HYDROXY: CPT

## 2022-05-06 PROCEDURE — 85025 COMPLETE CBC W/AUTO DIFF WBC: CPT

## 2022-05-07 LAB
25(OH)D3 SERPL-MCNC: 43.7 NG/ML (ref 30–100)
FOLATE SERPL-MCNC: 22.1 NG/ML (ref 5–21)
VIT B12 SERPL-MCNC: 303 PG/ML (ref 193–986)

## 2022-05-26 ENCOUNTER — HOSPITAL ENCOUNTER (EMERGENCY)
Age: 87
Discharge: HOME | End: 2022-05-27
Attending: FAMILY MEDICINE
Payer: MEDICARE

## 2022-05-26 VITALS
SYSTOLIC BLOOD PRESSURE: 172 MMHG | BODY MASS INDEX: 25.1 KG/M2 | DIASTOLIC BLOOD PRESSURE: 69 MMHG | OXYGEN SATURATION: 99 % | RESPIRATION RATE: 16 BRPM | HEART RATE: 69 BPM | TEMPERATURE: 98.1 F | WEIGHT: 180 LBS

## 2022-05-26 DIAGNOSIS — R73.9 HYPERGLYCEMIA: Primary | ICD-10-CM

## 2022-05-26 LAB
ALBUMIN SERPL-MCNC: 3.6 G/DL (ref 3.5–5)
ALBUMIN/GLOB SERPL: 1 {RATIO} (ref 1.1–2.2)
ALP SERPL-CCNC: 154 U/L (ref 45–117)
ALT SERPL-CCNC: 30 U/L (ref 12–78)
ANION GAP SERPL CALC-SCNC: 11 MMOL/L (ref 5–15)
AST SERPL-CCNC: 32 U/L (ref 15–37)
BASOPHILS # BLD: 0 K/UL (ref 0–0.1)
BASOPHILS NFR BLD: 0 % (ref 0–1)
BILIRUB SERPL-MCNC: 0.6 MG/DL (ref 0.2–1)
BUN SERPL-MCNC: 19 MG/DL (ref 6–20)
BUN/CREAT SERPL: 14 (ref 12–20)
CALCIUM SERPL-MCNC: 9.1 MG/DL (ref 8.5–10.1)
CHLORIDE SERPL-SCNC: 102 MMOL/L (ref 97–108)
CO2 SERPL-SCNC: 23 MMOL/L (ref 21–32)
CREAT SERPL-MCNC: 1.34 MG/DL (ref 0.7–1.3)
DIFFERENTIAL METHOD BLD: ABNORMAL
EOSINOPHIL # BLD: 0.1 K/UL (ref 0–0.4)
EOSINOPHIL NFR BLD: 2 % (ref 0–7)
ERYTHROCYTE [DISTWIDTH] IN BLOOD BY AUTOMATED COUNT: 13.2 % (ref 11.5–14.5)
GLOBULIN SER CALC-MCNC: 3.7 G/DL (ref 2–4)
GLUCOSE BLD STRIP.AUTO-MCNC: 360 MG/DL (ref 65–117)
GLUCOSE SERPL-MCNC: 342 MG/DL (ref 65–100)
HCT VFR BLD AUTO: 33.7 % (ref 36.6–50.3)
HGB BLD-MCNC: 11.5 G/DL (ref 12.1–17)
IMM GRANULOCYTES # BLD AUTO: 0 K/UL (ref 0–0.04)
IMM GRANULOCYTES NFR BLD AUTO: 0 % (ref 0–0.5)
LIPASE SERPL-CCNC: 141 U/L (ref 73–393)
LYMPHOCYTES # BLD: 3.3 K/UL (ref 0.8–3.5)
LYMPHOCYTES NFR BLD: 44 % (ref 12–49)
MAGNESIUM SERPL-MCNC: 1.8 MG/DL (ref 1.6–2.4)
MCH RBC QN AUTO: 30.9 PG (ref 26–34)
MCHC RBC AUTO-ENTMCNC: 34.1 G/DL (ref 30–36.5)
MCV RBC AUTO: 90.6 FL (ref 80–99)
MONOCYTES # BLD: 0.7 K/UL (ref 0–1)
MONOCYTES NFR BLD: 9 % (ref 5–13)
NEUTS SEG # BLD: 3.4 K/UL (ref 1.8–8)
NEUTS SEG NFR BLD: 45 % (ref 32–75)
NRBC # BLD: 0 K/UL (ref 0–0.01)
NRBC BLD-RTO: 0 PER 100 WBC
PLATELET # BLD AUTO: 179 K/UL (ref 150–400)
PMV BLD AUTO: 9.8 FL (ref 8.9–12.9)
POTASSIUM SERPL-SCNC: 4.6 MMOL/L (ref 3.5–5.1)
PROT SERPL-MCNC: 7.3 G/DL (ref 6.4–8.2)
RBC # BLD AUTO: 3.72 M/UL (ref 4.1–5.7)
SERVICE CMNT-IMP: ABNORMAL
SODIUM SERPL-SCNC: 136 MMOL/L (ref 136–145)
WBC # BLD AUTO: 7.6 K/UL (ref 4.1–11.1)

## 2022-05-26 PROCEDURE — 99284 EMERGENCY DEPT VISIT MOD MDM: CPT

## 2022-05-26 PROCEDURE — 36415 COLL VENOUS BLD VENIPUNCTURE: CPT

## 2022-05-26 PROCEDURE — 83735 ASSAY OF MAGNESIUM: CPT

## 2022-05-26 PROCEDURE — 74011636637 HC RX REV CODE- 636/637: Performed by: FAMILY MEDICINE

## 2022-05-26 PROCEDURE — 80053 COMPREHEN METABOLIC PANEL: CPT

## 2022-05-26 PROCEDURE — 81003 URINALYSIS AUTO W/O SCOPE: CPT

## 2022-05-26 PROCEDURE — 82962 GLUCOSE BLOOD TEST: CPT

## 2022-05-26 PROCEDURE — 83690 ASSAY OF LIPASE: CPT

## 2022-05-26 PROCEDURE — 85025 COMPLETE CBC W/AUTO DIFF WBC: CPT

## 2022-05-26 RX ORDER — SODIUM CHLORIDE 9 MG/ML
100 INJECTION, SOLUTION INTRAVENOUS ONCE
Status: DISCONTINUED | OUTPATIENT
Start: 2022-05-26 | End: 2022-05-26

## 2022-05-26 RX ORDER — SODIUM CHLORIDE 0.9 % (FLUSH) 0.9 %
5-10 SYRINGE (ML) INJECTION ONCE
Status: DISCONTINUED | OUTPATIENT
Start: 2022-05-26 | End: 2022-05-27 | Stop reason: HOSPADM

## 2022-05-26 RX ADMIN — Medication 6 UNITS: at 23:26

## 2022-05-27 LAB
APPEARANCE UR: CLEAR
BACTERIA URNS QL MICRO: NEGATIVE /HPF
BILIRUB UR QL: NEGATIVE
COLOR UR: ABNORMAL
EPITH CASTS URNS QL MICRO: ABNORMAL /LPF
GLUCOSE BLD STRIP.AUTO-MCNC: 242 MG/DL (ref 65–117)
GLUCOSE BLD STRIP.AUTO-MCNC: 305 MG/DL (ref 65–117)
GLUCOSE UR STRIP.AUTO-MCNC: >1000 MG/DL
HGB UR QL STRIP: ABNORMAL
KETONES UR QL STRIP.AUTO: NEGATIVE MG/DL
LEUKOCYTE ESTERASE UR QL STRIP.AUTO: NEGATIVE
NITRITE UR QL STRIP.AUTO: NEGATIVE
PH UR STRIP: 7 [PH] (ref 5–8)
PROT UR STRIP-MCNC: NEGATIVE MG/DL
RBC #/AREA URNS HPF: ABNORMAL /HPF (ref 0–5)
SERVICE CMNT-IMP: ABNORMAL
SERVICE CMNT-IMP: ABNORMAL
SP GR UR REFRACTOMETRY: 1.01 (ref 1–1.03)
UROBILINOGEN UR QL STRIP.AUTO: 0.2 EU/DL (ref 0.2–1)
WBC URNS QL MICRO: ABNORMAL /HPF (ref 0–4)

## 2022-05-27 PROCEDURE — 82962 GLUCOSE BLOOD TEST: CPT

## 2022-05-27 NOTE — ED NOTES
[facility] blood sugar was 369 at last check, staff physician requested that the patient be sent to the emergency room for a dose of insulin because the facility is out of insulin at this time.

## 2022-05-27 NOTE — ED TRIAGE NOTES
Patient was sent to Emergency room because of high blood sugar and the sending facility is out of insulin.

## 2022-05-27 NOTE — PROGRESS NOTES
Contacted [facility] to arrange BLS transport of patient back to [facility]. Spoke with Eliel. Discussed patient condition, and need for transport. ETA is [phone]. ED is aware.

## 2022-05-27 NOTE — ED NOTES
TRANSFER - OUT REPORT:    Verbal report given to Olga Sidhu Rattler  being transferred to [facility]  for routine progression of care       Report consisted of patients Situation, Background, Assessment and   Recommendations(SBAR). Information from the following report(s) SBAR and MAR was reviewed with the receiving nurse. Lines:   Saline Lock 05/26/22 Anterior;Right Hand (Active)        Opportunity for questions and clarification was provided.       Patient transported with:   [employer]

## 2022-05-27 NOTE — ED NOTES
Patient was given additional blankets for comfort. Will recheck blood sugar, one hour from the time of the last check. Will continue to monitor.

## 2022-05-27 NOTE — ED PROVIDER NOTES
[address] [place] [address]   EMERGENCY DEPARTMENT          Date: 5/26/2022  Patient: Lm Nesbitt MRN: [id number]  SSN: [id number]    YOB: 1928  Age: 80 y.o. Sex: male      PCP: Dimitry Hogan NP  The patient arrived by ambulance and is alone. History of Presenting Illness     Chief Complaint   Patient presents with    High Blood Sugar       History Provided By: Patient    HPI: Lm Nesbitt is a 80 y.o. male, pmhx Perforated appendicitis with abscess repair September 6, 2021, type II diabetes with peripheral vascular disease, coronary artery disease status post PTCA with stenting, hypertension, hyperlipidemia carotid artery disease, , who presents via EMS to the ED c/o blood sugar. Patient states that he has no specific complaints Yisel Pen that he feels well. He is an old onset diabetic who uses insulin twice a day. At his nursing facility they ran out of his NovoLog 70/30 that he is supposed to get twice a day. He did not receive insulin tonight. They noted that his blood sugar was in the 369 range and the doctor on-call referred patient to come to hospital for insulin injection. Patient himself says that he has no blurred vision dry mouth or polyuria. He has had no chest pain heaviness pressure fever sweats chills. He has had no cough. He has had no dysuria urgency or frequency. He initially refused to come to hospital but was encouraged to come for an insulin injection. States that he has been eating and drinking well has had no change in his activity and feels very normal.  Review of his records from his assisted living facility show a range of blood sugars from . Blood sugars are usually in the lower range in the morning and higher range in the evening. Evening sugars appear to have a range of 106-340 in the evenings.       Past History     Past Medical History:   Diagnosis Date    Carotid artery disease ([address]     Coronary atherosclerosis of native coronary artery     Diabetes mellitus, type II [facility] [address]     DJD (degenerative joint disease)     HCVD (hypertensive cardiovascular disease)     Pure hypercholesterolemia        Past Surgical History:   Procedure Laterality Date    HX CATARACT REMOVAL Bilateral     HX HEART CATHETERIZATION  2010    LVEDP 9, mild ant hypo EF 55-60%, LAD 70% 99%, mod D1, OM3 30%, PDA 90%    HX PTCA  2010    Stent-LAD 2.5X18 ANDREA    HX PTCA  3/2010    Stent PDA ANDREA       Family History   Problem Relation Age of Onset    Heart Disease Mother     Cancer Father        Social History     Tobacco Use    Smoking status: Former Smoker     Quit date: 10/17/1963     Years since quittin.5    Smokeless tobacco: Never Used   Vaping Use    Vaping Use: Never used   Substance Use Topics    Alcohol use: Not Currently     Comment: stopped 2018/used to drink heavy    Drug use: Never       Allergies   Allergen Reactions    Aspirin Other (comments)     NOSE BLEED  MG IS TAKEN  Can take low dose aspirin    Pcn [Penicillins] Shortness of Breath     Itching/hives       Current Outpatient Medications   Medication Sig Dispense Refill    acetaminophen (TYLENOL) 325 mg tablet Take 2 Tablets by mouth every six (6) hours as needed.  amLODIPine (NORVASC) 5 mg tablet Take 1 tablet by mouth once daily 90 Tab 1    atorvastatin (LIPITOR) 40 mg tablet Take 1 Tab by mouth nightly. 90 Tab 1    lisinopriL (PRINIVIL, ZESTRIL) 10 mg tablet Take 1 tablet by mouth twice daily 180 Tab 1    insulin aspart protamine/insulin aspart (NovoLOG Mix 70-30FlexPen U-100) 100 unit/mL (70-30) inpn Administer 30 units in the am and 10 units in the evening by subcutaneous injection. 5 Adjustable Dose Pre-filled Pen Syringe 5    cholecalciferol, vitamin D3, (VITAMIN D3) 2,000 unit tab Take  by mouth daily.  aspirin delayed-release 81 mg tablet Take 81 mg by mouth daily.       ferrous sulfate (IRON) 325 mg (65 mg iron) tablet Take  by mouth Daily (before breakfast). Review of Systems     Review of Systems   All other systems reviewed and are negative. Physical Exam     Physical Exam  Vitals and nursing note reviewed. Constitutional:       General: He is not in acute distress. Appearance: Normal appearance. He is normal weight. He is not ill-appearing or diaphoretic. HENT:      Head: Normocephalic and atraumatic. Right Ear: External ear normal.      Left Ear: External ear normal.      Nose: Nose normal.      Mouth/Throat:      Mouth: Mucous membranes are moist.   Eyes:      Extraocular Movements: Extraocular movements intact. Conjunctiva/sclera: Conjunctivae normal.      Pupils: Pupils are equal, round, and reactive to light. Cardiovascular:      Rate and Rhythm: Normal rate. Heart sounds: Normal heart sounds. No murmur heard. No friction rub. No gallop. Pulmonary:      Effort: Pulmonary effort is normal. No respiratory distress. Breath sounds: Normal breath sounds. Abdominal:      General: There is no distension. Palpations: Abdomen is soft. Tenderness: There is no abdominal tenderness. Musculoskeletal:         General: No swelling or tenderness. Normal range of motion. Cervical back: Normal range of motion and neck supple. Comments: Proximately 3 cm raised soft purple lesion on left forearm, possible hemangioma nontender   Skin:     General: Skin is warm and dry. Capillary Refill: Capillary refill takes less than 2 seconds. Neurological:      Mental Status: He is alert and oriented to person, place, and time. Cranial Nerves: Cranial nerve deficit present. Sensory: No sensory deficit. Motor: No weakness. Coordination: Coordination normal.      Comments: Patient is hard of hearing and wears hearing aids   Psychiatric:         Mood and Affect: Mood normal.         Behavior: Behavior normal.         Thought Content:  Thought content normal.         Judgment: Judgment normal.         Diagnostic Study Results     Labs -     Recent Results (from the past 48 hour(s))   GLUCOSE, POC    Collection Time: 05/26/22 10:36 PM   Result Value Ref Range    Glucose (POC) 360 (H) 65 - 117 mg/dL    Performed by Christina Car (PCT)    CBC WITH AUTOMATED DIFF    Collection Time: 05/26/22 10:45 PM   Result Value Ref Range    WBC 7.6 4.1 - 11.1 K/uL    RBC 3.72 (L) 4.10 - 5.70 M/uL    HGB 11.5 (L) 12.1 - 17.0 g/dL    HCT 33.7 (L) 36.6 - 50.3 %    MCV 90.6 80.0 - 99.0 FL    MCH 30.9 26.0 - 34.0 PG    MCHC 34.1 30.0 - 36.5 g/dL    RDW 13.2 11.5 - 14.5 %    PLATELET 527 042 - 895 K/uL    MPV 9.8 8.9 - 12.9 FL    NRBC 0.0 0  WBC    ABSOLUTE NRBC 0.00 0.00 - 0.01 K/uL    NEUTROPHILS 45 32 - 75 %    LYMPHOCYTES 44 12 - 49 %    MONOCYTES 9 5 - 13 %    EOSINOPHILS 2 0 - 7 %    BASOPHILS 0 0 - 1 %    IMMATURE GRANULOCYTES 0 0.0 - 0.5 %    ABS. NEUTROPHILS 3.4 1.8 - 8.0 K/UL    ABS. LYMPHOCYTES 3.3 0.8 - 3.5 K/UL    ABS. MONOCYTES 0.7 0.0 - 1.0 K/UL    ABS. EOSINOPHILS 0.1 0.0 - 0.4 K/UL    ABS. BASOPHILS 0.0 0.0 - 0.1 K/UL    ABS. IMM. GRANS. 0.0 0.00 - 0.04 K/UL    DF AUTOMATED     METABOLIC PANEL, COMPREHENSIVE    Collection Time: 05/26/22 10:45 PM   Result Value Ref Range    Sodium 136 136 - 145 mmol/L    Potassium 4.6 3.5 - 5.1 mmol/L    Chloride 102 97 - 108 mmol/L    CO2 23 21 - 32 mmol/L    Anion gap 11 5 - 15 mmol/L    Glucose 342 (H) 65 - 100 mg/dL    BUN 19 6 - 20 MG/DL    Creatinine 1.34 (H) 0.70 - 1.30 MG/DL    BUN/Creatinine ratio 14 12 - 20      GFR est AA >60 >60 ml/min/1.73m2    GFR est non-AA 50 (L) >60 ml/min/1.73m2    Calcium 9.1 8.5 - 10.1 MG/DL    Bilirubin, total 0.6 0.2 - 1.0 MG/DL    ALT (SGPT) 30 12 - 78 U/L    AST (SGOT) 32 15 - 37 U/L    Alk.  phosphatase 154 (H) 45 - 117 U/L    Protein, total 7.3 6.4 - 8.2 g/dL    Albumin 3.6 3.5 - 5.0 g/dL    Globulin 3.7 2.0 - 4.0 g/dL    A-G Ratio 1.0 (L) 1.1 - 2.2     LIPASE    Collection Time: 05/26/22 10:45 PM   Result Value Ref Range    Lipase 141 73 - 393 U/L   MAGNESIUM    Collection Time: 05/26/22 10:45 PM   Result Value Ref Range    Magnesium 1.8 1.6 - 2.4 mg/dL   URINALYSIS W/ RFLX MICROSCOPIC    Collection Time: 05/26/22 11:30 PM   Result Value Ref Range    Color YELLOW/STRAW      Appearance CLEAR CLEAR      Specific gravity 1.015 1.003 - 1.030      pH (UA) 7.0 5.0 - 8.0      Protein Negative NEG mg/dL    Glucose >1,000 (A) NEG mg/dL    Ketone Negative NEG mg/dL    Bilirubin Negative NEG      Blood TRACE (A) NEG      Urobilinogen 0.2 0.2 - 1.0 EU/dL    Nitrites Negative NEG      Leukocyte Esterase Negative NEG      WBC 0-4 0 - 4 /hpf    RBC 5-10 0 - 5 /hpf    Epithelial cells FEW FEW /lpf    Bacteria Negative NEG /hpf   GLUCOSE, POC    Collection Time: 05/27/22 12:20 AM   Result Value Ref Range    Glucose (POC) 305 (H) 65 - 117 mg/dL    Performed by Denny Bhandari    GLUCOSE, POC    Collection Time: 05/27/22  1:28 AM   Result Value Ref Range    Glucose (POC) 242 (H) 65 - 117 mg/dL    Performed by Denny Bhandari         Radiologic Studies -   CT Results  (Last 48 hours)    None        No orders to display         Procedures     Procedures      Medical Decision Making     Records Reviewed: Nursing Notes, Old Medical Records, Previous Laboratory Studies and Nursing home record    Vital Signs  Patient Vitals for the past 24 hrs:   Temp Pulse Resp BP SpO2   05/26/22 2243 -- -- -- -- 99 %   05/26/22 2232 98.1 °F (36.7 °C) 69 16 (!) 172/69 99 %       Pulse Oximetry Analysis - 99% on RA    I am the first provider for this patient.     I reviewed the vital signs, available nursing notes, past medical history, past surgical history, family history and social history, performed a physical exam and reviewed labs from this visit    MDM  Number of Diagnoses or Management Options  Hyperglycemia: established, worsening     Amount and/or Complexity of Data Reviewed  Clinical lab tests: ordered and reviewed  Tests in the medicine section of CPT®: ordered and reviewed    Risk of Complications, Morbidity, and/or Mortality  Presenting problems: moderate  Diagnostic procedures: moderate  Management options: moderate  General comments: Patient basically presents without complaints for his nighttime insulin dose because his healthcare facility had run out of his prescription. .  We were unable to provide NPH or equivalent combination insulin products so he was given regular insulin 6 units with improvement of his glucose to the 240 range. Patient was discharged home stable. Patient Progress  Patient progress: improved      ED Course     Initial assessment performed. The patients presenting problems have been discussed, and they are in agreement with the care plan formulated and outlined with them. I have encouraged them to ask questions as they arise throughout their visit. ED Course as of 05/27/22 0758   Thu May 26, 2022   2313 Do Not have NovoLog 70/30 or Humalog 75/25 here at this facility. Patient usually gets NovoLog 70/30, 20 units after supper. Discussed with pharmacy, will give patient NPH 14 units and regular insulin 6 units subcutaneously should be equivalent. [PS]   Fri May 27, 2022   0022 We do not have NPH insulin in our hospital.  All That is available is regular and Lantus. Have given patient 6 units of regular and will recheck and dose with more insulin if needed. [PS]   0200 Sugar down to 242 after 6 units of regular. Will recheck sugar in 2 hours and redose if needed  Patient will need transfer back to his assisted living facility. [PS]   0202 Labs reviewed.   Magnesium normal lipase normal, CMP with sodium 136, potassium 4.6, BUN 19 creatinine 1.34, glucose 342, liver function studies remarkable for slightly elevated alk phos of 154, urinalysis is unremarkable except for glucose, CBC with a white count of 7600, hemoglobin 11.5, platelet count 023, normal differential. [PS]      ED Course User Index  [PS] Diya Cano MD Orders Placed This Encounter    CBC WITH AUTOMATED DIFF    CMP    LIPASE    MAGNESIUM    URINALYSIS W/ RFLX MICROSCOPIC    GLUCOSE, POC    GLUCOSE, POC    GLUCOSE, POC    DISCONTD: sodium chloride (NS) flush 5-10 mL    DISCONTD: 0.9% sodium chloride infusion    DISCONTD: insulin lispro protamine/insulin lispro (HUMALOG MIX 75/25) injection 20 Units    insulin regular (NOVOLIN R, HUMULIN R) injection 6 Units    DISCONTD: insulin NPH (NOVOLIN N, HUMULIN N) injection 14 Units       MEDICATIONS GIVEN:    Medications   insulin regular (NOVOLIN R, HUMULIN R) injection 6 Units (6 Units SubCUTAneous Given 5/26/22 9174)         Diagnosis     Clinical Impression:   1. Hyperglycemia            Disposition     Discharge note:    I have reviewed all pertinent and currently available diagnostic test results for this visit including, but not limited to, labs, xrays, and EKGs. I have reviewed all pertinent and currently available medical records. My plan of care and further evaluation and/or disposition is based on these results, as well as the initial, and subsequent, history and physical exam, as well as any additional complaints during the visit. Pt has been re-examined, appears to be stable  states that they are feeling better and have no new complaints. Patient has nontoxic appearance and condition is stable for discharge. Laboratory tests, medications, diagnosis, follow up plan and return instructions have been reviewed and discussed with the patient and/or family. Pt and/or family were instructed on symptoms that may arise after discharge requiring re-evaluation by a physician. Pt and/or family have had the opportunity to ask questions about their care. Patient and/or family verbalized understanding and agreement with care plan, follow up and return instructions. Patient and/or family agree to return if their symptoms are not improving or immediately if they have any change in their condition.      I have also put together some discharge instructions for the patient that include: 1) educational information regarding their diagnosis, 2) how to care for their diagnosis at home, as well a 3) list of reasons why they would want to return to the ED prior to their follow-up appointment, should their condition change as well as instructions to return to the ED should sx worsen at any time. Vital signs stable for discharge. PLAN:  1. Discharge home in stable condition  2. Discharge Medication List as of 5/27/2022  3:01 AM        3. Follow-up Information     Follow up With Specialties Details Why Contact Info    Olivia High NP Nurse Practitioner In 1 day Follow up todays symptoms. [id number] [phone]          4. Discharged    Return to ED if worse    Please note, this dictation was completed with [employer], the computer voice recognition software. Quite often unanticipated grammatical, syntax, homophones, and other interpretive errors are inadvertently transcribed by the computer software. Please disregard these errors. Please excuse any errors that have escaped final proof reading.       Katarzyna Baig MD

## 2022-10-03 ENCOUNTER — APPOINTMENT (OUTPATIENT)
Dept: CT IMAGING | Age: 87
End: 2022-10-03
Attending: EMERGENCY MEDICINE
Payer: MEDICARE

## 2022-10-03 ENCOUNTER — HOSPITAL ENCOUNTER (EMERGENCY)
Age: 87
Discharge: NURSING, REHAB OR LONG-TERM CARE | End: 2022-10-03
Attending: EMERGENCY MEDICINE | Admitting: EMERGENCY MEDICINE
Payer: MEDICARE

## 2022-10-03 VITALS
DIASTOLIC BLOOD PRESSURE: 79 MMHG | BODY MASS INDEX: 25.1 KG/M2 | OXYGEN SATURATION: 100 % | RESPIRATION RATE: 18 BRPM | SYSTOLIC BLOOD PRESSURE: 189 MMHG | TEMPERATURE: 98 F | WEIGHT: 180 LBS | HEART RATE: 70 BPM
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
## 2022-10-03 PROCEDURE — 99284 EMERGENCY DEPT VISIT MOD MDM: CPT | Performed by: EMERGENCY MEDICINE

## 2022-10-03 PROCEDURE — 70450 CT HEAD/BRAIN W/O DYE: CPT

## 2022-10-03 NOTE — ED TRIAGE NOTES
Pt presents via EMS for a GLF that happened this morning. Pt denies any pain. Pt had unwitnessed fall. Nursing facility denies head injury.

## 2022-10-03 NOTE — ED NOTES
This writer contacted [employer] to facilitate a ride home for this patient. [facility] they will be around 0930 to transport patient.

## 2022-10-05 ENCOUNTER — HOSPITAL ENCOUNTER (OUTPATIENT)
Dept: LAB | Age: 87
Discharge: HOME | End: 2022-10-05
Payer: MEDICARE

## 2022-10-05 PROCEDURE — 87086 URINE CULTURE/COLONY COUNT: CPT

## 2022-10-05 PROCEDURE — 81001 URINALYSIS AUTO W/SCOPE: CPT

## 2022-10-06 ENCOUNTER — HOSPITAL ENCOUNTER (OUTPATIENT)
Dept: LAB | Age: 87
Discharge: HOME | End: 2022-10-06

## 2022-10-06 LAB
APPEARANCE UR: CLEAR
BACTERIA URNS QL MICRO: NEGATIVE /HPF
BILIRUB UR QL: NEGATIVE
CAOX CRY URNS QL MICRO: ABNORMAL
COLOR UR: ABNORMAL
EPITH CASTS URNS QL MICRO: ABNORMAL /LPF
GLUCOSE UR STRIP.AUTO-MCNC: NEGATIVE MG/DL
HGB UR QL STRIP: NEGATIVE
KETONES UR QL STRIP.AUTO: ABNORMAL MG/DL
LEUKOCYTE ESTERASE UR QL STRIP.AUTO: NEGATIVE
NITRITE UR QL STRIP.AUTO: NEGATIVE
PH UR STRIP: 6 [PH] (ref 5–8)
PROT UR STRIP-MCNC: ABNORMAL MG/DL
RBC #/AREA URNS HPF: ABNORMAL /HPF (ref 0–5)
SP GR UR REFRACTOMETRY: 1.02 (ref 1–1.03)
UROBILINOGEN UR QL STRIP.AUTO: 0.2 EU/DL (ref 0.2–1)
WBC URNS QL MICRO: ABNORMAL /HPF (ref 0–4)

## 2022-10-07 LAB
BACTERIA SPEC CULT: NORMAL
CC UR VC: NORMAL
SERVICE CMNT-IMP: NORMAL

## 2022-10-27 ENCOUNTER — HOSPITAL ENCOUNTER (EMERGENCY)
Age: 87
Discharge: HOME | End: 2022-10-27
Attending: EMERGENCY MEDICINE
Payer: MEDICARE

## 2022-10-27 ENCOUNTER — APPOINTMENT (OUTPATIENT)
Dept: CT IMAGING | Age: 87
End: 2022-10-27
Attending: EMERGENCY MEDICINE
Payer: MEDICARE

## 2022-10-27 ENCOUNTER — APPOINTMENT (OUTPATIENT)
Dept: GENERAL RADIOLOGY | Age: 87
End: 2022-10-27
Attending: EMERGENCY MEDICINE
Payer: MEDICARE

## 2022-10-27 VITALS
HEART RATE: 63 BPM | SYSTOLIC BLOOD PRESSURE: 174 MMHG | TEMPERATURE: 99.3 F | BODY MASS INDEX: 25.2 KG/M2 | DIASTOLIC BLOOD PRESSURE: 64 MMHG | OXYGEN SATURATION: 99 % | HEIGHT: 71 IN | WEIGHT: 180 LBS | RESPIRATION RATE: 17 BRPM

## 2022-10-27 DIAGNOSIS — U07.1 COVID-19 VIRUS INFECTION: Primary | ICD-10-CM

## 2022-10-27 DIAGNOSIS — E11.65 TYPE 2 DIABETES MELLITUS WITH HYPERGLYCEMIA, WITHOUT LONG-TERM CURRENT USE OF INSULIN (HCC): ICD-10-CM

## 2022-10-27 DIAGNOSIS — I10 ESSENTIAL HYPERTENSION: ICD-10-CM

## 2022-10-27 DIAGNOSIS — E86.0 DEHYDRATION: ICD-10-CM

## 2022-10-27 LAB
ANION GAP SERPL CALC-SCNC: 9 MMOL/L (ref 5–15)
APPEARANCE UR: CLEAR
BACTERIA URNS QL MICRO: ABNORMAL /HPF
BASOPHILS # BLD: 0 K/UL (ref 0–0.1)
BASOPHILS NFR BLD: 0 % (ref 0–1)
BILIRUB UR QL: NEGATIVE
BUN SERPL-MCNC: 17 MG/DL (ref 6–20)
BUN/CREAT SERPL: 15 (ref 12–20)
CALCIUM SERPL-MCNC: 8.5 MG/DL (ref 8.5–10.1)
CHLORIDE SERPL-SCNC: 102 MMOL/L (ref 97–108)
CO2 SERPL-SCNC: 29 MMOL/L (ref 21–32)
COLOR UR: ABNORMAL
CREAT SERPL-MCNC: 1.12 MG/DL (ref 0.7–1.3)
DIFFERENTIAL METHOD BLD: NORMAL
EOSINOPHIL # BLD: 0.2 K/UL (ref 0–0.4)
EOSINOPHIL NFR BLD: 3 % (ref 0–7)
EPITH CASTS URNS QL MICRO: ABNORMAL /LPF
ERYTHROCYTE [DISTWIDTH] IN BLOOD BY AUTOMATED COUNT: 12.9 % (ref 11.5–14.5)
GLUCOSE SERPL-MCNC: 203 MG/DL (ref 65–100)
GLUCOSE UR STRIP.AUTO-MCNC: 100 MG/DL
HCT VFR BLD AUTO: 38.8 % (ref 36.6–50.3)
HGB BLD-MCNC: 13.3 G/DL (ref 12.1–17)
HGB UR QL STRIP: ABNORMAL
IMM GRANULOCYTES # BLD AUTO: 0 K/UL (ref 0–0.04)
IMM GRANULOCYTES NFR BLD AUTO: 0 % (ref 0–0.5)
KETONES UR QL STRIP.AUTO: ABNORMAL MG/DL
LEUKOCYTE ESTERASE UR QL STRIP.AUTO: NEGATIVE
LYMPHOCYTES # BLD: 2.6 K/UL (ref 0.8–3.5)
LYMPHOCYTES NFR BLD: 44 % (ref 12–49)
MCH RBC QN AUTO: 30.7 PG (ref 26–34)
MCHC RBC AUTO-ENTMCNC: 34.3 G/DL (ref 30–36.5)
MCV RBC AUTO: 89.6 FL (ref 80–99)
MONOCYTES # BLD: 0.6 K/UL (ref 0–1)
MONOCYTES NFR BLD: 10 % (ref 5–13)
NEUTS SEG # BLD: 2.5 K/UL (ref 1.8–8)
NEUTS SEG NFR BLD: 43 % (ref 32–75)
NITRITE UR QL STRIP.AUTO: NEGATIVE
NRBC # BLD: 0 K/UL (ref 0–0.01)
NRBC BLD-RTO: 0 PER 100 WBC
PH UR STRIP: 7 [PH] (ref 5–8)
PLATELET # BLD AUTO: 182 K/UL (ref 150–400)
PMV BLD AUTO: 9.6 FL (ref 8.9–12.9)
POTASSIUM SERPL-SCNC: 4 MMOL/L (ref 3.5–5.1)
PROT UR STRIP-MCNC: 30 MG/DL
RBC # BLD AUTO: 4.33 M/UL (ref 4.1–5.7)
RBC #/AREA URNS HPF: ABNORMAL /HPF (ref 0–5)
SODIUM SERPL-SCNC: 140 MMOL/L (ref 136–145)
SP GR UR REFRACTOMETRY: 1.02 (ref 1–1.03)
UA: UC IF INDICATED,UAUC: ABNORMAL
UROBILINOGEN UR QL STRIP.AUTO: 1 EU/DL (ref 0.2–1)
WBC # BLD AUTO: 5.8 K/UL (ref 4.1–11.1)
WBC URNS QL MICRO: ABNORMAL /HPF (ref 0–4)

## 2022-10-27 PROCEDURE — 81001 URINALYSIS AUTO W/SCOPE: CPT

## 2022-10-27 PROCEDURE — 70450 CT HEAD/BRAIN W/O DYE: CPT

## 2022-10-27 PROCEDURE — 74011250636 HC RX REV CODE- 250/636: Performed by: EMERGENCY MEDICINE

## 2022-10-27 PROCEDURE — 80048 BASIC METABOLIC PNL TOTAL CA: CPT

## 2022-10-27 PROCEDURE — 85025 COMPLETE CBC W/AUTO DIFF WBC: CPT

## 2022-10-27 PROCEDURE — 36415 COLL VENOUS BLD VENIPUNCTURE: CPT

## 2022-10-27 PROCEDURE — 74011250637 HC RX REV CODE- 250/637: Performed by: EMERGENCY MEDICINE

## 2022-10-27 PROCEDURE — 99284 EMERGENCY DEPT VISIT MOD MDM: CPT

## 2022-10-27 PROCEDURE — 71045 X-RAY EXAM CHEST 1 VIEW: CPT

## 2022-10-27 RX ORDER — AMLODIPINE BESYLATE 5 MG/1
5 TABLET ORAL
Status: COMPLETED | OUTPATIENT
Start: 2022-10-27 | End: 2022-10-27

## 2022-10-27 RX ORDER — LISINOPRIL 20 MG/1
20 TABLET ORAL
Status: COMPLETED | OUTPATIENT
Start: 2022-10-27 | End: 2022-10-27

## 2022-10-27 RX ADMIN — AMLODIPINE BESYLATE 5 MG: 5 TABLET ORAL at 09:35

## 2022-10-27 RX ADMIN — SODIUM CHLORIDE 500 ML: 9 INJECTION, SOLUTION INTRAVENOUS at 09:36

## 2022-10-27 RX ADMIN — LISINOPRIL 20 MG: 20 TABLET ORAL at 09:35

## 2022-10-27 NOTE — ED NOTES
Assisted pt with getting dressed with the assistance of common [facility] staff member.   Pt placed in w/c and taken to awaiting transport [facility] [place]

## 2022-10-27 NOTE — ED TRIAGE NOTES
Pt  arrived by EMS from the [facility] for change in mental status. Per EMS staff reported for 1 week pt has had a change in mental status but per EMS pt is awake alert and oriented X4, pt is Covid positive with no complaints. Pt noted to be hypertensive but no AM medications were given. Pt reports lower back pain after a fall last week. IV was established PTA, .   Pt educated on ER flow

## 2022-10-27 NOTE — ED PROVIDER NOTES
EMERGENCY DEPARTMENT HISTORY AND PHYSICAL EXAM      Date: 10/27/2022  Patient Name: Jim Merida    History of Presenting Illness     Chief Complaint   Patient presents with    Altered mental status       History Provided By: Patient, EMS, and Nursing Home/SNF/Rehab Center    HPI: Jim Merida, 80 y.o. male with PMHx significant for hypertension, high cholesterol, DM 2, CAD, presents via EMS to the ED with cc of confusion. Patient presents from [facility]. He recently tested positive for COVID and has been confused for facility staff. He is afebrile. His blood sugar was 201 this morning he is a DNR. He has not had any of his morning meds. PMHx: Significant for hypertension, high cholesterol, DM 2, CAD  PSHx: Significant for coronary stents, LAD and PDA ANDREA 2010. EF 55 to 60%  Social Hx: Former smoker. Quit in 1963. Quit drinking alcohol in 2018. There are no other complaints, changes, or physical findings at this time. PCP: Jensen James NP    No current facility-administered medications on file prior to encounter. Current Outpatient Medications on File Prior to Encounter   Medication Sig Dispense Refill    acetaminophen (TYLENOL) 325 mg tablet Take 2 Tablets by mouth every six (6) hours as needed. amLODIPine (NORVASC) 5 mg tablet Take 1 tablet by mouth once daily 90 Tab 1    atorvastatin (LIPITOR) 40 mg tablet Take 1 Tab by mouth nightly. 90 Tab 1    lisinopriL (PRINIVIL, ZESTRIL) 10 mg tablet Take 1 tablet by mouth twice daily 180 Tab 1    insulin aspart protamine/insulin aspart (NovoLOG Mix 70-30FlexPen U-100) 100 unit/mL (70-30) inpn Administer 30 units in the am and 10 units in the evening by subcutaneous injection. 5 Adjustable Dose Pre-filled Pen Syringe 5    cholecalciferol, vitamin D3, (VITAMIN D3) 2,000 unit tab Take  by mouth daily. aspirin delayed-release 81 mg tablet Take 81 mg by mouth daily.       ferrous sulfate (IRON) 325 mg (65 mg iron) tablet Take  by mouth Daily (before breakfast). Past History     Past Medical History:  Past Medical History:   Diagnosis Date    Carotid artery disease ([facility] [address]     Coronary atherosclerosis of native coronary artery     Diabetes mellitus, type II (HCC)     DJD (degenerative joint disease)     HCVD (hypertensive cardiovascular disease)     Pure hypercholesterolemia        Past Surgical History:  Past Surgical History:   Procedure Laterality Date    HX CATARACT REMOVAL Bilateral     HX HEART CATHETERIZATION  2010    LVEDP 9, mild ant hypo EF 55-60%, LAD 70% 99%, mod D1, OM3 30%, PDA 90%    HX PTCA  2010    Stent-LAD 2.5X18 ANDREA    HX PTCA  3/2010    Stent PDA ANDREA       Family History:  Family History   Problem Relation Age of Onset    Heart Disease Mother     Cancer Father        Social History:  Social History     Tobacco Use    Smoking status: Former     Types: Cigarettes     Quit date: 10/17/1963     Years since quittin.0    Smokeless tobacco: Never   Vaping Use    Vaping Use: Never used   Substance Use Topics    Alcohol use: Not Currently     Comment: stopped 2018/used to drink heavy    Drug use: Never       Allergies: Allergies   Allergen Reactions    Aspirin Other (comments)     NOSE BLEED  MG IS TAKEN  Can take low dose aspirin    Pcn [Penicillins] Shortness of Breath     Itching/hives         Review of Systems   Review of Systems   Constitutional:  Negative for activity change, chills and fever. HENT:  Negative for congestion and sore throat. Eyes:  Negative for pain and redness. Respiratory:  Negative for cough, chest tightness and shortness of breath. Cardiovascular:  Negative for chest pain and palpitations. Gastrointestinal:  Negative for abdominal pain, diarrhea, nausea and vomiting. Genitourinary:  Negative for dysuria, frequency and urgency. Musculoskeletal:  Negative for back pain and neck pain. Skin:  Negative for rash.    Neurological:  Negative for syncope, light-headedness and headaches. Psychiatric/Behavioral:  Negative for confusion. All other systems reviewed and are negative. Physical Exam   Physical Exam  Vitals and nursing note reviewed. Constitutional:       General: He is not in acute distress. Appearance: He is well-developed. He is not diaphoretic. HENT:      Head: Normocephalic. Nose: Nose normal.      Mouth/Throat:      Pharynx: No oropharyngeal exudate. Eyes:      General: No scleral icterus. Conjunctiva/sclera: Conjunctivae normal.      Pupils: Pupils are equal, round, and reactive to light. Neck:      Thyroid: No thyromegaly. Vascular: No JVD. Trachea: No tracheal deviation. Cardiovascular:      Rate and Rhythm: Normal rate and regular rhythm. Heart sounds: No murmur heard. No friction rub. No gallop. Pulmonary:      Effort: Pulmonary effort is normal. No respiratory distress. Breath sounds: Normal breath sounds. No stridor. No wheezing or rales. Abdominal:      General: Bowel sounds are normal. There is no distension. Palpations: Abdomen is soft. Tenderness: There is no abdominal tenderness. There is no guarding or rebound. Musculoskeletal:         General: Normal range of motion. Cervical back: Normal range of motion and neck supple. Lymphadenopathy:      Cervical: No cervical adenopathy. Skin:     General: Skin is warm and dry. Findings: No erythema or rash. Neurological:      Mental Status: He is alert and oriented to person, place, and time. Cranial Nerves: No cranial nerve deficit. Motor: No abnormal muscle tone. Coordination: Coordination normal.      Comments: Calm and pleasant elderly white male. Alert to person and place. Mild confusion to time and current events. Follows commands without difficulty.    Psychiatric:         Behavior: Behavior normal.           Diagnostic Study Results     Labs -     Recent Results (from the past 12 hour(s))   CBC WITH AUTOMATED DIFF    Collection Time: 10/27/22  8:38 AM   Result Value Ref Range    WBC 5.8 4.1 - 11.1 K/uL    RBC 4.33 4.10 - 5.70 M/uL    HGB 13.3 12.1 - 17.0 g/dL    HCT 38.8 36.6 - 50.3 %    MCV 89.6 80.0 - 99.0 FL    MCH 30.7 26.0 - 34.0 PG    MCHC 34.3 30.0 - 36.5 g/dL    RDW 12.9 11.5 - 14.5 %    PLATELET 828 528 - 704 K/uL    MPV 9.6 8.9 - 12.9 FL    NRBC 0.0 0  WBC    ABSOLUTE NRBC 0.00 0.00 - 0.01 K/uL    NEUTROPHILS 43 32 - 75 %    LYMPHOCYTES 44 12 - 49 %    MONOCYTES 10 5 - 13 %    EOSINOPHILS 3 0 - 7 %    BASOPHILS 0 0 - 1 %    IMMATURE GRANULOCYTES 0 0.0 - 0.5 %    ABS. NEUTROPHILS 2.5 1.8 - 8.0 K/UL    ABS. LYMPHOCYTES 2.6 0.8 - 3.5 K/UL    ABS. MONOCYTES 0.6 0.0 - 1.0 K/UL    ABS. EOSINOPHILS 0.2 0.0 - 0.4 K/UL    ABS. BASOPHILS 0.0 0.0 - 0.1 K/UL    ABS. IMM.  GRANS. 0.0 0.00 - 0.04 K/UL    DF AUTOMATED     METABOLIC PANEL, BASIC    Collection Time: 10/27/22  8:38 AM   Result Value Ref Range    Sodium 140 136 - 145 mmol/L    Potassium 4.0 3.5 - 5.1 mmol/L    Chloride 102 97 - 108 mmol/L    CO2 29 21 - 32 mmol/L    Anion gap 9 5 - 15 mmol/L    Glucose 203 (H) 65 - 100 mg/dL    BUN 17 6 - 20 MG/DL    Creatinine 1.12 0.70 - 1.30 MG/DL    BUN/Creatinine ratio 15 12 - 20      eGFR >60 >60 ml/min/1.73m2    Calcium 8.5 8.5 - 10.1 MG/DL   URINALYSIS W/ REFLEX CULTURE    Collection Time: 10/27/22  9:12 AM    Specimen: Urine   Result Value Ref Range    Color YELLOW/STRAW      Appearance CLEAR CLEAR      Specific gravity 1.020 1.003 - 1.030      pH (UA) 7.0 5.0 - 8.0      Protein 30 (A) NEG mg/dL    Glucose 100 (A) NEG mg/dL    Ketone TRACE (A) NEG mg/dL    Bilirubin Negative NEG      Blood TRACE (A) NEG      Urobilinogen 1.0 0.2 - 1.0 EU/dL    Nitrites Negative NEG      Leukocyte Esterase Negative NEG      WBC 0-4 0 - 4 /hpf    RBC 5-10 0 - 5 /hpf    Epithelial cells MANY (A) FEW /lpf    Bacteria 1+ (A) NEG /hpf    UA:UC IF INDICATED CULTURE NOT INDICATED BY UA RESULT CNI Radiologic Studies -   XR CHEST SNGL V   Final Result   No acute cardiopulmonary abnormality. CT HEAD WO CONT   Final Result   1. No evidence of acute intracranial abnormality. 2. Unchanged generalized parenchymal volume loss and mild chronic microvascular   ischemic disease. 3. Paranasal sinus mucosal thickening as above, with small air-fluid level in   the left maxillary sinus possibly indicating acute sinusitis. Correlate   clinically. CT Results  (Last 48 hours)                 10/27/22 0855  CT HEAD WO CONT Final result    Impression:  1. No evidence of acute intracranial abnormality. 2. Unchanged generalized parenchymal volume loss and mild chronic microvascular   ischemic disease. 3. Paranasal sinus mucosal thickening as above, with small air-fluid level in   the left maxillary sinus possibly indicating acute sinusitis. Correlate   clinically. Narrative:  EXAM:  CT HEAD WO CONT       INDICATION:   ams       COMPARISON: CT head 10/27/2022. TECHNIQUE: Unenhanced CT of the head was performed using 5 mm images. Brain and   bone windows were generated. CT dose reduction was achieved through use of a   standardized protocol tailored for this examination and automatic exposure   control for dose modulation. FINDINGS:   Unchanged generalized parenchymal volume loss with commensurate dilation of the   sulci and ventricular system. Unchanged scattered periventricular and deep   white matter hypodensities, consistent with mild chronic microangiopathic   ischemic changes. Basilar cisterns are patent. No midline shift. There is no   evidence of acute infarct, hemorrhage, or extraaxial fluid collection. Complete opacification of the left frontal sinus, anterior ethmoidal air cells,   and near complete opacification of the left maxillary sinus, with associated   hyperdense inspissated secretions. Small air-fluid level in the left maxillary   sinus.  The mastoid air cells and middle ears are clear. The orbital contents are   within normal limits with bilateral lens implants. There are no significant   osseous or extracranial soft tissue lesions. CXR Results  (Last 48 hours)                 10/27/22 0901  XR CHEST SNGL V Final result    Impression:  No acute cardiopulmonary abnormality. Narrative:  EXAM:  XR CHEST SNGL V       INDICATION:  Chest pain       COMPARISON: July 15, 2021. TECHNIQUE: AP portable chest radiograph. FINDINGS: The lungs are clear. Heart size and mediastinal contours are normal.   No pleural effusion or pneumothorax. Bones are age-appropriate. Medical Decision Making   I am the first provider for this patient. I reviewed the vital signs, available nursing notes, past medical history, past surgical history, family history and social history. Vital Signs-Reviewed the patient's vital signs. Patient Vitals for the past 12 hrs:   Temp Pulse Resp BP SpO2   10/27/22 1000 -- 67 19 (!) 183/75 --   10/27/22 0935 -- 67 -- (!) 191/64 --   10/27/22 0845 -- 73 15 (!) 200/73 99 %   10/27/22 0834 -- 61 16 (!) 214/60 98 %   10/27/22 0832 99.3 °F (37.4 °C) 62 18 (!) 214/60 98 %       Pulse Oximetry Analysis - 99% on RA    Cardiac Monitor:   Rate: 67 bpm      Records Reviewed: Nursing Notes    Provider Notes (Medical Decision Making):   DDx/MDM: 25-year-old male with ported confusion since being diagnosed with COVID. Patient has a mild dry cough. He is alert and oriented to person and place here with only mild confusion to time. CBC and BMP unremarkable. Mild hyperglycemia without anion gap. Blood pressure elevated but improved with normal home meds. Mild ketonuria. Hydrated with IV fluids. CT head without any acute findings. No door deficits. Suspect confusion related to COVID-19 virus infection and mild dehydration. Discharge back to facility. ED Course:   Initial assessment performed.  The patients presenting problems have been discussed, and they are in agreement with the care plan formulated and outlined with them. I have encouraged them to ask questions as they arise throughout their visit. Progress note:    Pt noted to be feeling better , ready for discharge. Updated pt and/or family on all final lab and imaging findings. Will follow up as instructed. All questions have been answered, pt voiced understanding and agreement with plan. Specific return precautions provided as well as instructions to return to the ED should sx worsen at any time. Vital signs stable for discharge. I have also put together some discharge instructions for them that include: 1) educational information regarding their diagnosis, 2) how to care for their diagnosis at home, as well a 3) list of reasons why they would want to return to the ED prior to their follow-up appointment, should their condition change. Written by Tobias Araujo MD        Critical Care Time:   0    Disposition:  Discharge    PLAN:  1. Current Discharge Medication List        2. Follow-up Information       Follow up With Specialties Details Why Contact Info    Jessi Sanders NP Nurse Practitioner Schedule an appointment as soon as possible for a visit in 1 week  [facility] of [facility] in [facility] [place] [phone] [facility] [address] [facility]      [address] [facility] [address] [facility] Go in 1 day If symptoms worsen [facility]  [phone]          Return to ED if worse     Diagnosis     Clinical Impression:   1. COVID-19 virus infection    2. Dehydration    3. Essential hypertension    4. Type 2 diabetes mellitus with hyperglycemia, without long-term current use of insulin ([facility] [address]              Please note that this dictation was completed with [employer], the computer voice recognition software.   Quite often unanticipated grammatical, syntax, homophones, and other interpretive errors are inadvertently transcribed by the computer software. Please disregard these errors. Please excuse any errors that have escaped final proofreading.

## 2022-10-27 NOTE — ED NOTES
Pt assisted with breakfast tray.   Pt continues to be awake and alert  Pt offers no complaints at this time

## 2022-11-18 ENCOUNTER — HOSPITAL ENCOUNTER (EMERGENCY)
Age: 87
Discharge: HOME | End: 2022-11-18
Attending: EMERGENCY MEDICINE
Payer: MEDICARE

## 2022-11-18 ENCOUNTER — APPOINTMENT (OUTPATIENT)
Dept: GENERAL RADIOLOGY | Age: 87
End: 2022-11-18
Attending: EMERGENCY MEDICINE
Payer: MEDICARE

## 2022-11-18 ENCOUNTER — APPOINTMENT (OUTPATIENT)
Dept: CT IMAGING | Age: 87
End: 2022-11-18
Attending: EMERGENCY MEDICINE
Payer: MEDICARE

## 2022-11-18 VITALS
TEMPERATURE: 98.7 F | DIASTOLIC BLOOD PRESSURE: 90 MMHG | RESPIRATION RATE: 15 BRPM | SYSTOLIC BLOOD PRESSURE: 168 MMHG | BODY MASS INDEX: 24.55 KG/M2 | WEIGHT: 176 LBS | OXYGEN SATURATION: 100 % | HEART RATE: 74 BPM

## 2022-11-18 DIAGNOSIS — E86.0 DEHYDRATION: Primary | ICD-10-CM

## 2022-11-18 LAB
ALBUMIN SERPL-MCNC: 3.1 G/DL (ref 3.5–5)
ALBUMIN/GLOB SERPL: 1 {RATIO} (ref 1.1–2.2)
ALP SERPL-CCNC: 102 U/L (ref 45–117)
ALT SERPL-CCNC: 22 U/L (ref 12–78)
ANION GAP SERPL CALC-SCNC: 6 MMOL/L (ref 5–15)
APPEARANCE UR: CLEAR
AST SERPL-CCNC: 19 U/L (ref 15–37)
BACTERIA URNS QL MICRO: NEGATIVE /HPF
BASOPHILS # BLD: 0 K/UL (ref 0–0.1)
BASOPHILS NFR BLD: 0 % (ref 0–1)
BILIRUB SERPL-MCNC: 0.5 MG/DL (ref 0.2–1)
BILIRUB UR QL: NEGATIVE
BUN SERPL-MCNC: 22 MG/DL (ref 6–20)
BUN/CREAT SERPL: 15 (ref 12–20)
CALCIUM SERPL-MCNC: 8.6 MG/DL (ref 8.5–10.1)
CHLORIDE SERPL-SCNC: 103 MMOL/L (ref 97–108)
CO2 SERPL-SCNC: 28 MMOL/L (ref 21–32)
COLOR UR: ABNORMAL
CREAT SERPL-MCNC: 1.42 MG/DL (ref 0.7–1.3)
DIFFERENTIAL METHOD BLD: ABNORMAL
EOSINOPHIL # BLD: 0.1 K/UL (ref 0–0.4)
EOSINOPHIL NFR BLD: 1 % (ref 0–7)
EPITH CASTS URNS QL MICRO: ABNORMAL /LPF
ERYTHROCYTE [DISTWIDTH] IN BLOOD BY AUTOMATED COUNT: 13.5 % (ref 11.5–14.5)
GLOBULIN SER CALC-MCNC: 3.2 G/DL (ref 2–4)
GLUCOSE BLD STRIP.AUTO-MCNC: 287 MG/DL (ref 65–117)
GLUCOSE SERPL-MCNC: 295 MG/DL (ref 65–100)
GLUCOSE UR STRIP.AUTO-MCNC: NEGATIVE MG/DL
HCT VFR BLD AUTO: 32.7 % (ref 36.6–50.3)
HGB BLD-MCNC: 11.1 G/DL (ref 12.1–17)
HGB UR QL STRIP: ABNORMAL
IMM GRANULOCYTES # BLD AUTO: 0 K/UL (ref 0–0.04)
IMM GRANULOCYTES NFR BLD AUTO: 0 % (ref 0–0.5)
KETONES UR QL STRIP.AUTO: NEGATIVE MG/DL
LEUKOCYTE ESTERASE UR QL STRIP.AUTO: NEGATIVE
LYMPHOCYTES # BLD: 2.5 K/UL (ref 0.8–3.5)
LYMPHOCYTES NFR BLD: 34 % (ref 12–49)
MCH RBC QN AUTO: 31.1 PG (ref 26–34)
MCHC RBC AUTO-ENTMCNC: 33.9 G/DL (ref 30–36.5)
MCV RBC AUTO: 91.6 FL (ref 80–99)
MONOCYTES # BLD: 0.7 K/UL (ref 0–1)
MONOCYTES NFR BLD: 10 % (ref 5–13)
NEUTS SEG # BLD: 4.1 K/UL (ref 1.8–8)
NEUTS SEG NFR BLD: 55 % (ref 32–75)
NITRITE UR QL STRIP.AUTO: NEGATIVE
NRBC # BLD: 0 K/UL (ref 0–0.01)
NRBC BLD-RTO: 0 PER 100 WBC
PH UR STRIP: 7.5 [PH] (ref 5–8)
PLATELET # BLD AUTO: 189 K/UL (ref 150–400)
PMV BLD AUTO: 9.6 FL (ref 8.9–12.9)
POTASSIUM SERPL-SCNC: 4.1 MMOL/L (ref 3.5–5.1)
PROT SERPL-MCNC: 6.3 G/DL (ref 6.4–8.2)
PROT UR STRIP-MCNC: NEGATIVE MG/DL
RBC # BLD AUTO: 3.57 M/UL (ref 4.1–5.7)
RBC #/AREA URNS HPF: ABNORMAL /HPF (ref 0–5)
SERVICE CMNT-IMP: ABNORMAL
SODIUM SERPL-SCNC: 137 MMOL/L (ref 136–145)
SP GR UR REFRACTOMETRY: 1.01 (ref 1–1.03)
UA: UC IF INDICATED,UAUC: ABNORMAL
UROBILINOGEN UR QL STRIP.AUTO: 0.2 EU/DL (ref 0.2–1)
WBC # BLD AUTO: 7.5 K/UL (ref 4.1–11.1)
WBC URNS QL MICRO: ABNORMAL /HPF (ref 0–4)

## 2022-11-18 PROCEDURE — 96361 HYDRATE IV INFUSION ADD-ON: CPT

## 2022-11-18 PROCEDURE — 82962 GLUCOSE BLOOD TEST: CPT

## 2022-11-18 PROCEDURE — 51701 INSERT BLADDER CATHETER: CPT

## 2022-11-18 PROCEDURE — 70450 CT HEAD/BRAIN W/O DYE: CPT

## 2022-11-18 PROCEDURE — 99285 EMERGENCY DEPT VISIT HI MDM: CPT

## 2022-11-18 PROCEDURE — 93005 ELECTROCARDIOGRAM TRACING: CPT

## 2022-11-18 PROCEDURE — 74011250636 HC RX REV CODE- 250/636: Performed by: EMERGENCY MEDICINE

## 2022-11-18 PROCEDURE — 74011250637 HC RX REV CODE- 250/637: Performed by: EMERGENCY MEDICINE

## 2022-11-18 PROCEDURE — 81001 URINALYSIS AUTO W/SCOPE: CPT

## 2022-11-18 PROCEDURE — 36415 COLL VENOUS BLD VENIPUNCTURE: CPT

## 2022-11-18 PROCEDURE — 80053 COMPREHEN METABOLIC PANEL: CPT

## 2022-11-18 PROCEDURE — 71045 X-RAY EXAM CHEST 1 VIEW: CPT

## 2022-11-18 PROCEDURE — 85025 COMPLETE CBC W/AUTO DIFF WBC: CPT

## 2022-11-18 PROCEDURE — 96360 HYDRATION IV INFUSION INIT: CPT

## 2022-11-18 RX ORDER — LISINOPRIL 20 MG/1
10 TABLET ORAL DAILY
Status: DISCONTINUED | OUTPATIENT
Start: 2022-11-18 | End: 2022-11-19 | Stop reason: HOSPADM

## 2022-11-18 RX ORDER — AMLODIPINE BESYLATE 5 MG/1
10 TABLET ORAL DAILY
Status: DISCONTINUED | OUTPATIENT
Start: 2022-11-18 | End: 2022-11-19 | Stop reason: HOSPADM

## 2022-11-18 RX ORDER — LISINOPRIL 20 MG/1
10 TABLET ORAL DAILY
Status: DISCONTINUED | OUTPATIENT
Start: 2022-11-19 | End: 2022-11-18

## 2022-11-18 RX ORDER — AMLODIPINE BESYLATE 5 MG/1
10 TABLET ORAL DAILY
Status: DISCONTINUED | OUTPATIENT
Start: 2022-11-19 | End: 2022-11-18

## 2022-11-18 RX ADMIN — AMLODIPINE BESYLATE 10 MG: 5 TABLET ORAL at 19:07

## 2022-11-18 RX ADMIN — SODIUM CHLORIDE 500 ML: 9 INJECTION, SOLUTION INTRAVENOUS at 12:14

## 2022-11-18 RX ADMIN — LISINOPRIL 10 MG: 20 TABLET ORAL at 19:07

## 2022-11-18 RX ADMIN — SODIUM CHLORIDE 500 ML: 9 INJECTION, SOLUTION INTRAVENOUS at 13:00

## 2022-11-18 RX ADMIN — SODIUM CHLORIDE 500 ML: 9 INJECTION, SOLUTION INTRAVENOUS at 14:18

## 2022-11-18 NOTE — ED PROVIDER NOTES
EMERGENCY DEPARTMENT HISTORY AND PHYSICAL EXAM      Date: 11/18/2022  Patient Name: Hermilo Andrea    History of Presenting Illness     Chief Complaint   Patient presents with    Lethargy       History Provided By: Patient, EMS, and Nursing Home/SNF/Rehab Center    HPI: Hermilo Andrea, 80 y.o. male with PMHx significant for DM 2, high cholesterol, CAD, presents EMS to the ED with cc of altered mental status. Patient presents from [facility] assisted living. They report on rounds this morning he was confused and slow to respond, more than baseline. He reportedly had a low blood pressure and low heart rate however when EMS arrived he was hypertensive in the 901P systolic and his heart rate was 66. I saw the patient recently on October 27. In the ED for complaints of confusion. He was noted to be COVID-positive at that time. CBC and BMP were unremarkable. He had mild hyperglycemia but without anion gap. He was hydrated with IV fluids. CT head was without any acute findings. He was discharged back to the facility. PMHx: Significant for DM 2, insulin-dependent, CAD, hypertension, high cholesterol  PSHx: Significant for coronary stent PDA and LAD.  2010. Social Hx: Former smoker. Quit in 1963. There are no other complaints, changes, or physical findings at this time. PCP: Iliana Cantor NP    No current facility-administered medications on file prior to encounter. Current Outpatient Medications on File Prior to Encounter   Medication Sig Dispense Refill    acetaminophen (TYLENOL) 325 mg tablet Take 2 Tablets by mouth every six (6) hours as needed. amLODIPine (NORVASC) 5 mg tablet Take 1 tablet by mouth once daily 90 Tab 1    atorvastatin (LIPITOR) 40 mg tablet Take 1 Tab by mouth nightly.  90 Tab 1    lisinopriL (PRINIVIL, ZESTRIL) 10 mg tablet Take 1 tablet by mouth twice daily 180 Tab 1    insulin aspart protamine/insulin aspart (NovoLOG Mix 70-30FlexPen U-100) 100 unit/mL (70-30) inpn Administer 30 units in the am and 10 units in the evening by subcutaneous injection. 5 Adjustable Dose Pre-filled Pen Syringe 5    cholecalciferol, vitamin D3, (VITAMIN D3) 2,000 unit tab Take  by mouth daily. aspirin delayed-release 81 mg tablet Take 81 mg by mouth daily. ferrous sulfate (IRON) 325 mg (65 mg iron) tablet Take  by mouth Daily (before breakfast). Past History     Past Medical History:  Past Medical History:   Diagnosis Date    Carotid artery disease ([facility] [address]     Coronary atherosclerosis of native coronary artery     Diabetes mellitus, type II (HCC)     DJD (degenerative joint disease)     HCVD (hypertensive cardiovascular disease)     Pure hypercholesterolemia        Past Surgical History:  Past Surgical History:   Procedure Laterality Date    HX CATARACT REMOVAL Bilateral     HX HEART CATHETERIZATION  2010    LVEDP 9, mild ant hypo EF 55-60%, LAD 70% 99%, mod D1, OM3 30%, PDA 90%    HX PTCA  2010    Stent-LAD 2.5X18 ANDREA    HX PTCA  3/2010    Stent PDA ANDREA       Family History:  Family History   Problem Relation Age of Onset    Heart Disease Mother     Cancer Father        Social History:  Social History     Tobacco Use    Smoking status: Former     Types: Cigarettes     Quit date: 10/17/1963     Years since quittin.1    Smokeless tobacco: Never   Vaping Use    Vaping Use: Never used   Substance Use Topics    Alcohol use: Not Currently     Comment: stopped 2018/used to drink heavy    Drug use: Never       Allergies: Allergies   Allergen Reactions    Aspirin Other (comments)     NOSE BLEED  MG IS TAKEN  Can take low dose aspirin    Pcn [Penicillins] Shortness of Breath     Itching/hives         Review of Systems   Review of Systems   Constitutional:  Positive for fatigue. Negative for activity change, chills and fever. HENT:  Negative for congestion and sore throat. Eyes:  Negative for pain and redness.    Respiratory:  Negative for cough, chest tightness and shortness of breath. Cardiovascular:  Negative for chest pain and palpitations. Gastrointestinal:  Negative for abdominal pain, diarrhea, nausea and vomiting. Genitourinary:  Negative for dysuria, frequency and urgency. Musculoskeletal:  Negative for back pain and neck pain. Skin:  Negative for rash. Neurological:  Negative for syncope, light-headedness and headaches. Psychiatric/Behavioral:  Positive for confusion. All other systems reviewed and are negative. Physical Exam   Physical Exam  Vitals and nursing note reviewed. Constitutional:       General: He is not in acute distress. Appearance: He is well-developed. He is not diaphoretic. HENT:      Head: Normocephalic. Nose: Nose normal.      Mouth/Throat:      Pharynx: No oropharyngeal exudate. Eyes:      General: No scleral icterus. Conjunctiva/sclera: Conjunctivae normal.      Pupils: Pupils are equal, round, and reactive to light. Neck:      Thyroid: No thyromegaly. Vascular: No JVD. Trachea: No tracheal deviation. Cardiovascular:      Rate and Rhythm: Normal rate and regular rhythm. Heart sounds: No murmur heard. No friction rub. No gallop. Pulmonary:      Effort: Pulmonary effort is normal. No respiratory distress. Breath sounds: Normal breath sounds. No stridor. No wheezing or rales. Abdominal:      General: Bowel sounds are normal. There is no distension. Palpations: Abdomen is soft. Tenderness: There is no abdominal tenderness. There is no guarding or rebound. Musculoskeletal:         General: Normal range of motion. Cervical back: Normal range of motion and neck supple. Lymphadenopathy:      Cervical: No cervical adenopathy. Skin:     General: Skin is warm and dry. Findings: No erythema or rash. Neurological:      General: No focal deficit present. Mental Status: He is alert. Cranial Nerves: No cranial nerve deficit.       Motor: No abnormal muscle tone. Coordination: Coordination normal.   Psychiatric:         Behavior: Behavior normal.           Diagnostic Study Results     Labs -   No results found for this or any previous visit (from the past 12 hour(s)). Radiologic Studies -   CT HEAD WO CONT   Final Result   No acute intracranial hemorrhage, mass or infarct. XR CHEST SNGL V   Final Result   No evidence of acute cardiopulmonary process. CT Results  (Last 48 hours)                 11/18/22 1158  CT HEAD WO CONT Final result    Impression:  No acute intracranial hemorrhage, mass or infarct. Narrative:  INDICATION: ams, lethargy. Exam: Noncontrast CT of the brain is performed with 5 mm collimation. CT dose reduction was achieved with the use of the standardized protocol   tailored for this examination and automatic exposure control for dose   modulation. Direct comparison is made to prior CT dated October 27, 2022. FINDINGS: There is age-appropriate diffuse cortical atrophy. There is no acute   intracranial hemorrhage, mass, mass effect or herniation. Ventricular system is   normal. The gray-white matter differentiation is well-preserved. The mastoid air   cells are well pneumatized. There is near total opacification of the left   frontal sinus, left maxillary sinus and anterior left ethmoid air cells. CXR Results  (Last 48 hours)                 11/18/22 1150  XR CHEST SNGL V Final result    Impression:  No evidence of acute cardiopulmonary process. Narrative:  INDICATION:  Chest Pain        COMPARISON: October 27       FINDINGS: Single AP portable view of the chest obtained at 1150 demonstrates a   stable cardiomediastinal silhouette. The lungs are hypoinspiratory but clear   bilaterally. No osseous abnormalities are seen. Medical Decision Making   I am the first provider for this patient.     I reviewed the vital signs, available nursing notes, past medical history, past surgical history, family history and social history. Vital Signs-Reviewed the patient's vital signs. No data found. Pulse Oximetry Analysis - 100% on RA    Cardiac Monitor:   Rate: 76 bpm  Rhythm: Normal Sinus Rhythm        ED EKG interpretation:11:44  Rhythm: Sinus rhythm with first-degree AV block; and regular . Rate (approx.): 63  ; Axis: normal; NH Interval: First-degree AV block,  ms; QRS interval: normal ; ST/T wave: normal; This EKG was interpreted by Vadim Gallegos MD,ED Provider. Records Reviewed: Nursing Notes and Old Medical Records    Provider Notes (Medical Decision Making):   DDX: 30-year-old male sent for evaluation from assisted living facility/nursing home for decreased alertness. Patient alert oriented. Patient does appear very fatigued. Patient with recent COVID-19 virus infection diagnosis. Suspect this is the effect of COVID 19 virus infection along with mild dehydration. CT head without any acute findings. No neurodeficits. CBC unremarkable. No increased WBC. UA negative. CMP with mild hyperglycemia. No anion gap. Mild increase in creatinine from 1.12 on October 27 two 1.42 today. Hydrated with IV fluids. Chest x-ray clear. Patient feeling slightly better. We will plan on transfer back to nursing facility. Reviewed work-up treatment and results with patient's daughter Lisbeth Corral. ED Course:   Initial assessment performed. The patients presenting problems have been discussed, and they are in agreement with the care plan formulated and outlined with them. I have encouraged them to ask questions as they arise throughout their visit. Progress note:    Pt noted to be feeling better , ready for discharge. Updated pt and/or family on all final lab and imaging findings. Will follow up as instructed. All questions have been answered, pt voiced understanding and agreement with plan.          Specific return precautions provided as well as instructions to return to the ED should sx worsen at any time. Vital signs stable for discharge. I have also put together some discharge instructions for them that include: 1) educational information regarding their diagnosis, 2) how to care for their diagnosis at home, as well a 3) list of reasons why they would want to return to the ED prior to their follow-up appointment, should their condition change. Written by Yonny Singleton MD        Critical Care Time:   0    Disposition:  Discharge    PLAN:  1. Discharge Medication List as of 11/18/2022  4:14 PM        2. Follow-up Information       Follow up With Specialties Details Why Contact Info    Zak Denney NP Nurse Practitioner Schedule an appointment as soon as possible for a visit in 1 week  [facility] [id number] [address] [facility] Emergency Medicine Go in 1 day If symptoms worsen [facility]  [phone]          Return to ED if worse     Diagnosis     Clinical Impression:   1. Dehydration              Please note that this dictation was completed with [employer], the computer voice recognition software. Quite often unanticipated grammatical, syntax, homophones, and other interpretive errors are inadvertently transcribed by the computer software. Please disregard these errors. Please excuse any errors that have escaped final proofreading.

## 2022-11-18 NOTE — PROGRESS NOTES
S transport arranged via [address] [place] [address]). Provided patient's demographics and insurance information. ETA 2030.

## 2022-11-19 LAB
ATRIAL RATE: 63 BPM
CALCULATED P AXIS, ECG09: 61 DEGREES
CALCULATED R AXIS, ECG10: 50 DEGREES
CALCULATED T AXIS, ECG11: 8 DEGREES
DIAGNOSIS, 93000: NORMAL
P-R INTERVAL, ECG05: 234 MS
Q-T INTERVAL, ECG07: 398 MS
QRS DURATION, ECG06: 94 MS
QTC CALCULATION (BEZET), ECG08: 407 MS
VENTRICULAR RATE, ECG03: 63 BPM

## 2022-11-21 ENCOUNTER — HOSPITAL ENCOUNTER (OUTPATIENT)
Age: 87
Setting detail: OBSERVATION
Discharge: HOME | End: 2022-11-23
Attending: FAMILY MEDICINE | Admitting: HOSPITALIST
Payer: MEDICARE

## 2022-11-21 DIAGNOSIS — E16.2 HYPOGLYCEMIA: Primary | ICD-10-CM

## 2022-11-21 LAB
ANION GAP SERPL CALC-SCNC: 5 MMOL/L (ref 5–15)
ATRIAL RATE: 74 BPM
BASOPHILS # BLD: 0 K/UL (ref 0–0.1)
BASOPHILS NFR BLD: 1 % (ref 0–1)
BUN SERPL-MCNC: 37 MG/DL (ref 6–20)
BUN/CREAT SERPL: 25 (ref 12–20)
CALCIUM SERPL-MCNC: 9 MG/DL (ref 8.5–10.1)
CALCULATED P AXIS, ECG09: 36 DEGREES
CALCULATED R AXIS, ECG10: 68 DEGREES
CALCULATED T AXIS, ECG11: 35 DEGREES
CHLORIDE SERPL-SCNC: 108 MMOL/L (ref 97–108)
CO2 SERPL-SCNC: 29 MMOL/L (ref 21–32)
CREAT SERPL-MCNC: 1.51 MG/DL (ref 0.7–1.3)
DIAGNOSIS, 93000: NORMAL
DIFFERENTIAL METHOD BLD: ABNORMAL
EOSINOPHIL # BLD: 0.1 K/UL (ref 0–0.4)
EOSINOPHIL NFR BLD: 2 % (ref 0–7)
ERYTHROCYTE [DISTWIDTH] IN BLOOD BY AUTOMATED COUNT: 14.1 % (ref 11.5–14.5)
GLUCOSE BLD STRIP.AUTO-MCNC: 125 MG/DL (ref 65–117)
GLUCOSE BLD STRIP.AUTO-MCNC: 185 MG/DL (ref 65–117)
GLUCOSE BLD STRIP.AUTO-MCNC: 50 MG/DL (ref 65–117)
GLUCOSE BLD STRIP.AUTO-MCNC: 52 MG/DL (ref 65–117)
GLUCOSE BLD STRIP.AUTO-MCNC: 53 MG/DL (ref 65–117)
GLUCOSE BLD STRIP.AUTO-MCNC: 56 MG/DL (ref 65–117)
GLUCOSE BLD STRIP.AUTO-MCNC: 58 MG/DL (ref 65–117)
GLUCOSE BLD STRIP.AUTO-MCNC: 63 MG/DL (ref 65–117)
GLUCOSE SERPL-MCNC: 51 MG/DL (ref 65–100)
HCT VFR BLD AUTO: 32 % (ref 36.6–50.3)
HGB BLD-MCNC: 10.7 G/DL (ref 12.1–17)
IMM GRANULOCYTES # BLD AUTO: 0 K/UL (ref 0–0.04)
IMM GRANULOCYTES NFR BLD AUTO: 0 % (ref 0–0.5)
LYMPHOCYTES # BLD: 2.2 K/UL (ref 0.8–3.5)
LYMPHOCYTES NFR BLD: 33 % (ref 12–49)
MCH RBC QN AUTO: 30.7 PG (ref 26–34)
MCHC RBC AUTO-ENTMCNC: 33.4 G/DL (ref 30–36.5)
MCV RBC AUTO: 92 FL (ref 80–99)
MONOCYTES # BLD: 0.9 K/UL (ref 0–1)
MONOCYTES NFR BLD: 13 % (ref 5–13)
NEUTS SEG # BLD: 3.3 K/UL (ref 1.8–8)
NEUTS SEG NFR BLD: 51 % (ref 32–75)
NRBC # BLD: 0 K/UL (ref 0–0.01)
NRBC BLD-RTO: 0 PER 100 WBC
P-R INTERVAL, ECG05: 196 MS
PLATELET # BLD AUTO: 199 K/UL (ref 150–400)
PMV BLD AUTO: 10.1 FL (ref 8.9–12.9)
POTASSIUM SERPL-SCNC: 3.9 MMOL/L (ref 3.5–5.1)
Q-T INTERVAL, ECG07: 406 MS
QRS DURATION, ECG06: 94 MS
QTC CALCULATION (BEZET), ECG08: 450 MS
RBC # BLD AUTO: 3.48 M/UL (ref 4.1–5.7)
SERVICE CMNT-IMP: ABNORMAL
SODIUM SERPL-SCNC: 142 MMOL/L (ref 136–145)
VENTRICULAR RATE, ECG03: 74 BPM
WBC # BLD AUTO: 6.5 K/UL (ref 4.1–11.1)

## 2022-11-21 PROCEDURE — 80048 BASIC METABOLIC PNL TOTAL CA: CPT

## 2022-11-21 PROCEDURE — 36415 COLL VENOUS BLD VENIPUNCTURE: CPT

## 2022-11-21 PROCEDURE — 96365 THER/PROPH/DIAG IV INF INIT: CPT

## 2022-11-21 PROCEDURE — 85025 COMPLETE CBC W/AUTO DIFF WBC: CPT

## 2022-11-21 PROCEDURE — 93005 ELECTROCARDIOGRAM TRACING: CPT

## 2022-11-21 PROCEDURE — 82962 GLUCOSE BLOOD TEST: CPT

## 2022-11-21 PROCEDURE — 74011000250 HC RX REV CODE- 250: Performed by: FAMILY MEDICINE

## 2022-11-21 PROCEDURE — 99285 EMERGENCY DEPT VISIT HI MDM: CPT

## 2022-11-21 RX ORDER — ACETAMINOPHEN 325 MG/1
650 TABLET ORAL
Status: DISCONTINUED | OUTPATIENT
Start: 2022-11-21 | End: 2022-11-22 | Stop reason: SDUPTHER

## 2022-11-21 RX ORDER — DEXTROSE, SODIUM CHLORIDE, AND POTASSIUM CHLORIDE 5; .45; .15 G/100ML; G/100ML; G/100ML
75 INJECTION INTRAVENOUS CONTINUOUS
Status: DISCONTINUED | OUTPATIENT
Start: 2022-11-21 | End: 2022-11-22

## 2022-11-21 RX ORDER — DEXTROSE MONOHYDRATE 100 MG/ML
100 INJECTION, SOLUTION INTRAVENOUS CONTINUOUS
Status: DISCONTINUED | OUTPATIENT
Start: 2022-11-21 | End: 2022-11-22

## 2022-11-21 RX ADMIN — DEXTROSE MONOHYDRATE 100 ML/HR: 100 INJECTION, SOLUTION INTRAVENOUS at 22:46

## 2022-11-21 NOTE — Clinical Note
Patient Class[de-identified] OBSERVATION [193]   Type of Bed: Medical [8]   Cardiac Monitoring Required?: No   Reason for Observation: Hypoglyc   Admitting Diagnosis: Hypoglycemia [575815]   Admitting Physician: Jeneane Schirmer   Attending Physician: Jose Willis [[id number]]

## 2022-11-22 PROBLEM — I95.9 HYPOTENSION: Status: ACTIVE | Noted: 2022-11-22

## 2022-11-22 PROBLEM — I95.1 ORTHOSTATIC HYPOTENSION: Status: ACTIVE | Noted: 2022-11-22

## 2022-11-22 PROBLEM — R41.82 AMS (ALTERED MENTAL STATUS): Status: ACTIVE | Noted: 2022-11-22

## 2022-11-22 LAB
ALBUMIN SERPL-MCNC: 2.8 G/DL (ref 3.5–5)
ALBUMIN/GLOB SERPL: 0.9 {RATIO} (ref 1.1–2.2)
ALP SERPL-CCNC: 114 U/L (ref 45–117)
ALT SERPL-CCNC: 26 U/L (ref 12–78)
ANION GAP SERPL CALC-SCNC: 5 MMOL/L (ref 5–15)
APPEARANCE UR: CLEAR
AST SERPL-CCNC: 26 U/L (ref 15–37)
BACTERIA URNS QL MICRO: NEGATIVE /HPF
BASOPHILS # BLD: 0 K/UL (ref 0–0.1)
BASOPHILS NFR BLD: 0 % (ref 0–1)
BILIRUB SERPL-MCNC: 0.4 MG/DL (ref 0.2–1)
BILIRUB UR QL: NEGATIVE
BUN SERPL-MCNC: 27 MG/DL (ref 6–20)
BUN/CREAT SERPL: 21 (ref 12–20)
CALCIUM SERPL-MCNC: 8.8 MG/DL (ref 8.5–10.1)
CHLORIDE SERPL-SCNC: 106 MMOL/L (ref 97–108)
CO2 SERPL-SCNC: 28 MMOL/L (ref 21–32)
COLOR UR: ABNORMAL
CREAT SERPL-MCNC: 1.26 MG/DL (ref 0.7–1.3)
DIFFERENTIAL METHOD BLD: ABNORMAL
EOSINOPHIL # BLD: 0.2 K/UL (ref 0–0.4)
EOSINOPHIL NFR BLD: 3 % (ref 0–7)
EPITH CASTS URNS QL MICRO: ABNORMAL /LPF
ERYTHROCYTE [DISTWIDTH] IN BLOOD BY AUTOMATED COUNT: 13.9 % (ref 11.5–14.5)
EST. AVERAGE GLUCOSE BLD GHB EST-MCNC: 154 MG/DL
GLOBULIN SER CALC-MCNC: 3.1 G/DL (ref 2–4)
GLUCOSE BLD STRIP.AUTO-MCNC: 148 MG/DL (ref 65–117)
GLUCOSE BLD STRIP.AUTO-MCNC: 167 MG/DL (ref 65–117)
GLUCOSE BLD STRIP.AUTO-MCNC: 220 MG/DL (ref 65–117)
GLUCOSE BLD STRIP.AUTO-MCNC: 221 MG/DL (ref 65–117)
GLUCOSE BLD STRIP.AUTO-MCNC: 225 MG/DL (ref 65–117)
GLUCOSE BLD STRIP.AUTO-MCNC: 232 MG/DL (ref 65–117)
GLUCOSE BLD STRIP.AUTO-MCNC: 259 MG/DL (ref 65–117)
GLUCOSE BLD STRIP.AUTO-MCNC: 297 MG/DL (ref 65–117)
GLUCOSE SERPL-MCNC: 248 MG/DL (ref 65–100)
GLUCOSE UR STRIP.AUTO-MCNC: 100 MG/DL
HBA1C MFR BLD: 7 % (ref 4–5.6)
HCT VFR BLD AUTO: 32.3 % (ref 36.6–50.3)
HGB BLD-MCNC: 10.7 G/DL (ref 12.1–17)
HGB UR QL STRIP: NEGATIVE
IMM GRANULOCYTES # BLD AUTO: 0 K/UL (ref 0–0.04)
IMM GRANULOCYTES NFR BLD AUTO: 0 % (ref 0–0.5)
KETONES UR QL STRIP.AUTO: NEGATIVE MG/DL
LEUKOCYTE ESTERASE UR QL STRIP.AUTO: NEGATIVE
LYMPHOCYTES # BLD: 2.3 K/UL (ref 0.8–3.5)
LYMPHOCYTES NFR BLD: 43 % (ref 12–49)
MCH RBC QN AUTO: 30.2 PG (ref 26–34)
MCHC RBC AUTO-ENTMCNC: 33.1 G/DL (ref 30–36.5)
MCV RBC AUTO: 91.2 FL (ref 80–99)
MONOCYTES # BLD: 0.7 K/UL (ref 0–1)
MONOCYTES NFR BLD: 13 % (ref 5–13)
NEUTS SEG # BLD: 2.2 K/UL (ref 1.8–8)
NEUTS SEG NFR BLD: 41 % (ref 32–75)
NITRITE UR QL STRIP.AUTO: NEGATIVE
NRBC # BLD: 0 K/UL (ref 0–0.01)
NRBC BLD-RTO: 0 PER 100 WBC
PH UR STRIP: 6 [PH] (ref 5–8)
PLATELET # BLD AUTO: 195 K/UL (ref 150–400)
PMV BLD AUTO: 9.9 FL (ref 8.9–12.9)
POTASSIUM SERPL-SCNC: 4.2 MMOL/L (ref 3.5–5.1)
PROT SERPL-MCNC: 5.9 G/DL (ref 6.4–8.2)
PROT UR STRIP-MCNC: NEGATIVE MG/DL
RBC # BLD AUTO: 3.54 M/UL (ref 4.1–5.7)
RBC #/AREA URNS HPF: ABNORMAL /HPF (ref 0–5)
SERVICE CMNT-IMP: ABNORMAL
SODIUM SERPL-SCNC: 139 MMOL/L (ref 136–145)
SP GR UR REFRACTOMETRY: 1.01 (ref 1–1.03)
UROBILINOGEN UR QL STRIP.AUTO: 0.2 EU/DL (ref 0.2–1)
WBC # BLD AUTO: 5.4 K/UL (ref 4.1–11.1)
WBC URNS QL MICRO: ABNORMAL /HPF (ref 0–4)

## 2022-11-22 PROCEDURE — 36415 COLL VENOUS BLD VENIPUNCTURE: CPT

## 2022-11-22 PROCEDURE — 97116 GAIT TRAINING THERAPY: CPT

## 2022-11-22 PROCEDURE — 96372 THER/PROPH/DIAG INJ SC/IM: CPT

## 2022-11-22 PROCEDURE — 81001 URINALYSIS AUTO W/SCOPE: CPT

## 2022-11-22 PROCEDURE — 97165 OT EVAL LOW COMPLEX 30 MIN: CPT

## 2022-11-22 PROCEDURE — 74011250636 HC RX REV CODE- 250/636: Performed by: INTERNAL MEDICINE

## 2022-11-22 PROCEDURE — G0378 HOSPITAL OBSERVATION PER HR: HCPCS

## 2022-11-22 PROCEDURE — 82962 GLUCOSE BLOOD TEST: CPT

## 2022-11-22 PROCEDURE — 74011250637 HC RX REV CODE- 250/637: Performed by: INTERNAL MEDICINE

## 2022-11-22 PROCEDURE — 80053 COMPREHEN METABOLIC PANEL: CPT

## 2022-11-22 PROCEDURE — 74011250636 HC RX REV CODE- 250/636: Performed by: FAMILY MEDICINE

## 2022-11-22 PROCEDURE — 85025 COMPLETE CBC W/AUTO DIFF WBC: CPT

## 2022-11-22 PROCEDURE — 74011636637 HC RX REV CODE- 636/637: Performed by: INTERNAL MEDICINE

## 2022-11-22 PROCEDURE — 97161 PT EVAL LOW COMPLEX 20 MIN: CPT

## 2022-11-22 PROCEDURE — 83036 HEMOGLOBIN GLYCOSYLATED A1C: CPT

## 2022-11-22 RX ORDER — AMLODIPINE BESYLATE 5 MG/1
5 TABLET ORAL DAILY
Status: DISCONTINUED | OUTPATIENT
Start: 2022-11-22 | End: 2022-11-23 | Stop reason: HOSPADM

## 2022-11-22 RX ORDER — MIRTAZAPINE 15 MG/1
15 TABLET, FILM COATED ORAL
COMMUNITY
Start: 2022-11-18 | End: 2022-11-23

## 2022-11-22 RX ORDER — INSULIN GLARGINE 100 [IU]/ML
20 INJECTION, SOLUTION SUBCUTANEOUS DAILY
Status: DISCONTINUED | OUTPATIENT
Start: 2022-11-22 | End: 2022-11-23 | Stop reason: HOSPADM

## 2022-11-22 RX ORDER — QUETIAPINE FUMARATE 25 MG/1
25 TABLET, FILM COATED ORAL
COMMUNITY
Start: 2022-11-18

## 2022-11-22 RX ORDER — AMLODIPINE BESYLATE 5 MG/1
5 TABLET ORAL DAILY
COMMUNITY
End: 2022-11-23

## 2022-11-22 RX ORDER — ONDANSETRON 2 MG/ML
4 INJECTION INTRAMUSCULAR; INTRAVENOUS
Status: DISCONTINUED | OUTPATIENT
Start: 2022-11-22 | End: 2022-11-23 | Stop reason: HOSPADM

## 2022-11-22 RX ORDER — SODIUM CHLORIDE 0.9 % (FLUSH) 0.9 %
5-40 SYRINGE (ML) INJECTION EVERY 8 HOURS
Status: DISCONTINUED | OUTPATIENT
Start: 2022-11-22 | End: 2022-11-23 | Stop reason: HOSPADM

## 2022-11-22 RX ORDER — ONDANSETRON 4 MG/1
4 TABLET, ORALLY DISINTEGRATING ORAL
Status: DISCONTINUED | OUTPATIENT
Start: 2022-11-22 | End: 2022-11-23 | Stop reason: HOSPADM

## 2022-11-22 RX ORDER — POLYETHYLENE GLYCOL 3350 17 G/17G
17 POWDER, FOR SOLUTION ORAL DAILY PRN
Status: DISCONTINUED | OUTPATIENT
Start: 2022-11-22 | End: 2022-11-23 | Stop reason: HOSPADM

## 2022-11-22 RX ORDER — ASPIRIN 81 MG/1
81 TABLET ORAL DAILY
Status: DISCONTINUED | OUTPATIENT
Start: 2022-11-22 | End: 2022-11-23 | Stop reason: HOSPADM

## 2022-11-22 RX ORDER — ACETAMINOPHEN 325 MG/1
650 TABLET ORAL
Status: DISCONTINUED | OUTPATIENT
Start: 2022-11-22 | End: 2022-11-23 | Stop reason: HOSPADM

## 2022-11-22 RX ORDER — SODIUM CHLORIDE 9 MG/ML
50 INJECTION, SOLUTION INTRAVENOUS CONTINUOUS
Status: DISCONTINUED | OUTPATIENT
Start: 2022-11-22 | End: 2022-11-23 | Stop reason: HOSPADM

## 2022-11-22 RX ORDER — OXYBUTYNIN CHLORIDE 5 MG/1
5 TABLET ORAL
COMMUNITY
Start: 2022-10-31 | End: 2022-11-23

## 2022-11-22 RX ORDER — ATORVASTATIN CALCIUM 40 MG/1
40 TABLET, FILM COATED ORAL
Status: DISCONTINUED | OUTPATIENT
Start: 2022-11-22 | End: 2022-11-23 | Stop reason: HOSPADM

## 2022-11-22 RX ORDER — MAGNESIUM SULFATE 100 %
4 CRYSTALS MISCELLANEOUS AS NEEDED
Status: DISCONTINUED | OUTPATIENT
Start: 2022-11-22 | End: 2022-11-23 | Stop reason: HOSPADM

## 2022-11-22 RX ORDER — ENOXAPARIN SODIUM 100 MG/ML
40 INJECTION SUBCUTANEOUS DAILY
Status: DISCONTINUED | OUTPATIENT
Start: 2022-11-22 | End: 2022-11-23 | Stop reason: HOSPADM

## 2022-11-22 RX ORDER — BISMUTH SUBSALICYLATE 262 MG
1 TABLET,CHEWABLE ORAL DAILY
COMMUNITY

## 2022-11-22 RX ORDER — METOPROLOL SUCCINATE 25 MG/1
25 TABLET, EXTENDED RELEASE ORAL DAILY
COMMUNITY
Start: 2022-10-31

## 2022-11-22 RX ORDER — INSULIN LISPRO 100 [IU]/ML
INJECTION, SOLUTION INTRAVENOUS; SUBCUTANEOUS
Status: DISCONTINUED | OUTPATIENT
Start: 2022-11-22 | End: 2022-11-23 | Stop reason: HOSPADM

## 2022-11-22 RX ORDER — MAGNESIUM SULFATE 100 %
4 CRYSTALS MISCELLANEOUS AS NEEDED
Status: DISCONTINUED | OUTPATIENT
Start: 2022-11-22 | End: 2022-11-22 | Stop reason: SDUPTHER

## 2022-11-22 RX ORDER — POLYETHYLENE GLYCOL 3350 17 G/17G
17 POWDER, FOR SOLUTION ORAL DAILY
COMMUNITY

## 2022-11-22 RX ORDER — SODIUM CHLORIDE 9 MG/ML
75 INJECTION, SOLUTION INTRAVENOUS CONTINUOUS
Status: DISCONTINUED | OUTPATIENT
Start: 2022-11-22 | End: 2022-11-22

## 2022-11-22 RX ORDER — ACETAMINOPHEN 650 MG/1
650 SUPPOSITORY RECTAL
Status: DISCONTINUED | OUTPATIENT
Start: 2022-11-22 | End: 2022-11-23 | Stop reason: HOSPADM

## 2022-11-22 RX ORDER — TAMSULOSIN HYDROCHLORIDE 0.4 MG/1
0.4 CAPSULE ORAL DAILY
Status: ON HOLD | COMMUNITY
Start: 2022-10-31 | End: 2022-11-23 | Stop reason: SDUPTHER

## 2022-11-22 RX ORDER — SODIUM CHLORIDE 0.9 % (FLUSH) 0.9 %
5-40 SYRINGE (ML) INJECTION AS NEEDED
Status: DISCONTINUED | OUTPATIENT
Start: 2022-11-22 | End: 2022-11-22

## 2022-11-22 RX ADMIN — SODIUM CHLORIDE 75 ML/HR: 9 INJECTION, SOLUTION INTRAVENOUS at 03:34

## 2022-11-22 RX ADMIN — ASPIRIN 81 MG: 81 TABLET, COATED ORAL at 09:13

## 2022-11-22 RX ADMIN — ENOXAPARIN SODIUM 40 MG: 100 INJECTION SUBCUTANEOUS at 09:14

## 2022-11-22 RX ADMIN — Medication 20 UNITS: at 09:13

## 2022-11-22 RX ADMIN — AMLODIPINE BESYLATE 5 MG: 5 TABLET ORAL at 09:13

## 2022-11-22 RX ADMIN — ATORVASTATIN CALCIUM 40 MG: 40 TABLET, FILM COATED ORAL at 22:51

## 2022-11-22 RX ADMIN — Medication 2 UNITS: at 09:13

## 2022-11-22 RX ADMIN — Medication 2 UNITS: at 17:56

## 2022-11-22 RX ADMIN — SODIUM CHLORIDE 50 ML/HR: 9 INJECTION, SOLUTION INTRAVENOUS at 08:15

## 2022-11-22 RX ADMIN — POTASSIUM CHLORIDE, DEXTROSE MONOHYDRATE AND SODIUM CHLORIDE 125 ML/HR: 150; 5; 450 INJECTION, SOLUTION INTRAVENOUS at 01:00

## 2022-11-22 NOTE — PROGRESS NOTES
Care Management Interventions  PCP Verified by CM: Yes Sheri Emery)  Last Visit to PCP: 11/21/22  Palliative Care Criteria Met (RRAT>21 & CHF Dx)?: No (No MD order)  Mode of Transport at Discharge: Other (see comment) (POV)  Transition of Care Consult (CM Consult): Discharge Planning  MyChart Signup: No  Discharge Durable Medical Equipment: No  Physical Therapy Consult: Yes  Occupational Therapy Consult: Yes  Speech Therapy Consult: No  Support Systems: Assisted Living  Confirm Follow Up Transport: Family  The Plan for Transition of Care is Related to the Following Treatment Goals : Treat hypoglycemia  Discharge Location  Patient Expects to be Discharged to[de-identified] Assisted Living ([facility] [address] )    Patient is a resident of [facility] [address]. Spoke with Solomon Wills from [facility]. She said he can normally walk around the facility. Tiffanie Grissom is to send patient's medication list. Patient does have an active ACP document on file stating his son Nilson Montiel is primary healthcare decision maker and Paulo Chávez is secondary decision maker. Patient is currently in observation status. Medicare Outpatient Observation Notice (MOON) provided to patient/representative with verbal explanation of the notice. Time allotted for questions regarding the notice. Patient /representative provided a completed copy of the MOON notice. Copy placed on bedside chart. Patient also given CM information and told to call if he has any questions or concerns during his stay.      Reason for Admission:  Hypoglycemia                      RUR Score:          N/A PT IN OBS STATUS          RRAT: 20 MODERATE            Plan for utilizing home health:      TBD    PCP: First and Last name:  Zak Denney NP     Name of Practice: [place] [facility] [address]    Are you a current patient: Yes/No: Yes   Approximate date of last visit: 11/21/22   Can you participate in a virtual visit with your PCP: no Current Advanced Directive/Advance Care Plan: DNR      Healthcare Decision Maker:   Click here to complete [address] including selection of the Healthcare Decision Maker Relationship (ie \"Primary\")             Transition of Care Plan:                  Return to [facility] when medically stable. Advance Care Planning     General Advance Care Planning (ACP) Conversation      Date of Conversation: 11/21/2022  Conducted with: Patient with Decision Making Capacity    Healthcare Decision Maker:     Primary Decision Maker: Tete [place] - Daughter - [phone]    Secondary Decision Maker: Raúl Benitos - Son - [phone]  Click here to complete [address] including selection of the Healthcare Decision Maker Relationship (ie \"Primary\")    Today we documented Decision Maker(s) consistent with ACP documents on file. Content/Action Overview:    Has ACP document(s) on file - reflects the patient's care preferences  Reviewed DNR/DNI and patient   Topics discussed: treatment goals  Additional Comments : n/a       Length of Voluntary ACP Conversation in minutes:  16 minutes    [place]

## 2022-11-22 NOTE — H&P
81yo M with a PMH of DM2, CAD, HTN, HLD who presents to the emergency deparment with AMD.  He has been found to have hypoglycemia. Unsure of last dose of insulin. He will be placed on the hospitalist service and given D5 IVFs overnight.     Formal H+P to be done by the in house daytime hospitalist.

## 2022-11-22 NOTE — H&P
[facility]   Admission History & Physical        11/22/2022 7:21 AM  Patient: Amy Schmid 12/30/1928  PCP: Erika Rider NP    HISTORY  Chief Complaint:   Chief Complaint   Patient presents with    Altered mental status       HPI: 80 y.o. male presenting for admission to [facility] for further evaluation and treatment for Hypoglycemia. He  has a past medical history of Carotid artery disease ([facility] [address] Coronary atherosclerosis of native coronary artery, Diabetes mellitus, type II ([address] DJD (degenerative joint disease), HCVD (hypertensive cardiovascular disease), and Pure hypercholesterolemia. Patient referred to the ED last evening following a slump at the dinner table with altered level of consciousness. The blood pressure sitting at the facility was reported as systolic in the 09X. Hypotension was confirmed by EMS. On arrival to the ED his supine blood pressure was 126/54 heart rate in the 60s. Found to be hypoglycemic with a blood sugar in the 50s. The facility staff could not specify his insulin doses given on the day of admission or a report of blood glucose checks. Patient received D10 and D5 with improvement in the blood sugar and consciousness level. His sugars gradually improved and he was admitted for further monitoring. This AM he is sedate but easily aroused and conversant. He was able to take breakfast with assistance. Insulin dosing was tapered. Alternate tx options considered    Pt denied c/o CP, SOB, HA, GI upset, constipation, pain. He states he is ambulatory at the facility with a walker or not?       Past Medical History:  Past Medical History:   Diagnosis Date    Carotid artery disease ([facility] [address]     Coronary atherosclerosis of native coronary artery     Diabetes mellitus, type II (HCC)     DJD (degenerative joint disease)     HCVD (hypertensive cardiovascular disease)     Pure hypercholesterolemia        Past Surgical History:  Past Surgical History:   Procedure Laterality Date    HX CATARACT REMOVAL Bilateral     HX HEART CATHETERIZATION  2010    LVEDP 9, mild ant hypo EF 55-60%, LAD 70% 99%, mod D1, OM3 30%, PDA 90%    HX PTCA  2010    Stent-LAD 2.5X18 ANDREA    HX PTCA  3/2010    Stent PDA ANDREA       Medication:  Prior to Admission medications    Medication Sig Start Date End Date Taking? Authorizing Provider   acetaminophen (TYLENOL) 325 mg tablet Take 2 Tablets by mouth every six (6) hours as needed. 21   Sammy King MD   amLODIPine (NORVASC) 5 mg tablet Take 1 tablet by mouth once daily 21    Route, MD   atorvastatin (LIPITOR) 40 mg tablet Take 1 Tab by mouth nightly. 21    Route, MD   lisinopriL (PRINIVIL, ZESTRIL) 10 mg tablet Take 1 tablet by mouth twice daily 21    Route, MD   insulin aspart protamine/insulin aspart (NovoLOG Mix 70-30FlexPen U-100) 100 unit/mL (70-30) inpn Administer 30 units in the am and 10 units in the evening by subcutaneous injection. 3/11/21   Daisha Cruz MD   cholecalciferol, vitamin D3, 50 mcg (2,000 unit) tab Take  by mouth daily. Provider, Historical   aspirin delayed-release 81 mg tablet Take 81 mg by mouth daily. Provider, Historical   ferrous sulfate 325 mg (65 mg iron) tablet Take  by mouth Daily (before breakfast). Provider, Historical       Allergies:   Allergies   Allergen Reactions    Aspirin Other (comments)     NOSE BLEED  MG IS TAKEN  Can take low dose aspirin    Pcn [Penicillins] Shortness of Breath     Itching/hives       Social History:  Social History     Tobacco Use    Smoking status: Former     Types: Cigarettes     Quit date: 10/17/1963     Years since quittin.1    Smokeless tobacco: Never   Vaping Use    Vaping Use: Never used   Substance Use Topics    Alcohol use: Not Currently     Comment: stopped 2018/used to drink heavy    Drug use: Never       Family History:  Family History   Problem Relation Age of Onset    Heart Disease Mother     Cancer Father        ROS:   no reliable history available    Total of 12 systems reviewed as follows:  POSITIVE= bolded text  Negative = text not bolded       General:  fever, chills, sweats, generalized weakness, weight loss/gain, loss of appetite   Eyes:    blurred vision, eye pain, loss of vision, double vision  ENT:    rhinorrhea, pharyngitis   Respiratory:  cough, sputum production, SOB, FLETCHER, wheezing, pleuritic pain   Cardiology:   chest pain, palpitations, orthopnea, PND, edema, syncope   Gastrointestinal:  abdominal pain , N/V, diarrhea, dysphagia, constipation, bleeding   Genitourinary:  frequency, urgency, dysuria, hematuria, incontinence, prostatism   Muskuloskeletal: arthralgia, myalgia, back pain  Hematology:   easy bruising, nose or gum bleeding, lymphadenopathy   Dermatological: rash, ulceration, pruritis, color change / jaundice  Endocrine:   hot flashes or polydipsia   Neurological:  headache, dizziness, confusion, focal weakness, paresthesia, speech difficulties, memory loss, gait difficulty  Psychological: feelings of anxiety, depression, agitation      PHYSICAL EXAM:  Patient Vitals for the past 24 hrs:   Temp Pulse Resp BP SpO2   11/22/22 0101 98.1 °F (36.7 °C) 72 16 (!) 187/77 100 %   11/22/22 0026 -- -- -- (!) 144/52 100 %   11/22/22 0011 -- -- -- (!) 131/51 100 %   11/22/22 0004 -- -- -- (!) 126/50 100 %   11/21/22 2349 -- -- -- (!) 127/49 100 %   11/21/22 2334 -- -- -- (!) 142/82 100 %   11/21/22 2322 -- -- -- (!) 153/72 100 %   11/21/22 2307 -- -- -- (!) 165/61 100 %   11/21/22 2252 -- -- -- (!) 163/59 100 %   11/21/22 2237 -- -- -- (!) 154/62 --   11/21/22 2222 -- -- -- (!) 146/69 --   11/21/22 2207 -- -- -- (!) 170/73 --   11/21/22 2152 -- -- -- (!) 156/60 100 %   11/21/22 2137 -- -- -- (!) 155/68 --   11/21/22 2132 -- 64 15 -- --   11/21/22 2120 -- -- -- 123/66 --   11/21/22 2112 -- -- -- -- 95 %   11/21/22 2108 -- 67 15 -- --   11/21/22 2100 -- -- -- (!) 140/45 99 % 11/21/22 2051 -- 76 16 (!) 126/54 98 %   11/21/22 2036 -- -- -- (!) 138/54 --   11/21/22 2022 -- -- -- (!) 141/55 --   11/21/22 2010 97.7 °F (36.5 °C) 71 15 (!) 149/56 99 %       General:    Sedate but easily aroused, cooperative, no distress, appears stated age. HEENT: Atraumatic, anicteric sclerae, pink conjunctivae     No oral ulcers, mucosa moist, throat clear, dentition fair  Neck:  Supple, symmetrical;   thyroid non tender  Lungs:   Clear to auscultation bilaterally. No wheezing or rhonchi. No rales. Chest wall:  No tenderness. No accessory muscle use. Heart:   Regular rhythm. No  murmur. No edema  Abdomen:   Soft, non-tender. Not distended. Bowel sounds normal  Extremities: No cyanosis. No clubbing      Capillary refill normal,  Radial pulse 2+,  DP 1+  Skin:     Not pale. Not jaundiced. No rashes   Psych:  Not depressed. Not anxious or agitated. Neurologic: EOMs intact. No facial asymmetry. No aphasia or slurred speech. Symmetrical strength, Sensation grossly intact.  Alert     Lab Data Reviewed:    Recent Results (from the past 24 hour(s))   EKG, 12 LEAD, INITIAL    Collection Time: 11/21/22  8:13 PM   Result Value Ref Range    Ventricular Rate 74 BPM    Atrial Rate 74 BPM    P-R Interval 196 ms    QRS Duration 94 ms    Q-T Interval 406 ms    QTC Calculation (Bezet) 450 ms    Calculated P Axis 36 degrees    Calculated R Axis 68 degrees    Calculated T Axis 35 degrees    Diagnosis       Normal sinus rhythm  Normal ECG  When compared with ECG of 18-NOV-2022 11:44,  MO interval has decreased  Confirmed by Lourdes Agudelo MD, Eneida Lyn ([id number]) on 11/21/2022 9:58:18 PM     CBC WITH AUTOMATED DIFF    Collection Time: 11/21/22  8:33 PM   Result Value Ref Range    WBC 6.5 4.1 - 11.1 K/uL    RBC 3.48 (L) 4.10 - 5.70 M/uL    HGB 10.7 (L) 12.1 - 17.0 g/dL    HCT 32.0 (L) 36.6 - 50.3 %    MCV 92.0 80.0 - 99.0 FL    MCH 30.7 26.0 - 34.0 PG    MCHC 33.4 30.0 - 36.5 g/dL    RDW 14.1 11.5 - 14.5 %    PLATELET 196 150 - 400 K/uL    MPV 10.1 8.9 - 12.9 FL    NRBC 0.0 0  WBC    ABSOLUTE NRBC 0.00 0.00 - 0.01 K/uL    NEUTROPHILS 51 32 - 75 %    LYMPHOCYTES 33 12 - 49 %    MONOCYTES 13 5 - 13 %    EOSINOPHILS 2 0 - 7 %    BASOPHILS 1 0 - 1 %    IMMATURE GRANULOCYTES 0 0.0 - 0.5 %    ABS. NEUTROPHILS 3.3 1.8 - 8.0 K/UL    ABS. LYMPHOCYTES 2.2 0.8 - 3.5 K/UL    ABS. MONOCYTES 0.9 0.0 - 1.0 K/UL    ABS. EOSINOPHILS 0.1 0.0 - 0.4 K/UL    ABS. BASOPHILS 0.0 0.0 - 0.1 K/UL    ABS. IMM.  GRANS. 0.0 0.00 - 0.04 K/UL    DF AUTOMATED     METABOLIC PANEL, BASIC    Collection Time: 11/21/22  8:33 PM   Result Value Ref Range    Sodium 142 136 - 145 mmol/L    Potassium 3.9 3.5 - 5.1 mmol/L    Chloride 108 97 - 108 mmol/L    CO2 29 21 - 32 mmol/L    Anion gap 5 5 - 15 mmol/L    Glucose 51 (LL) 65 - 100 mg/dL    BUN 37 (H) 6 - 20 MG/DL    Creatinine 1.51 (H) 0.70 - 1.30 MG/DL    BUN/Creatinine ratio 25 (H) 12 - 20      eGFR 43 (L) >60 ml/min/1.73m2    Calcium 9.0 8.5 - 10.1 MG/DL   GLUCOSE, POC    Collection Time: 11/21/22  9:37 PM   Result Value Ref Range    Glucose (POC) 52 (LL) 65 - 117 mg/dL    Performed by Ladyareli Christopher    GLUCOSE, POC    Collection Time: 11/21/22  9:39 PM   Result Value Ref Range    Glucose (POC) 58 (L) 65 - 117 mg/dL    Performed by Lady Christopher    GLUCOSE, POC    Collection Time: 11/21/22 10:07 PM   Result Value Ref Range    Glucose (POC) 50 (LL) 65 - 117 mg/dL    Performed by LadNozomi Photonicsek    GLUCOSE, POC    Collection Time: 11/21/22 10:09 PM   Result Value Ref Range    Glucose (POC) 63 (L) 65 - 117 mg/dL    Performed by Lady Christopher    GLUCOSE, POC    Collection Time: 11/21/22 10:10 PM   Result Value Ref Range    Glucose (POC) 53 (LL) 65 - 117 mg/dL    Performed by Lady White    GLUCOSE, POC    Collection Time: 11/21/22 10:31 PM   Result Value Ref Range    Glucose (POC) 56 (L) 65 - 117 mg/dL    Performed by Win, POC    Collection Time: 11/21/22 11:11 PM Result Value Ref Range    Glucose (POC) 125 (H) 65 - 117 mg/dL    Performed by Patrizia Horowitz, POC    Collection Time: 11/21/22 11:49 PM   Result Value Ref Range    Glucose (POC) 185 (H) 65 - 117 mg/dL    Performed by Geneva Whitehead W/MICROSCOPIC    Collection Time: 11/22/22 12:56 AM   Result Value Ref Range    Color YELLOW/STRAW      Appearance CLEAR CLEAR      Specific gravity 1.015 1.003 - 1.030      pH (UA) 6.0 5.0 - 8.0      Protein Negative NEG mg/dL    Glucose 100 (A) NEG mg/dL    Ketone Negative NEG mg/dL    Bilirubin Negative NEG      Blood Negative NEG      Urobilinogen 0.2 0.2 - 1.0 EU/dL    Nitrites Negative NEG      Leukocyte Esterase Negative NEG      WBC 0-4 0 - 4 /hpf    RBC 0-5 0 - 5 /hpf    Epithelial cells FEW FEW /lpf    Bacteria Negative NEG /hpf   GLUCOSE, POC    Collection Time: 11/22/22 12:57 AM   Result Value Ref Range    Glucose (POC) 225 (H) 65 - 117 mg/dL    Performed by Janina Alpha (RN)    GLUCOSE, POC    Collection Time: 11/22/22  2:10 AM   Result Value Ref Range    Glucose (POC) 297 (H) 65 - 117 mg/dL    Performed by Janina Alpha (RN)    GLUCOSE, POC    Collection Time: 11/22/22  4:17 AM   Result Value Ref Range    Glucose (POC) 259 (H) 65 - 117 mg/dL    Performed by Janina Alpha (RN)    METABOLIC PANEL, COMPREHENSIVE    Collection Time: 11/22/22  5:49 AM   Result Value Ref Range    Sodium 139 136 - 145 mmol/L    Potassium 4.2 3.5 - 5.1 mmol/L    Chloride 106 97 - 108 mmol/L    CO2 28 21 - 32 mmol/L    Anion gap 5 5 - 15 mmol/L    Glucose 248 (H) 65 - 100 mg/dL    BUN 27 (H) 6 - 20 MG/DL    Creatinine 1.26 0.70 - 1.30 MG/DL    BUN/Creatinine ratio 21 (H) 12 - 20      eGFR 53 (L) >60 ml/min/1.73m2    Calcium 8.8 8.5 - 10.1 MG/DL    Bilirubin, total 0.4 0.2 - 1.0 MG/DL    ALT (SGPT) 26 12 - 78 U/L    AST (SGOT) 26 15 - 37 U/L    Alk.  phosphatase 114 45 - 117 U/L    Protein, total 5.9 (L) 6.4 - 8.2 g/dL    Albumin 2.8 (L) 3.5 - 5.0 g/dL    Globulin 3.1 2.0 - 4.0 g/dL    A-G Ratio 0.9 (L) 1.1 - 2.2     CBC WITH AUTOMATED DIFF    Collection Time: 22  5:49 AM   Result Value Ref Range    WBC 5.4 4.1 - 11.1 K/uL    RBC 3.54 (L) 4.10 - 5.70 M/uL    HGB 10.7 (L) 12.1 - 17.0 g/dL    HCT 32.3 (L) 36.6 - 50.3 %    MCV 91.2 80.0 - 99.0 FL    MCH 30.2 26.0 - 34.0 PG    MCHC 33.1 30.0 - 36.5 g/dL    RDW 13.9 11.5 - 14.5 %    PLATELET 959 443 - 231 K/uL    MPV 9.9 8.9 - 12.9 FL    NRBC 0.0 0  WBC    ABSOLUTE NRBC 0.00 0.00 - 0.01 K/uL    NEUTROPHILS 41 32 - 75 %    LYMPHOCYTES 43 12 - 49 %    MONOCYTES 13 5 - 13 %    EOSINOPHILS 3 0 - 7 %    BASOPHILS 0 0 - 1 %    IMMATURE GRANULOCYTES 0 0.0 - 0.5 %    ABS. NEUTROPHILS 2.2 1.8 - 8.0 K/UL    ABS. LYMPHOCYTES 2.3 0.8 - 3.5 K/UL    ABS. MONOCYTES 0.7 0.0 - 1.0 K/UL    ABS. EOSINOPHILS 0.2 0.0 - 0.4 K/UL    ABS. BASOPHILS 0.0 0.0 - 0.1 K/UL    ABS. IMM. GRANS. 0.0 0.00 - 0.04 K/UL    DF AUTOMATED     GLUCOSE, POC    Collection Time: 22  6:01 AM   Result Value Ref Range    Glucose (POC) 221 (H) 65 - 117 mg/dL    Performed by Michael Sanabria (RN)        EK/21: NSR 74 bpm, normal  : NSR 63 with marked SA and 1st deg AVB    Radiology:  pCXR  :  Single AP portable view of the chest obtained at 1150 demonstrates a  stable cardiomediastinal silhouette. The lungs are hypoinspiratory but clear  bilaterally. No osseous abnormalities are seen. IMPRESSION: No evidence of acute cardiopulmonary process. CT HEAD : There is age-appropriate diffuse cortical atrophy. There is no acute  intracranial hemorrhage, mass, mass effect or herniation. Ventricular system is  normal. The gray-white matter differentiation is well-preserved. The mastoid air  cells are well pneumatized. There is near total opacification of the left  frontal sinus, left maxillary sinus and anterior left ethmoid air cells. IMPRESSION: No acute intracranial hemorrhage, mass or infarct.         Care Plan discussed with:   Patient x Family     RN x         Consultant      Expected  Disposition:   Home with Family    HH/PT/OT/RN    SNF/LTC Return [facility]   TAWNY      TOTAL TIME:  50 Minutes      Comments    x Reviewed previous records   >50% of visit spent in counseling and coordination of care x Discussion with patient and/or family and questions answered       _______________________________________________________  Given the patient's current clinical presentation, I have a high level of concern for decompensation if discharged from the emergency department. Complex decision making was performed, which includes reviewing the patient's available past medical records, laboratory results, and x-ray films. My assessment of this patient's clinical condition and my plan of care is as follows. ASSESSMENT / PLAN    Principal Problem:    Hypoglycemia (11/21/2022)  Active Problems:    Acute renal failure (ARF) ([address] (7/15/2021)    Hypotension (11/22/2022)    AMS (altered mental status) (11/22/2022)  GINO better with IVF  Hypoglycemia responded to IV dextrose  Reduce tx to Lantus 20u daily and CC sensitive scale  BS qid ac/hs  Follow for arrhythmia, drop BP, Orthostasis  Recent ED visit 11/18 for confusion, lethargy. Labs noted hyperglycemia w/o AGap. CT / LABS unremarkable.  D/joy w/o med change  11/18  with BUN 22 and Cr 1.42  Review [facility] MAR, plan tx adjustments      Diabetes mellitus, type II (HCC) ()  Taper tx as above  Follow BS qid ac/hs      HCVD (hypertensive cardiovascular disease) ()  Cont tx Norvasc / Lisinopril  Monitor      Pure hypercholesterolemia ()  Cont Lipitor        SAFETY:   Code Status:DNR  DVT prophylaxis:Lovenox  Stress Ulcer prophylaxis: Not Indicated  Bladder catheter:no  Family Contact Info:  Primary Emergency Contact: Calosyoselin Bg, Adolfo Phone: [phone]  Bedded: [facility] Room 128/01  Disposition: TBD, likely home when stable  Admission status:  Observation    -Tentative plan of care discussed with patient / family, who demonstrated understanding and is in agreement to the above  -Case was reviewed via ED Provider records, Grazyna Mancini MD  [facility] Hospitalist  [phone]

## 2022-11-22 NOTE — ED NOTES
Spoke with nurse at [facility]. Nurse unaware when patient was last given insulin or patient's last BS prior to arrival. MD made aware.

## 2022-11-22 NOTE — ED NOTES
TRANSFER - OUT REPORT:    Verbal report given to Harsha Milan RN on Catina Lees  being transferred to [facility] 128 for routine progression of care       Report consisted of patients Situation, Background, Assessment and   Recommendations(SBAR). Information from the following report(s) SBAR, Kardex, ED Summary, MAR, Recent Results and Med Rec Status was reviewed with the receiving nurse. Lines:   Peripheral IV 11/21/22 Right Antecubital (Active)        Opportunity for questions and clarification was provided.       Patient transported with:   Registered Nurse

## 2022-11-22 NOTE — PROGRESS NOTES
Patient removed IV site while trying to get out of bed. Dr. Phoebe Villarreal made aware and approved for patient to be without IV access. Patient currently resting in bed. Bed alarm on.

## 2022-11-22 NOTE — PROGRESS NOTES
Patients BP elevated this afternoon- 183/79. Dr. Nicole De made aware. Requested orthostatic BP check which was done. Decrease in BP noted (see flowsheets). Dr. Nicole De made aware. Will continue to monitor. No other new orders. Patient currently resting comfortably in bed. Call bell within reach. Bed alarm on.

## 2022-11-22 NOTE — PROGRESS NOTES
Problem: Pressure Injury - Risk of  Goal: *Prevention of pressure injury  Description: Document Papa Scale and appropriate interventions in the flowsheet. Outcome: Progressing Towards Goal  Note: Pressure Injury Interventions:  Sensory Interventions: Assess changes in LOC    Moisture Interventions: Absorbent underpads    Activity Interventions: Increase time out of bed    Mobility Interventions: HOB 30 degrees or less    Nutrition Interventions: Document food/fluid/supplement intake    Friction and Shear Interventions: HOB 30 degrees or less                Problem: Patient Education: Go to Patient Education Activity  Goal: Patient/Family Education  Outcome: Progressing Towards Goal     Problem: Falls - Risk of  Goal: *Absence of Falls  Description: Document Jonathan Fall Risk and appropriate interventions in the flowsheet. Outcome: Progressing Towards Goal  Note: Fall Risk Interventions:  Mobility Interventions: Bed/chair exit alarm    Mentation Interventions: Bed/chair exit alarm    Medication Interventions: Bed/chair exit alarm    Elimination Interventions: Call light in reach              Problem: Patient Education: Go to Patient Education Activity  Goal: Patient/Family Education  Outcome: Progressing Towards Goal     Problem: Diabetes Self-Management  Goal: *Disease process and treatment process  Description: Define diabetes and identify own type of diabetes; list 3 options for treating diabetes. Outcome: Progressing Towards Goal  Goal: *Incorporating nutritional management into lifestyle  Description: Describe effect of type, amount and timing of food on blood glucose; list 3 methods for planning meals. Outcome: Progressing Towards Goal  Goal: *Incorporating physical activity into lifestyle  Description: State effect of exercise on blood glucose levels.   Outcome: Progressing Towards Goal  Goal: *Monitoring blood glucose, interpreting and using results  Description: Identify recommended blood glucose targets  and personal targets. Outcome: Progressing Towards Goal  Goal: *Prevention, detection, treatment of acute complications  Description: List symptoms of hyper- and hypoglycemia; describe how to treat low blood sugar and actions for lowering  high blood glucose level. Outcome: Progressing Towards Goal  Goal: *Prevention, detection and treatment of chronic complications  Description: Define the natural course of diabetes and describe the relationship of blood glucose levels to long term complications of diabetes.   Outcome: Progressing Towards Goal

## 2022-11-22 NOTE — ED TRIAGE NOTES
Pt arrived EMS from [facility] after staff stated he was going in and out of consciousness for approx. 30 minutes PTA. Pt able to move all extremities equally and respond to questions. Pt is drowsy but easily arousable to voice. Per EMS Pt's BS= 133 en route.

## 2022-11-22 NOTE — ED NOTES
Lab called with critical glucose at 51, bedside glucose taken and is 52.  Food and juice given per MD.

## 2022-11-22 NOTE — PROGRESS NOTES
Problem: Pressure Injury - Risk of  Goal: *Prevention of pressure injury  Description: Document Papa Scale and appropriate interventions in the flowsheet. Outcome: Progressing Towards Goal  Note: Pressure Injury Interventions:  Sensory Interventions: Assess changes in LOC    Moisture Interventions: Absorbent underpads    Activity Interventions: Increase time out of bed    Mobility Interventions: HOB 30 degrees or less    Nutrition Interventions: Document food/fluid/supplement intake    Friction and Shear Interventions: HOB 30 degrees or less                Problem: Patient Education: Go to Patient Education Activity  Goal: Patient/Family Education  Outcome: Progressing Towards Goal     Problem: Falls - Risk of  Goal: *Absence of Falls  Description: Document Jonathan Fall Risk and appropriate interventions in the flowsheet. Outcome: Progressing Towards Goal  Note: Fall Risk Interventions:  Mobility Interventions: Bed/chair exit alarm    Mentation Interventions: Bed/chair exit alarm    Medication Interventions: Bed/chair exit alarm    Elimination Interventions: Call light in reach              Problem: Diabetes Self-Management  Goal: *Disease process and treatment process  Description: Define diabetes and identify own type of diabetes; list 3 options for treating diabetes. Outcome: Progressing Towards Goal  Goal: *Using medications safely  Description: State effect of diabetes medications on diabetes; name diabetes medication taking, action and side effects. Outcome: Progressing Towards Goal  Goal: *Monitoring blood glucose, interpreting and using results  Description: Identify recommended blood glucose targets  and personal targets.   Outcome: Progressing Towards Goal

## 2022-11-22 NOTE — PROGRESS NOTES
2049 - Dr. Scooter Andres advises to arranged BLS transportation from [facility] back to  [facility]. Arranged BLS transport w/[facility] - ETA of  0100 given - updated ED staff w/ETA    2200 -  Dr. Scooter Andres advised to cancel [facility] returning pt back to nursing facility - writer cancelled [facility] advising admission r/t hypoglycemia.

## 2022-11-22 NOTE — ED PROVIDER NOTES
EMERGENCY DEPARTMENT HISTORY AND PHYSICAL EXAM          Date: 11/21/2022  Patient Name: Yordy Marie    History of Presenting Illness     Chief Complaint   Patient presents with    Altered mental status       History Provided By: Patient and Nursing Home/SNF/Rehab Center    HPI: Yordy Marie is a 80 y.o. male, pmhx , who was brought to the ED by EMS from a local nursing home because of a decreased level of consciousness. He was sitting at the dinner table, leaning forward onto the table, not eating any of his meal. The blood pressure that was taken at the nursing home was in the 55'V systolic, and EMS obtained a blood pressure in the 60's. On his arrival to the ED, his blood pressure was 126/54 to 163/59 with a heart rate in the 60's. The staff at the nursing home was unable to tell what dosage of insulin he was given this evening, and what his FSBS was before the insulin was given. PCP: Yovany Ackerman NP    Allergies: see list  Social Hx: No tobacco. Used to drink heavily    There are no other complaints, changes, or physical findings at this time.      Current Facility-Administered Medications   Medication Dose Route Frequency Provider Last Rate Last Admin    acetaminophen (TYLENOL) tablet 650 mg  650 mg Oral Q6H PRN Jenny Schaumann, MD        dextrose 5% - 0.45% NaCl with KCl 20 mEq/L infusion  125 mL/hr IntraVENous CONTINUOUS Linh Iverson  mL/hr at 11/22/22 0100 125 mL/hr at 11/22/22 0100       Past History     Past Medical History:  Past Medical History:   Diagnosis Date    Carotid artery disease ([facility] [address]     Coronary atherosclerosis of native coronary artery     Diabetes mellitus, type II ([address]     DJD (degenerative joint disease)     HCVD (hypertensive cardiovascular disease)     Pure hypercholesterolemia        Past Surgical History:  Past Surgical History:   Procedure Laterality Date    HX CATARACT REMOVAL Bilateral     HX HEART CATHETERIZATION  2/2010    LVEDP 9, mild ant hypo EF 55-60%, LAD 70% 99%, mod D1, OM3 30%, PDA 90%    HX PTCA  2010    Stent-LAD 2.5X18 ANDREA    HX PTCA  3/2010    Stent PDA ANDREA       Family History:  Family History   Problem Relation Age of Onset    Heart Disease Mother     Cancer Father        Social History:  Social History     Tobacco Use    Smoking status: Former     Types: Cigarettes     Quit date: 10/17/1963     Years since quittin.1    Smokeless tobacco: Never   Vaping Use    Vaping Use: Never used   Substance Use Topics    Alcohol use: Not Currently     Comment: stopped 2018/used to drink heavy    Drug use: Never       Allergies: Allergies   Allergen Reactions    Aspirin Other (comments)     NOSE BLEED  MG IS TAKEN  Can take low dose aspirin    Pcn [Penicillins] Shortness of Breath     Itching/hives         Review of Systems   Review of Systems   Unable to perform ROS: Dementia     Physical Exam   Physical Exam  Vitals reviewed. Constitutional:       General: He is not in acute distress. Appearance: He is well-developed. He is not diaphoretic. HENT:      Head: Normocephalic and atraumatic. Right Ear: External ear normal.      Left Ear: External ear normal.      Nose: Nose normal.      Mouth/Throat:      Mouth: Mucous membranes are moist.      Pharynx: No oropharyngeal exudate. Eyes:      General: No scleral icterus. Right eye: No discharge. Left eye: No discharge. Conjunctiva/sclera: Conjunctivae normal.      Pupils: Pupils are equal, round, and reactive to light. Neck:      Thyroid: No thyromegaly. Vascular: No JVD. Trachea: No tracheal deviation. Cardiovascular:      Rate and Rhythm: Normal rate and regular rhythm. Heart sounds: Normal heart sounds. No murmur heard. No friction rub. No gallop. Pulmonary:      Effort: Pulmonary effort is normal. No respiratory distress. Breath sounds: No wheezing or rales. Abdominal:      General: Bowel sounds are normal. There is no distension. Palpations: Abdomen is soft. Tenderness: There is no abdominal tenderness. There is no rebound. Musculoskeletal:         General: No tenderness or deformity. Normal range of motion. Cervical back: Normal range of motion and neck supple. Skin:     General: Skin is warm and dry. Neurological:      General: No focal deficit present. Mental Status: He is oriented to person, place, and time. He is lethargic. GCS: GCS eye subscore is 4. GCS verbal subscore is 4. GCS motor subscore is 6. Cranial Nerves: No cranial nerve deficit, dysarthria or facial asymmetry. Motor: No weakness. Coordination: Coordination normal.      Deep Tendon Reflexes: Reflexes are normal and symmetric. Comments: Once glucose corrected, patient follows simple commands, answers simple questions.    Psychiatric:         Behavior: Behavior normal.       Diagnostic Study Results     Labs -    Recent Results (from the past 12 hour(s))   EKG, 12 LEAD, INITIAL    Collection Time: 11/21/22  8:13 PM   Result Value Ref Range    Ventricular Rate 74 BPM    Atrial Rate 74 BPM    P-R Interval 196 ms    QRS Duration 94 ms    Q-T Interval 406 ms    QTC Calculation (Bezet) 450 ms    Calculated P Axis 36 degrees    Calculated R Axis 68 degrees    Calculated T Axis 35 degrees    Diagnosis       Normal sinus rhythm  Normal ECG  When compared with ECG of 18-NOV-2022 11:44,  GA interval has decreased  Confirmed by Tamela Jang MD, Connye Patches ([id number]) on 11/21/2022 9:58:18 PM     CBC WITH AUTOMATED DIFF    Collection Time: 11/21/22  8:33 PM   Result Value Ref Range    WBC 6.5 4.1 - 11.1 K/uL    RBC 3.48 (L) 4.10 - 5.70 M/uL    HGB 10.7 (L) 12.1 - 17.0 g/dL    HCT 32.0 (L) 36.6 - 50.3 %    MCV 92.0 80.0 - 99.0 FL    MCH 30.7 26.0 - 34.0 PG    MCHC 33.4 30.0 - 36.5 g/dL    RDW 14.1 11.5 - 14.5 %    PLATELET 927 181 - 931 K/uL    MPV 10.1 8.9 - 12.9 FL    NRBC 0.0 0  WBC    ABSOLUTE NRBC 0.00 0.00 - 0.01 K/uL    NEUTROPHILS 51 32 - 75 %    LYMPHOCYTES 33 12 - 49 %    MONOCYTES 13 5 - 13 %    EOSINOPHILS 2 0 - 7 %    BASOPHILS 1 0 - 1 %    IMMATURE GRANULOCYTES 0 0.0 - 0.5 %    ABS. NEUTROPHILS 3.3 1.8 - 8.0 K/UL    ABS. LYMPHOCYTES 2.2 0.8 - 3.5 K/UL    ABS. MONOCYTES 0.9 0.0 - 1.0 K/UL    ABS. EOSINOPHILS 0.1 0.0 - 0.4 K/UL    ABS. BASOPHILS 0.0 0.0 - 0.1 K/UL    ABS. IMM.  GRANS. 0.0 0.00 - 0.04 K/UL    DF AUTOMATED     METABOLIC PANEL, BASIC    Collection Time: 11/21/22  8:33 PM   Result Value Ref Range    Sodium 142 136 - 145 mmol/L    Potassium 3.9 3.5 - 5.1 mmol/L    Chloride 108 97 - 108 mmol/L    CO2 29 21 - 32 mmol/L    Anion gap 5 5 - 15 mmol/L    Glucose 51 (LL) 65 - 100 mg/dL    BUN 37 (H) 6 - 20 MG/DL    Creatinine 1.51 (H) 0.70 - 1.30 MG/DL    BUN/Creatinine ratio 25 (H) 12 - 20      eGFR 43 (L) >60 ml/min/1.73m2    Calcium 9.0 8.5 - 10.1 MG/DL   GLUCOSE, POC    Collection Time: 11/21/22  9:37 PM   Result Value Ref Range    Glucose (POC) 52 (LL) 65 - 117 mg/dL    Performed by [employer]    GLUCOSE, POC    Collection Time: 11/21/22  9:39 PM   Result Value Ref Range    Glucose (POC) 58 (L) 65 - 117 mg/dL    Performed by [employer]    GLUCOSE, POC    Collection Time: 11/21/22 10:07 PM   Result Value Ref Range    Glucose (POC) 50 (LL) 65 - 117 mg/dL    Performed by [employer]    GLUCOSE, POC    Collection Time: 11/21/22 10:09 PM   Result Value Ref Range    Glucose (POC) 63 (L) 65 - 117 mg/dL    Performed by [employer]    GLUCOSE, POC    Collection Time: 11/21/22 10:10 PM   Result Value Ref Range    Glucose (POC) 53 (LL) 65 - 117 mg/dL    Performed by [employer]    GLUCOSE, POC    Collection Time: 11/21/22 10:31 PM   Result Value Ref Range    Glucose (POC) 56 (L) 65 - 117 mg/dL    Performed by Win, POC    Collection Time: 11/21/22 11:11 PM   Result Value Ref Range    Glucose (POC) 125 (H) 65 - 117 mg/dL    Performed by Irma Morataya, POC    Collection Time: 11/21/22 11:49 PM   Result Value Ref Range    Glucose (POC) 185 (H) 65 - 117 mg/dL    Performed by Cornelio Quezada W/MICROSCOPIC    Collection Time: 11/22/22 12:56 AM   Result Value Ref Range    Color YELLOW/STRAW      Appearance CLEAR CLEAR      Specific gravity 1.015 1.003 - 1.030      pH (UA) 6.0 5.0 - 8.0      Protein Negative NEG mg/dL    Glucose 100 (A) NEG mg/dL    Ketone Negative NEG mg/dL    Bilirubin Negative NEG      Blood Negative NEG      Urobilinogen 0.2 0.2 - 1.0 EU/dL    Nitrites Negative NEG      Leukocyte Esterase Negative NEG      WBC 0-4 0 - 4 /hpf    RBC 0-5 0 - 5 /hpf    Epithelial cells FEW FEW /lpf    Bacteria Negative NEG /hpf   GLUCOSE, POC    Collection Time: 11/22/22 12:57 AM   Result Value Ref Range    Glucose (POC) 225 (H) 65 - 117 mg/dL    Performed by Sanjeev Feng (RN)          Medical Decision Making   I am the first provider for this patient. I reviewed the vital signs, available nursing notes, past medical history, past surgical history, family history and social history. Vital Signs-Reviewed the patient's vital signs.   Patient Vitals for the past 12 hrs:   Temp Pulse Resp BP SpO2   11/22/22 0101 98.1 °F (36.7 °C) 72 16 (!) 187/77 100 %   11/22/22 0026 -- -- -- (!) 144/52 100 %   11/22/22 0011 -- -- -- (!) 131/51 100 %   11/22/22 0004 -- -- -- (!) 126/50 100 %   11/21/22 2349 -- -- -- (!) 127/49 100 %   11/21/22 2334 -- -- -- (!) 142/82 100 %   11/21/22 2322 -- -- -- (!) 153/72 100 %   11/21/22 2307 -- -- -- (!) 165/61 100 %   11/21/22 2252 -- -- -- (!) 163/59 100 %   11/21/22 2237 -- -- -- (!) 154/62 --   11/21/22 2222 -- -- -- (!) 146/69 --   11/21/22 2207 -- -- -- (!) 170/73 --   11/21/22 2152 -- -- -- (!) 156/60 100 %   11/21/22 2137 -- -- -- (!) 155/68 --   11/21/22 2132 -- 64 15 -- --   11/21/22 2120 -- -- -- 123/66 --   11/21/22 2112 -- -- -- -- 95 %   11/21/22 2108 -- 67 15 -- --   11/21/22 2100 -- -- -- (!) 140/45 99 %   11/21/22 2051 -- 76 16 (!) 126/54 98 %   11/21/22 2036 -- -- -- (!) 138/54 --   11/21/22 2022 -- -- -- (!) 141/55 --   11/21/22 2010 97.7 °F (36.5 °C) 71 15 (!) 149/56 99 %       Pulse Oximetry Analysis - 100% on RA    Cardiac Monitor:   Rate: 65 bpm  Rhythm: Normal Sinus Rhythm      Records Reviewed: Nursing Notes, Old Medical Records, Ambulance Run Sheet, Previous Radiology Studies, and Previous Laboratory Studies    Provider Notes (Medical Decision Making):   MDM:  Elderly man with hypoglycemia, on insulin. Nursing home unable to tell us what the blood sugar was or whether he was given insulin. No response to OJ, crackers, peanut butter, Ensure, so D10 begun. Most likely that the patient did not eat enough to utilize the insulin he was given. No h/o hypoglycemia in the past. No sign of sepsis; it seems that he had too much insulin, or not enough oral glucose/calories, or both. ED Course:   Initial assessment performed. The patients presenting problems have been discussed, and they are in agreement with the care plan formulated and outlined with them. I have encouraged them to ask questions as they arise throughout their visit. EKG interpretation: (Preliminary)  Rhythm: NSR, HR 74, Normal EKG. This EKG was interpreted by ED Provider Dr José Antonio Gross MD      PROGRESS NOTE:  En route, the patient's FSBS was normal, and his behavior seemed normal for his age. Labs were sent, and his serum glucose was 51. POC glucose was similar, and it was repeated. He was willing to take peanut butter, crackers, and OJ, but his POC glucose stayed in the 50's. He was then given Ensure with ice cream, and again there was no change. He was started on D10, and it came up to 125. Dr. Shira Lewis was called, and he recommended that the patient be put on D5. Admission orders were written, and his daughter Zander Elliott was called to be informed of his condition. Critical Care Time: 0        Diagnosis     Clinical Impression:   1.  Hypoglycemia        PLAN: Admit Med/Surg [facility]     Discussed with Dr. Red Carrillo

## 2022-11-22 NOTE — PROGRESS NOTES
Problem: Mobility Impaired (Adult and Pediatric)  Goal: *Acute Goals and Plan of Care (Insert Text)  Outcome: Not Met  Note: FUNCTIONAL STATUS PRIOR TO ADMISSION: Patient was modified independent using a walker for functional mobility. According to the patient he used a walker down to the DR but used no AD in his room. Patient APPEARS to be a good historian, but is [facility] and there is definitely some confusion. HOME SUPPORT PRIOR TO ADMISSION: The patient lived with [place] [address] resident. .    Physical Therapy Goals  Initiated 11/22/2022  1. Patient will move from supine to sit and sit to supine , scoot up and down, and roll side to side in bed with modified independence within 7 day(s). 2.  Patient will transfer from bed to chair and chair to bed with modified independence using the least restrictive device within 7 day(s). 3.  Patient will perform sit to stand with modified independence within 7 day(s). 4.  Patient will ambulate with supervision/set-up for 200 feet with the least restrictive device within 7 day(s). 5. Patient will return to his baseline level of activity within 7 days. PHYSICAL THERAPY EVALUATION  Patient: Parker Randle (89 y.o. male)  Date: 11/22/2022  Primary Diagnosis: Hypoglycemia [E16.2]       Precautions: [facility]  Fall    ASSESSMENT  Based on the objective data described below, the patient presents with pleasant [facility] patient who was admitted following a syncopal episode probably related to low blood sugar but alos hypotension. Patient functions fairly well at [place] [address] though information is sparse. It appears that pt is modified independent generally. His mobility is good for his age and he is limited a bit due to his confusion. Pt tends to actually do better with HHA than with RW which seems to get in his way.  Pt could benefit from PT to improve strength, balance, gait quality, endurance and overall functional mobility to return to his baseline level of function. .    Current Level of Function Impacting Discharge (mobility/balance): gait with HHA or RW    Functional Outcome Measure: The patient scored 60 on the Barthel outcome measure which is indicative of needing some help with ADLS mostly direction and focus. .      Other factors to consider for discharge:      Patient will benefit from skilled therapy intervention to address the above noted impairments. PLAN :  Recommendations and Planned Interventions: bed mobility training, transfer training, gait training, therapeutic exercises, patient and family training/education, and therapeutic activities      Frequency/Duration: Patient will be followed by physical therapy:  3 times a week to address goals. Recommendation for discharge: (in order for the patient to meet his/her long term goals)  No skilled physical therapy/ follow up rehabilitation needs identified at this time. This discharge recommendation:  Has not yet been discussed the attending provider and/or case management    IF patient discharges home will need the following DME: patient owns DME required for discharge         SUBJECTIVE:   Patient stated Lets not make a big deal of it (passing out).     OBJECTIVE DATA SUMMARY:   HISTORY:    Past Medical History:   Diagnosis Date    Carotid artery disease ([facility] [address]     Coronary atherosclerosis of native coronary artery     Diabetes mellitus, type II ([facility] [address]     DJD (degenerative joint disease)     HCVD (hypertensive cardiovascular disease)     Pure hypercholesterolemia      Past Surgical History:   Procedure Laterality Date    HX CATARACT REMOVAL Bilateral     HX HEART CATHETERIZATION  2/2010    LVEDP 9, mild ant hypo EF 55-60%, LAD 70% 99%, mod D1, OM3 30%, PDA 90%    HX PTCA  2/2010    Stent-LAD 2.5X18 ANDREA    HX PTCA  3/2010    Stent PDA ANDREA       Personal factors and/or comorbidities impacting plan of care: confusion, oriented to self and place    Home Situation  Home Environment: Assisted living  One/Two Story Residence: One story  Living Alone: No  Support Systems: Assisted Living  Patient Expects to be Discharged to[de-identified] Assisted Living  Current DME Used/Available at Home: Walker, rolling    EXAMINATION/PRESENTATION/DECISION MAKING:   Critical Behavior:  Neurologic State: Alert  Orientation Level: Oriented to person, Disoriented to place, Disoriented to situation, Disoriented to time  Cognition: Memory loss, Follows commands     Hearing: Auditory  Auditory Impairment: Hard of hearing, bilateral  Skin:  intact with venous bulbular area on dorsal forearm (from IV?)  Edema: no  Range Of Motion:  AROM: Generally decreased, functional                       Strength:    Strength: Generally decreased, functional                    Tone & Sensation:   Tone: Normal              Sensation:  (NT)               Coordination:     Vision:      Functional Mobility:  Bed Mobility:  Rolling: Contact guard assistance  Supine to Sit: Contact guard assistance        Transfers:  Sit to Stand: Contact guard assistance  Stand to Sit: Contact guard assistance                       Balance:   Sitting: Intact; Without support  Standing: Impaired; Without support  Standing - Static: Good  Standing - Dynamic : Fair;Constant support  Ambulation/Gait Training:  Distance (ft): 175 Feet (ft)  Assistive Device: Walker, rolling (and HHA)  Ambulation - Level of Assistance: Contact guard assistance        Gait Abnormalities: Decreased step clearance  Right Side Weight Bearing: Full  Left Side Weight Bearing: Full  Base of Support: Narrowed     Speed/Mary: Shuffled                              Therapeutic Exercises:   LAQ, ankle circles    Functional Measure:  Barthel Index:    Bathin  Bladder: 0  Bowels: 10  Groomin  Dressin  Feeding: 10  Mobility: 10  Stairs: 5  Toilet Use: 5  Transfer (Bed to Chair and Back): 10  Total: 60/100       The Barthel ADL Index: Guidelines  1.  The index should be used as a record of what a patient does, not as a record of what a patient could do. 2. The main aim is to establish degree of independence from any help, physical or verbal, however minor and for whatever reason. 3. The need for supervision renders the patient not independent. 4. A patient's performance should be established using the best available evidence. Asking the patient, friends/relatives and nurses are the usual sources, but direct observation and common sense are also important. However direct testing is not needed. 5. Usually the patient's performance over the preceding 24-48 hours is important, but occasionally longer periods will be relevant. 6. Middle categories imply that the patient supplies over 50 per cent of the effort. 7. Use of aids to be independent is allowed. Score Interpretation (from [facility] [address])    Independent   60-79 Minimally independent   40-59 Partially dependent   20-39 Very dependent   <20 Totally dependent     -Carlitos Gill., Barthel, D.W. (1965). Functional evaluation: the Barthel Index. [facility] ([address] (1997). The Barthel activities of daily living index: self-reporting versus actual performance in the old (> or = 75 years). Journal of [address] [place] [address] 457), [facility], ALVARO, Kandice uKhn., Chayito Mejia. (1999). Measuring the change in disability after inpatient rehabilitation; comparison of the responsiveness of the Barthel Index and Functional Sandoval Measure. Journal of Neurology, Neurosurgery, and Psychiatry, 66(4), 015-973. Jose Raul Huston, N.J.A, SARTHAK Mccain, & Kailee Holman M.A. (2004) Assessment of post-stroke quality of life in cost-effectiveness studies: The usefulness of the Barthel Index and the EuroQoL-5D.  Quality of Life Research, 15, 197-44           Physical Therapy Evaluation Charge Determination   History Examination Presentation Decision-Making   LOW Complexity : Zero comorbidities / personal factors that will impact the outcome / POC LOW Complexity : 1-2 Standardized tests and measures addressing body structure, function, activity limitation and / or participation in recreation  LOW Complexity : Stable, uncomplicated  LOW Complexity : FOTO score of       Based on the above components, the patient evaluation is determined to be of the following complexity level: LOW     Pain Rating:      Activity Tolerance:   Good - stable vitals    After treatment patient left in no apparent distress:   Sitting in chair and Call bell within reach    COMMUNICATION/EDUCATION:   The patients plan of care was discussed with: Occupational therapist and Registered nurse. Patient/family agree to work toward stated goals and plan of care.     Thank you for this referral.  Sandy Maravilla, PT   Time Calculation: 25 mins

## 2022-11-22 NOTE — DISCHARGE INSTRUCTIONS
Hospitalist Recommendations    Check FBS daily - adjust Lantus accordingly  Current DM control good, possible too good.   Stop Novulin Mix 70/30  Pt likely dropped from the \"quick\" component, when his BP was low (orthostatic) and he didn't eat  Stop Oxybutynin - anticholinergic SE, BP drop, Urine retention  Use Seroquel hs but hold Mirtazapine for now  Shift Flomax / Tamsulosin to bedtime, stop if continued orthostatic drop in BP  Don't use Hydralazine prn unless sitting / standing BP is elevated  Stop Lisinopril, use Toprol XL and advance dose if needed (if pulse not tool low)  Beta blocker may be better for orthostatic patients  Keep well hydrated  NICOLAS Patel, [address]

## 2022-11-22 NOTE — PROGRESS NOTES
TRANSFER - IN REPORT:    Verbal report received from Pito Gimenez RN(name) on Monae Landers  being received from ED(unit) for routine progression of care      Report consisted of patients Situation, Background, Assessment and   Recommendations(SBAR). Information from the following report(s) ED Summary was reviewed with the receiving nurse. Opportunity for questions and clarification was provided. Assessment completed upon patients arrival to unit and care assumed.

## 2022-11-22 NOTE — PROGRESS NOTES
2350 - recheck finger stick (185) - Dr Richar Talbot advised to stop D10 which was stopped by Katerina Pan. Dr. Richar Talbot talking w/Dr. Shira eLwis r/t admission and infusion recommendation.

## 2022-11-22 NOTE — PROGRESS NOTES
Problem: Self Care Deficits Care Plan (Adult)  Goal: *Acute Goals and Plan of Care (Insert Text)  Description: FUNCTIONAL STATUS PRIOR TO ADMISSION: Patient was modified independent using a rolling walker occasionally for functional mobility. Prior functional level with ADLs unclear (per CM was Mod I for all ADLs and was more oriented prior to admission). HOME SUPPORT: The patient lives at assisted living [facility]) with assistance from staff. Occupational Therapy Goals  Initiated 11/22/2022  1. Patient will perform standing grooming with modified independence within 7 day(s). 2.  Patient will perform upper body dressing with modified independence within 7 day(s). 3.  Patient will perform lower body dressing with modified independence within 7 day(s). 4.  Patient will perform toilet transfers with modified independence within 7 day(s). 5.  Patient will perform all aspects of toileting with modified independence within 7 day(s). 6.  Patient will participate in upper extremity therapeutic exercise/activities with modified independence for 10 minutes within 7 day(s). Outcome: Not Met     OCCUPATIONAL THERAPY EVALUATION  Patient: Mat Shore (50 y.o. male)  Date: 11/22/2022  Primary Diagnosis: Hypoglycemia [E16.2]       Precautions:  Fall    ASSESSMENT  Based on the objective data described below, the patient presents with deficits in self-care, primarily due to decreased activity tolerance, general weakness, impaired standing balance, and confusion/decreased safety. Patient is [place] and requires increased time and repetition to understand directions. Initially getting up by himself saying that he needs to urinate, happens multiple times throughout session despite patient education on condom catheter placement. Patient able to complete functional mobility around nursing station with PT and OT (chair following) and returned to bathroom to complete oral care with CGA.  Returned to recliner chair and elevated legs, chair alarm on and RN aware. Patient left with all needs met. Patient will benefit from skilled OT treatment to maximize independence and safety with ADLs prior to d/c. Current Level of Function Impacting Discharge (ADLs/self-care): CGA    Functional Outcome Measure: The patient scored Total: 60/100 on the Barthel Index outcome measure which is indicative of being minimally independent in basic self-care. Other factors to consider for discharge: below baseline level of function, MD planning d/c back to [facility]     Patient will benefit from skilled therapy intervention to address the above noted impairments. PLAN :  Recommendations and Planned Interventions: self care training, functional mobility training, therapeutic exercise, balance training, therapeutic activities, endurance activities, patient education, and home safety training    Frequency/Duration: Patient will be followed by occupational therapy 3-5 times a week to address goals. Recommendation for discharge: (in order for the patient to meet his/her long term goals)  To be determined: back to [facility]    This discharge recommendation:  Has been made in collaboration with the attending provider and/or case management    IF patient discharges home will need the following DME: patient owns DME required for discharge       SUBJECTIVE:   Patient stated Thanks for letting me brush my teeth.     OBJECTIVE DATA SUMMARY:   HISTORY:   Past Medical History:   Diagnosis Date    Carotid artery disease ([facility] [address]     Coronary atherosclerosis of native coronary artery     Diabetes mellitus, type II ([facility] [address]     DJD (degenerative joint disease)     HCVD (hypertensive cardiovascular disease)     Pure hypercholesterolemia      Past Surgical History:   Procedure Laterality Date    HX CATARACT REMOVAL Bilateral     HX HEART CATHETERIZATION  2/2010    LVEDP 9, mild ant hypo EF 55-60%, LAD 70% 99%, mod D1, OM3 30%, PDA 90%    HX PTCA  2/2010    Stent-LAD 2.5X18 ANDREA    HX PTCA  3/2010    Stent PDA ANDREA       Expanded or extensive additional review of patient history:     Home Situation  Home Environment: Assisted living  One/Two Story Residence: One story  Living Alone: No  Support Systems: Assisted Living  Patient Expects to be Discharged to[de-identified] Assisted Living  Current DME Used/Available at Home: Walker, rolling    Hand dominance: Right    EXAMINATION OF PERFORMANCE DEFICITS:  Cognitive/Behavioral Status:  Neurologic State: Alert  Orientation Level: Oriented to person;Disoriented to place; Disoriented to situation;Disoriented to time  Cognition: Memory loss; Follows commands  Perception: Appears intact  Perseveration: No perseveration noted       Skin: Blue-lia/purple skin protrusion on L forearm, possibly from prior IV site? Hearing: Auditory  Auditory Impairment: Hard of hearing, bilateral    Range of Motion:  AROM: Generally decreased, functional                         Strength:  Strength: Generally decreased, functional (4/5 BUEs)                Coordination:     Fine Motor Skills-Upper: Left Intact; Right Intact    Gross Motor Skills-Upper: Right Intact; Left Intact    Tone & Sensation:  Tone: Normal  Sensation:  (NT)                      Balance:  Sitting: Intact; Without support  Standing: Impaired; Without support  Standing - Static: Good  Standing - Dynamic : Fair;Constant support    Functional Mobility and Transfers for ADLs:  Bed Mobility:  Supine to Sit: Minimum assistance    Transfers:  Sit to Stand: Contact guard assistance  Stand to Sit: Contact guard assistance  Toilet Transfer : Contact guard assistance (inferred and per RN/CNA)    ADL Assessment:  Feeding: Independent    Oral Facial Hygiene/Grooming: Setup;Contact guard assistance (standing)    Bathing: Minimum assistance    Type of Bath: Chlorhexidine (CHG); Basin/Soap/Water    Upper Body Dressing: Stand-by assistance    Lower Body Dressing: Minimum assistance    Toileting: Minimum assistance ADL Intervention and task modifications:       Grooming  Grooming Assistance: Contact guard assistance  Position Performed: Standing  Brushing Teeth: Contact guard assistance         Type of Bath: Chlorhexidine (CHG); Basin/Soap/Water                   Toileting  Bladder Hygiene: Total assistance (dependent) (condom cath)       Functional Measure:    Barthel Index:  Bathin  Bladder: 0  Bowels: 10  Groomin  Dressin  Feeding: 10  Mobility: 10  Stairs: 5  Toilet Use: 5  Transfer (Bed to Chair and Back): 10  Total: 60/100      The Barthel ADL Index: Guidelines  1. The index should be used as a record of what a patient does, not as a record of what a patient could do. 2. The main aim is to establish degree of independence from any help, physical or verbal, however minor and for whatever reason. 3. The need for supervision renders the patient not independent. 4. A patient's performance should be established using the best available evidence. Asking the patient, friends/relatives and nurses are the usual sources, but direct observation and common sense are also important. However direct testing is not needed. 5. Usually the patient's performance over the preceding 24-48 hours is important, but occasionally longer periods will be relevant. 6. Middle categories imply that the patient supplies over 50 per cent of the effort. 7. Use of aids to be independent is allowed. Score Interpretation (from [address] [place] [address])    Independent   60-79 Minimally independent   40-59 Partially dependent   20-39 Very dependent   <20 Totally dependent     -Carlitos Gill., Barthel, D.W. (1965). Functional evaluation: the Barthel Index. [facility] ([address] (1997). The Barthel activities of daily living index: self-reporting versus actual performance in the old (> or = 75 years).  Journal of [address] [place] [address] 45(7), [facility], JMARTA, Zuniga Check, A.CAMILLE. (1999). Measuring the change in disability after inpatient rehabilitation; comparison of the responsiveness of the Barthel Index and Functional Liberty Measure. Journal of Neurology, Neurosurgery, and Psychiatry, 66(4), 990-063. MEREDITH Arcos, SARTHAK Mccain, & Ivone Lawrence M.A. (2004) Assessment of post-stroke quality of life in cost-effectiveness studies: The usefulness of the Barthel Index and the EuroQoL-5D. Quality of Life Research, 15, 915-51     Occupational Therapy Evaluation Charge Determination   History Examination Decision-Making   LOW Complexity : Brief history review  LOW Complexity : 1-3 performance deficits relating to physical, cognitive , or psychosocial skils that result in activity limitations and / or participation restrictions  LOW Complexity : No comorbidities that affect functional and no verbal or physical assistance needed to complete eval tasks       Based on the above components, the patient evaluation is determined to be of the following complexity level: LOW   Pain Rating:  None reported    Activity Tolerance:   Good and tolerates ADLs without rest breaks    After treatment patient left in no apparent distress:    Sitting in chair, Heels elevated for pressure relief, Call bell within reach, Bed / chair alarm activated, and RN aware    COMMUNICATION/EDUCATION:   The patients plan of care was discussed with: Physical therapist, Registered nurse, and Case management. Patient/family agree to work toward stated goals and plan of care. This patients plan of care is appropriate for delegation to [facility].     Thank you for this referral.  Kandace Gray OT  Time Calculation: 32 mins

## 2022-11-23 VITALS
WEIGHT: 165.7 LBS | DIASTOLIC BLOOD PRESSURE: 95 MMHG | OXYGEN SATURATION: 98 % | SYSTOLIC BLOOD PRESSURE: 173 MMHG | BODY MASS INDEX: 23.2 KG/M2 | RESPIRATION RATE: 20 BRPM | HEART RATE: 97 BPM | TEMPERATURE: 97.7 F | HEIGHT: 71 IN

## 2022-11-23 LAB
ANION GAP SERPL CALC-SCNC: 7 MMOL/L (ref 5–15)
APPEARANCE UR: CLEAR
BACTERIA URNS QL MICRO: NEGATIVE /HPF
BILIRUB UR QL: NEGATIVE
BUN SERPL-MCNC: 16 MG/DL (ref 6–20)
BUN/CREAT SERPL: 13 (ref 12–20)
CALCIUM SERPL-MCNC: 9.6 MG/DL (ref 8.5–10.1)
CHLORIDE SERPL-SCNC: 103 MMOL/L (ref 97–108)
CO2 SERPL-SCNC: 29 MMOL/L (ref 21–32)
COLOR UR: ABNORMAL
CREAT SERPL-MCNC: 1.19 MG/DL (ref 0.7–1.3)
EPITH CASTS URNS QL MICRO: ABNORMAL /LPF
GLUCOSE BLD STRIP.AUTO-MCNC: 219 MG/DL (ref 65–117)
GLUCOSE BLD STRIP.AUTO-MCNC: 382 MG/DL (ref 65–117)
GLUCOSE SERPL-MCNC: 236 MG/DL (ref 65–100)
GLUCOSE UR STRIP.AUTO-MCNC: NEGATIVE MG/DL
HGB UR QL STRIP: ABNORMAL
KETONES UR QL STRIP.AUTO: NEGATIVE MG/DL
LEUKOCYTE ESTERASE UR QL STRIP.AUTO: NEGATIVE
MAGNESIUM SERPL-MCNC: 1.6 MG/DL (ref 1.6–2.4)
NITRITE UR QL STRIP.AUTO: NEGATIVE
PH UR STRIP: 6.5 [PH] (ref 5–8)
POTASSIUM SERPL-SCNC: 3.7 MMOL/L (ref 3.5–5.1)
PROT UR STRIP-MCNC: NEGATIVE MG/DL
RBC #/AREA URNS HPF: >100 /HPF (ref 0–5)
SERVICE CMNT-IMP: ABNORMAL
SERVICE CMNT-IMP: ABNORMAL
SODIUM SERPL-SCNC: 139 MMOL/L (ref 136–145)
SP GR UR REFRACTOMETRY: 1.01 (ref 1–1.03)
UA: UC IF INDICATED,UAUC: ABNORMAL
UROBILINOGEN UR QL STRIP.AUTO: 0.2 EU/DL (ref 0.2–1)
WBC URNS QL MICRO: ABNORMAL /HPF (ref 0–4)

## 2022-11-23 PROCEDURE — 74011000250 HC RX REV CODE- 250: Performed by: INTERNAL MEDICINE

## 2022-11-23 PROCEDURE — 80048 BASIC METABOLIC PNL TOTAL CA: CPT

## 2022-11-23 PROCEDURE — 83735 ASSAY OF MAGNESIUM: CPT

## 2022-11-23 PROCEDURE — 96372 THER/PROPH/DIAG INJ SC/IM: CPT

## 2022-11-23 PROCEDURE — 96375 TX/PRO/DX INJ NEW DRUG ADDON: CPT

## 2022-11-23 PROCEDURE — 82962 GLUCOSE BLOOD TEST: CPT

## 2022-11-23 PROCEDURE — 74011636637 HC RX REV CODE- 636/637: Performed by: INTERNAL MEDICINE

## 2022-11-23 PROCEDURE — G0378 HOSPITAL OBSERVATION PER HR: HCPCS

## 2022-11-23 PROCEDURE — 36415 COLL VENOUS BLD VENIPUNCTURE: CPT

## 2022-11-23 PROCEDURE — 74011250637 HC RX REV CODE- 250/637: Performed by: INTERNAL MEDICINE

## 2022-11-23 PROCEDURE — 74011250636 HC RX REV CODE- 250/636: Performed by: INTERNAL MEDICINE

## 2022-11-23 PROCEDURE — 81001 URINALYSIS AUTO W/SCOPE: CPT

## 2022-11-23 PROCEDURE — 74011250636 HC RX REV CODE- 250/636: Performed by: HOSPITALIST

## 2022-11-23 RX ORDER — TAMSULOSIN HYDROCHLORIDE 0.4 MG/1
0.4 CAPSULE ORAL
Qty: 1 CAPSULE | Refills: 0 | Status: SHIPPED
Start: 2022-11-23

## 2022-11-23 RX ORDER — HYDRALAZINE HYDROCHLORIDE 20 MG/ML
10 INJECTION INTRAMUSCULAR; INTRAVENOUS
Status: DISCONTINUED | OUTPATIENT
Start: 2022-11-23 | End: 2022-11-23 | Stop reason: HOSPADM

## 2022-11-23 RX ORDER — INSULIN GLARGINE 100 [IU]/ML
25 INJECTION, SOLUTION SUBCUTANEOUS DAILY
Qty: 1 ML | Refills: 0 | Status: SHIPPED | OUTPATIENT
Start: 2022-11-23

## 2022-11-23 RX ADMIN — ENOXAPARIN SODIUM 40 MG: 100 INJECTION SUBCUTANEOUS at 08:47

## 2022-11-23 RX ADMIN — SODIUM CHLORIDE, PRESERVATIVE FREE 10 ML: 5 INJECTION INTRAVENOUS at 06:20

## 2022-11-23 RX ADMIN — Medication 20 UNITS: at 08:47

## 2022-11-23 RX ADMIN — Medication 4 UNITS: at 08:47

## 2022-11-23 RX ADMIN — Medication 2 UNITS: at 12:20

## 2022-11-23 RX ADMIN — HYDRALAZINE HYDROCHLORIDE 10 MG: 20 INJECTION INTRAMUSCULAR; INTRAVENOUS at 01:40

## 2022-11-23 RX ADMIN — AMLODIPINE BESYLATE 5 MG: 5 TABLET ORAL at 08:47

## 2022-11-23 RX ADMIN — ASPIRIN 81 MG: 81 TABLET, COATED ORAL at 08:47

## 2022-11-23 NOTE — PROGRESS NOTES
Occupational Therapy    Discussed OT treatment with RN at ~940, she reports that patient is significantly more confused than yesterday and was up all last night. Requesting to defer therapy at this time to let him get some rest. Will continue to follow up.     Ting Akins OTR/L

## 2022-11-23 NOTE — PROGRESS NOTES
Transition of Care (TRISTEN) Plan:  Patient is being [facility]. Patient is going to receive home health for skilled nursing, physical therapy, occupational therapy. TRISTEN Transportation:      How is patient being transported at discharge? POV/ Facility    When? 11/23/22    Is transport scheduled? YES    Follow-up appointment and transportation:    PCP? Maury Santos, TED Facility Professional    Who is transporting to the follow-up appointment? NA    Is transport for follow up appointment scheduled? NA    Communication plan (with patient/family):        Click here to complete [address] including selection of the Healthcare Decision Maker Relationship (ie \"Primary\")  @[email or web address]     Care Management Interventions  PCP Verified by CM: Yes Maury Santos NP)  Last Visit to PCP: 11/21/22  Palliative Care Criteria Met (RRAT>21 & CHF Dx)?: No (No MD order for Palliative Care)  Mode of Transport at Discharge:  Other (see comment) (POV)  Transition of Care Consult (CM Consult): Discharge Planning, [address] ([facility])  [address]: No  Reason Outside [facility]: Patient already serviced by other home care/hospice agency  MyChart Signup: No  Discharge Durable Medical Equipment: No  Physical Therapy Consult: Yes  Occupational Therapy Consult: Yes  Speech Therapy Consult: No  Support Systems: Child(chepe)  Confirm Follow Up Transport: Family  The Plan for Transition of Care is Related to the Following Treatment Goals : Treat hypoglycemia  [employer] Information Provided?: No  Discharge Location  Patient Expects to be Discharged to[de-identified] Assisted Living (At [facility])

## 2022-11-23 NOTE — PROGRESS NOTES
Performing hourly rounding and noticed patient's catheter bag with red/ bloody urine. Patient currently has and EXTERNAL condom catheter with no previous documentation of an indwelling or straight cath. MD notified. Primary nurse notified.

## 2022-11-23 NOTE — PROGRESS NOTES
Follow up visit with patient in Rm 128  Provided empathic listening and spiritual support  Advised of  Availability    [address] [place] [address]

## 2022-11-23 NOTE — ROUTINE PROCESS
Azael 1025: noted blood on right arm and linens. Right arm dressed with gauze and sheet and blanket changed.

## 2022-11-23 NOTE — PROGRESS NOTES
Patients BP elevated- 197/74. Dr. Anusha Webster made aware. Patient has been orthostatic. No new orders at this time. Will continue to monitor.

## 2022-11-23 NOTE — PROGRESS NOTES
Problem: Pressure Injury - Risk of  Goal: *Prevention of pressure injury  Description: Document Papa Scale and appropriate interventions in the flowsheet. Outcome: Progressing Towards Goal  Note: Pressure Injury Interventions:  Sensory Interventions: Assess changes in LOC    Moisture Interventions: Absorbent underpads    Activity Interventions: Increase time out of bed    Mobility Interventions: PT/OT evaluation    Nutrition Interventions: Offer support with meals,snacks and hydration    Friction and Shear Interventions: Apply protective barrier, creams and emollients                Problem: Patient Education: Go to Patient Education Activity  Goal: Patient/Family Education  Outcome: Progressing Towards Goal     Problem: Falls - Risk of  Goal: *Absence of Falls  Description: Document Jonathan Fall Risk and appropriate interventions in the flowsheet. Outcome: Progressing Towards Goal  Note: Fall Risk Interventions:  Mobility Interventions: Bed/chair exit alarm    Mentation Interventions: Adequate sleep, hydration, pain control    Medication Interventions: Bed/chair exit alarm    Elimination Interventions: Bed/chair exit alarm              Problem: Patient Education: Go to Patient Education Activity  Goal: Patient/Family Education  Outcome: Progressing Towards Goal     Problem: Diabetes Self-Management  Goal: *Disease process and treatment process  Description: Define diabetes and identify own type of diabetes; list 3 options for treating diabetes. Outcome: Progressing Towards Goal  Goal: *Incorporating nutritional management into lifestyle  Description: Describe effect of type, amount and timing of food on blood glucose; list 3 methods for planning meals. Outcome: Progressing Towards Goal  Goal: *Incorporating physical activity into lifestyle  Description: State effect of exercise on blood glucose levels.   Outcome: Progressing Towards Goal  Goal: *Developing strategies to promote health/change behavior  Description: Define the ABC's of diabetes; identify appropriate screenings, schedule and personal plan for screenings. Outcome: Progressing Towards Goal  Goal: *Using medications safely  Description: State effect of diabetes medications on diabetes; name diabetes medication taking, action and side effects. Outcome: Progressing Towards Goal  Goal: *Monitoring blood glucose, interpreting and using results  Description: Identify recommended blood glucose targets  and personal targets. Outcome: Progressing Towards Goal  Goal: *Prevention, detection, treatment of acute complications  Description: List symptoms of hyper- and hypoglycemia; describe how to treat low blood sugar and actions for lowering  high blood glucose level. Outcome: Progressing Towards Goal  Goal: *Prevention, detection and treatment of chronic complications  Description: Define the natural course of diabetes and describe the relationship of blood glucose levels to long term complications of diabetes.   Outcome: Progressing Towards Goal     Problem: Patient Education: Go to Patient Education Activity  Goal: Patient/Family Education  Outcome: Progressing Towards Goal     Problem: General Medical Care Plan  Goal: *Vital signs within specified parameters  Outcome: Progressing Towards Goal  Goal: *Labs within defined limits  Outcome: Progressing Towards Goal  Goal: *Absence of infection signs and symptoms  Outcome: Progressing Towards Goal  Goal: *Optimal pain control at patient's stated goal  Outcome: Progressing Towards Goal  Goal: *Skin integrity maintained  Outcome: Progressing Towards Goal  Goal: *Fluid volume balance  Outcome: Progressing Towards Goal  Goal: *Optimize nutritional status  Outcome: Progressing Towards Goal  Goal: *Anxiety reduced or absent  Outcome: Progressing Towards Goal  Goal: *Progressive mobility and function (eg: ADL's)  Outcome: Progressing Towards Goal     Problem: Patient Education: Go to Patient Education Activity  Goal: Patient/Family Education  Outcome: Progressing Towards Goal     Problem: Patient Education: Go to Patient Education Activity  Goal: Patient/Family Education  Outcome: Progressing Towards Goal     Problem: Patient Education: Go to Patient Education Activity  Goal: Patient/Family Education  Outcome: Progressing Towards Goal

## 2022-11-23 NOTE — DISCHARGE SUMMARY
[facility]  Hospitalist Discharge Summary    Patient ID:  William Fernando  [id number]  12 y.o.  12/30/1928    PCP on record: Anastacio Rosales NP    Admit date: 11/21/2022  Discharge date and time: 11/23/2022     DISCHARGE DIAGNOSIS:    Principal Problem:    Hypoglycemia (11/21/2022)    Active Problems:    Acute renal failure (ARF) ([address] (7/15/2021)    Orthostatic hypotension (11/22/2022)    AMS (altered mental status) (11/22/2022)    Diabetes mellitus, type II ([address] ()    HCVD (hypertensive cardiovascular disease) ()    Pure hypercholesterolemia ()    CONSULTATIONS:  None    Excerpted HPI from H&P of Mat Judd MD:  80 y.o. male presenting for admission to [facility] for further evaluation and treatment for Hypoglycemia. He  has a past medical history of Carotid artery disease ([address] Coronary atherosclerosis of native coronary artery, Diabetes mellitus, type II ([address] DJD (degenerative joint disease), HCVD (hypertensive cardiovascular disease), and Pure hypercholesterolemia. Patient referred to the ED last evening following a slump at the dinner table with altered level of consciousness. The blood pressure sitting at the facility was reported as systolic in the 79R. Hypotension was confirmed by EMS. On arrival to the ED his supine blood pressure was 126/54 heart rate in the 60s. Found to be hypoglycemic with a blood sugar in the 50s. The facility staff could not specify his insulin doses given on the day of admission or a report of blood glucose checks. Patient received D10 and D5 with improvement in the blood sugar and consciousness level. His sugars gradually improved and he was admitted for further monitoring. This AM he is sedate but easily aroused and conversant. He was able to take breakfast with assistance. Insulin dosing was tapered. Alternate tx options considered   Pt denied c/o CP, SOB, HA, GI upset, constipation, pain.   He states he is ambulatory at the facility with a walker or not?   ______________________________________________________________________  DISCHARGE SUMMARY/HOSPITAL COURSE:  for full details see H&P, daily progress notes, labs, consult notes. Principal Problem:    Hypoglycemia (11/21/2022)  Active Problems:    Acute renal failure (ARF) ([address] (7/15/2021)    Hypotension (11/22/2022)    AMS (altered mental status) (11/22/2022)  GINO better with IV fluids  Hypoglycemia responded to IV dextrose  Reduce tx to Lantus 20u daily plus CC sensitive scale  Will d/c on Lantus 25 units daily following FBS daily, will need to titrate   Current A1c notes excellent control, possible too good  Followed also for arrhythmia  Recent ED visit 11/18 for confusion, lethargy. Labs noted hyperglycemia w/o AGap. CT / LABS unremarkable. D/joy w/o med change  11/18  with BUN 22 and Cr 1.42  Reviewed [facility] MAR, Adjusted meds as noted    Vitals 11/23 1836  Lying 186/78, 75  Sitting 170/73, 76  Standing 129/63, 85    Stop Oxybutynin - anticholinergic SE, BP drop, Urine retention  Use Seroquel hs but hold Mirtazapine for now  Shift Flomax / Tamsulosin to bedtime, stop if continued orthostatic drop in BP  Don't use Hydralazine prn unless sitting / standing BP is elevated  Stop Lisinopril, use Toprol XL and advance dose if needed (if pulse not tool low)  Beta blocker may be better for orthostatic patients  Keep well hydrated, Avoid diuretics       Diabetes mellitus, type II (HCC) ()  Taper tx as above  Follow BS FBS daily  Stop quick insulin, suspect was cause for hypoglycemia when give w/o eating       HCVD (hypertensive cardiovascular disease) ()  Stop Norvasc / Lisinopril  Monitor on Metoprolol   Follow for dizziness / orthostasis side effects       Pure hypercholesterolemia ()  Cont Lipitor  _______________________________________________________________________  Patient seen and examined by me on discharge day.   Pertinent Findings:  Visit Vitals  BP (!) 197/74 (BP 1 Location: Right upper arm, BP Patient Position: At rest;Sitting)   Pulse 97   Temp 97.7 °F (36.5 °C)   Resp 20   Ht 5' 11\" (1.803 m)   Wt 75.2 kg (165 lb 11.2 oz)   SpO2 98%   BMI 23.11 kg/m²     Gen:    Not in distress  Chest: Nonlabored respiration, Clear lungs  CVS:   Regular rhythm. No edema  Abd:  Soft, not distended, not tender  Neuro:  Alert, nonfocal, weak    LABS:      EK/21: NSR 74 bpm, normal  : NSR 63 with marked SA and 1st deg AVB     Radiology:  pCXR  :  Single AP portable view of the chest obtained at 1150 demonstrates a  stable cardiomediastinal silhouette. The lungs are hypoinspiratory but clear  bilaterally. No osseous abnormalities are seen. IMPRESSION: No evidence of acute cardiopulmonary process. CT HEAD : There is age-appropriate diffuse cortical atrophy. There is no acute  intracranial hemorrhage, mass, mass effect or herniation. Ventricular system is  normal. The gray-white matter differentiation is well-preserved. The mastoid air  cells are well pneumatized. There is near total opacification of the left  frontal sinus, left maxillary sinus and anterior left ethmoid air cells. IMPRESSION: No acute intracranial hemorrhage, mass or infarct.     _______________________________________________________________________  DISCHARGE MEDICATIONS:   Current Discharge Medication List        START taking these medications    Details   insulin glargine (LANTUS) 100 unit/mL injection 25 Units by SubCUTAneous route daily. Qty: 1 mL, Refills: 0  Start date: 2022           CONTINUE these medications which have CHANGED    Details   tamsulosin (FLOMAX) 0.4 mg capsule Take 1 Capsule by mouth nightly. Qty: 1 Capsule, Refills: 0  Start date: 2022           CONTINUE these medications which have NOT CHANGED    Details   multivitamin (ONE A DAY) tablet Take 1 Tablet by mouth daily.       polyethylene glycol (MIRALAX) 17 gram packet Take 17 g by mouth daily. metoprolol succinate (TOPROL-XL) 25 mg XL tablet Take 25 mg by mouth daily. QUEtiapine (SEROquel) 25 mg tablet Take 25 mg by mouth nightly. acetaminophen (TYLENOL) 325 mg tablet Take 2 Tablets by mouth every six (6) hours as needed. atorvastatin (LIPITOR) 40 mg tablet Take 1 Tab by mouth nightly. Qty: 90 Tab, Refills: 1      aspirin delayed-release 81 mg tablet Take 81 mg by mouth daily. STOP taking these medications       amLODIPine (NORVASC) 5 mg tablet Comments:   Reason for Stopping:         oxybutynin (DITROPAN) 5 mg tablet Comments:   Reason for Stopping:         mirtazapine (REMERON) 15 mg tablet Comments:   Reason for Stopping:         amLODIPine (NORVASC) 5 mg tablet Comments:   Reason for Stopping:         lisinopriL (PRINIVIL, ZESTRIL) 10 mg tablet Comments:   Reason for Stopping:         insulin aspart protamine/insulin aspart (NovoLOG Mix 70-30FlexPen U-100) 100 unit/mL (70-30) inpn Comments:   Reason for Stopping:         cholecalciferol, vitamin D3, 50 mcg (2,000 unit) tab Comments:   Reason for Stopping:         ferrous sulfate 325 mg (65 mg iron) tablet Comments:   Reason for Stopping:               My Recommended  Diet: Diabetic, encourage fluids, Allow reasonable amount of salt  Activity: Up only with assistance  Wound Care: none, use support hose daily  Follow-up labs: Daily FBS    Hospitalist Recommendations  Check FBS daily - adjust Lantus dosing accordingly  Current DM control good, possible too good.   Stop Novulin Mix 70/30  Pt likely dropped from the \"quick\" insulin component, when his BP was low (orthostatic) and he didn't eat  Stop Oxybutynin - anticholinergic SE, BP drop, Urine retention  Use Seroquel hs but hold Mirtazapine for now  Shift Flomax / Tamsulosin to bedtime, stop if continued orthostatic drop in BP  Don't use Hydralazine prn unless sitting / standing BP is elevated  Stop Lisinopril, use Toprol XL and advance dose if needed (if pulse not tool low)  Beta blocker may be better for orthostatic patients  Keep well hydrated  NICOLAS Christensen MD    [phone]  ______________________________________________________________________  DISPOSITION:    Home with Family:    Home with HH/PT/OT/RN:    SNF/LTC: [facility]   TAWNY:    OTHER:        Condition at Discharge:  Stable  _____________________________________________________________________  Follow up with:   PCP : Yovany Ackerman NP  This week        Total time in minutes spent coordinating this discharge (includes going over instructions, follow-up, prescriptions, and preparing report for sign off to her PCP) :35 minutes    Signed:  Patricio Cheek MD  [facility] Hospitalist  [phone]

## 2022-11-23 NOTE — PROGRESS NOTES
TRANSFER - OUT REPORT:    Verbal report given to Amber Queen (name) on Marco Loving  being transferred to [facility] (unit) for routine progression of care       Report consisted of patients Situation, Background, Assessment and   Recommendations(SBAR). Information from the following report(s) SBAR, Kardex, Intake/Output, MAR, and Recent Results was reviewed with the receiving nurse. Lines:       Opportunity for questions and clarification was provided. Patient transported with discharge instructions and printed lantus prescription.

## 2022-11-23 NOTE — PROGRESS NOTES
Notified Dr. Severiano Howell of patients urine becoming red in color when it was yellow at 0000. Patient has had a condom cath on during this shift. Charge nurse and Nurse Sup are aware.

## 2022-11-23 NOTE — ROUTINE PROCESS
Bedside shift change report given to Tiny Chase (oncoming nurse) by 1200 Daniel Gonzales (offgoing nurse). Report included the following information SBAR, Kardex, and MAR.

## 2022-11-23 NOTE — PROGRESS NOTES
Bedside and Verbal shift change report given to ANDREA De La Torre RN (oncoming nurse) by Angel Luis Reynoso RN (offgoing nurse). Report included the following information SBAR, Kardex, Intake/Output, MAR, and Recent Results.

## 2022-11-23 NOTE — PROGRESS NOTES
Comprehensive Nutrition Assessment    Type and Reason for Visit: Initial, Positive nutrition screen    Nutrition Recommendations/Plan:   Regular 4 carb choices low fat/low chol/high fiber/SAAD  Glucerna with meals   Encourage po intake      Malnutrition Assessment:  Malnutrition Status:  Mild malnutrition (11/23/22 1420)    Context:  Acute illness     Findings of the 6 clinical characteristics of malnutrition:   Energy Intake:  No significant decrease in energy intake  Weight Loss:  Greater than 5% over 1 month     Body Fat Loss:  No significant body fat loss,     Muscle Mass Loss:  No significant muscle mass loss,    Fluid Accumulation:  No significant fluid accumulation,     Strength:  Not performed     Nutrition Assessment: Present on Admission:   Hypoglycemia   Orthostatic hypotension   AMS (altered mental status)   HCVD (hypertensive cardiovascular disease)   Diabetes mellitus, type II ([facility]   Pure hypercholesterolemia   Acute renal failure (ARF) ([facility]  Pt admitted with above. He came in with low blood sugars and since then has had high blood sugars    148-382. He is on insulin and A1c% is 7. Po intake is fair/good if pt alert. Very confused today. Would continue supplements as he has had 15#/8.3% wt loss in 1 month. Encourage intake and help with meal set-up as needed. Nutrition Related Findings:      Wound Type:  (buttocks wound, unsure stage if any), labs-H/H-10.7/32.3, BUN-27, meds-lipitor, insulin     Current Nutrition Intake & Therapies:  Average Meal Intake: 51-75%  Average Supplement Intake: 26-50%  ADULT ORAL NUTRITION SUPPLEMENT Breakfast, Lunch, Dinner; Diabetic Supplement  ADULT DIET Regular; 4 carb choices (60 gm/meal); Low Fat/Low Chol/High Fiber/SAAD    Anthropometric Measures:  Height: 5' 11\" (180.3 cm)  Ideal Body Weight (IBW): 172 lbs (78 kg)  Current Body Wt:  75.2 kg (165 lb 12.6 oz), 96.4 % IBW.     Current BMI (kg/m2): 23.1  BMI Category: Normal weight (BMI 22.0-24.9) age over 65  Weight Loss Metrics 11/22/2022 11/18/2022 10/27/2022 10/3/2022 5/26/2022 9/6/2021 9/6/2021   Today's Wt 165 lb 11.2 oz 176 lb 180 lb 180 lb 180 lb 183 lb 183 lb   BMI 23.11 kg/m2 24.55 kg/m2 25.1 kg/m2 25.1 kg/m2 25.1 kg/m2 25.52 kg/m2 25.52 kg/m2       Estimated Daily Nutrient Needs:  Energy Requirements Based On: Formula  Weight Used for Energy Requirements: Admission  Energy (kcal/day): 1420  Weight Used for Protein Requirements: Admission  Protein (g/day): 90  Method Used for Fluid Requirements: 1 ml/kcal  Fluid (ml/day): 2706    Nutrition Diagnosis:   Unintended weight loss related to cognitive or neurological impairment as evidenced by weight loss, weight loss greater than or equal to 5% in 1 month    Nutrition Interventions:   Food and/or Nutrient Delivery: Continue current diet, Continue oral nutrition supplement  Nutrition Education/Counseling: No recommendations at this time  Coordination of Nutrition Care: Continue to monitor while inpatient, Interdisciplinary rounds  Plan of Care discussed with: pt is confused    Goals:     Goals: Meet at least 75% of estimated needs, by next RD assessment       Nutrition Monitoring and Evaluation:   Behavioral-Environmental Outcomes: None identified  Food/Nutrient Intake Outcomes: Food and nutrient intake, Supplement intake  Physical Signs/Symptoms Outcomes: Weight, Skin, Meal time behavior    Discharge Planning:    Continue oral nutrition supplement, Continue current diet    Nancy Aguirre, CAYETANO

## 2022-11-23 NOTE — PROGRESS NOTES
Problem: Pressure Injury - Risk of  Goal: *Prevention of pressure injury  Description: Document Papa Scale and appropriate interventions in the flowsheet. Outcome: Progressing Towards Goal  Note: Pressure Injury Interventions:  Sensory Interventions: Assess changes in LOC    Moisture Interventions: Absorbent underpads    Activity Interventions: Increase time out of bed    Mobility Interventions: PT/OT evaluation    Nutrition Interventions: Document food/fluid/supplement intake    Friction and Shear Interventions: HOB 30 degrees or less                Problem: Falls - Risk of  Goal: *Absence of Falls  Description: Document Jonathan Fall Risk and appropriate interventions in the flowsheet.   Outcome: Progressing Towards Goal  Note: Fall Risk Interventions:  Mobility Interventions: Bed/chair exit alarm    Mentation Interventions: Adequate sleep, hydration, pain control    Medication Interventions: Bed/chair exit alarm    Elimination Interventions: Bed/chair exit alarm

## 2022-11-23 NOTE — PROGRESS NOTES
Patients bedside BG elevated- 382. Dr. Sherry Knutson made aware. Ordered to give 4 units of Lispo and scheduled Lantus. Will continue to monitor.

## 2022-12-06 PROCEDURE — 96360 HYDRATION IV INFUSION INIT: CPT

## 2022-12-06 PROCEDURE — 99284 EMERGENCY DEPT VISIT MOD MDM: CPT

## 2022-12-06 RX ORDER — SODIUM CHLORIDE 0.9 % (FLUSH) 0.9 %
5-10 SYRINGE (ML) INJECTION ONCE
Status: DISCONTINUED | OUTPATIENT
Start: 2022-12-06 | End: 2022-12-07 | Stop reason: HOSPADM

## 2022-12-07 ENCOUNTER — HOSPITAL ENCOUNTER (EMERGENCY)
Age: 87
Discharge: HOME | End: 2022-12-07
Attending: EMERGENCY MEDICINE
Payer: MEDICARE

## 2022-12-07 ENCOUNTER — HOSPITAL ENCOUNTER (OUTPATIENT)
Dept: CT IMAGING | Age: 87
Discharge: HOME | End: 2022-12-07
Attending: EMERGENCY MEDICINE

## 2022-12-07 VITALS
SYSTOLIC BLOOD PRESSURE: 142 MMHG | TEMPERATURE: 97.7 F | OXYGEN SATURATION: 98 % | DIASTOLIC BLOOD PRESSURE: 78 MMHG | BODY MASS INDEX: 22.22 KG/M2 | RESPIRATION RATE: 15 BRPM | WEIGHT: 158.7 LBS | HEART RATE: 84 BPM | HEIGHT: 71 IN

## 2022-12-07 DIAGNOSIS — E08.65 DIABETES MELLITUS DUE TO UNDERLYING CONDITION WITH HYPERGLYCEMIA, UNSPECIFIED WHETHER LONG TERM INSULIN USE (HCC): Primary | ICD-10-CM

## 2022-12-07 LAB
ALBUMIN SERPL-MCNC: 3.1 G/DL (ref 3.5–5)
ALBUMIN/GLOB SERPL: 1.1 {RATIO} (ref 1.1–2.2)
ALP SERPL-CCNC: 162 U/L (ref 45–117)
ALT SERPL-CCNC: 26 U/L (ref 12–78)
ANION GAP SERPL CALC-SCNC: 9 MMOL/L (ref 5–15)
APPEARANCE UR: CLEAR
AST SERPL-CCNC: 18 U/L (ref 15–37)
BACTERIA URNS QL MICRO: NEGATIVE /HPF
BASOPHILS # BLD: 0 K/UL (ref 0–0.1)
BASOPHILS NFR BLD: 0 % (ref 0–1)
BILIRUB SERPL-MCNC: 0.4 MG/DL (ref 0.2–1)
BILIRUB UR QL: NEGATIVE
BUN SERPL-MCNC: 23 MG/DL (ref 6–20)
BUN/CREAT SERPL: 16 (ref 12–20)
CALCIUM SERPL-MCNC: 8.5 MG/DL (ref 8.5–10.1)
CHLORIDE SERPL-SCNC: 103 MMOL/L (ref 97–108)
CO2 SERPL-SCNC: 27 MMOL/L (ref 21–32)
COLOR UR: ABNORMAL
CREAT SERPL-MCNC: 1.42 MG/DL (ref 0.7–1.3)
DIFFERENTIAL METHOD BLD: ABNORMAL
EOSINOPHIL # BLD: 0.1 K/UL (ref 0–0.4)
EOSINOPHIL NFR BLD: 2 % (ref 0–7)
EPITH CASTS URNS QL MICRO: ABNORMAL /LPF
ERYTHROCYTE [DISTWIDTH] IN BLOOD BY AUTOMATED COUNT: 13.5 % (ref 11.5–14.5)
GLOBULIN SER CALC-MCNC: 2.9 G/DL (ref 2–4)
GLUCOSE BLD STRIP.AUTO-MCNC: 265 MG/DL (ref 65–117)
GLUCOSE BLD STRIP.AUTO-MCNC: 354 MG/DL (ref 65–117)
GLUCOSE SERPL-MCNC: 357 MG/DL (ref 65–100)
GLUCOSE UR STRIP.AUTO-MCNC: >1000 MG/DL
HCT VFR BLD AUTO: 32.3 % (ref 36.6–50.3)
HGB BLD-MCNC: 10.8 G/DL (ref 12.1–17)
HGB UR QL STRIP: NEGATIVE
IMM GRANULOCYTES # BLD AUTO: 0 K/UL (ref 0–0.04)
IMM GRANULOCYTES NFR BLD AUTO: 0 % (ref 0–0.5)
KETONES UR QL STRIP.AUTO: NEGATIVE MG/DL
LEUKOCYTE ESTERASE UR QL STRIP.AUTO: NEGATIVE
LIPASE SERPL-CCNC: 178 U/L (ref 73–393)
LYMPHOCYTES # BLD: 2.4 K/UL (ref 0.8–3.5)
LYMPHOCYTES NFR BLD: 42 % (ref 12–49)
MAGNESIUM SERPL-MCNC: 1.7 MG/DL (ref 1.6–2.4)
MCH RBC QN AUTO: 30.5 PG (ref 26–34)
MCHC RBC AUTO-ENTMCNC: 33.4 G/DL (ref 30–36.5)
MCV RBC AUTO: 91.2 FL (ref 80–99)
MONOCYTES # BLD: 0.7 K/UL (ref 0–1)
MONOCYTES NFR BLD: 13 % (ref 5–13)
NEUTS SEG # BLD: 2.4 K/UL (ref 1.8–8)
NEUTS SEG NFR BLD: 43 % (ref 32–75)
NITRITE UR QL STRIP.AUTO: NEGATIVE
NRBC # BLD: 0 K/UL (ref 0–0.01)
NRBC BLD-RTO: 0 PER 100 WBC
PH UR STRIP: 6.5 [PH] (ref 5–8)
PLATELET # BLD AUTO: 195 K/UL (ref 150–400)
PMV BLD AUTO: 9.7 FL (ref 8.9–12.9)
POTASSIUM SERPL-SCNC: 4.1 MMOL/L (ref 3.5–5.1)
PROT SERPL-MCNC: 6 G/DL (ref 6.4–8.2)
PROT UR STRIP-MCNC: NEGATIVE MG/DL
RBC # BLD AUTO: 3.54 M/UL (ref 4.1–5.7)
RBC #/AREA URNS HPF: ABNORMAL /HPF (ref 0–5)
SERVICE CMNT-IMP: ABNORMAL
SERVICE CMNT-IMP: ABNORMAL
SODIUM SERPL-SCNC: 139 MMOL/L (ref 136–145)
SP GR UR REFRACTOMETRY: 1.01 (ref 1–1.03)
TROPONIN-HIGH SENSITIVITY: 137 NG/L (ref 0–76)
TROPONIN-HIGH SENSITIVITY: 139 NG/L (ref 0–76)
UA: UC IF INDICATED,UAUC: ABNORMAL
UROBILINOGEN UR QL STRIP.AUTO: 0.2 EU/DL (ref 0.2–1)
WBC # BLD AUTO: 5.6 K/UL (ref 4.1–11.1)
WBC URNS QL MICRO: ABNORMAL /HPF (ref 0–4)

## 2022-12-07 PROCEDURE — 81001 URINALYSIS AUTO W/SCOPE: CPT

## 2022-12-07 PROCEDURE — 85025 COMPLETE CBC W/AUTO DIFF WBC: CPT

## 2022-12-07 PROCEDURE — 80053 COMPREHEN METABOLIC PANEL: CPT

## 2022-12-07 PROCEDURE — 93005 ELECTROCARDIOGRAM TRACING: CPT

## 2022-12-07 PROCEDURE — 36415 COLL VENOUS BLD VENIPUNCTURE: CPT

## 2022-12-07 PROCEDURE — 84484 ASSAY OF TROPONIN QUANT: CPT

## 2022-12-07 PROCEDURE — 83735 ASSAY OF MAGNESIUM: CPT

## 2022-12-07 PROCEDURE — 83690 ASSAY OF LIPASE: CPT

## 2022-12-07 PROCEDURE — 74011250636 HC RX REV CODE- 250/636: Performed by: EMERGENCY MEDICINE

## 2022-12-07 PROCEDURE — 82962 GLUCOSE BLOOD TEST: CPT

## 2022-12-07 RX ADMIN — SODIUM CHLORIDE 500 ML: 9 INJECTION, SOLUTION INTRAVENOUS at 00:46

## 2022-12-07 NOTE — ED PROVIDER NOTES
EMERGENCY DEPARTMENT HISTORY AND PHYSICAL EXAM      Date: 12/7/2022  Patient Name: Cole Bautista      Diagnosis     Clinical Impression:   1. Diabetes mellitus due to underlying condition with hyperglycemia, unspecified whether long term insulin use ([facility]                    History of Presenting Illness     Chief Complaint   Patient presents with    High Blood Sugar       History Provided By: Patient and EMS    HPI:   The history is provided by the patient and the nursing home. The history is limited by the condition of the patient. Altered mental status   This is a new problem. The current episode started 3 to 5 hours ago. The problem has been resolved. Associated symptoms include confusion. Pertinent negatives include no unresponsiveness, no hallucinations and no violence. Functional status baseline:  [EPIC#1537^NOTE} His past medical history is significant for diabetes and heart disease. Cole Bautista, 80 y.o. male  presents to the ED with cc of altered mental status. Patient sent from the nursing home with report of elevated blood sugar in the 500s and he was confused. He has had several visits over the last several weeks for confusion and lethargy. Patient is hard of hearing and history is limited secondary to this. He has no complaints of pain, denies any chest pain abdominal pain he denies any fevers chills. He has a history of dyslipidemia coronary disease and diabetes. There are no other complaints, changes, or physical findings at this time. PCP: Elías Aguayo NP    No current facility-administered medications on file prior to encounter. Current Outpatient Medications on File Prior to Encounter   Medication Sig Dispense Refill    insulin glargine (LANTUS) 100 unit/mL injection 25 Units by SubCUTAneous route daily. 1 mL 0    tamsulosin (FLOMAX) 0.4 mg capsule Take 1 Capsule by mouth nightly.  1 Capsule 0    metoprolol succinate (TOPROL-XL) 25 mg XL tablet Take 25 mg by mouth daily. QUEtiapine (SEROquel) 25 mg tablet Take 25 mg by mouth nightly. multivitamin (ONE A DAY) tablet Take 1 Tablet by mouth daily. polyethylene glycol (MIRALAX) 17 gram packet Take 17 g by mouth daily. acetaminophen (TYLENOL) 325 mg tablet Take 2 Tablets by mouth every six (6) hours as needed. atorvastatin (LIPITOR) 40 mg tablet Take 1 Tab by mouth nightly. 90 Tab 1    aspirin delayed-release 81 mg tablet Take 81 mg by mouth daily. Past History     Past Medical History:  Past Medical History:   Diagnosis Date    Carotid artery disease ([facility]     Coronary atherosclerosis of native coronary artery     Diabetes mellitus, type II (HCC)     DJD (degenerative joint disease)     HCVD (hypertensive cardiovascular disease)     Pure hypercholesterolemia        Past Surgical History:  Past Surgical History:   Procedure Laterality Date    HX CATARACT REMOVAL Bilateral     HX HEART CATHETERIZATION  2010    LVEDP 9, mild ant hypo EF 55-60%, LAD 70% 99%, mod D1, OM3 30%, PDA 90%    HX PTCA  2010    Stent-LAD 2.5X18 ANDREA    HX PTCA  3/2010    Stent PDA ANDREA       Family History:  Family History   Problem Relation Age of Onset    Heart Disease Mother     Cancer Father        Social History:  Social History     Socioeconomic History    Marital status:    Tobacco Use    Smoking status: Former     Types: Cigarettes     Quit date: 10/17/1963     Years since quittin.1    Smokeless tobacco: Never   Vaping Use    Vaping Use: Never used   Substance and Sexual Activity    Alcohol use: Not Currently     Comment: stopped 2018/used to drink heavy    Drug use: Never    Sexual activity: Not Currently       Allergies:   Allergies   Allergen Reactions    Aspirin Other (comments)     NOSE BLEED  MG IS TAKEN  Can take low dose aspirin    Pcn [Penicillins] Shortness of Breath     Itching/hives           Social Determinants of Health     Tobacco Use: Medium Risk    Smoking Tobacco Use: Former    Smokeless Tobacco Use: Never    Passive Exposure: Not on file   Alcohol Use: Not on file   Financial Resource Strain: Not on file   Food Insecurity: Not on file   Transportation Needs: Not on file   Physical Activity: Not on file   Stress: Not on file   Social Connections: Not on file   Intimate Partner Violence: Not on file   Depression: Not at risk    PHQ-2 Score: 0   Housing Stability: Not on file     Social Connections: Not on file             Review of Systems   Review of Systems   Unable to perform ROS: Dementia   Psychiatric/Behavioral:  Positive for confusion. Negative for hallucinations. All other systems reviewed and are negative. Physical Exam   Physical Exam  Vitals and nursing note reviewed. Constitutional:       General: He is not in acute distress. Appearance: Normal appearance. He is well-developed. He is not ill-appearing, toxic-appearing or diaphoretic. HENT:      Head: Normocephalic and atraumatic. Nose: Nose normal.      Mouth/Throat:      Mouth: Mucous membranes are moist.      Pharynx: Oropharynx is clear. Eyes:      Extraocular Movements: Extraocular movements intact. Conjunctiva/sclera: Conjunctivae normal.      Pupils: Pupils are equal, round, and reactive to light. Cardiovascular:      Rate and Rhythm: Normal rate and regular rhythm. Pulses: Normal pulses. Heart sounds: Normal heart sounds. Pulmonary:      Effort: Pulmonary effort is normal. No respiratory distress. Breath sounds: Normal breath sounds. Abdominal:      General: There is no distension. Palpations: Abdomen is soft. There is no mass. Tenderness: There is no abdominal tenderness. There is no guarding or rebound. Hernia: No hernia is present. Musculoskeletal:         General: No swelling or tenderness. Normal range of motion. Cervical back: Normal range of motion and neck supple. No rigidity or tenderness. No muscular tenderness.       Right lower leg: No edema. Left lower leg: No edema. Skin:     General: Skin is warm and dry. Capillary Refill: Capillary refill takes less than 2 seconds. Findings: No erythema or rash. Neurological:      General: No focal deficit present. Mental Status: He is alert and oriented to person, place, and time. Mental status is at baseline. GCS: GCS eye subscore is 4. GCS verbal subscore is 5. GCS motor subscore is 6. Cranial Nerves: Cranial nerves 2-12 are intact. No cranial nerve deficit. Sensory: No sensory deficit. Motor: Motor function is intact. No weakness. Psychiatric:         Mood and Affect: Mood normal.         Behavior: Behavior normal.         Diagnostic Study Results     Labs -     Recent Results (from the past 12 hour(s))   GLUCOSE, POC    Collection Time: 12/07/22 12:00 AM   Result Value Ref Range    Glucose (POC) 354 (H) 65 - 117 mg/dL    Performed by Fredy     CBC WITH AUTOMATED DIFF    Collection Time: 12/07/22 12:35 AM   Result Value Ref Range    WBC 5.6 4.1 - 11.1 K/uL    RBC 3.54 (L) 4.10 - 5.70 M/uL    HGB 10.8 (L) 12.1 - 17.0 g/dL    HCT 32.3 (L) 36.6 - 50.3 %    MCV 91.2 80.0 - 99.0 FL    MCH 30.5 26.0 - 34.0 PG    MCHC 33.4 30.0 - 36.5 g/dL    RDW 13.5 11.5 - 14.5 %    PLATELET 868 935 - 168 K/uL    MPV 9.7 8.9 - 12.9 FL    NRBC 0.0 0  WBC    ABSOLUTE NRBC 0.00 0.00 - 0.01 K/uL    NEUTROPHILS 43 32 - 75 %    LYMPHOCYTES 42 12 - 49 %    MONOCYTES 13 5 - 13 %    EOSINOPHILS 2 0 - 7 %    BASOPHILS 0 0 - 1 %    IMMATURE GRANULOCYTES 0 0.0 - 0.5 %    ABS. NEUTROPHILS 2.4 1.8 - 8.0 K/UL    ABS. LYMPHOCYTES 2.4 0.8 - 3.5 K/UL    ABS. MONOCYTES 0.7 0.0 - 1.0 K/UL    ABS. EOSINOPHILS 0.1 0.0 - 0.4 K/UL    ABS. BASOPHILS 0.0 0.0 - 0.1 K/UL    ABS. IMM.  GRANS. 0.0 0.00 - 0.04 K/UL    DF AUTOMATED     METABOLIC PANEL, COMPREHENSIVE    Collection Time: 12/07/22 12:35 AM   Result Value Ref Range    Sodium 139 136 - 145 mmol/L    Potassium 4.1 3.5 - 5.1 mmol/L    Chloride 103 97 - 108 mmol/L    CO2 27 21 - 32 mmol/L    Anion gap 9 5 - 15 mmol/L    Glucose 357 (H) 65 - 100 mg/dL    BUN 23 (H) 6 - 20 MG/DL    Creatinine 1.42 (H) 0.70 - 1.30 MG/DL    BUN/Creatinine ratio 16 12 - 20      eGFR 46 (L) >60 ml/min/1.73m2    Calcium 8.5 8.5 - 10.1 MG/DL    Bilirubin, total 0.4 0.2 - 1.0 MG/DL    ALT (SGPT) 26 12 - 78 U/L    AST (SGOT) 18 15 - 37 U/L    Alk.  phosphatase 162 (H) 45 - 117 U/L    Protein, total 6.0 (L) 6.4 - 8.2 g/dL    Albumin 3.1 (L) 3.5 - 5.0 g/dL    Globulin 2.9 2.0 - 4.0 g/dL    A-G Ratio 1.1 1.1 - 2.2     LIPASE    Collection Time: 12/07/22 12:35 AM   Result Value Ref Range    Lipase 178 73 - 393 U/L   MAGNESIUM    Collection Time: 12/07/22 12:35 AM   Result Value Ref Range    Magnesium 1.7 1.6 - 2.4 mg/dL   TROPONIN-HIGH SENSITIVITY    Collection Time: 12/07/22 12:35 AM   Result Value Ref Range    Troponin-High Sensitivity 137 (HH) 0 - 76 ng/L   URINALYSIS W/ REFLEX CULTURE    Collection Time: 12/07/22 12:35 AM    Specimen: Urine   Result Value Ref Range    Color YELLOW/STRAW      Appearance CLEAR CLEAR      Specific gravity 1.015 1.003 - 1.030      pH (UA) 6.5 5.0 - 8.0      Protein Negative NEG mg/dL    Glucose >1,000 (A) NEG mg/dL    Ketone Negative NEG mg/dL    Bilirubin Negative NEG      Blood Negative NEG      Urobilinogen 0.2 0.2 - 1.0 EU/dL    Nitrites Negative NEG      Leukocyte Esterase Negative NEG      WBC 0-4 0 - 4 /hpf    RBC 0-5 0 - 5 /hpf    Epithelial cells FEW FEW /lpf    Bacteria Negative NEG /hpf    UA:UC IF INDICATED CULTURE NOT INDICATED BY UA RESULT CNI     GLUCOSE, POC    Collection Time: 12/07/22  2:18 AM   Result Value Ref Range    Glucose (POC) 265 (H) 65 - 117 mg/dL    Performed by Estevan Mitchell        Radiologic Studies -   CT HEAD WO CONT   Final Result   No acute process or change compared to the prior exam.              CT Results  (Last 48 hours)                 12/07/22 0021  CT HEAD WO CONT Final result Impression:  No acute process or change compared to the prior exam.               Narrative:  EXAM: CT HEAD WO CONT       INDICATION: AMS       COMPARISON: 11/18/2022. CONTRAST: None. TECHNIQUE: Unenhanced CT of the head was performed using 5 mm images. Brain and   bone windows were generated. Coronal and sagittal reformats. CT dose reduction   was achieved through use of a standardized protocol tailored for this   examination and automatic exposure control for dose modulation. FINDINGS:   The ventricles and sulci are normal in size, shape and configuration. . Small   vessel ischemic changes are stable compared to the prior exam. There is no   intracranial hemorrhage, extra-axial collection, or mass effect. The basilar   cisterns are open. No CT evidence of acute infarct. The bone windows demonstrate no abnormalities. Near-complete opacification of   the left maxillary sinus, left frontal sinus, and left ethmoid layer cells is   noted. CXR Results  (Last 48 hours)      None            Medical Decision Making   I am the first provider for this patient. I reviewed the vital signs, available nursing notes, past medical history, past surgical history, family history and social history. Vital Signs-Reviewed the patient's vital signs. Patient Vitals for the past 12 hrs:   Temp Pulse Resp BP SpO2   12/07/22 0003 97.7 °F (36.5 °C) 75 20 (!) 167/60 97 %           EKG interpretation: (Preliminary)  Rhythm: normal sinus rhythm;  regular . Rate (approx.): 75; Blocks: 1st degree block; Ectopy: noneAxis: normal; P wave: prolonged; QRS interval: normal ; ST/T wave: normal; in  Lead: ; Other findings: .        Records Reviewed: Nursing Notes, Old Medical Records, Previous Radiology Studies, and Previous Laboratory Studies    Provider Notes (Medical Decision Making):   Presents with altered mental status reported elevated blood sugar, will check basic laboratory studies EKG head CT. He has no symptoms of acute stroke on examination, will assess for UTI. ED Course:   Initial assessment performed. The patients presenting problems have been discussed, and they are in agreement with the care plan formulated and outlined with them. I have encouraged them to ask questions as they arise throughout their visit. Medications Given in the ED:    Medications   sodium chloride (NS) flush 5-10 mL (has no administration in time range)   insulin regular (NOVOLIN R, HUMULIN R) 100 unit/mL injection (has no administration in time range)   sodium chloride 0.9 % bolus infusion 500 mL (500 mL IntraVENous New Bag 12/7/22 0046)         3:13 AM    Patient presents with reported altered mental status and hyperglycemia, blood sugar improved with IV fluids and still initially ordered but not given due to improvement of blood sugar. Patient without altered mental status answered all questions appropriately with a nonfocal neurologic exam, no findings to suggest stroke. Urinalysis laboratory studies essentially unremarkable no signs of infection head CT is negative for acute process, will recommend continued monitoring of blood sugar see PCP for follow-up return here for change or worsening symptoms    Pt has been re-examined and states that they are feeling better and have no new complaints. Laboratory tests, medications, x-rays, diagnosis, follow up plan and return instructions have been reviewed and discussed with the patient and/or family. Pt and/or family were instructed on symptoms that may arise after discharge requiring re-evaluation by a physician. Pt and/or family have had the opportunity to ask questions about their care. Patient and/or family verbalized understanding and agreement with care plan, follow up and return instructions. Patient and/or family agree to return in 50 hours if their symptoms are not improving or immediately if they have any change in their condition.          I have also put together some discharge instructions for patient that include: 1) educational information regarding their diagnosis, 2) how to care for their diagnosis at home, as well a 3) list of reasons why they would want to return to the ED prior to their follow-up appointment, should their condition change. Monet Lamas MD      Procedures        Disposition:  Discharged          DISCHARGE PLAN:  1. Current Discharge Medication List        2. Follow-up Information       Follow up With Specialties Details Why Contact Info    Jana Fisher NP Nurse Practitioner Schedule an appointment as soon as possible for a visit in 2 days For follow up Kevin [phone]  [address] [phone]            3. Return to ED if worse       Attestations: Monet Lamas MD    Please note that this dictation was completed with [email or web address], the computer voice recognition software. Quite often unanticipated grammatical, syntax, homophones, and other interpretive errors are inadvertently transcribed by the computer software. Please disregard these errors. Please excuse any errors that have escaped final proofreading. Thank you.

## 2022-12-07 NOTE — PROGRESS NOTES
[address] ED RN advises to arranged BLS transportation from [facility] back to [facility].   Arranged BLS transport w/[facility] - ETA of 0715 given - updated ED staff w/ETA

## 2022-12-07 NOTE — ED TRIAGE NOTES
Pt arrived via EMS from [facility] with report of hyperglycemia 503 on EMS arrival. Pt's blood sugar was 323 at dinner time approx 1700 tonight. AMS reported by [facility] per EMS.  Pt alert and oriented x 3 on arrival.

## 2022-12-07 NOTE — ED NOTES
Pt incontinent of large amount of urine x 2 during ED stay. Documented on flowsheet. Pericare performed and brief applied/linen changed each episode.

## 2022-12-11 LAB
ATRIAL RATE: 75 BPM
CALCULATED P AXIS, ECG09: 90 DEGREES
CALCULATED R AXIS, ECG10: 52 DEGREES
CALCULATED T AXIS, ECG11: 61 DEGREES
DIAGNOSIS, 93000: NORMAL
P-R INTERVAL, ECG05: 236 MS
Q-T INTERVAL, ECG07: 392 MS
QRS DURATION, ECG06: 92 MS
QTC CALCULATION (BEZET), ECG08: 437 MS
VENTRICULAR RATE, ECG03: 75 BPM

## 2022-12-15 ENCOUNTER — HOSPITAL ENCOUNTER (EMERGENCY)
Age: 87
Discharge: HOME | End: 2022-12-16
Attending: FAMILY MEDICINE
Payer: MEDICARE

## 2022-12-15 ENCOUNTER — APPOINTMENT (OUTPATIENT)
Dept: GENERAL RADIOLOGY | Age: 87
End: 2022-12-15
Attending: FAMILY MEDICINE
Payer: MEDICARE

## 2022-12-15 DIAGNOSIS — Z00.00 NORMAL NEUROLOGICAL EXAM: Primary | ICD-10-CM

## 2022-12-15 LAB
ALBUMIN SERPL-MCNC: 3.4 G/DL (ref 3.5–5)
ALBUMIN/GLOB SERPL: 1.1 {RATIO} (ref 1.1–2.2)
ALP SERPL-CCNC: 109 U/L (ref 45–117)
ALT SERPL-CCNC: 24 U/L (ref 12–78)
ANION GAP SERPL CALC-SCNC: 7 MMOL/L (ref 5–15)
AST SERPL-CCNC: 24 U/L (ref 15–37)
BILIRUB SERPL-MCNC: 0.4 MG/DL (ref 0.2–1)
BUN SERPL-MCNC: 26 MG/DL (ref 6–20)
BUN/CREAT SERPL: 19 (ref 12–20)
CALCIUM SERPL-MCNC: 8.9 MG/DL (ref 8.5–10.1)
CHLORIDE SERPL-SCNC: 102 MMOL/L (ref 97–108)
CO2 SERPL-SCNC: 29 MMOL/L (ref 21–32)
CREAT SERPL-MCNC: 1.39 MG/DL (ref 0.7–1.3)
GLOBULIN SER CALC-MCNC: 3.1 G/DL (ref 2–4)
GLUCOSE SERPL-MCNC: 220 MG/DL (ref 65–100)
POTASSIUM SERPL-SCNC: 4.2 MMOL/L (ref 3.5–5.1)
PROT SERPL-MCNC: 6.5 G/DL (ref 6.4–8.2)
SODIUM SERPL-SCNC: 138 MMOL/L (ref 136–145)

## 2022-12-15 PROCEDURE — 80053 COMPREHEN METABOLIC PANEL: CPT

## 2022-12-15 PROCEDURE — 81001 URINALYSIS AUTO W/SCOPE: CPT

## 2022-12-15 PROCEDURE — 99284 EMERGENCY DEPT VISIT MOD MDM: CPT

## 2022-12-15 PROCEDURE — 85025 COMPLETE CBC W/AUTO DIFF WBC: CPT

## 2022-12-15 PROCEDURE — 36415 COLL VENOUS BLD VENIPUNCTURE: CPT

## 2022-12-15 PROCEDURE — 71045 X-RAY EXAM CHEST 1 VIEW: CPT

## 2022-12-15 RX ORDER — HYDRALAZINE HYDROCHLORIDE 25 MG/1
1 TABLET, FILM COATED ORAL 2 TIMES DAILY
COMMUNITY
Start: 2022-10-06

## 2022-12-16 VITALS
TEMPERATURE: 97.8 F | DIASTOLIC BLOOD PRESSURE: 78 MMHG | HEIGHT: 71 IN | OXYGEN SATURATION: 100 % | RESPIRATION RATE: 14 BRPM | WEIGHT: 174.3 LBS | SYSTOLIC BLOOD PRESSURE: 166 MMHG | HEART RATE: 74 BPM | BODY MASS INDEX: 24.4 KG/M2

## 2022-12-16 LAB
APPEARANCE UR: CLEAR
BACTERIA URNS QL MICRO: NEGATIVE /HPF
BASOPHILS # BLD: 0 K/UL (ref 0–0.1)
BASOPHILS NFR BLD: 0 % (ref 0–1)
BILIRUB UR QL: NEGATIVE
COLOR UR: ABNORMAL
DIFFERENTIAL METHOD BLD: ABNORMAL
EOSINOPHIL # BLD: 0.1 K/UL (ref 0–0.4)
EOSINOPHIL NFR BLD: 1 % (ref 0–7)
EPITH CASTS URNS QL MICRO: ABNORMAL /LPF
ERYTHROCYTE [DISTWIDTH] IN BLOOD BY AUTOMATED COUNT: 13.5 % (ref 11.5–14.5)
GLUCOSE UR STRIP.AUTO-MCNC: 250 MG/DL
HCT VFR BLD AUTO: 31.8 % (ref 36.6–50.3)
HGB BLD-MCNC: 11 G/DL (ref 12.1–17)
HGB UR QL STRIP: NEGATIVE
IMM GRANULOCYTES # BLD AUTO: 0 K/UL (ref 0–0.04)
IMM GRANULOCYTES NFR BLD AUTO: 0 % (ref 0–0.5)
KETONES UR QL STRIP.AUTO: NEGATIVE MG/DL
LEUKOCYTE ESTERASE UR QL STRIP.AUTO: NEGATIVE
LYMPHOCYTES # BLD: 2.4 K/UL (ref 0.8–3.5)
LYMPHOCYTES NFR BLD: 33 % (ref 12–49)
MCH RBC QN AUTO: 31.4 PG (ref 26–34)
MCHC RBC AUTO-ENTMCNC: 34.6 G/DL (ref 30–36.5)
MCV RBC AUTO: 90.9 FL (ref 80–99)
MONOCYTES # BLD: 0.8 K/UL (ref 0–1)
MONOCYTES NFR BLD: 11 % (ref 5–13)
NEUTS SEG # BLD: 3.9 K/UL (ref 1.8–8)
NEUTS SEG NFR BLD: 55 % (ref 32–75)
NITRITE UR QL STRIP.AUTO: NEGATIVE
NRBC # BLD: 0 K/UL (ref 0–0.01)
NRBC BLD-RTO: 0 PER 100 WBC
PH UR STRIP: 7 [PH] (ref 5–8)
PLATELET # BLD AUTO: 180 K/UL (ref 150–400)
PMV BLD AUTO: 10.3 FL (ref 8.9–12.9)
PROT UR STRIP-MCNC: NEGATIVE MG/DL
RBC # BLD AUTO: 3.5 M/UL (ref 4.1–5.7)
RBC #/AREA URNS HPF: ABNORMAL /HPF (ref 0–5)
RBC MORPH BLD: ABNORMAL
SP GR UR REFRACTOMETRY: 1.01 (ref 1–1.03)
UROBILINOGEN UR QL STRIP.AUTO: 0.2 EU/DL (ref 0.2–1)
WBC # BLD AUTO: 7.2 K/UL (ref 4.1–11.1)
WBC URNS QL MICRO: ABNORMAL /HPF (ref 0–4)

## 2022-12-16 NOTE — ED NOTES
Unable to void in urinal Went to cath patient, after cleansing patient started voiding on his own, CC urine sent.

## 2022-12-16 NOTE — ED TRIAGE NOTES
Decreased alertness and leaning to rt X 1 hour.  Drowsy but answers questions correctly, waves at unseen person in corner of room  several times during assessment

## 2022-12-16 NOTE — ED NOTES
I have reviewed discharge instructions with the patient and caregiver. The caregiver verbalized understanding. Report called to OBDULIA Franklin at [facility] DR SELMA DOHERTY , copy of instructions sent. All belongings with patient. . Report to LIZ .  Pt sent back to [facility]

## 2022-12-16 NOTE — PROGRESS NOTES
26 - Dr. Triston Durant ED advised to arranged BLS transportation from [facility] to [facility].   Arranged BLS transport w/[facility] Jose Pena) - ETA of  0130 given - updated ED staff w/ETA

## 2022-12-16 NOTE — ED PROVIDER NOTES
EMERGENCY DEPARTMENT HISTORY AND PHYSICAL EXAM          Date: 12/15/2022  Patient Name: Mahesh Krause    History of Presenting Illness     Chief Complaint   Patient presents with    Extremity Weakness     Decreased alertness and leaning to rt X 1 hour       History Provided By: Patient, Patient's Daughter, and EMS    HPI: Mahesh Krause is a 80 y.o. male, pmhx IDDM, Htn, CAD, and hyperlipidemia, who was brought to the ED by EMS because of a possible stroke and AMS for the last hour. The workers at the nursing home were concerned that he may have been weaker on the right side than the left, and they thought that they saw a right sided facial droop. They also noticed that he seemed to be leaning to the right, but they were not sure how long he had been like that. PCP: Floyd Lee NP    Allergies: aspirin, pcn  Social Hx: No tobacco, vaping, EtOH, Illicit Drugs; Lives in a local nursing home. There are no other complaints, changes, or physical findings at this time. Current Outpatient Medications   Medication Sig Dispense Refill    hydrALAZINE (APRESOLINE) 25 mg tablet Take 1 Tablet by mouth two (2) times a day. insulin glargine (LANTUS) 100 unit/mL injection 25 Units by SubCUTAneous route daily. 1 mL 0    tamsulosin (FLOMAX) 0.4 mg capsule Take 1 Capsule by mouth nightly. 1 Capsule 0    metoprolol succinate (TOPROL-XL) 25 mg XL tablet Take 25 mg by mouth daily. QUEtiapine (SEROquel) 25 mg tablet Take 25 mg by mouth nightly. multivitamin (ONE A DAY) tablet Take 1 Tablet by mouth daily. polyethylene glycol (MIRALAX) 17 gram packet Take 17 g by mouth daily. acetaminophen (TYLENOL) 325 mg tablet Take 2 Tablets by mouth every six (6) hours as needed. atorvastatin (LIPITOR) 40 mg tablet Take 1 Tab by mouth nightly. 90 Tab 1    aspirin delayed-release 81 mg tablet Take 81 mg by mouth daily.          Past History     Past Medical History:  Past Medical History: Diagnosis Date    Carotid artery disease (HCC)     Coronary atherosclerosis of native coronary artery     Diabetes mellitus, type II (HCC)     DJD (degenerative joint disease)     HCVD (hypertensive cardiovascular disease)     Pure hypercholesterolemia        Past Surgical History:  Past Surgical History:   Procedure Laterality Date    HX CATARACT REMOVAL Bilateral     HX HEART CATHETERIZATION  2010    LVEDP 9, mild ant hypo EF 55-60%, LAD 70% 99%, mod D1, OM3 30%, PDA 90%    HX PTCA  2010    Stent-LAD 2.5X18 ANDREA    HX PTCA  3/2010    Stent PDA ANDREA       Family History:  Family History   Problem Relation Age of Onset    Heart Disease Mother     Cancer Father        Social History:  Social History     Tobacco Use    Smoking status: Former     Types: Cigarettes     Quit date: 10/17/1963     Years since quittin.2    Smokeless tobacco: Never   Vaping Use    Vaping Use: Never used   Substance Use Topics    Alcohol use: Not Currently     Comment: stopped 2018/used to drink heavy    Drug use: Never       Allergies: Allergies   Allergen Reactions    Aspirin Other (comments)     NOSE BLEED  MG IS TAKEN  Can take low dose aspirin    Pcn [Penicillins] Shortness of Breath     Itching/hives         Review of Systems   Review of Systems   Constitutional:  Negative for chills and fever. HENT:  Negative for congestion, rhinorrhea and sore throat. Respiratory:  Negative for chest tightness, shortness of breath and stridor. Cardiovascular:  Negative for chest pain. Gastrointestinal:  Negative for abdominal pain, constipation and vomiting. Genitourinary:  Negative for dysuria. Musculoskeletal:  Negative for back pain and myalgias. Skin:  Negative for rash. Neurological:  Negative for facial asymmetry, light-headedness and headaches. Physical Exam   Physical Exam  Vitals reviewed. Constitutional:       General: He is not in acute distress. Appearance: He is well-developed.  He is not diaphoretic. HENT:      Head: Normocephalic and atraumatic. Right Ear: External ear normal.      Left Ear: External ear normal.      Nose: Nose normal.      Mouth/Throat:      Mouth: Mucous membranes are moist.      Pharynx: No oropharyngeal exudate. Eyes:      General: No scleral icterus. Right eye: No discharge. Left eye: No discharge. Conjunctiva/sclera: Conjunctivae normal.      Pupils: Pupils are equal, round, and reactive to light. Neck:      Thyroid: No thyromegaly. Vascular: No JVD. Trachea: No tracheal deviation. Cardiovascular:      Rate and Rhythm: Normal rate and regular rhythm. Heart sounds: Normal heart sounds. No murmur heard. No friction rub. No gallop. Pulmonary:      Effort: Pulmonary effort is normal. No respiratory distress. Breath sounds: No wheezing or rales. Abdominal:      General: Bowel sounds are normal. There is no distension. Palpations: Abdomen is soft. Tenderness: There is no abdominal tenderness. There is no rebound. Musculoskeletal:         General: No tenderness or deformity. Normal range of motion. Cervical back: Normal range of motion and neck supple. Skin:     General: Skin is warm and dry. Neurological:      General: No focal deficit present. Mental Status: He is alert and oriented to person, place, and time. Cranial Nerves: No cranial nerve deficit. Motor: No weakness. Coordination: Coordination normal.      Deep Tendon Reflexes: Reflexes are normal and symmetric.    Psychiatric:         Behavior: Behavior normal.       Diagnostic Study Results     Labs -     Recent Results (from the past 12 hour(s))   CBC WITH AUTOMATED DIFF    Collection Time: 12/15/22 10:22 PM   Result Value Ref Range    WBC 7.2 4.1 - 11.1 K/uL    RBC 3.50 (L) 4.10 - 5.70 M/uL    HGB 11.0 (L) 12.1 - 17.0 g/dL    HCT 31.8 (L) 36.6 - 50.3 %    MCV 90.9 80.0 - 99.0 FL    MCH 31.4 26.0 - 34.0 PG    MCHC 34.6 30.0 - 36.5 g/dL    RDW 13.5 11.5 - 14.5 %    PLATELET 957 289 - 216 K/uL    MPV 10.3 8.9 - 12.9 FL    NRBC 0.0 0  WBC    ABSOLUTE NRBC 0.00 0.00 - 0.01 K/uL    NEUTROPHILS 55 32 - 75 %    LYMPHOCYTES 33 12 - 49 %    MONOCYTES 11 5 - 13 %    EOSINOPHILS 1 0 - 7 %    BASOPHILS 0 0 - 1 %    IMMATURE GRANULOCYTES 0 0.0 - 0.5 %    ABS. NEUTROPHILS 3.9 1.8 - 8.0 K/UL    ABS. LYMPHOCYTES 2.4 0.8 - 3.5 K/UL    ABS. MONOCYTES 0.8 0.0 - 1.0 K/UL    ABS. EOSINOPHILS 0.1 0.0 - 0.4 K/UL    ABS. BASOPHILS 0.0 0.0 - 0.1 K/UL    ABS. IMM. GRANS. 0.0 0.00 - 0.04 K/UL    DF AUTOMATED      RBC COMMENTS NORMOCYTIC, NORMOCHROMIC     METABOLIC PANEL, COMPREHENSIVE    Collection Time: 12/15/22 10:22 PM   Result Value Ref Range    Sodium 138 136 - 145 mmol/L    Potassium 4.2 3.5 - 5.1 mmol/L    Chloride 102 97 - 108 mmol/L    CO2 29 21 - 32 mmol/L    Anion gap 7 5 - 15 mmol/L    Glucose 220 (H) 65 - 100 mg/dL    BUN 26 (H) 6 - 20 MG/DL    Creatinine 1.39 (H) 0.70 - 1.30 MG/DL    BUN/Creatinine ratio 19 12 - 20      eGFR 47 (L) >60 ml/min/1.73m2    Calcium 8.9 8.5 - 10.1 MG/DL    Bilirubin, total 0.4 0.2 - 1.0 MG/DL    ALT (SGPT) 24 12 - 78 U/L    AST (SGOT) 24 15 - 37 U/L    Alk.  phosphatase 109 45 - 117 U/L    Protein, total 6.5 6.4 - 8.2 g/dL    Albumin 3.4 (L) 3.5 - 5.0 g/dL    Globulin 3.1 2.0 - 4.0 g/dL    A-G Ratio 1.1 1.1 - 2.2     URINALYSIS W/MICROSCOPIC    Collection Time: 12/15/22 11:18 PM   Result Value Ref Range    Color YELLOW/STRAW      Appearance CLEAR CLEAR      Specific gravity 1.010 1.003 - 1.030      pH (UA) 7.0 5.0 - 8.0      Protein Negative NEG mg/dL    Glucose 250 (A) NEG mg/dL    Ketone Negative NEG mg/dL    Bilirubin Negative NEG      Blood Negative NEG      Urobilinogen 0.2 0.2 - 1.0 EU/dL    Nitrites Negative NEG      Leukocyte Esterase Negative NEG      WBC 0-4 0 - 4 /hpf    RBC 5-10 0 - 5 /hpf    Epithelial cells FEW FEW /lpf    Bacteria Negative NEG /hpf       Radiologic Studies -   XR CHEST SNGL V   Final Result   No acute cardiopulmonary process. CT Results  (Last 48 hours)      None          CXR Results  (Last 48 hours)                 12/15/22 2330  XR CHEST SNGL V Final result    Impression:  No acute cardiopulmonary process. Narrative:  EXAM: XR CHEST SNGL V       HISTORY: AMS. COMPARISON: 11/18/2022       FINDINGS: Single view(s) of the chest. The lungs are well inflated. No focal   consolidation, pleural effusion, or pneumothorax. The cardiomediastinal   silhouette is unremarkable. The bones are osteopenic. Medical Decision Making   I am the first provider for this patient. I reviewed the vital signs, available nursing notes, past medical history, past surgical history, family history and social history. Vital Signs-Reviewed the patient's vital signs. Patient Vitals for the past 12 hrs:   Temp Pulse Resp BP SpO2   12/16/22 0116 97.8 °F (36.6 °C) 74 14 (!) 166/78 100 %   12/16/22 0002 -- 78 14 (!) 164/73 100 %   12/15/22 2332 -- 81 13 (!) 166/110 99 %   12/15/22 2317 -- 90 16 (!) 187/71 100 %   12/15/22 2302 -- 77 13 (!) 175/70 100 %   12/15/22 2251 -- 75 18 (!) 177/67 100 %   12/15/22 2231 -- 75 16 (!) 176/65 100 %       Pulse Oximetry Analysis - 100% on RA    Cardiac Monitor:   Rate: 78 bpm  Rhythm: Normal Sinus Rhythm      Records Reviewed: Nursing Notes, Old Medical Records, Previous Radiology Studies, and Previous Laboratory Studies    Provider Notes (Medical Decision Making):   MDM: Elderly man with facial droop and AMS per nursing home staff. Neuro exam normal. Oriented. DDx: UTI vs electrolyte problem vs mild senile dementia with intermittent transient confusion. ED Course:   Initial assessment performed. The patients presenting problems have been discussed, and they are in agreement with the care plan formulated and outlined with them. I have encouraged them to ask questions as they arise throughout their visit.       PROGRESS NOTE:    ED Course as of 12/16/22 0137   u Dec 15, 2022   2352 Labs normal. Neurologically stable. Will discharge back to home. [VG]   Fri Dec 16, 2022   0034 Discussed with daughter. [VG]      ED Course User Index  [VG] Kim Warren MD        Discharge note:  Pt re-evaluated and noted to be feeling better, ready for discharge. Updated pt on all final lab  findings. Will follow up as instructed . All questions have been answered, pt voiced understanding and agreement with plan. Specific return precautions provided as well as instructions to return to the ED should sx worsen at any time. Vital signs stable for discharge. Critical Care Time: 0      Diagnosis     Clinical Impression:   1. Normal neurological exam        PLAN:  1. Current Discharge Medication List        2. Follow-up Information       Follow up With Specialties Details Why Contact Info    Beatriz Enrique NP Nurse Practitioner In 3 days  [id number]  [phone]            Return to ED if worse     Disposition:  Home       Please note, this dictation was completed with Thumb[employer], the computer voice recognition software. Quite often unanticipated grammatical, syntax, homophones, and other interpretive errors are inadvertently transcribed by the computer software. Please disregard these errors. Please excuse any errors that have escaped final proof reading.

## 2023-01-31 NOTE — PROGRESS NOTES
"    Subjective:   Patient ID:  Clare Whitley is a 62 y.o. female who presents for evaluation of Shortness of Breath and Dizziness      HPI  62-year-old female, history of mitral valve prolapse, mitral regurgitation.    Hypertension.    Has some general, vague complaints.    She states that in 2021 she was found to have pulmonary edema on her chest x-ray and was put on Lasix and she felt better after that.    However BNP was normal.    She follows with Dr. Hand.  She had a stress test done she states and echocardiogram which showed mild mitral regurgitation.    She does complain of vague shortness of breath while the mask is on.  She also gets a little anxious when she starts to feel short of breath.  She does agree to have some anxiety related symptoms.    No exertional angina or a constant dyspnea with exertion.    She feels normal for the last few days.    She had some episode of shortness of breath few days ago resolved on its own.      Past Medical History:   Diagnosis Date    Hypertension     Mitral regurgitation     "mild"; followed by Dr. Hand ([facility])    MVP (mitral valve prolapse)     "trivial"; followed by Dr. Hand ([facility])    Sickle cell trait        Past Surgical History:   Procedure Laterality Date    CHOLECYSTECTOMY      laser SX Bilateral     LYMPH NODE BIOPSY Left     left neck     TUBAL LIGATION  9/2/1991    UTERINE FIBROID EMBOLIZATION  01/01/2010       Social History     Tobacco Use    Smoking status: Passive Smoke Exposure - Never Smoker    Smokeless tobacco: Never   Substance Use Topics    Alcohol use: Never    Drug use: Never       Family History   Problem Relation Age of Onset    Hypertension Mother     Cataracts Mother     Hypertension Father     Hypertension Sister     Hypertension Brother     Stroke Cousin        Review of Systems   Constitutional: Negative for fever and malaise/fatigue.   HENT:  Negative for sore throat.    Eyes:  Negative for blurred vision.   Cardiovascular:  " Problem: Mobility Impaired (Adult and Pediatric)  Goal: *Acute Goals and Plan of Care (Insert Text)  Description: FUNCTIONAL STATUS PRIOR TO ADMISSION: Patient was independent and active without use of DME. Reports driving private vehicle as recently as 2 days ago. HOME SUPPORT PRIOR TO ADMISSION: The patient lived alone with no local support. States children live out of town. Physical Therapy Goals  Initiated 7/15/2021  1. Patient will move from supine to sit and sit to supine  in bed with modified independence within 7 day(s). 2.  Patient will transfer from bed to chair and chair to bed with modified independence using the least restrictive device within 7 day(s). 3.  Patient will perform sit to stand with modified independence within 7 day(s). 4.  Patient will ambulate with modified independence for 450 feet with the least restrictive device within 7 day(s). 5.  Patient will ascend/descend 4 stairs with one handrail(s) with supervision/set-up within 7 day(s). Outcome: Progressing Towards Goal  physical Therapy TREATMENT    Patient: Alonzo Recinos (77 y.o. male)  Date: 7/17/2021  Diagnosis: Acute renal failure (ARF) (HCC) [N17.9] Acute renal failure (ARF) (HCC)       Precautions: Fall   Chart, physical therapy assessment, plan of care and goals were reviewed. ASSESSMENT:  Session initiated in response to triggered bed alarm. Pt sitting EOB upon entry to room. Supervision provided to restroom, with pt stands to urinate (No LOB). Pt is agreeable to sofia ambulation. No AD use. Short step/stride, but no shuffling. Pt navigates obstacles and narrowed spaces. Cues provide for direction, as pt easily forgets commands and landmarks. Supervision is needed at D/c.    Progression toward goals:  []      Improving appropriately and progressing toward goals  [x]      Improving slowly and progressing toward goals  []      Not making progress toward goals and plan of care will be adjusted     PLAN:  Patient Negative for chest pain, claudication, cyanosis, dyspnea on exertion, irregular heartbeat, leg swelling, near-syncope, orthopnea, palpitations, paroxysmal nocturnal dyspnea and syncope.   Respiratory:  Negative for cough and hemoptysis.    Hematologic/Lymphatic: Negative for bleeding problem.   Skin:  Negative for rash.   Musculoskeletal:  Negative for falls.   Gastrointestinal:  Negative for abdominal pain.   Genitourinary: Negative.    Neurological: Negative.    Psychiatric/Behavioral:  Negative for altered mental status and substance abuse.      Current Outpatient Medications on File Prior to Visit   Medication Sig    aspirin (ECOTRIN) 81 MG EC tablet Take 81 mg by mouth once daily.    atenoloL (TENORMIN) 100 MG tablet Take 100 mg by mouth once daily.    chlorthalidone (HYGROTEN) 25 MG Tab TK 1 T PO D    cholecalciferol, vitamin D3, (VITAMIN D3) 25 mcg (1,000 unit) capsule Take 1 capsule (1,000 Units total) by mouth once daily.    diclofenac sodium (VOLTAREN) 1 % Gel Apply 2 g topically 4 (four) times daily.    furosemide (LASIX) 40 MG tablet Take 40 mg by mouth once daily.    potassium chloride (MICRO-K) 10 MEQ CpSR TAKE 1 CAPSULE(10 MEQ) BY MOUTH EVERY DAY    gabapentin (NEURONTIN) 300 MG capsule Take 1 capsule (300 mg total) by mouth every evening for 10 days, THEN 2 capsules (600 mg total) every evening for 10 days, THEN 3 capsules (900 mg total) every evening for 10 days.     No current facility-administered medications on file prior to visit.       Objective:   Objective:  Wt Readings from Last 3 Encounters:   01/31/23 77 kg (169 lb 12.1 oz)   01/30/23 75.3 kg (166 lb 0.1 oz)   01/10/23 77.1 kg (170 lb)     Temp Readings from Last 3 Encounters:   01/30/23 98.1 °F (36.7 °C) (Tympanic)   08/26/22 98 °F (36.7 °C) (Oral)   07/25/22 97.7 °F (36.5 °C) (Temporal)     BP Readings from Last 3 Encounters:   01/31/23 114/82   01/30/23 120/88   01/10/23 126/80     Pulse Readings from Last 3 Encounters:   01/31/23 72  continues to benefit from skilled intervention to address the above impairments. Continue treatment per established plan of care. Discharge Recommendations:  Home Physical Therapy and 24 hr supervision  Further Equipment Recommendations for Discharge:  N/A     SUBJECTIVE:   Patient stated I really need to use the bathroom.     OBJECTIVE DATA SUMMARY:   Critical Behavior:  Neurologic State: Alert  Orientation Level: Oriented X4  Cognition: Follows commands  Safety/Judgement: Decreased awareness of need for assistance  Functional Mobility Training:  Bed Mobility:  Rolling: Supervision  Supine to Sit: Supervision  Sit to Supine: Supervision  Transfers:  Sit to Stand: Supervision;Stand-by assistance  Stand to Sit: Supervision  Balance:  Sitting: Intact  Sitting - Static: Good (unsupported)  Sitting - Dynamic: Good (unsupported)  Standing: Impaired  Standing - Static: Good  Standing - Dynamic : Fair  Ambulation/Gait Training:  Distance (ft): 350 Feet (ft)  Assistive Device: Gait belt  Ambulation - Level of Assistance: Stand-by assistance  Gait Abnormalities: Decreased step clearance  Base of Support: Narrowed  Speed/Mary: Slow  Pain:  Pain Scale 1: Numeric (0 - 10)  Pain Intensity 1: 0  Pain out: 0  Activity Tolerance:   Good  Please refer to the flowsheet for vital signs taken during this treatment.   After treatment:   [] Patient left in no apparent distress sitting up in chair  [x] Patient left in no apparent distress in bed  [x] Call bell left within reach  [x] Nursing notified  [] Caregiver present  [x] Bed alarm activated      Inderjit Dumont PTA   Time Calculation: 23 mins   01/30/23 76   09/08/22 82       Physical Exam  Vitals reviewed.   Constitutional:       Appearance: She is well-developed.   HENT:      Head: Normocephalic and atraumatic.   Eyes:      General: No scleral icterus.     Conjunctiva/sclera: Conjunctivae normal.   Cardiovascular:      Rate and Rhythm: Normal rate and regular rhythm.      Pulses: Intact distal pulses.      Heart sounds: Normal heart sounds. No murmur heard.  Pulmonary:      Effort: No respiratory distress.      Breath sounds: No wheezing or rales.   Chest:      Chest wall: No tenderness.   Abdominal:      General: Bowel sounds are normal. There is no distension.      Palpations: Abdomen is soft.      Tenderness: There is no guarding.   Musculoskeletal:         General: Normal range of motion.      Cervical back: Normal range of motion and neck supple.   Skin:     General: Skin is warm.   Neurological:      Mental Status: She is alert and oriented to person, place, and time.       Lab Results   Component Value Date    CHOL 155 07/25/2022    CHOL 135 07/14/2021    CHOL 121 04/13/2021     Lab Results   Component Value Date    HDL 59 07/25/2022    HDL 56 07/14/2021    HDL 58 04/13/2021     Lab Results   Component Value Date    LDLCALC 85.4 07/25/2022    LDLCALC 70.0 07/14/2021    LDLCALC 53 04/13/2021     Lab Results   Component Value Date    TRIG 53 07/25/2022    TRIG 45 07/14/2021    TRIG 52 04/13/2021     Lab Results   Component Value Date    CHOLHDL 38.1 07/25/2022    CHOLHDL 41.5 07/14/2021    CHOLHDL 41.4 01/04/2021       Chemistry        Component Value Date/Time     01/18/2023 1149    K 4.0 01/18/2023 1149     01/18/2023 1149    CO2 21 (L) 01/18/2023 1149    BUN 12 01/18/2023 1149    CREATININE 0.8 01/18/2023 1149    GLU 91 01/18/2023 1149        Component Value Date/Time    CALCIUM 10.3 01/18/2023 1149    ALKPHOS 36 (L) 08/26/2022 0727    AST 24 08/26/2022 0727    ALT 16 08/26/2022 0727    BILITOT 0.6 08/26/2022 0727    ESTGFRAFRICA  >60.0 07/25/2022 1056    EGFRNONAA >60.0 07/25/2022 1056          Lab Results   Component Value Date    TSH 0.876 07/25/2022     No results found for: INR, PROTIME  Lab Results   Component Value Date    WBC 4.94 08/26/2022    HGB 13.2 08/26/2022    HCT 36.5 (L) 08/26/2022    MCV 77 (L) 08/26/2022     08/26/2022     BNP  @LABRCNTIP(BNP,BNPTRIAGEBLO)@  CrCl cannot be calculated (Patient's most recent lab result is older than the maximum 7 days allowed.).     Imaging:  ======  No results found for this or any previous visit.    No results found for this or any previous visit.    No results found for this or any previous visit.    Results for orders placed during the hospital encounter of 01/30/23    X-Ray Chest PA And Lateral    Narrative  EXAMINATION:  XR CHEST PA AND LATERAL    CLINICAL HISTORY:  Shortness of breath    TECHNIQUE:  PA and lateral views of the chest were performed.    COMPARISON:  Prior radiographs    FINDINGS:  Cardiac silhouette and mediastinal contours are normal.  Lungs are clear.  Osseous structures are intact.    Impression  No acute cardiopulmonary process.      Electronically signed by: Lawrence Chacon MD  Date:    01/30/2023  Time:    12:33    No results found for this or any previous visit.    No valid procedures specified.    Diagnostic Results:  ECG: Reviewed    The ASCVD Risk score (Hemalatha RAZA, et al., 2019) failed to calculate for the following reasons:    The patient has a prior MI or stroke diagnosis    Assessment and Plan:   Essential hypertension    Shortness of breath  -     Ambulatory referral/consult to Cardiology    Dizziness  -     Ambulatory referral/consult to Cardiology    MVP (mitral valve prolapse)    SINTIA on CPAP    Class 1 obesity due to excess calories with serious comorbidity and body mass index (BMI) of 30.0 to 30.9 in adult    History of 2019 novel coronavirus disease (COVID-19)      Reviewed all tests and above medical conditions with patient in detail and  formulated treatment plan.  Risk factor modification discussed.   Cardiac low salt diet discussed.  Maintaining healthy weight and weight loss goals were discussed in clinic.  Continue with current meds.    Patient would like to follows her own cardiologist

## 2023-02-07 ENCOUNTER — APPOINTMENT (OUTPATIENT)
Dept: GENERAL RADIOLOGY | Age: 88
End: 2023-02-07
Attending: EMERGENCY MEDICINE
Payer: MEDICARE

## 2023-02-07 ENCOUNTER — APPOINTMENT (OUTPATIENT)
Dept: CT IMAGING | Age: 88
End: 2023-02-07
Attending: EMERGENCY MEDICINE
Payer: MEDICARE

## 2023-02-07 ENCOUNTER — HOSPITAL ENCOUNTER (EMERGENCY)
Age: 88
Discharge: HOME | End: 2023-02-07
Attending: EMERGENCY MEDICINE
Payer: MEDICARE

## 2023-02-07 VITALS
HEART RATE: 87 BPM | BODY MASS INDEX: 23.8 KG/M2 | TEMPERATURE: 98 F | DIASTOLIC BLOOD PRESSURE: 81 MMHG | WEIGHT: 170 LBS | SYSTOLIC BLOOD PRESSURE: 174 MMHG | HEIGHT: 71 IN | RESPIRATION RATE: 18 BRPM | OXYGEN SATURATION: 97 %

## 2023-02-07 DIAGNOSIS — R40.0 SOMNOLENCE: ICD-10-CM

## 2023-02-07 DIAGNOSIS — J01.90 ACUTE SINUSITIS, RECURRENCE NOT SPECIFIED, UNSPECIFIED LOCATION: Primary | ICD-10-CM

## 2023-02-07 LAB
ALBUMIN SERPL-MCNC: 3.2 G/DL (ref 3.5–5)
ALBUMIN/GLOB SERPL: 1 (ref 1.1–2.2)
ALP SERPL-CCNC: 152 U/L (ref 45–117)
ALT SERPL-CCNC: 27 U/L (ref 12–78)
ANION GAP SERPL CALC-SCNC: 5 MMOL/L (ref 5–15)
APPEARANCE UR: CLEAR
AST SERPL-CCNC: 28 U/L (ref 15–37)
BACTERIA URNS QL MICRO: NEGATIVE /HPF
BASOPHILS # BLD: 0 K/UL (ref 0–0.1)
BASOPHILS NFR BLD: 1 % (ref 0–1)
BILIRUB SERPL-MCNC: 0.6 MG/DL (ref 0.2–1)
BILIRUB UR QL: NEGATIVE
BUN SERPL-MCNC: 24 MG/DL (ref 6–20)
BUN/CREAT SERPL: 20 (ref 12–20)
CALCIUM SERPL-MCNC: 8.9 MG/DL (ref 8.5–10.1)
CHLORIDE SERPL-SCNC: 107 MMOL/L (ref 97–108)
CO2 SERPL-SCNC: 29 MMOL/L (ref 21–32)
COLOR UR: NORMAL
CREAT SERPL-MCNC: 1.22 MG/DL (ref 0.7–1.3)
DIFFERENTIAL METHOD BLD: ABNORMAL
EOSINOPHIL # BLD: 0.1 K/UL (ref 0–0.4)
EOSINOPHIL NFR BLD: 2 % (ref 0–7)
EPITH CASTS URNS QL MICRO: NORMAL /LPF
ERYTHROCYTE [DISTWIDTH] IN BLOOD BY AUTOMATED COUNT: 13.4 % (ref 11.5–14.5)
GLOBULIN SER CALC-MCNC: 3.3 G/DL (ref 2–4)
GLUCOSE BLD STRIP.AUTO-MCNC: 122 MG/DL (ref 65–117)
GLUCOSE SERPL-MCNC: 133 MG/DL (ref 65–100)
GLUCOSE UR STRIP.AUTO-MCNC: NEGATIVE MG/DL
HCT VFR BLD AUTO: 32.1 % (ref 36.6–50.3)
HGB BLD-MCNC: 10.3 G/DL (ref 12.1–17)
HGB UR QL STRIP: NEGATIVE
IMM GRANULOCYTES # BLD AUTO: 0 K/UL (ref 0–0.04)
IMM GRANULOCYTES NFR BLD AUTO: 0 % (ref 0–0.5)
KETONES UR QL STRIP.AUTO: NEGATIVE MG/DL
LEUKOCYTE ESTERASE UR QL STRIP.AUTO: NEGATIVE
LYMPHOCYTES # BLD: 2.5 K/UL (ref 0.8–3.5)
LYMPHOCYTES NFR BLD: 39 % (ref 12–49)
MAGNESIUM SERPL-MCNC: 1.8 MG/DL (ref 1.6–2.4)
MCH RBC QN AUTO: 30.2 PG (ref 26–34)
MCHC RBC AUTO-ENTMCNC: 32.1 G/DL (ref 30–36.5)
MCV RBC AUTO: 94.1 FL (ref 80–99)
MONOCYTES # BLD: 0.7 K/UL (ref 0–1)
MONOCYTES NFR BLD: 11 % (ref 5–13)
NEUTS SEG # BLD: 3.1 K/UL (ref 1.8–8)
NEUTS SEG NFR BLD: 47 % (ref 32–75)
NITRITE UR QL STRIP.AUTO: NEGATIVE
NRBC # BLD: 0 K/UL (ref 0–0.01)
NRBC BLD-RTO: 0 PER 100 WBC
PH UR STRIP: 6 (ref 5–8)
PLATELET # BLD AUTO: 214 K/UL (ref 150–400)
PMV BLD AUTO: 9.6 FL (ref 8.9–12.9)
POTASSIUM SERPL-SCNC: 4.3 MMOL/L (ref 3.5–5.1)
PROT SERPL-MCNC: 6.5 G/DL (ref 6.4–8.2)
PROT UR STRIP-MCNC: NEGATIVE MG/DL
RBC # BLD AUTO: 3.41 M/UL (ref 4.1–5.7)
RBC #/AREA URNS HPF: NORMAL /HPF (ref 0–5)
SERVICE CMNT-IMP: ABNORMAL
SODIUM SERPL-SCNC: 141 MMOL/L (ref 136–145)
SP GR UR REFRACTOMETRY: 1.02 (ref 1–1.03)
TSH SERPL DL<=0.05 MIU/L-ACNC: 3.27 UIU/ML (ref 0.36–3.74)
UA: UC IF INDICATED,UAUC: NORMAL
UROBILINOGEN UR QL STRIP.AUTO: 0.2 EU/DL (ref 0.2–1)
WBC # BLD AUTO: 6.5 K/UL (ref 4.1–11.1)
WBC URNS QL MICRO: NORMAL /HPF (ref 0–4)

## 2023-02-07 PROCEDURE — 85025 COMPLETE CBC W/AUTO DIFF WBC: CPT

## 2023-02-07 PROCEDURE — 74011250637 HC RX REV CODE- 250/637: Performed by: FAMILY MEDICINE

## 2023-02-07 PROCEDURE — 74011250636 HC RX REV CODE- 250/636: Performed by: EMERGENCY MEDICINE

## 2023-02-07 PROCEDURE — 83735 ASSAY OF MAGNESIUM: CPT

## 2023-02-07 PROCEDURE — 71045 X-RAY EXAM CHEST 1 VIEW: CPT

## 2023-02-07 PROCEDURE — 84443 ASSAY THYROID STIM HORMONE: CPT

## 2023-02-07 PROCEDURE — 82962 GLUCOSE BLOOD TEST: CPT

## 2023-02-07 PROCEDURE — 70450 CT HEAD/BRAIN W/O DYE: CPT

## 2023-02-07 PROCEDURE — 96365 THER/PROPH/DIAG IV INF INIT: CPT

## 2023-02-07 PROCEDURE — 80053 COMPREHEN METABOLIC PANEL: CPT

## 2023-02-07 PROCEDURE — 81001 URINALYSIS AUTO W/SCOPE: CPT

## 2023-02-07 PROCEDURE — 36415 COLL VENOUS BLD VENIPUNCTURE: CPT

## 2023-02-07 PROCEDURE — 74011000258 HC RX REV CODE- 258: Performed by: EMERGENCY MEDICINE

## 2023-02-07 PROCEDURE — 99285 EMERGENCY DEPT VISIT HI MDM: CPT

## 2023-02-07 RX ORDER — CEFDINIR 300 MG/1
300 CAPSULE ORAL 2 TIMES DAILY
Qty: 20 CAPSULE | Refills: 0 | OUTPATIENT
Start: 2023-02-07 | End: 2023-02-10

## 2023-02-07 RX ORDER — HYDRALAZINE HYDROCHLORIDE 25 MG/1
25 TABLET, FILM COATED ORAL
Status: COMPLETED | OUTPATIENT
Start: 2023-02-07 | End: 2023-02-07

## 2023-02-07 RX ADMIN — HYDRALAZINE HYDROCHLORIDE 25 MG: 25 TABLET, FILM COATED ORAL at 23:24

## 2023-02-07 RX ADMIN — CEFTRIAXONE SODIUM 1 G: 1 INJECTION, POWDER, FOR SOLUTION INTRAMUSCULAR; INTRAVENOUS at 15:29

## 2023-02-07 NOTE — ED TRIAGE NOTES
Pt arrived by EMS for lethargy. Per EMS pt was awake and alert this AM.  Family went to go and check on patient and noted pt to be lethargic. EMS reports pts blood sugar 166 and last had Insulin at 10 am.  Pt arrived somnolent but arouses to vocal stimuli, pt is [address] and does not have his hearing aids. Pt opens eyes and follows commands. Pt denies injury or pain.   Pt educated on ER flow

## 2023-02-07 NOTE — DISCHARGE INSTRUCTIONS
Patient seen today in the emergency department and was noted to be quite somnolent. Patient was able to be aroused and would follow commands without any difficulty. Patient did have a CT of the head showing acute sinusitis which we will place patient on antibiotics for. At approximately 4:00 the patient was sitting up in the bed wide-awake saying he needs to urinate. So unclear as to where the somnolence may be coming from would consider whether he is not sleeping at night and needs his Seroquel dose increased or if he is sleeping at night whether his Seroquel dose may need to be decreased.

## 2023-02-07 NOTE — ED NOTES
This writer called and spoke to [facility] at [facility],  Per [facility] they do not have a  at this time.

## 2023-02-07 NOTE — ED NOTES
Pt is awake and reporting that he had a \"good nap\" and \"feels great\" . Reports that he needs to urinate again.  Assisted pt to use urinal.

## 2023-02-07 NOTE — ED PROVIDER NOTES
[place]       Pt Name: Geovanni Arcos  MRN: [id number]  Birthdate 1928  Date of evaluation: 2023  Provider: Blanca Raphael DO   PCP: Dimple Lora NP  Note Started: 3:37 PM 23     CHIEF COMPLAINT       Chief Complaint   Patient presents with    Lethargy        HISTORY OF PRESENT ILLNESS: 1 or more elements      History From: EMS, History limited by: pt very hard of hearing, poor historian. Geovanni Arcos is a 80 y.o. male Patient was brought in by EMS from the [facility]. Per EMS it was reported that the patient was much more somnolent this morning than normal.  There have been no reports of any fever or chills. There have been no complaints of recent vomiting or diarrhea. There have been no recent cough or cold symptoms. Please See MDM for Additional Details of the HPI/PMH  Nursing Notes were all reviewed and agreed with or any disagreements were addressed in the HPI. REVIEW OF SYSTEMS        Positives and Pertinent negatives as per HPI.     PAST HISTORY     Past Medical History:  Past Medical History:   Diagnosis Date    Carotid artery disease ([address]     Coronary atherosclerosis of native coronary artery     Diabetes mellitus, type II (HCC)     DJD (degenerative joint disease)     HCVD (hypertensive cardiovascular disease)     Pure hypercholesterolemia        Past Surgical History:  Past Surgical History:   Procedure Laterality Date    HX CATARACT REMOVAL Bilateral     HX HEART CATHETERIZATION  2010    LVEDP 9, mild ant hypo EF 55-60%, LAD 70% 99%, mod D1, OM3 30%, PDA 90%    HX PTCA  2010    Stent-LAD 2.5X18 ANDREA    HX PTCA  3/2010    Stent PDA ANDREA       Family History:  Family History   Problem Relation Age of Onset    Heart Disease Mother     Cancer Father        Social History:  Social History     Tobacco Use    Smoking status: Former     Types: Cigarettes     Quit date: 10/17/1963     Years since quittin.3 Smokeless tobacco: Never   Vaping Use    Vaping Use: Never used   Substance Use Topics    Alcohol use: Not Currently     Comment: stopped 2018/used to drink heavy    Drug use: Never       Allergies: Allergies   Allergen Reactions    Aspirin Other (comments)     NOSE BLEED  MG IS TAKEN  Can take low dose aspirin    Pcn [Penicillins] Shortness of Breath     Itching/hives       CURRENT MEDICATIONS      Previous Medications    ACETAMINOPHEN (TYLENOL) 325 MG TABLET    Take 2 Tablets by mouth every six (6) hours as needed. ASPIRIN DELAYED-RELEASE 81 MG TABLET    Take 81 mg by mouth daily. ATORVASTATIN (LIPITOR) 40 MG TABLET    Take 1 Tab by mouth nightly. HYDRALAZINE (APRESOLINE) 25 MG TABLET    Take 1 Tablet by mouth two (2) times a day. INSULIN GLARGINE (LANTUS) 100 UNIT/ML INJECTION    25 Units by SubCUTAneous route daily. METOPROLOL SUCCINATE (TOPROL-XL) 25 MG XL TABLET    Take 25 mg by mouth daily. MULTIVITAMIN (ONE A DAY) TABLET    Take 1 Tablet by mouth daily. POLYETHYLENE GLYCOL (MIRALAX) 17 GRAM PACKET    Take 17 g by mouth daily. QUETIAPINE (SEROQUEL) 25 MG TABLET    Take 25 mg by mouth nightly. TAMSULOSIN (FLOMAX) 0.4 MG CAPSULE    Take 1 Capsule by mouth nightly. SCREENINGS               No data recorded         PHYSICAL EXAM      ED Triage Vitals [02/07/23 1311]   ED Encounter Vitals Group      BP (!) 161/68      Pulse (Heart Rate) 66      Resp Rate 12      Temp 98 °F (36.7 °C)      Temp src       O2 Sat (%) 98 %      Weight 170 lb      Height 5' 11\"        Physical Exam  Vitals and nursing note reviewed. Constitutional:       General: He is not in acute distress. Appearance: He is well-developed. He is not diaphoretic. HENT:      Head: Normocephalic and atraumatic. Mouth/Throat:      Mouth: Mucous membranes are moist.      Pharynx: No oropharyngeal exudate. Eyes:      Extraocular Movements: Extraocular movements intact.       Conjunctiva/sclera: Conjunctivae normal.      Pupils: Pupils are equal, round, and reactive to light. Neck:      Vascular: No JVD. Trachea: No tracheal deviation. Cardiovascular:      Rate and Rhythm: Normal rate and regular rhythm. Heart sounds: Normal heart sounds. No murmur heard. Pulmonary:      Effort: Pulmonary effort is normal. No respiratory distress. Breath sounds: Normal breath sounds. No stridor. No wheezing or rales. Abdominal:      General: There is no distension. Palpations: Abdomen is soft. Tenderness: There is no abdominal tenderness. There is no guarding or rebound. Musculoskeletal:         General: No tenderness. Normal range of motion. Cervical back: Normal range of motion and neck supple. Skin:     General: Skin is warm and dry. Capillary Refill: Capillary refill takes less than 2 seconds. Neurological:      Mental Status: He is alert. Cranial Nerves: No cranial nerve deficit. Comments: No gross motor or sensory deficits, Patient very somnolent and hard of hearing will respond to commands and does follow commands.    Psychiatric:         Behavior: Behavior normal.        DIAGNOSTIC RESULTS   LABS:     Recent Results (from the past 12 hour(s))   GLUCOSE, POC    Collection Time: 02/07/23  1:23 PM   Result Value Ref Range    Glucose (POC) 122 (H) 65 - 117 mg/dL    Performed by Haley Reyes    CBC WITH AUTOMATED DIFF    Collection Time: 02/07/23  1:26 PM   Result Value Ref Range    WBC 6.5 4.1 - 11.1 K/uL    RBC 3.41 (L) 4.10 - 5.70 M/uL    HGB 10.3 (L) 12.1 - 17.0 g/dL    HCT 32.1 (L) 36.6 - 50.3 %    MCV 94.1 80.0 - 99.0 FL    MCH 30.2 26.0 - 34.0 PG    MCHC 32.1 30.0 - 36.5 g/dL    RDW 13.4 11.5 - 14.5 %    PLATELET 723 181 - 099 K/uL    MPV 9.6 8.9 - 12.9 FL    NRBC 0.0 0  WBC    ABSOLUTE NRBC 0.00 0.00 - 0.01 K/uL    NEUTROPHILS 47 32 - 75 %    LYMPHOCYTES 39 12 - 49 %    MONOCYTES 11 5 - 13 %    EOSINOPHILS 2 0 - 7 %    BASOPHILS 1 0 - 1 % IMMATURE GRANULOCYTES 0 0.0 - 0.5 %    ABS. NEUTROPHILS 3.1 1.8 - 8.0 K/UL    ABS. LYMPHOCYTES 2.5 0.8 - 3.5 K/UL    ABS. MONOCYTES 0.7 0.0 - 1.0 K/UL    ABS. EOSINOPHILS 0.1 0.0 - 0.4 K/UL    ABS. BASOPHILS 0.0 0.0 - 0.1 K/UL    ABS. IMM. GRANS. 0.0 0.00 - 0.04 K/UL    DF AUTOMATED     METABOLIC PANEL, COMPREHENSIVE    Collection Time: 02/07/23  1:26 PM   Result Value Ref Range    Sodium 141 136 - 145 mmol/L    Potassium 4.3 3.5 - 5.1 mmol/L    Chloride 107 97 - 108 mmol/L    CO2 29 21 - 32 mmol/L    Anion gap 5 5 - 15 mmol/L    Glucose 133 (H) 65 - 100 mg/dL    BUN 24 (H) 6 - 20 MG/DL    Creatinine 1.22 0.70 - 1.30 MG/DL    BUN/Creatinine ratio 20 12 - 20      eGFR 55 (L) >60 ml/min/1.73m2    Calcium 8.9 8.5 - 10.1 MG/DL    Bilirubin, total 0.6 0.2 - 1.0 MG/DL    ALT (SGPT) 27 12 - 78 U/L    AST (SGOT) 28 15 - 37 U/L    Alk. phosphatase 152 (H) 45 - 117 U/L    Protein, total 6.5 6.4 - 8.2 g/dL    Albumin 3.2 (L) 3.5 - 5.0 g/dL    Globulin 3.3 2.0 - 4.0 g/dL    A-G Ratio 1.0 (L) 1.1 - 2.2     TSH 3RD GENERATION    Collection Time: 02/07/23  1:26 PM   Result Value Ref Range    TSH 3.27 0.36 - 3.74 uIU/mL   MAGNESIUM    Collection Time: 02/07/23  1:26 PM   Result Value Ref Range    Magnesium 1.8 1.6 - 2.4 mg/dL        EKG: If performed, independent interpretation documented below in the MDM section     RADIOLOGY:  Non-plain film images such as CT, Ultrasound and MRI are read by the radiologist. Plain radiographic images are visualized and preliminarily interpreted by the ED Provider with the findings documented in the MDM section. Interpretation per the Radiologist below, if available at the time of this note:     CT HEAD WO CONT    Result Date: 2/7/2023  EXAM: CT HEAD WO CONT INDICATION: AMS COMPARISON: CT head 12/7/2022. CONTRAST: None. TECHNIQUE: Unenhanced CT of the head was performed using 5 mm images. Brain and bone windows were generated. Coronal and sagittal reformats.  CT dose reduction was achieved through use of a standardized protocol tailored for this examination and automatic exposure control for dose modulation. FINDINGS: Generalized volume loss. Scattered white matter hypodensities may reflect chronic microangiopathic change. There is no acute intracranial hemorrhage, extra-axial collection, or mass effect. 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 The basilar cisterns are open. No CT evidence of acute infarct. The bone windows demonstrate no abnormalities. Left frontal sinus near complete opacification. Patchy left ethmoid air cell opacification. Marked left maxillary sinus mucosal thickening. Patchy left mastoid effusion. . Bilateral lens replacements. 1.  No acute abnormality. 2.  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 3.  Extensive left-sided paranasal sinus disease and left mastoid effusion, correlate for acute sinusitis and/or mastoiditis, respectively. XR CHEST PORT    Result Date: 2/7/2023  INDICATION:  AMS Exam: Portable chest 1341. Comparison: 12/15/2022. Findings: Cardiomediastinal silhouette is within normal limits. Pulmonary vasculature is not engorged. There are no focal parenchymal opacities, effusions, or pneumothorax. 1. No acute disease        PROCEDURES   Unless otherwise noted below, none  Procedures     CRITICAL CARE TIME   none    EMERGENCY DEPARTMENT COURSE and DIFFERENTIAL DIAGNOSIS/MDM   Vitals:    Vitals:    02/07/23 1330 02/07/23 1331 02/07/23 1422 02/07/23 1445   BP: (!) 164/88  (!) 179/65 (!) 161/63   Pulse: (!) 58  66 75   Resp: 15  12 15   Temp:       SpO2: (!) 89% 100% 97%    Weight:       Height:            Patient was given the following medications:  Medications   cefTRIAXone (ROCEPHIN) 1 g in 0.9% sodium chloride (MBP/ADV) 50 mL MBP (1 g IntraVENous New Bag 2/7/23 1529)       Medical Decision Making  Amount and/or Complexity of Data Reviewed  Labs: ordered.   Radiology: ordered. Risk  Prescription drug management. Patient CT does show sinusitis questionable mastoid effusion on physical exam there is no redness or swelling over the left mastoid. No otitis media. We will dose with IV antibiotics    Labs reassuring. Chest x-ray unremarkable. Urinalysis is pending however patient given IV Rocephin for sinusitis will be discharged on antibiotics as well. At this time patient is sleepy however follows commands and is appropriate. It is unclear to the etiology of his somnolence whether he needs to have dosing changes in his Seroquel whether he is on too much and sleepy because of this or if he is not on enough Seroquel and is not sleeping at night and therefore more somnolent during the daytime. 3:47 PM  Patient's son-in-law called he and the patient's daughter live in [place]. They stated that they had been to visit him this past weekend and he was somnolent then. They are concerned that he needs to be on an insulin pump. I discussed with him that he should have a provider that is following him at the [facility]. This is also not something that I would be able to accommodate in an emergency department setting nor do I have the capabilities of dosing and insulin pump correctly. I discussed with him that they may want to see an endocrinologist I did give him a group in [address] that they could see however I am not clear on the need for an insulin pump or how it is decided who should be on one. I also discussed with him that if they are concerned that his primary care provider within the facility is not handling his care correctly they could seek outside facility resources here in the local area. Discussed with him both [address] [place] [address] as well as [place] [address] group have primary care physicians in the area.       Patient will be discharged back to the [facility] we will put in his discharge instructions things that they may want to watch out for based on his sleep patterns as well as possibly looking at his Seroquel doses. 4:12 PM  Patient is awake alert sitting up in the bed stating that he needs to urinate. Patient be discharged back to the [facility]. FINAL IMPRESSION     1. Acute sinusitis, recurrence not specified, unspecified location    2. Somnolence          DISPOSITION/PLAN   Blake Nixon's  results have been reviewed with him. He has been counseled regarding his diagnosis, treatment, and plan. He verbally conveys understanding and agreement of the signs, symptoms, diagnosis, treatment and prognosis and additionally agrees to follow up as discussed. He also agrees with the care-plan and conveys that all of his questions have been answered. I have also provided discharge instructions for him that include: educational information regarding their diagnosis and treatment, and list of reasons why they would want to return to the ED prior to their follow-up appointment, should his condition change. CLINICAL IMPRESSION    Discharge Note: The patient is stable for discharge home. The signs, symptoms, diagnosis, and discharge instructions have been discussed, understanding conveyed, and agreed upon. The patient is to follow up as recommended or return to ER should their symptoms worsen. PATIENT REFERRED TO:  Follow-up Information       Follow up With Specialties Details Why Contact Info    Rula Crain NP Nurse Practitioner  As needed [phone]  [place] [phone]  [phone]                DISCHARGE MEDICATIONS:  Current Discharge Medication List        START taking these medications    Details   cefdinir (OMNICEF) 300 mg capsule Take 1 Capsule by mouth two (2) times a day. Qty: 20 Capsule, Refills: 0  Start date: 2/7/2023               DISCONTINUED MEDICATIONS:  Current Discharge Medication List          I am the Primary Clinician of Record.    Leslie Velasco DO (electronically signed)    (Please note that parts of this dictation were completed with voice recognition software. Quite often unanticipated grammatical, syntax, homophones, and other interpretive errors are inadvertently transcribed by the computer software. Please disregards these errors.  Please excuse any errors that have escaped final proofreading.)

## 2023-02-07 NOTE — PROGRESS NOTES
[address] [facility] ED RN advises to arranged BLS transportation from [facility] back to [place].   Arranged BLS transport w/[facility] - ETA of 2330 given - updated ED staff w/ETA

## 2023-02-07 NOTE — ED NOTES
Pt able to be aroused and was able to follow all commands to use urinal to provide sample. Brief placed back on pt.

## 2023-02-07 NOTE — ED NOTES
Pt noted to be sitting up in the stretcher wide awake,  This writer entered the room and pt stated, \"Good Morning\",  Pt offers no complaints at this time. Pt is awake and alert.   Pt pulled up and repositioned for comfort

## 2023-02-07 NOTE — ED NOTES
Spoke with family who was concerned in regards to pts care. States there \"seems to be a disconnect between our facility and the nursing home\" and that our facility \"always seems to say the opposite of what the home says\"  Reports that on Saturday pt was \"breathing at 61 times/min\" (per the son in law, who is an EMT). Also questioned as to why we (the ED) has not placed the pt on an insulin pump. Family was instructed that a PCP would be required for that. Family was very terse and unsatisfied with responses from the RN, as well as the MD. Yamile Turner family that pt has been able to be aroused and is able to follow commands. Discussed med admin at home facility.

## 2023-02-08 NOTE — ED NOTES
Charge nurse to [facility] nurse report given to Jessica, report included pt awaits [facility] back to [facility] ETA 2330, a updated report will need to be given to [facility]

## 2023-02-08 NOTE — ED NOTES
Attempted to call report to [facility]. Message left on [facility] voicemail regarding pt returning and discharge information. Instructed to call ED with any questions.

## 2023-02-10 ENCOUNTER — APPOINTMENT (OUTPATIENT)
Dept: CT IMAGING | Age: 88
End: 2023-02-10
Attending: EMERGENCY MEDICINE
Payer: MEDICARE

## 2023-02-10 ENCOUNTER — APPOINTMENT (OUTPATIENT)
Dept: GENERAL RADIOLOGY | Age: 88
End: 2023-02-10
Attending: EMERGENCY MEDICINE
Payer: MEDICARE

## 2023-02-10 ENCOUNTER — HOSPITAL ENCOUNTER (EMERGENCY)
Age: 88
Discharge: HOME | End: 2023-02-10
Attending: EMERGENCY MEDICINE
Payer: MEDICARE

## 2023-02-10 VITALS
TEMPERATURE: 98.1 F | SYSTOLIC BLOOD PRESSURE: 170 MMHG | WEIGHT: 165.12 LBS | BODY MASS INDEX: 23.12 KG/M2 | HEIGHT: 71 IN | OXYGEN SATURATION: 100 % | RESPIRATION RATE: 17 BRPM | DIASTOLIC BLOOD PRESSURE: 72 MMHG | HEART RATE: 64 BPM

## 2023-02-10 DIAGNOSIS — I48.91 ATRIAL FIBRILLATION, UNSPECIFIED TYPE (HCC): Primary | ICD-10-CM

## 2023-02-10 DIAGNOSIS — N30.01 ACUTE CYSTITIS WITH HEMATURIA: ICD-10-CM

## 2023-02-10 LAB
ANION GAP SERPL CALC-SCNC: 7 MMOL/L (ref 5–15)
APPEARANCE UR: CLEAR
ATRIAL RATE: 85 BPM
BACTERIA URNS QL MICRO: ABNORMAL /HPF
BASOPHILS # BLD: 0 K/UL (ref 0–0.1)
BASOPHILS NFR BLD: 0 % (ref 0–1)
BILIRUB UR QL: NEGATIVE
BUN SERPL-MCNC: 25 MG/DL (ref 6–20)
BUN/CREAT SERPL: 19 (ref 12–20)
CALCIUM SERPL-MCNC: 9 MG/DL (ref 8.5–10.1)
CALCULATED P AXIS, ECG09: 51 DEGREES
CALCULATED R AXIS, ECG10: 62 DEGREES
CALCULATED T AXIS, ECG11: 76 DEGREES
CHLORIDE SERPL-SCNC: 106 MMOL/L (ref 97–108)
CO2 SERPL-SCNC: 30 MMOL/L (ref 21–32)
COLOR UR: ABNORMAL
CREAT SERPL-MCNC: 1.29 MG/DL (ref 0.7–1.3)
DIAGNOSIS, 93000: NORMAL
DIFFERENTIAL METHOD BLD: ABNORMAL
EOSINOPHIL # BLD: 0.1 K/UL (ref 0–0.4)
EOSINOPHIL NFR BLD: 1 % (ref 0–7)
EPITH CASTS URNS QL MICRO: ABNORMAL /LPF
ERYTHROCYTE [DISTWIDTH] IN BLOOD BY AUTOMATED COUNT: 13.2 % (ref 11.5–14.5)
GLUCOSE BLD STRIP.AUTO-MCNC: 115 MG/DL (ref 65–117)
GLUCOSE BLD STRIP.AUTO-MCNC: 124 MG/DL (ref 65–117)
GLUCOSE BLD STRIP.AUTO-MCNC: 38 MG/DL (ref 65–117)
GLUCOSE BLD STRIP.AUTO-MCNC: 42 MG/DL (ref 65–117)
GLUCOSE BLD STRIP.AUTO-MCNC: 71 MG/DL (ref 65–117)
GLUCOSE BLD STRIP.AUTO-MCNC: 99 MG/DL (ref 65–117)
GLUCOSE SERPL-MCNC: 39 MG/DL (ref 65–100)
GLUCOSE UR STRIP.AUTO-MCNC: NEGATIVE MG/DL
HCT VFR BLD AUTO: 36.7 % (ref 36.6–50.3)
HGB BLD-MCNC: 11.9 G/DL (ref 12.1–17)
HGB UR QL STRIP: ABNORMAL
IMM GRANULOCYTES # BLD AUTO: 0 K/UL (ref 0–0.04)
IMM GRANULOCYTES NFR BLD AUTO: 0 % (ref 0–0.5)
KETONES UR QL STRIP.AUTO: NEGATIVE MG/DL
LEUKOCYTE ESTERASE UR QL STRIP.AUTO: NEGATIVE
LYMPHOCYTES # BLD: 1.7 K/UL (ref 0.8–3.5)
LYMPHOCYTES NFR BLD: 25 % (ref 12–49)
MCH RBC QN AUTO: 30.4 PG (ref 26–34)
MCHC RBC AUTO-ENTMCNC: 32.4 G/DL (ref 30–36.5)
MCV RBC AUTO: 93.9 FL (ref 80–99)
MONOCYTES # BLD: 0.6 K/UL (ref 0–1)
MONOCYTES NFR BLD: 9 % (ref 5–13)
NEUTS SEG # BLD: 4.4 K/UL (ref 1.8–8)
NEUTS SEG NFR BLD: 65 % (ref 32–75)
NITRITE UR QL STRIP.AUTO: NEGATIVE
NRBC # BLD: 0 K/UL (ref 0–0.01)
NRBC BLD-RTO: 0 PER 100 WBC
P-R INTERVAL, ECG05: 194 MS
PH UR STRIP: 7 (ref 5–8)
PLATELET # BLD AUTO: 232 K/UL (ref 150–400)
PMV BLD AUTO: 9.5 FL (ref 8.9–12.9)
POTASSIUM SERPL-SCNC: 4 MMOL/L (ref 3.5–5.1)
PROT UR STRIP-MCNC: NEGATIVE MG/DL
Q-T INTERVAL, ECG07: 392 MS
QRS DURATION, ECG06: 94 MS
QTC CALCULATION (BEZET), ECG08: 466 MS
RBC # BLD AUTO: 3.91 M/UL (ref 4.1–5.7)
RBC #/AREA URNS HPF: ABNORMAL /HPF (ref 0–5)
SERVICE CMNT-IMP: ABNORMAL
SERVICE CMNT-IMP: NORMAL
SODIUM SERPL-SCNC: 143 MMOL/L (ref 136–145)
SP GR UR REFRACTOMETRY: 1.01 (ref 1–1.03)
UA: UC IF INDICATED,UAUC: ABNORMAL
UROBILINOGEN UR QL STRIP.AUTO: 0.2 EU/DL (ref 0.2–1)
VENTRICULAR RATE, ECG03: 85 BPM
WBC # BLD AUTO: 6.8 K/UL (ref 4.1–11.1)
WBC URNS QL MICRO: ABNORMAL /HPF (ref 0–4)

## 2023-02-10 PROCEDURE — 96366 THER/PROPH/DIAG IV INF ADDON: CPT | Performed by: EMERGENCY MEDICINE

## 2023-02-10 PROCEDURE — 96375 TX/PRO/DX INJ NEW DRUG ADDON: CPT | Performed by: EMERGENCY MEDICINE

## 2023-02-10 PROCEDURE — 80048 BASIC METABOLIC PNL TOTAL CA: CPT

## 2023-02-10 PROCEDURE — 71045 X-RAY EXAM CHEST 1 VIEW: CPT

## 2023-02-10 PROCEDURE — 74011000258 HC RX REV CODE- 258: Performed by: EMERGENCY MEDICINE

## 2023-02-10 PROCEDURE — 99285 EMERGENCY DEPT VISIT HI MDM: CPT | Performed by: EMERGENCY MEDICINE

## 2023-02-10 PROCEDURE — 74011250636 HC RX REV CODE- 250/636: Performed by: EMERGENCY MEDICINE

## 2023-02-10 PROCEDURE — 74011000250 HC RX REV CODE- 250: Performed by: EMERGENCY MEDICINE

## 2023-02-10 PROCEDURE — 85025 COMPLETE CBC W/AUTO DIFF WBC: CPT

## 2023-02-10 PROCEDURE — 81001 URINALYSIS AUTO W/SCOPE: CPT

## 2023-02-10 PROCEDURE — 70450 CT HEAD/BRAIN W/O DYE: CPT

## 2023-02-10 PROCEDURE — 96365 THER/PROPH/DIAG IV INF INIT: CPT | Performed by: EMERGENCY MEDICINE

## 2023-02-10 PROCEDURE — 82962 GLUCOSE BLOOD TEST: CPT

## 2023-02-10 PROCEDURE — 36415 COLL VENOUS BLD VENIPUNCTURE: CPT

## 2023-02-10 RX ORDER — DEXTROSE MONOHYDRATE 100 MG/ML
0-250 INJECTION, SOLUTION INTRAVENOUS AS NEEDED
Status: DISCONTINUED | OUTPATIENT
Start: 2023-02-10 | End: 2023-02-10 | Stop reason: HOSPADM

## 2023-02-10 RX ORDER — DEXTROSE MONOHYDRATE 100 MG/ML
0-250 INJECTION, SOLUTION INTRAVENOUS AS NEEDED
Status: DISCONTINUED | OUTPATIENT
Start: 2023-02-10 | End: 2023-02-10 | Stop reason: SDUPTHER

## 2023-02-10 RX ORDER — CEFDINIR 300 MG/1
300 CAPSULE ORAL 2 TIMES DAILY
Qty: 20 CAPSULE | Refills: 0 | Status: SHIPPED | OUTPATIENT
Start: 2023-02-10

## 2023-02-10 RX ORDER — CEFDINIR 300 MG/1
300 CAPSULE ORAL 2 TIMES DAILY
Qty: 20 CAPSULE | Refills: 0 | Status: SHIPPED | OUTPATIENT
Start: 2023-02-10 | End: 2023-02-10 | Stop reason: SDUPTHER

## 2023-02-10 RX ADMIN — CEFTRIAXONE SODIUM 1 G: 1 INJECTION, POWDER, FOR SOLUTION INTRAMUSCULAR; INTRAVENOUS at 10:04

## 2023-02-10 RX ADMIN — DEXTROSE MONOHYDRATE 250 ML: 100 INJECTION, SOLUTION INTRAVENOUS at 08:01

## 2023-02-10 RX ADMIN — DEXTROSE MONOHYDRATE 250 ML: 100 INJECTION, SOLUTION INTRAVENOUS at 11:13

## 2023-02-10 NOTE — ED NOTES
Pt report called to [facility]. RN spoke with [facility]. I have reviewed discharge instructions with the patient and caregiver. The patient and caregiver verbalized understanding.

## 2023-02-10 NOTE — ED TRIAGE NOTES
Per [facility] staff pt is altered with last time known well being last night 1900. Per staff he was hard to wake with sluggish speech and BSG of 30 and they gave OJ Per rescue BSG was is now 130.

## 2023-02-10 NOTE — ED PROVIDER NOTES
EMERGENCY DEPARTMENT HISTORY AND PHYSICAL EXAM      Date: 2/10/2023  Patient Name: Ariel Guerrero    History of Presenting Illness     Chief Complaint   Patient presents with    Altered mental status       History Provided By: Patient, EMS, and Nursing Home/SNF/Rehab Center    HPI: Ariel Guerrero, 80 y.o. male with PMHx significant for CAD, DM 2, high cholesterol, presents via EMS to the ED with cc of altered mental status. Patient arrives via EMS from [facility]. Facility staff noted that he was altered this morning slow to respond on waking. His blood glucose was 30. Gave him some orange juice. Blood sugar was 109 on EMS arrival.  Last known well time was approximately 7 PM last night. PMHx: Significant for DM 2, CAD, high cholesterol  PSHx: Significant for cardiac cath with LAD and PDA stents in 2010, cataract surgery  Social Hx: Former smoker. Quit in 1963. Stopped drinking alcohol in 2018. There are no other complaints, changes, or physical findings at this time. PCP: Bo Martinez NP    No current facility-administered medications on file prior to encounter. Current Outpatient Medications on File Prior to Encounter   Medication Sig Dispense Refill    [DISCONTINUED] cefdinir (OMNICEF) 300 mg capsule Take 1 Capsule by mouth two (2) times a day. 20 Capsule 0    hydrALAZINE (APRESOLINE) 25 mg tablet Take 1 Tablet by mouth two (2) times a day. insulin glargine (LANTUS) 100 unit/mL injection 25 Units by SubCUTAneous route daily. 1 mL 0    tamsulosin (FLOMAX) 0.4 mg capsule Take 1 Capsule by mouth nightly. 1 Capsule 0    metoprolol succinate (TOPROL-XL) 25 mg XL tablet Take 25 mg by mouth daily. QUEtiapine (SEROquel) 25 mg tablet Take 25 mg by mouth nightly. multivitamin (ONE A DAY) tablet Take 1 Tablet by mouth daily. polyethylene glycol (MIRALAX) 17 gram packet Take 17 g by mouth daily.       acetaminophen (TYLENOL) 325 mg tablet Take 2 Tablets by mouth every six (6) hours as needed. atorvastatin (LIPITOR) 40 mg tablet Take 1 Tab by mouth nightly. 90 Tab 1    aspirin delayed-release 81 mg tablet Take 81 mg by mouth daily. Past History     Past Medical History:  Past Medical History:   Diagnosis Date    Carotid artery disease ([address]     Coronary atherosclerosis of native coronary artery     Diabetes mellitus, type II (HCC)     DJD (degenerative joint disease)     HCVD (hypertensive cardiovascular disease)     Pure hypercholesterolemia        Past Surgical History:  Past Surgical History:   Procedure Laterality Date    HX CATARACT REMOVAL Bilateral     HX HEART CATHETERIZATION  2010    LVEDP 9, mild ant hypo EF 55-60%, LAD 70% 99%, mod D1, OM3 30%, PDA 90%    HX PTCA  2010    Stent-LAD 2.5X18 ANDREA    HX PTCA  3/2010    Stent PDA ANDREA       Family History:  Family History   Problem Relation Age of Onset    Heart Disease Mother     Cancer Father        Social History:  Social History     Tobacco Use    Smoking status: Former     Types: Cigarettes     Quit date: 10/17/1963     Years since quittin.3    Smokeless tobacco: Never   Vaping Use    Vaping Use: Never used   Substance Use Topics    Alcohol use: Not Currently     Comment: stopped 2018/used to drink heavy    Drug use: Never       Allergies: Allergies   Allergen Reactions    Aspirin Other (comments)     NOSE BLEED  MG IS TAKEN  Can take low dose aspirin    Pcn [Penicillins] Shortness of Breath     Itching/hives         Review of Systems   Review of Systems   Constitutional:  Negative for activity change, chills and fever. HENT:  Negative for congestion and sore throat. Eyes:  Negative for pain and redness. Respiratory:  Negative for cough, chest tightness and shortness of breath. Cardiovascular:  Negative for chest pain and palpitations. Gastrointestinal:  Negative for abdominal pain, diarrhea, nausea and vomiting.    Genitourinary:  Negative for dysuria, frequency and urgency. Musculoskeletal:  Negative for back pain and neck pain. Skin:  Negative for rash. Neurological:  Negative for syncope, light-headedness and headaches. Psychiatric/Behavioral:  Negative for confusion. All other systems reviewed and are negative. Physical Exam   Physical Exam  Vitals and nursing note reviewed. Constitutional:       General: He is not in acute distress. Appearance: He is well-developed. He is not diaphoretic. HENT:      Head: Normocephalic. Nose: Nose normal.      Mouth/Throat:      Pharynx: No oropharyngeal exudate. Eyes:      General: No scleral icterus. Conjunctiva/sclera: Conjunctivae normal.      Pupils: Pupils are equal, round, and reactive to light. Neck:      Thyroid: No thyromegaly. Vascular: No JVD. Trachea: No tracheal deviation. Cardiovascular:      Rate and Rhythm: Normal rate and regular rhythm. Heart sounds: No murmur heard. No friction rub. No gallop. Pulmonary:      Effort: Pulmonary effort is normal. No respiratory distress. Breath sounds: Normal breath sounds. No stridor. No wheezing or rales. Abdominal:      General: Bowel sounds are normal. There is no distension. Palpations: Abdomen is soft. Tenderness: There is no abdominal tenderness. There is no guarding or rebound. Musculoskeletal:         General: Normal range of motion. Cervical back: Normal range of motion and neck supple. Lymphadenopathy:      Cervical: No cervical adenopathy. Skin:     General: Skin is warm and dry. Findings: No erythema or rash. Neurological:      Mental Status: He is alert. Cranial Nerves: No cranial nerve deficit. Motor: No abnormal muscle tone. Coordination: Coordination normal.      Comments: Patient's somnolent but arouses to loud voice on arrival.  Answers questions appropriately and follows simple commands. Alert to person and place.   Confusion to time and current events. Psychiatric:         Behavior: Behavior normal.           Diagnostic Study Results     Labs -     Recent Results (from the past 12 hour(s))   GLUCOSE, POC    Collection Time: 02/10/23  7:52 AM   Result Value Ref Range    Glucose (POC) 38 (LL) 65 - 117 mg/dL    Performed by [facility] PCT    GLUCOSE, POC    Collection Time: 02/10/23  7:54 AM   Result Value Ref Range    Glucose (POC) 42 (LL) 65 - 117 mg/dL    Performed by [facility] PCT    CBC WITH AUTOMATED DIFF    Collection Time: 02/10/23  7:58 AM   Result Value Ref Range    WBC 6.8 4.1 - 11.1 K/uL    RBC 3.91 (L) 4.10 - 5.70 M/uL    HGB 11.9 (L) 12.1 - 17.0 g/dL    HCT 36.7 36.6 - 50.3 %    MCV 93.9 80.0 - 99.0 FL    MCH 30.4 26.0 - 34.0 PG    MCHC 32.4 30.0 - 36.5 g/dL    RDW 13.2 11.5 - 14.5 %    PLATELET 954 655 - 711 K/uL    MPV 9.5 8.9 - 12.9 FL    NRBC 0.0 0  WBC    ABSOLUTE NRBC 0.00 0.00 - 0.01 K/uL    NEUTROPHILS 65 32 - 75 %    LYMPHOCYTES 25 12 - 49 %    MONOCYTES 9 5 - 13 %    EOSINOPHILS 1 0 - 7 %    BASOPHILS 0 0 - 1 %    IMMATURE GRANULOCYTES 0 0.0 - 0.5 %    ABS. NEUTROPHILS 4.4 1.8 - 8.0 K/UL    ABS. LYMPHOCYTES 1.7 0.8 - 3.5 K/UL    ABS. MONOCYTES 0.6 0.0 - 1.0 K/UL    ABS. EOSINOPHILS 0.1 0.0 - 0.4 K/UL    ABS. BASOPHILS 0.0 0.0 - 0.1 K/UL    ABS. IMM.  GRANS. 0.0 0.00 - 0.04 K/UL    DF AUTOMATED     METABOLIC PANEL, BASIC    Collection Time: 02/10/23  7:58 AM   Result Value Ref Range    Sodium 143 136 - 145 mmol/L    Potassium 4.0 3.5 - 5.1 mmol/L    Chloride 106 97 - 108 mmol/L    CO2 30 21 - 32 mmol/L    Anion gap 7 5 - 15 mmol/L    Glucose 39 (LL) 65 - 100 mg/dL    BUN 25 (H) 6 - 20 MG/DL    Creatinine 1.29 0.70 - 1.30 MG/DL    BUN/Creatinine ratio 19 12 - 20      eGFR 51 (L) >60 ml/min/1.73m2    Calcium 9.0 8.5 - 10.1 MG/DL   EKG, 12 LEAD, INITIAL    Collection Time: 02/10/23  8:04 AM   Result Value Ref Range    Ventricular Rate 85 BPM    Atrial Rate 85 BPM    P-R Interval 194 ms    QRS Duration 94 ms    Q-T Interval 392 ms    QTC Calculation (Bezet) 466 ms    Calculated P Axis 51 degrees    Calculated R Axis 62 degrees    Calculated T Axis 76 degrees    Diagnosis       Sinus rhythm with occasional premature ventricular complexes and premature   atrial complexes  Otherwise normal ECG  When compared with ECG of 07-DEC-2022 00:42,  premature ventricular complexes are now present  premature atrial complexes are now present  OH interval has decreased  Nonspecific T wave abnormality now evident in Lateral leads     GLUCOSE, POC    Collection Time: 02/10/23  8:05 AM   Result Value Ref Range    Glucose (POC) 99 65 - 117 mg/dL    Performed by [facility]    GLUCOSE, POC    Collection Time: 02/10/23  8:39 AM   Result Value Ref Range    Glucose (POC) 124 (H) 65 - 117 mg/dL    Performed by Andreas Gimenez W/ REFLEX CULTURE    Collection Time: 02/10/23  9:46 AM    Specimen: Urine   Result Value Ref Range    Color YELLOW/STRAW      Appearance CLEAR CLEAR      Specific gravity 1.012 1.003 - 1.030      pH (UA) 7.0 5.0 - 8.0      Protein Negative NEG mg/dL    Glucose Negative NEG mg/dL    Ketone Negative NEG mg/dL    Bilirubin Negative NEG      Blood LARGE (A) NEG      Urobilinogen 0.2 0.2 - 1.0 EU/dL    Nitrites Negative NEG      Leukocyte Esterase Negative NEG      WBC 5-10 0 - 4 /hpf    RBC 20-50 0 - 5 /hpf    Epithelial cells MODERATE (A) FEW /lpf    Bacteria 1+ (A) NEG /hpf    UA:UC IF INDICATED CULTURE NOT INDICATED BY UA RESULT CNI     GLUCOSE, POC    Collection Time: 02/10/23 10:48 AM   Result Value Ref Range    Glucose (POC) 71 65 - 117 mg/dL    Performed by [facility]        Radiologic Studies -   CT HEAD WO CONT   Final Result   No acute intracranial abnormality.             XR CHEST SNGL V   Final Result   No acute process or change compared to the prior exam.         CT Results  (Last 48 hours)                 02/10/23 0830  CT HEAD WO CONT Final result    Impression:  No acute intracranial abnormality. Narrative:  EXAM: CT HEAD WO CONT       INDICATION: ams       COMPARISON: 2/7/2023. CONTRAST: None. TECHNIQUE: Unenhanced CT of the head was performed using 5 mm images. Brain and   bone windows were generated. Coronal and sagittal reformats. CT dose reduction   was achieved through use of a standardized protocol tailored for this   examination and automatic exposure control for dose modulation. FINDINGS:   There is slight prominence of ventricles and sulci. There are mild changes small   vessel disease periventricular white matter. Small left subdural hygroma   overlying left cerebral convexity is unchanged. No hemorrhage mass or acute   infarction is identified. . There is no significant white matter disease. There   is no intracranial hemorrhage, extra-axial collection, or mass effect. The   basilar cisterns are open. No CT evidence of acute infarct. The bone windows demonstrate no abnormalities. There is opacification left   frontal sinus. There is slight mucosal thickening left maxillary sinus and left   ethmoidal air cells. There is fluid left mastoid tip. CXR Results  (Last 48 hours)                 02/10/23 0813  XR CHEST SNGL V Final result    Impression:  No acute process or change compared to the prior exam.        Narrative:  EXAM: XR CHEST SNGL V       INDICATION: ams       COMPARISON: 2/7/2023. FINDINGS: A single view of the chest demonstrates clear lungs. The cardiac and   mediastinal contours and pulmonary vascularity are normal. The bones and soft   tissues are within normal limits. Medical Decision Making   I am the first provider for this patient. I reviewed the vital signs, available nursing notes, past medical history, past surgical history, family history and social history. Vital Signs-Reviewed the patient's vital signs.   Patient Vitals for the past 12 hrs:   Temp Pulse Resp BP SpO2   02/10/23 1103 -- 63 17 (!) 160/63 100 %   02/10/23 1048 -- 65 13 (!) 163/69 100 %   02/10/23 1035 -- 62 16 (!) 165/63 100 %   02/10/23 1019 -- 65 13 (!) 164/61 99 %   02/10/23 1014 -- 66 13 -- 100 %   02/10/23 1013 -- 62 17 -- 100 %   02/10/23 1012 -- 65 17 -- 100 %   02/10/23 1011 -- 64 19 -- 100 %   02/10/23 1010 -- 64 20 (!) 165/62 100 %   02/10/23 1003 -- (!) 59 16 (!) 162/65 100 %   02/10/23 0948 -- 71 18 (!) 171/61 100 %   02/10/23 0947 -- 64 18 -- 99 %   02/10/23 0946 -- 69 9 -- 99 %   02/10/23 0945 -- 71 10 (!) 176/65 100 %   02/10/23 0944 -- 76 11 -- 100 %   02/10/23 0943 -- 71 14 (!) 174/80 100 %   02/10/23 0942 -- 76 17 -- 100 %   02/10/23 0941 -- 76 13 -- 100 %   02/10/23 0940 -- 76 16 -- 100 %   02/10/23 0918 -- 71 14 (!) 161/65 100 %   02/10/23 0903 -- 71 13 (!) 162/65 99 %   02/10/23 0851 -- 77 19 -- 100 %   02/10/23 0850 -- 72 16 (!) 159/63 100 %   02/10/23 0849 -- 71 11 -- 99 %   02/10/23 0848 -- 83 19 -- 94 %   02/10/23 0847 -- 67 13 -- 100 %   02/10/23 0846 -- 75 14 -- 100 %   02/10/23 0809 -- -- -- (!) 163/68 --   02/10/23 0802 -- 84 15 (!) 160/69 98 %   02/10/23 0757 98.1 °F (36.7 °C) -- -- -- --   02/10/23 0756 -- 78 14 -- 98 %       Pulse Oximetry Analysis - 100% on RA    Cardiac Monitor:   Rate: 63 bpm  Rhythm: Normal Sinus Rhythm        ED EKG interpretation:08:04  Rhythm: normal sinus rhythm; and regular . Rate (approx.): 85; Axis: normal; NC Interval: normal; QRS interval: normal ; ST/T wave: normal; This EKG was interpreted by Erendira Rebollar MD,ED Provider. Records Reviewed: Prior medical records, Nursing notes, and EMS run sheet    Provider Notes (Medical Decision Making):   80-year-old male with history of DM 2 with altered mental status and hypoglycemia. Will obtain CT head, basic labs and provide IV D10. ED Course:   Initial assessment performed. The patients presenting problems have been discussed, and they are in agreement with the care plan formulated and outlined with them.   I have encouraged them to ask questions as they arise throughout their visit. ED Course as of 02/10/23 1110   Fri Feb 10, 2023   [id number] Blood sugar 42 on arrival.  We will start D10, [address] [CH]      ED Course User Index  [CH] Cari Field MD         PROGRESS NOTE    Pt reevaluated. Back to baseline mental status after blood sugar control with D10 IV. Aleida Valdez Patient also with UTI 5-10 WBC, 20-50 RBC, 1+ bacteria. CBC unremarkable. Other than hyperglycemia BMP unremarkable. Gave IV ceftriaxone here. Will discharge back to facility. Will Rx Omnicef x10 days. Written by Lakshmi Kelly MD     Progress note:    Pt noted to be feeling better , ready for discharge. Updated pt and/or family on all final lab and imaging findings. Will follow up as instructed. All questions have been answered, pt voiced understanding and agreement with plan. Abx were prescribed, pt advised that diarrhea and rash are possible side effects of the medications. Specific return precautions provided as well as instructions to return to the ED should sx worsen at any time. Vital signs stable for discharge. I have also put together some discharge instructions for them that include: 1) educational information regarding their diagnosis, 2) how to care for their diagnosis at home, as well a 3) list of reasons why they would want to return to the ED prior to their follow-up appointment, should their condition change. Written by Lakshmi Kelly MD        Critical Care Time:   0    Disposition:  Discharge    PLAN:  1. Current Discharge Medication List        CONTINUE these medications which have CHANGED    Details   cefdinir (OMNICEF) 300 mg capsule Take 1 Capsule by mouth two (2) times a day. Qty: 20 Capsule, Refills: 0  Start date: 2/10/2023           2. Follow-up Information    None       Return to ED if worse     Diagnosis     Clinical Impression:   1. Atrial fibrillation, unspecified type ([facility] [address]    2.  Acute cystitis with hematuria              Please note that this dictation was completed with [employer], the computer voice recognition software. Quite often unanticipated grammatical, syntax, homophones, and other interpretive errors are inadvertently transcribed by the computer software. Please disregard these errors. Please excuse any errors that have escaped final proofreading.

## 2023-02-10 NOTE — PROGRESS NOTES
S transport arranged via [address] [place] [address] Rajeev Infante for transport to [facility]. ETA 1200.

## 2023-02-10 NOTE — ED NOTES
Daughter updated via phone on dispo and discharge  .  Verbal discharge and medication  instructions provided

## 2023-02-25 PROCEDURE — 99285 EMERGENCY DEPT VISIT HI MDM: CPT

## 2023-02-25 PROCEDURE — 96361 HYDRATE IV INFUSION ADD-ON: CPT

## 2023-02-25 PROCEDURE — 96360 HYDRATION IV INFUSION INIT: CPT

## 2023-02-26 ENCOUNTER — APPOINTMENT (OUTPATIENT)
Dept: CT IMAGING | Age: 88
End: 2023-02-26
Attending: FAMILY MEDICINE
Payer: MEDICARE

## 2023-02-26 ENCOUNTER — APPOINTMENT (OUTPATIENT)
Dept: GENERAL RADIOLOGY | Age: 88
End: 2023-02-26
Attending: FAMILY MEDICINE
Payer: MEDICARE

## 2023-02-26 ENCOUNTER — HOSPITAL ENCOUNTER (OUTPATIENT)
Age: 88
Setting detail: OBSERVATION
Discharge: HOME HEALTH | End: 2023-03-01
Attending: FAMILY MEDICINE | Admitting: FAMILY MEDICINE
Payer: MEDICARE

## 2023-02-26 DIAGNOSIS — R73.9 HYPERGLYCEMIA: ICD-10-CM

## 2023-02-26 DIAGNOSIS — S06.0XAA CONCUSSION WITH UNKNOWN LOSS OF CONSCIOUSNESS STATUS, INITIAL ENCOUNTER: ICD-10-CM

## 2023-02-26 DIAGNOSIS — R41.82 ALTERED MENTAL STATUS, UNSPECIFIED ALTERED MENTAL STATUS TYPE: Primary | ICD-10-CM
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
## 2023-02-26 LAB
ALBUMIN SERPL-MCNC: 3.7 G/DL (ref 3.5–5)
ALBUMIN/GLOB SERPL: 1.2 (ref 1.1–2.2)
ALP SERPL-CCNC: 200 U/L (ref 45–117)
ALT SERPL-CCNC: 39 U/L (ref 12–78)
AMPHET UR QL SCN: NEGATIVE
ANION GAP SERPL CALC-SCNC: 5 MMOL/L (ref 5–15)
APPEARANCE UR: CLEAR
AST SERPL-CCNC: 21 U/L (ref 15–37)
BACTERIA URNS QL MICRO: NEGATIVE /HPF
BARBITURATES UR QL SCN: NEGATIVE
BASOPHILS # BLD: 0 K/UL (ref 0–0.1)
BASOPHILS NFR BLD: 0 % (ref 0–1)
BENZODIAZ UR QL: NEGATIVE
BILIRUB SERPL-MCNC: 0.7 MG/DL (ref 0.2–1)
BILIRUB UR QL: NEGATIVE
BUN SERPL-MCNC: 24 MG/DL (ref 6–20)
BUN/CREAT SERPL: 18 (ref 12–20)
CALCIUM SERPL-MCNC: 9.2 MG/DL (ref 8.5–10.1)
CANNABINOIDS UR QL SCN: NEGATIVE
CHLORIDE SERPL-SCNC: 103 MMOL/L (ref 97–108)
CK SERPL-CCNC: 120 U/L (ref 39–308)
CO2 SERPL-SCNC: 30 MMOL/L (ref 21–32)
COCAINE UR QL SCN: NEGATIVE
COLOR UR: ABNORMAL
CREAT SERPL-MCNC: 1.31 MG/DL (ref 0.7–1.3)
DIFFERENTIAL METHOD BLD: ABNORMAL
DRUG SCRN COMMENT,DRGCM: NORMAL
EOSINOPHIL # BLD: 0.1 K/UL (ref 0–0.4)
EOSINOPHIL NFR BLD: 1 % (ref 0–7)
EPITH CASTS URNS QL MICRO: NORMAL /LPF
ERYTHROCYTE [DISTWIDTH] IN BLOOD BY AUTOMATED COUNT: 12.7 % (ref 11.5–14.5)
GLOBULIN SER CALC-MCNC: 3.2 G/DL (ref 2–4)
GLUCOSE BLD STRIP.AUTO-MCNC: 238 MG/DL (ref 65–117)
GLUCOSE SERPL-MCNC: 285 MG/DL (ref 65–100)
GLUCOSE UR STRIP.AUTO-MCNC: >1000 MG/DL
HCT VFR BLD AUTO: 36 % (ref 36.6–50.3)
HGB BLD-MCNC: 12.4 G/DL (ref 12.1–17)
HGB UR QL STRIP: ABNORMAL
IMM GRANULOCYTES # BLD AUTO: 0 K/UL (ref 0–0.04)
IMM GRANULOCYTES NFR BLD AUTO: 0 % (ref 0–0.5)
KETONES UR QL STRIP.AUTO: NEGATIVE MG/DL
LACTATE SERPL-SCNC: 0.9 MMOL/L (ref 0.4–2)
LEUKOCYTE ESTERASE UR QL STRIP.AUTO: NEGATIVE
LYMPHOCYTES # BLD: 2.3 K/UL (ref 0.8–3.5)
LYMPHOCYTES NFR BLD: 39 % (ref 12–49)
MAGNESIUM SERPL-MCNC: 1.8 MG/DL (ref 1.6–2.4)
MCH RBC QN AUTO: 31.8 PG (ref 26–34)
MCHC RBC AUTO-ENTMCNC: 34.4 G/DL (ref 30–36.5)
MCV RBC AUTO: 92.3 FL (ref 80–99)
METHADONE UR QL: NEGATIVE
MONOCYTES # BLD: 0.5 K/UL (ref 0–1)
MONOCYTES NFR BLD: 8 % (ref 5–13)
MUCOUS THREADS URNS QL MICRO: NORMAL /LPF
NEUTS SEG # BLD: 3 K/UL (ref 1.8–8)
NEUTS SEG NFR BLD: 51 % (ref 32–75)
NITRITE UR QL STRIP.AUTO: NEGATIVE
NRBC # BLD: 0 K/UL (ref 0–0.01)
NRBC BLD-RTO: 0 PER 100 WBC
OPIATES UR QL: NEGATIVE
PCP UR QL: NEGATIVE
PH UR STRIP: 7 (ref 5–8)
PLATELET # BLD AUTO: 191 K/UL (ref 150–400)
PMV BLD AUTO: 10.7 FL (ref 8.9–12.9)
POTASSIUM SERPL-SCNC: 3.9 MMOL/L (ref 3.5–5.1)
PROT SERPL-MCNC: 6.9 G/DL (ref 6.4–8.2)
PROT UR STRIP-MCNC: 30 MG/DL
RBC # BLD AUTO: 3.9 M/UL (ref 4.1–5.7)
RBC #/AREA URNS HPF: NORMAL /HPF (ref 0–5)
SERVICE CMNT-IMP: ABNORMAL
SODIUM SERPL-SCNC: 138 MMOL/L (ref 136–145)
SP GR UR REFRACTOMETRY: 1.02 (ref 1–1.03)
TROPONIN I SERPL HS-MCNC: 173 NG/L (ref 0–76)
TROPONIN I SERPL HS-MCNC: 189 NG/L (ref 0–76)
UROBILINOGEN UR QL STRIP.AUTO: 0.2 EU/DL (ref 0.2–1)
WBC # BLD AUTO: 5.9 K/UL (ref 4.1–11.1)
WBC URNS QL MICRO: NORMAL /HPF (ref 0–4)

## 2023-02-26 PROCEDURE — 81001 URINALYSIS AUTO W/SCOPE: CPT

## 2023-02-26 PROCEDURE — 93005 ELECTROCARDIOGRAM TRACING: CPT

## 2023-02-26 PROCEDURE — G0378 HOSPITAL OBSERVATION PER HR: HCPCS

## 2023-02-26 PROCEDURE — 70450 CT HEAD/BRAIN W/O DYE: CPT

## 2023-02-26 PROCEDURE — 74011636637 HC RX REV CODE- 636/637: Performed by: STUDENT IN AN ORGANIZED HEALTH CARE EDUCATION/TRAINING PROGRAM

## 2023-02-26 PROCEDURE — 72125 CT NECK SPINE W/O DYE: CPT

## 2023-02-26 PROCEDURE — 74011000250 HC RX REV CODE- 250: Performed by: STUDENT IN AN ORGANIZED HEALTH CARE EDUCATION/TRAINING PROGRAM

## 2023-02-26 PROCEDURE — 74011250636 HC RX REV CODE- 250/636: Performed by: FAMILY MEDICINE

## 2023-02-26 PROCEDURE — 74011250637 HC RX REV CODE- 250/637: Performed by: STUDENT IN AN ORGANIZED HEALTH CARE EDUCATION/TRAINING PROGRAM

## 2023-02-26 PROCEDURE — 82550 ASSAY OF CK (CPK): CPT

## 2023-02-26 PROCEDURE — 85025 COMPLETE CBC W/AUTO DIFF WBC: CPT

## 2023-02-26 PROCEDURE — 77030042042 HC CATH EXT MALE -B

## 2023-02-26 PROCEDURE — 82962 GLUCOSE BLOOD TEST: CPT

## 2023-02-26 PROCEDURE — 83735 ASSAY OF MAGNESIUM: CPT

## 2023-02-26 PROCEDURE — 80307 DRUG TEST PRSMV CHEM ANLYZR: CPT

## 2023-02-26 PROCEDURE — 36415 COLL VENOUS BLD VENIPUNCTURE: CPT

## 2023-02-26 PROCEDURE — 71045 X-RAY EXAM CHEST 1 VIEW: CPT

## 2023-02-26 PROCEDURE — 74011250636 HC RX REV CODE- 250/636: Performed by: STUDENT IN AN ORGANIZED HEALTH CARE EDUCATION/TRAINING PROGRAM

## 2023-02-26 PROCEDURE — 80053 COMPREHEN METABOLIC PANEL: CPT

## 2023-02-26 PROCEDURE — 83605 ASSAY OF LACTIC ACID: CPT

## 2023-02-26 PROCEDURE — 84484 ASSAY OF TROPONIN QUANT: CPT

## 2023-02-26 RX ORDER — AMLODIPINE BESYLATE 2.5 MG/1
2.5 TABLET ORAL DAILY
Status: DISCONTINUED | OUTPATIENT
Start: 2023-02-26 | End: 2023-02-27

## 2023-02-26 RX ORDER — DEXTROSE MONOHYDRATE 100 MG/ML
0-250 INJECTION, SOLUTION INTRAVENOUS AS NEEDED
Status: DISCONTINUED | OUTPATIENT
Start: 2023-02-26 | End: 2023-03-01 | Stop reason: HOSPADM

## 2023-02-26 RX ORDER — ATORVASTATIN CALCIUM 40 MG/1
40 TABLET, FILM COATED ORAL
Status: DISCONTINUED | OUTPATIENT
Start: 2023-02-26 | End: 2023-03-01 | Stop reason: HOSPADM

## 2023-02-26 RX ORDER — SODIUM CHLORIDE 0.9 % (FLUSH) 0.9 %
5-10 SYRINGE (ML) INJECTION ONCE
Status: DISPENSED | OUTPATIENT
Start: 2023-02-26 | End: 2023-02-26

## 2023-02-26 RX ORDER — HYDRALAZINE HYDROCHLORIDE 25 MG/1
25 TABLET, FILM COATED ORAL 2 TIMES DAILY
Status: DISCONTINUED | OUTPATIENT
Start: 2023-02-26 | End: 2023-02-26

## 2023-02-26 RX ORDER — GUAIFENESIN 100 MG/5ML
200 SOLUTION ORAL
COMMUNITY
End: 2023-03-01

## 2023-02-26 RX ORDER — SODIUM CHLORIDE 9 MG/ML
75 INJECTION, SOLUTION INTRAVENOUS CONTINUOUS
Status: DISCONTINUED | OUTPATIENT
Start: 2023-02-26 | End: 2023-02-27

## 2023-02-26 RX ORDER — LOPERAMIDE HCL 2 MG
2 TABLET ORAL
COMMUNITY
End: 2023-03-01

## 2023-02-26 RX ORDER — HYDRALAZINE HYDROCHLORIDE 25 MG/1
25 TABLET, FILM COATED ORAL EVERY 8 HOURS
Status: DISCONTINUED | OUTPATIENT
Start: 2023-02-26 | End: 2023-03-01 | Stop reason: HOSPADM

## 2023-02-26 RX ORDER — ACETAMINOPHEN 325 MG/1
650 TABLET ORAL
Status: DISCONTINUED | OUTPATIENT
Start: 2023-02-26 | End: 2023-03-01 | Stop reason: HOSPADM

## 2023-02-26 RX ORDER — ENOXAPARIN SODIUM 100 MG/ML
40 INJECTION SUBCUTANEOUS EVERY 24 HOURS
Status: DISCONTINUED | OUTPATIENT
Start: 2023-02-26 | End: 2023-03-01 | Stop reason: HOSPADM

## 2023-02-26 RX ORDER — INSULIN LISPRO 100 [IU]/ML
INJECTION, SOLUTION INTRAVENOUS; SUBCUTANEOUS
Status: DISCONTINUED | OUTPATIENT
Start: 2023-02-26 | End: 2023-03-01 | Stop reason: HOSPADM

## 2023-02-26 RX ORDER — IBUPROFEN 200 MG
4 TABLET ORAL AS NEEDED
Status: DISCONTINUED | OUTPATIENT
Start: 2023-02-26 | End: 2023-03-01 | Stop reason: HOSPADM

## 2023-02-26 RX ORDER — QUETIAPINE FUMARATE 25 MG/1
25 TABLET, FILM COATED ORAL
Status: DISCONTINUED | OUTPATIENT
Start: 2023-02-26 | End: 2023-03-01 | Stop reason: HOSPADM

## 2023-02-26 RX ORDER — ASPIRIN 81 MG/1
81 TABLET ORAL DAILY
Status: DISCONTINUED | OUTPATIENT
Start: 2023-02-27 | End: 2023-03-01 | Stop reason: HOSPADM

## 2023-02-26 RX ORDER — TAMSULOSIN HYDROCHLORIDE 0.4 MG/1
0.4 CAPSULE ORAL
Status: DISCONTINUED | OUTPATIENT
Start: 2023-02-26 | End: 2023-03-01 | Stop reason: HOSPADM

## 2023-02-26 RX ORDER — SODIUM CHLORIDE 0.9 % (FLUSH) 0.9 %
5-40 SYRINGE (ML) INJECTION EVERY 8 HOURS
Status: DISCONTINUED | OUTPATIENT
Start: 2023-02-26 | End: 2023-03-01 | Stop reason: HOSPADM

## 2023-02-26 RX ORDER — INSULIN GLARGINE 100 [IU]/ML
25 INJECTION, SOLUTION SUBCUTANEOUS DAILY
Status: DISCONTINUED | OUTPATIENT
Start: 2023-02-27 | End: 2023-03-01 | Stop reason: HOSPADM

## 2023-02-26 RX ORDER — ACETAMINOPHEN 325 MG/1
650 TABLET ORAL
COMMUNITY
End: 2023-03-01

## 2023-02-26 RX ORDER — SODIUM CHLORIDE 0.9 % (FLUSH) 0.9 %
5-40 SYRINGE (ML) INJECTION AS NEEDED
Status: DISCONTINUED | OUTPATIENT
Start: 2023-02-26 | End: 2023-03-01 | Stop reason: HOSPADM

## 2023-02-26 RX ADMIN — AMLODIPINE BESYLATE 2.5 MG: 2.5 TABLET ORAL at 21:27

## 2023-02-26 RX ADMIN — HYDRALAZINE HYDROCHLORIDE 25 MG: 25 TABLET, FILM COATED ORAL at 17:59

## 2023-02-26 RX ADMIN — SODIUM CHLORIDE, PRESERVATIVE FREE 10 ML: 5 INJECTION INTRAVENOUS at 22:00

## 2023-02-26 RX ADMIN — SODIUM CHLORIDE, PRESERVATIVE FREE 10 ML: 5 INJECTION INTRAVENOUS at 13:23

## 2023-02-26 RX ADMIN — HYDRALAZINE HYDROCHLORIDE 25 MG: 25 TABLET, FILM COATED ORAL at 13:25

## 2023-02-26 RX ADMIN — SODIUM CHLORIDE 75 ML/HR: 9 INJECTION, SOLUTION INTRAVENOUS at 21:27

## 2023-02-26 RX ADMIN — SODIUM CHLORIDE 1000 ML: 9 INJECTION, SOLUTION INTRAVENOUS at 01:54

## 2023-02-26 RX ADMIN — Medication 3 UNITS: at 18:00

## 2023-02-26 RX ADMIN — HYDRALAZINE HYDROCHLORIDE 25 MG: 25 TABLET, FILM COATED ORAL at 21:27

## 2023-02-26 RX ADMIN — ENOXAPARIN SODIUM 40 MG: 100 INJECTION SUBCUTANEOUS at 13:25

## 2023-02-26 RX ADMIN — QUETIAPINE FUMARATE 25 MG: 25 TABLET ORAL at 17:59

## 2023-02-26 RX ADMIN — TAMSULOSIN HYDROCHLORIDE 0.4 MG: 0.4 CAPSULE ORAL at 21:27

## 2023-02-26 RX ADMIN — ATORVASTATIN CALCIUM 40 MG: 40 TABLET, FILM COATED ORAL at 21:27

## 2023-02-26 NOTE — ED NOTES
Admission SBAR Note  Situation/Background:    Patient is being transferred to [facility] Room# 130    Patient's Chief Complaint was fall and is admitted for Altered Mental Status    CODE STATUS: DNR    CSSRS:     ISOLATION/PRECAUTIONS: No    ISOLATION TYPE:  Is this a behavioral health patient? NO  Has wanding been completed NO  Are belongings secure? NO      Called outstanding consults: YES/NO: NO    STAT labs collected: YES/NO: YES    Repeat Lactic Acid DUE? YES/NO: NO TIME DUE: NA    All STAT orders are complete: YES    The following personal items will be sent with the patient during transfer to the floor: Tshirt, socks      All valuables:     ITEM:    ASSESSMENT:    NEURO:    DANNY SWALLOW SCREEN COMPLETE: (NO  ORIENTATION LEVEL: ORIENTATION LEVEL: Person, Place, Time, Situation and Other, baseline dementia    Cognition: Cognition: impaired decision making  Speech: SPEECH: has not spoke during my assessment      Is patient impulsive?yes  Is patient oriented? no  Do they follow commands? sometimes  Is the patient ambulatory? Attempts to ambulate with hx of falls    FALL RISK? YES/NO: YES  Interventions: Implemented/recommended increased supervision/assistance    RESPIRATORY: Is patient on oxygen? NO    CARDIAC: Is cardiac monitoring ordered? NO      LINE ACCESS: x2    /GI: Continent Bowel/Bladder? NO  Urinary Output:   Chronic or Acute: If Chronic, is it 1days old, was it changed prior to specimen collection? Was UA with reflex sent to lab? (Y/n) If no, collect and send prior to transport to inpatient area    INTEGUMENTARY:  IS THE PATIENT UNDRESSED? ARE THERE WOUNDS PRESENT? ARE THE WOUNDS DOCUMENTED? RESTRAINTS IN USE: (Y/N)    IS THE DOCUMENTATION COMPLETE? Is there a current order? When does it ?     Vital Signs:  Level of consciousness:  Temp:  Temp Source:  Heart Rate:  Source:  Cardiac Rhythm:  Resp Rate:  BP:  MAP (monitor):  MAP (Calculated):  BP 1 (location):  BP 1 (method):  BP patient position:   MEWS Score:  Pain 1:  Pain Scale 1:     REVIEW:    IP UNIT CALLED NOTE IS READY: YES/NO: YES  IF THERE ARE QUESTIONS, CALL [phone]

## 2023-02-26 NOTE — PROGRESS NOTES
Call placed to [facility] - asking for:  - current med list and last dose taken. - immunizations (especially flu and covid)    Spoke to nurse and [facility], she will fax the above info to [facility] [phone]. Addendum at 1200:  Med list received and a copy given to pharmacist, Dustin Moss. Addendum at [phone]:  Nursing report received from [facility] (from [place]):      Apparently, patient's baseline mental status has been mostly severe forgetfulness alternating with what she described as \"some good days - where he is able to feed himself and interact with staff\". But she says most days he is so forgetful, that he forgets where his room is or even his own name. She states that he uses a walker, but then forgets it and leaves it behind. He is able to swallow whole pills. He is incontinent of urine. Last BM is unknown. Mr. Glenis Lesches has a private aide who comes every day to the facility.

## 2023-02-26 NOTE — ED TRIAGE NOTES
Arrived with ems via stretcher c/o ground level fall. Remains on ems stretcher in hallway with medics.  Awaiting a bed

## 2023-02-26 NOTE — ED NOTES
Patient is attempting to get out of the bed, patient has been redirected multiple times throughout the night. Will continue to monitor.

## 2023-02-26 NOTE — H&P
History and Physical    Subjective:     Mr. Rodney Santiago is a 80 y.o.  pleasant male with past medical history of CAD s/p stent, Insulin-dependent diabetes mellitus, Hypertensive cardiovascular disease and hypercholesterolemia who was brought to the ED for evaluation of fall and altered mental status. At the time of evaluation, patient was alert and oriented to self, place, year. He recalls falling down and hitting his head at the nursing home. He reports frequent falls due to generalized weakness and feeling wobbly. He denied dizziness or lightheadedness, chest pain, SOB or palpitation, nausea or diaphoresis prior to the fall. Denies recent fever or chills, abdominal pain, urinary or bowel complaints. On arrival to the ED, patient was altered with a GCS of ~9. Labs notable for elevated troponin and elevated glucose without anion gap. Cr mildly elevated from his baseline. CBC and UA unremarkable. EKG showed sinus rhythm with 1st degree AV block, occasional PVCs/PACs otherwise no acute ST-T wave changes. CT head and C-spine showed no acute findings. CXR showed no acute process. Patient received 1L IV NS. His mental status started to improve while in the ED.     Past Medical History:   Diagnosis Date    Carotid artery disease ([facility] [address]     Coronary atherosclerosis of native coronary artery     Diabetes mellitus, type II (HCC)     DJD (degenerative joint disease)     HCVD (hypertensive cardiovascular disease)     Pure hypercholesterolemia       Past Surgical History:   Procedure Laterality Date    HX CATARACT REMOVAL Bilateral     HX HEART CATHETERIZATION  2/2010    LVEDP 9, mild ant hypo EF 55-60%, LAD 70% 99%, mod D1, OM3 30%, PDA 90%    HX PTCA  2/2010    Stent-LAD 2.5X18 ANDREA    HX PTCA  3/2010    Stent PDA ANDREA     Family History   Problem Relation Age of Onset    Heart Disease Mother     Cancer Father       Social History     Tobacco Use    Smoking status: Former     Types: Cigarettes     Quit date: 10/17/1963     Years since quittin.4    Smokeless tobacco: Never   Substance Use Topics    Alcohol use: Not Currently     Comment: stopped 2018/used to drink heavy       Prior to Admission medications    Medication Sig Start Date End Date Taking? Authorizing Provider   cefdinir (OMNICEF) 300 mg capsule Take 1 Capsule by mouth two (2) times a day. 2/10/23  Yes Helio Keita MD   hydrALAZINE (APRESOLINE) 25 mg tablet Take 1 Tablet by mouth two (2) times a day. 10/6/22  Yes Yayo Dinero MD   insulin glargine (LANTUS) 100 unit/mL injection 25 Units by SubCUTAneous route daily. 22  Yes Kelsi Thomas MD   tamsulosin [facility]) 0.4 mg capsule Take 1 Capsule by mouth nightly. 22  Yes Kelsi Thomas MD   metoprolol succinate (TOPROL-XL) 25 mg XL tablet Take 25 mg by mouth daily. 10/31/22  Yes Provider, Historical   QUEtiapine (SEROquel) 25 mg tablet Take 25 mg by mouth nightly. 22  Yes Provider, Historical   multivitamin (ONE A DAY) tablet Take 1 Tablet by mouth daily. Yes Provider, Historical   polyethylene glycol (MIRALAX) 17 gram packet Take 17 g by mouth daily. Yes Provider, Historical   acetaminophen (TYLENOL) 325 mg tablet Take 2 Tablets by mouth every six (6) hours as needed. 21  Yes Kelsi Thomas MD   atorvastatin (LIPITOR) 40 mg tablet Take 1 Tab by mouth nightly. 21  Yes Monique Mcfarland MD   aspirin delayed-release 81 mg tablet Take 81 mg by mouth daily.    Yes Provider, Historical     Allergies   Allergen Reactions    Aspirin Other (comments)     NOSE BLEED  MG IS TAKEN  Can take low dose aspirin    Pcn [Penicillins] Shortness of Breath     Itching/hives        Review of Systems:  Constitutional: negative  Respiratory: negative for cough or dyspnea on exertion  Cardiovascular: negative for chest pain, dyspnea, palpitations, orthopnea, paroxysmal nocturnal dyspnea, lower extremity edema, dizziness  Gastrointestinal: negative for nausea, vomiting, change in bowel habits, and abdominal pain  Genitourinary:negative for frequency and dysuria  Neurological: positive for headaches and weakness    Objective: Intake and Output:    No intake/output data recorded. No intake/output data recorded. Physical Exam:   General appearance: alert, cooperative, no distress, appears stated age  Head: Superficial contusion/laceration on the forehead and bridge of the nose  Mouth: Mucous membranes are moist  Neck:   supple, symmetrical, trachea midline  Lungs: clear to auscultation bilaterally  Heart: regular rate and rhythm, S1, S2 normal, no murmur, click, rub or gallop  Abdomen: soft, non-tender. Bowel sounds normal. No masses,  no organomegaly  Extremities: extremities normal, atraumatic, no cyanosis or edema  Neurologic: Alert and oriented X 3, normal strength and tone. Normal symmetric reflexes. Normal coordination and gait    ECG:  normal sinus rhythm, PAC's noted, 1st degree AV block     Data Review:   Recent Results (from the past 24 hour(s))   CBC WITH AUTOMATED DIFF    Collection Time: 02/26/23 12:25 AM   Result Value Ref Range    WBC 5.9 4.1 - 11.1 K/uL    RBC 3.90 (L) 4.10 - 5.70 M/uL    HGB 12.4 12.1 - 17.0 g/dL    HCT 36.0 (L) 36.6 - 50.3 %    MCV 92.3 80.0 - 99.0 FL    MCH 31.8 26.0 - 34.0 PG    MCHC 34.4 30.0 - 36.5 g/dL    RDW 12.7 11.5 - 14.5 %    PLATELET 110 795 - 131 K/uL    MPV 10.7 8.9 - 12.9 FL    NRBC 0.0 0.0  WBC    ABSOLUTE NRBC 0.00 0.00 - 0.01 K/uL    NEUTROPHILS 51 32 - 75 %    LYMPHOCYTES 39 12 - 49 %    MONOCYTES 8 5 - 13 %    EOSINOPHILS 1 0 - 7 %    BASOPHILS 0 0 - 1 %    IMMATURE GRANULOCYTES 0 0 - 0.5 %    ABS. NEUTROPHILS 3.0 1.8 - 8.0 K/UL    ABS. LYMPHOCYTES 2.3 0.8 - 3.5 K/UL    ABS. MONOCYTES 0.5 0.0 - 1.0 K/UL    ABS. EOSINOPHILS 0.1 0.0 - 0.4 K/UL    ABS. BASOPHILS 0.0 0.0 - 0.1 K/UL    ABS. IMM.  GRANS. 0.0 0.00 - 0.04 K/UL    DF AUTOMATED     METABOLIC PANEL, COMPREHENSIVE    Collection Time: 02/26/23 12:25 AM   Result Value Ref Range    Sodium 138 136 - 145 mmol/L    Potassium 3.9 3.5 - 5.1 mmol/L    Chloride 103 97 - 108 mmol/L    CO2 30 21 - 32 mmol/L    Anion gap 5 5 - 15 mmol/L    Glucose 285 (H) 65 - 100 mg/dL    BUN 24 (H) 6 - 20 MG/DL    Creatinine 1.31 (H) 0.70 - 1.30 MG/DL    BUN/Creatinine ratio 18 12 - 20      eGFR 50 (L) >60 ml/min/1.73m2    Calcium 9.2 8.5 - 10.1 MG/DL    Bilirubin, total 0.7 0.2 - 1.0 MG/DL    ALT (SGPT) 39 12 - 78 U/L    AST (SGOT) 21 15 - 37 U/L    Alk.  phosphatase 200 (H) 45 - 117 U/L    Protein, total 6.9 6.4 - 8.2 g/dL    Albumin 3.7 3.5 - 5.0 g/dL    Globulin 3.2 2.0 - 4.0 g/dL    A-G Ratio 1.2 1.1 - 2.2     TROPONIN-HIGH SENSITIVITY    Collection Time: 02/26/23 12:25 AM   Result Value Ref Range    Troponin-High Sensitivity 173 (HH) 0 - 76 ng/L   MAGNESIUM    Collection Time: 02/26/23 12:25 AM   Result Value Ref Range    Magnesium 1.8 1.6 - 2.4 mg/dL   LACTIC ACID    Collection Time: 02/26/23 12:25 AM   Result Value Ref Range    Lactic acid 0.9 0.4 - 2.0 MMOL/L   URINALYSIS W/ RFLX MICROSCOPIC    Collection Time: 02/26/23  1:55 AM   Result Value Ref Range    Color YELLOW/STRAW      Appearance CLEAR CLEAR      Specific gravity 1.020 1.003 - 1.030      pH (UA) 7.0 5.0 - 8.0      Protein 30 (A) NEG mg/dL    Glucose >1,000 (A) NEG mg/dL    Ketone Negative NEG mg/dL    Bilirubin Negative NEG      Blood TRACE (A) NEG      Urobilinogen 0.2 0.2 - 1.0 EU/dL    Nitrites Negative NEG      Leukocyte Esterase Negative NEG     DRUG SCREEN, URINE    Collection Time: 02/26/23  1:55 AM   Result Value Ref Range    AMPHETAMINES Negative NEG      BARBITURATES Negative NEG      BENZODIAZEPINES Negative NEG      COCAINE Negative NEG      METHADONE Negative NEG      OPIATES Negative NEG      PCP(PHENCYCLIDINE) Negative NEG      THC (TH-CANNABINOL) Negative NEG      Drug screen comment (NOTE)    URINE MICROSCOPIC ONLY    Collection Time: 02/26/23  1:55 AM   Result Value Ref Range    WBC 0-4 0 - 4 /hpf    RBC 5-10 0 - 5 /hpf    Epithelial cells FEW FEW /lpf    Bacteria Negative NEG /hpf    Mucus TRACE /lpf   TROPONIN-HIGH SENSITIVITY    Collection Time: 02/26/23  5:30 AM   Result Value Ref Range    Troponin-High Sensitivity 189 (HH) 0 - 76 ng/L   CK    Collection Time: 02/26/23  5:30 AM   Result Value Ref Range     39 - 308 U/L       Chest x-ray reviewed and was negative for infiltrate, effusion, pneumothorax, or wide mediastinum. CT C-spine:   No acute fracture to the mid C7 level (lower C7 excluded). CT Head w/o contrast:  Left frontal scalp soft tissue swelling with no acute intracranial hemorrhage. Assessment:     Principal Problem:    Altered mental status, unspecified     Active Problems:    Fall   Patient presents with unwitnessed fall with mild traumatic brain injury (concussion). CT head and C-spine without acute findings. His mental status has markedly improved after IV fluids and conservative management. - Monitor Neuro status closely  - Continue gentle IV hydration  - PT eval  - Fall precautions  - Obtain collateral info from NH regarding patient's baseline mental state      Hx of Carotid artery disease (HCC)   Elevated troponin:  - Likely supply-demand mismatch in the setting of fall/head trauma. - Patient denies chest pain. EKG without acute ischemic change  - Obtain Echo to assess LV function  - Resume ASA and Atorvastatin      Hypertension:   - Blood pressure elevated. Resume home medications once reconciled. Monitor blood pressure closely. Diabetes mellitus, type II ([address]  - Resume Insulin glargine 25 units daily and Lispro per sliding scale. Monitor glucose closely     CKD III:  - Baseline Cr ~1.2 (eGF ~50). Creatinine 1.31 on admission. Continue to monitor.     Code Status:DNR  DVT prophylaxis:Lovenox  Stress Ulcer prophylaxis: Not Indicated  Bladder catheter:no  Family Contact Info:  Primary Emergency Contact: Gume Saez, Home Phone: [phone]  Disposition:  SNF  Admission status: Observation    Plan of care discussed with patient and nursing staff    Total time spent: 40 minutes    Bell Stokes MD  Hospitalist  02/26/23

## 2023-02-26 NOTE — PROGRESS NOTES
Patient has removed his cardiac monitor again. He has been removing it since arrival to med surg department. He refuses to wear it, and for some reason - it makes him agitated. Call placed to MD and order received to d/c cardiac monitor.

## 2023-02-26 NOTE — ROUTINE PROCESS
TRANSFER - IN REPORT:    Verbal report received from ANDREI Rios(name) on Carmelo Poole  being received from ED(unit) for routine progression of care      Report consisted of patients Situation, Background, Assessment and   Recommendations(SBAR). Information from the following report(s) SBAR, Kardex, MAR, and Recent Results was reviewed with the receiving nurse. Opportunity for questions and clarification was provided. Assessment completed upon patients arrival to unit and care assumed.

## 2023-02-26 NOTE — ED NOTES
Patient was straight cath with the assistance of 2nd floor nurse. Patient is resting, will continue to monitor.

## 2023-02-26 NOTE — ED PROVIDER NOTES
[address] [place] [address]   EMERGENCY DEPARTMENT          Date: 2023  Patient: Sandra Melgoza MRN: [id number]  SSN: [id number]    YOB: 1928  Age: 80 y.o. Sex: male      PCP: Kingsley Fontanez NP  The patient arrived by ambulance and is alone. History of Presenting Illness     Chief Complaint   Patient presents with    Fall    Laceration     Bridge of nose       History Provided By: Patient and EMS    HPI: Sandra Melgoza is a 80 y.o. male, pmhx diabetes,, who presents via ambulance to the ED c/o fall. Patient was found on the floor at about 11:00 tonight. It is unclear the circumstances of the fall or how he was found on the floor. Mental status is noted to be altered since this time. He was brought in by ambulance. Vital signs appear to be unremarkable on the trip in his blood sugars in the 300 range. Patient has had no recent fever, sweats, chills that we are aware of. Review of systems is not obtainable and of the history is not obtainable due to the patient's mental status. DNR is confirmed from his nursing facility.       Past History     Past Medical History:   Diagnosis Date    Carotid artery disease ([address]     Coronary atherosclerosis of native coronary artery     Diabetes mellitus, type II (HCC)     DJD (degenerative joint disease)     HCVD (hypertensive cardiovascular disease)     Pure hypercholesterolemia        Past Surgical History:   Procedure Laterality Date    HX CATARACT REMOVAL Bilateral     HX HEART CATHETERIZATION  2010    LVEDP 9, mild ant hypo EF 55-60%, LAD 70% 99%, mod D1, OM3 30%, PDA 90%    HX PTCA  2010    Stent-LAD 2.5X18 ANDREA    HX PTCA  3/2010    Stent PDA ANDREA       Family History   Problem Relation Age of Onset    Heart Disease Mother     Cancer Father        Social History     Tobacco Use    Smoking status: Former     Types: Cigarettes     Quit date: 10/17/1963     Years since quittin.4    Smokeless tobacco: Never Vaping Use    Vaping Use: Never used   Substance Use Topics    Alcohol use: Not Currently     Comment: stopped 2018/used to drink heavy    Drug use: Never       Allergies   Allergen Reactions    Aspirin Other (comments)     NOSE BLEED  MG IS TAKEN  Can take low dose aspirin    Pcn [Penicillins] Shortness of Breath     Itching/hives       Current Facility-Administered Medications   Medication Dose Route Frequency Provider Last Rate Last Admin    acetaminophen (TYLENOL) tablet 650 mg  650 mg Oral Q6H PRN Bonita Pruitt MD        atorvastatin (LIPITOR) tablet 40 mg  40 mg Oral QHS Truman KEN MD   40 mg at 02/26/23 2127    [START ON 2/27/2023] insulin glargine (LANTUS) injection 25 Units  25 Units SubCUTAneous DAILY Truman KEN MD        QUEtiapine (SEROquel) tablet 25 mg  25 mg Oral QHS Truman KEN MD   25 mg at 02/26/23 1759    tamsulosin (FLOMAX) capsule 0.4 mg  0.4 mg Oral QHS Truman KEN MD   0.4 mg at 02/26/23 2127    [START ON 2/27/2023] aspirin delayed-release tablet 81 mg  81 mg Oral DAILY Bonita Pruitt MD        glucose chewable tablet 16 g  4 Tablet Oral PRN Bonita Pruitt MD        glucagon (GLUCAGEN) injection 1 mg  1 mg IntraMUSCular PRN Bonita Pruitt MD        dextrose 10% infusion 0-250 mL  0-250 mL IntraVENous PRN Bonita Pruitt MD        insulin lispro (HUMALOG) injection   SubCUTAneous Fox KEN MD   3 Units at 02/26/23 1800    sodium chloride (NS) flush 5-40 mL  5-40 mL IntraVENous Q8H Bonita Pruitt MD   10 mL at 02/26/23 2200    sodium chloride (NS) flush 5-40 mL  5-40 mL IntraVENous PRJAMIL KEN MD        enoxaparin (LOVENOX) injection 40 mg  40 mg SubCUTAneous Q24H Valarie Potter MD   40 mg at 02/26/23 1325    amLODIPine (NORVASC) tablet 2.5 mg  2.5 mg Oral DAILY Truman KEN MD   2.5 mg at 02/26/23 2127    hydrALAZINE (APRESOLINE) tablet 25 mg  25 mg Oral Q8H Truman KEN MD   25 mg at 02/26/23 2127    0.9% sodium chloride infusion  75 mL/hr IntraVENous CONTINUOUS Melvi Osei MD 75 mL/hr at 02/26/23 2127 75 mL/hr at 02/26/23 2127         Review of Systems     Review of Systems   Unable to perform ROS: Patient unresponsive     Physical Exam     Physical Exam  Vitals and nursing note reviewed. Constitutional:       General: He is not in acute distress. Appearance: He is well-developed. He is obese. He is not ill-appearing, toxic-appearing or diaphoretic. HENT:      Head: Normocephalic. Comments: Superficial contusion/abrasion to forehead     Right Ear: External ear normal.      Left Ear: External ear normal.      Nose:      Comments: Superficial laceration over bridge of nose     Mouth/Throat:      Mouth: Mucous membranes are moist.      Pharynx: Oropharynx is clear. No pharyngeal swelling or oropharyngeal exudate. Eyes:      Extraocular Movements: Extraocular movements intact. Conjunctiva/sclera: Conjunctivae normal.      Pupils: Pupils are equal, round, and reactive to light. Comments: Small minimally reactive pupils bilaterally noted   Neck:      Comments: [place] collar in place on arrival no apparent tenderness or step-off on palpation of posterior cervical spine. Cardiovascular:      Rate and Rhythm: Normal rate and regular rhythm. Heart sounds: Normal heart sounds. No murmur heard. No friction rub. No gallop. Pulmonary:      Effort: Pulmonary effort is normal. No respiratory distress. Breath sounds: Normal breath sounds. No wheezing or rales. Chest:      Chest wall: No tenderness. Abdominal:      General: Bowel sounds are normal.      Palpations: Abdomen is soft. There is no hepatomegaly, splenomegaly, mass or pulsatile mass. Tenderness: There is no abdominal tenderness. Hernia: No hernia is present. Musculoskeletal:      Right lower leg: No edema. Left lower leg: No edema. Comments: Passive range of motion was normal no pain to palpation of long bones or joints.   No gross deformity noted. Lymphadenopathy:      Cervical: No cervical adenopathy. Skin:     General: Skin is dry. Capillary Refill: Capillary refill takes less than 2 seconds. Findings: Bruising present. No rash. Neurological:      Mental Status: He is unresponsive. GCS: GCS eye subscore is 2. GCS verbal subscore is 2. GCS motor subscore is 5. Cranial Nerves: No facial asymmetry. Motor: Weakness present. Comments: Initially patient was nonverbal with a GCS of approximately 9 would not follow commands extremities with good tone. Patient would not move arms or legs, no tremor was noted, no seizure activity was noted. Unable to check coordination secondary to patient's mental status. Diagnostic Study Results     Labs -     Recent Results (from the past 48 hour(s))   CBC WITH AUTOMATED DIFF    Collection Time: 02/26/23 12:25 AM   Result Value Ref Range    WBC 5.9 4.1 - 11.1 K/uL    RBC 3.90 (L) 4.10 - 5.70 M/uL    HGB 12.4 12.1 - 17.0 g/dL    HCT 36.0 (L) 36.6 - 50.3 %    MCV 92.3 80.0 - 99.0 FL    MCH 31.8 26.0 - 34.0 PG    MCHC 34.4 30.0 - 36.5 g/dL    RDW 12.7 11.5 - 14.5 %    PLATELET 571 662 - 779 K/uL    MPV 10.7 8.9 - 12.9 FL    NRBC 0.0 0.0  WBC    ABSOLUTE NRBC 0.00 0.00 - 0.01 K/uL    NEUTROPHILS 51 32 - 75 %    LYMPHOCYTES 39 12 - 49 %    MONOCYTES 8 5 - 13 %    EOSINOPHILS 1 0 - 7 %    BASOPHILS 0 0 - 1 %    IMMATURE GRANULOCYTES 0 0 - 0.5 %    ABS. NEUTROPHILS 3.0 1.8 - 8.0 K/UL    ABS. LYMPHOCYTES 2.3 0.8 - 3.5 K/UL    ABS. MONOCYTES 0.5 0.0 - 1.0 K/UL    ABS. EOSINOPHILS 0.1 0.0 - 0.4 K/UL    ABS. BASOPHILS 0.0 0.0 - 0.1 K/UL    ABS. IMM.  GRANS. 0.0 0.00 - 0.04 K/UL    DF AUTOMATED     METABOLIC PANEL, COMPREHENSIVE    Collection Time: 02/26/23 12:25 AM   Result Value Ref Range    Sodium 138 136 - 145 mmol/L    Potassium 3.9 3.5 - 5.1 mmol/L    Chloride 103 97 - 108 mmol/L    CO2 30 21 - 32 mmol/L    Anion gap 5 5 - 15 mmol/L    Glucose 285 (H) 65 - 100 mg/dL    BUN 24 (H) 6 - 20 MG/DL    Creatinine 1.31 (H) 0.70 - 1.30 MG/DL    BUN/Creatinine ratio 18 12 - 20      eGFR 50 (L) >60 ml/min/1.73m2    Calcium 9.2 8.5 - 10.1 MG/DL    Bilirubin, total 0.7 0.2 - 1.0 MG/DL    ALT (SGPT) 39 12 - 78 U/L    AST (SGOT) 21 15 - 37 U/L    Alk.  phosphatase 200 (H) 45 - 117 U/L    Protein, total 6.9 6.4 - 8.2 g/dL    Albumin 3.7 3.5 - 5.0 g/dL    Globulin 3.2 2.0 - 4.0 g/dL    A-G Ratio 1.2 1.1 - 2.2     TROPONIN-HIGH SENSITIVITY    Collection Time: 02/26/23 12:25 AM   Result Value Ref Range    Troponin-High Sensitivity 173 (HH) 0 - 76 ng/L   MAGNESIUM    Collection Time: 02/26/23 12:25 AM   Result Value Ref Range    Magnesium 1.8 1.6 - 2.4 mg/dL   LACTIC ACID    Collection Time: 02/26/23 12:25 AM   Result Value Ref Range    Lactic acid 0.9 0.4 - 2.0 MMOL/L   URINALYSIS W/ RFLX MICROSCOPIC    Collection Time: 02/26/23  1:55 AM   Result Value Ref Range    Color YELLOW/STRAW      Appearance CLEAR CLEAR      Specific gravity 1.020 1.003 - 1.030      pH (UA) 7.0 5.0 - 8.0      Protein 30 (A) NEG mg/dL    Glucose >1,000 (A) NEG mg/dL    Ketone Negative NEG mg/dL    Bilirubin Negative NEG      Blood TRACE (A) NEG      Urobilinogen 0.2 0.2 - 1.0 EU/dL    Nitrites Negative NEG      Leukocyte Esterase Negative NEG     DRUG SCREEN, URINE    Collection Time: 02/26/23  1:55 AM   Result Value Ref Range    AMPHETAMINES Negative NEG      BARBITURATES Negative NEG      BENZODIAZEPINES Negative NEG      COCAINE Negative NEG      METHADONE Negative NEG      OPIATES Negative NEG      PCP(PHENCYCLIDINE) Negative NEG      THC (TH-CANNABINOL) Negative NEG      Drug screen comment (NOTE)    URINE MICROSCOPIC ONLY    Collection Time: 02/26/23  1:55 AM   Result Value Ref Range    WBC 0-4 0 - 4 /hpf    RBC 5-10 0 - 5 /hpf    Epithelial cells FEW FEW /lpf    Bacteria Negative NEG /hpf    Mucus TRACE /lpf   TROPONIN-HIGH SENSITIVITY    Collection Time: 02/26/23  5:30 AM   Result Value Ref Range Troponin-High Sensitivity 189 (HH) 0 - 76 ng/L   CK    Collection Time: 02/26/23  5:30 AM   Result Value Ref Range     39 - 308 U/L   EKG, 12 LEAD, INITIAL    Collection Time: 02/26/23  1:19 PM   Result Value Ref Range    Ventricular Rate 63 BPM    Atrial Rate 63 BPM    P-R Interval 220 ms    QRS Duration 92 ms    Q-T Interval 414 ms    QTC Calculation (Bezet) 423 ms    Calculated P Axis 11 degrees    Calculated R Axis 65 degrees    Calculated T Axis 73 degrees    Diagnosis       Sinus rhythm with 1st degree AV block  Minimal voltage criteria for LVH, may be normal variant  Borderline ECG  When compared with ECG of 10-FEB-2023 08:04,  premature ventricular complexes are no longer present  premature atrial complexes are no longer present     GLUCOSE, POC    Collection Time: 02/26/23  5:22 PM   Result Value Ref Range    Glucose (POC) 238 (H) 65 - 117 mg/dL    Performed by [employer]         Radiologic Studies -   CT Results  (Last 48 hours)                 02/26/23 0042  CT HEAD WO CONT Final result    Impression:      Left frontal scalp soft tissue swelling with no acute intracranial hemorrhage. Narrative:  INDICATION:   Fall with altered mental status       EXAMINATION:  CT HEAD WO CONTRAST       COMPARISON:  None       TECHNIQUE:  Routine noncontrast axial head CT was performed. Sagittal and   coronal reconstructions were generated. CT dose reduction was achieved through   use of a standardized protocol tailored for this examination and automatic   exposure control for dose modulation. FINDINGS:       Ventricles:  Midline, no hydrocephalus. Intracranial Hemorrhage:  None. Brain Parenchyma/Brainstem:  Normal for age. Basal Cisterns:  Normal.    Paranasal Sinuses:  Left frontal sinus disease similar to prior study. Soft Tissues:  Left frontal scalp swelling. Osseous Structures:  No acute fracture.    Additional Comments:  N/A.            02/26/23 0042  CT SPINE CERV WO CONT Final result    Impression:      No acute fracture to the mid C7 level (lower C7 excluded). Narrative:  INDICATION: Fall with altered mental status       COMPARISON: None. TECHNIQUE:   Noncontrast axial CT imaging of the cervical spine was performed. Coronal and sagittal reconstructions were obtained. CT dose reduction was achieved through use of a standardized protocol tailored   for this examination and automatic exposure control for dose modulation. FINDINGS:       There is normal alignment of the cervical spine. Inferior aspect of C7 is not   included on the study. There is no acute fracture or subluxation. The   craniocervical junction is intact. There is no prevertebral soft tissue   swelling. There are no significant degenerative changes. XR CHEST SNGL V   Final Result   No acute process. CT HEAD WO CONT   Final Result      Left frontal scalp soft tissue swelling with no acute intracranial hemorrhage. CT SPINE CERV WO CONT   Final Result      No acute fracture to the mid C7 level (lower C7 excluded).             Procedures     Critical Care  Performed by: Nayeli Alamo MD  Authorized by: Nayeli lAamo MD     Critical care provider statement:     Critical care time (minutes):  30    Critical care time was exclusive of:  Separately billable procedures and treating other patients and teaching time    Critical care was necessary to treat or prevent imminent or life-threatening deterioration of the following conditions:  Respiratory failure and CNS failure or compromise    Critical care was time spent personally by me on the following activities:  Development of treatment plan with patient or surrogate, discussions with consultants, evaluation of patient's response to treatment, examination of patient, obtaining history from patient or surrogate, ordering and performing treatments and interventions, ordering and review of laboratory studies, ordering and review of radiographic studies, pulse oximetry, re-evaluation of patient's condition and review of old charts    I assumed direction of critical care for this patient from another provider in my specialty: no      Care discussed with: admitting provider        Medical Decision Making     Records Reviewed: Prior medical records, Previous Radiology studies, Previous Laboratory studies, Previous EKGs, and Nursing notes    Vital Signs  Patient Vitals for the past 24 hrs:   Temp Pulse Resp BP SpO2   02/26/23 2127 98.6 °F (37 °C) 70 20 (!) 140/63 95 %   02/26/23 1615 98.2 °F (36.8 °C) 64 20 (!) 180/100 96 %   02/26/23 1130 98.2 °F (36.8 °C) 94 20 (!) 196/88 96 %   02/26/23 0834 97.6 °F (36.4 °C) 62 20 (!) 200/83 94 %   02/26/23 0005 98.4 °F (36.9 °C) 68 17 (!) 162/69 97 %   02/26/23 0002 -- (!) 59 18 (!) 184/78 98 %       Pulse Oximetry Analysis - 96% on RA    Cardiac Monitor:   Rate: 75bpm  Rhythm: Normal Sinus Rhythm      I am the first provider for this patient.     I reviewed the vital signs, available nursing notes, past medical history, past surgical history, family history and social history, performed a physical exam and reviewed labs and xrays from this visit    MDM  Number of Diagnoses or Management Options  Altered mental status, unspecified altered mental status type: new, needed workup  Concussion with unknown loss of consciousness status, initial encounter: new, needed workup  Hyperglycemia: established, worsening     Amount and/or Complexity of Data Reviewed  Clinical lab tests: ordered and reviewed  Tests in the radiology section of CPT®: ordered and reviewed  Tests in the medicine section of CPT®: reviewed and ordered  Decide to obtain previous medical records or to obtain history from someone other than the patient: yes  Obtain history from someone other than the patient: yes (EMS)  Review and summarize past medical records: yes  Discuss the patient with other providers: yes (Dr. Kiarra Arroyo)    Risk of Complications, Morbidity, and/or Mortality  Presenting problems: high  Diagnostic procedures: high  Management options: high  General comments: Patient presents with altered mental status after unwitnessed fall with head trauma at his nursing facility. He is unable to give a history upon arrival physical exam he is a GCS of 9, has bruises to the forehead lungs were clear patient was minimally responsive verbally. Lab data as noted on the results section included a negative head CT negative C-spine labs remarkable for hyperglycemia. Patient was observed and hydrated and seemed to improve mentally. Differential included subarachnoid, epidural, subdural, intracranial hemorrhage, stroke, cardiac injury, dysrhythmia. Patient will need to be admitted to hospital for monitoring and further evaluation as deemed necessary          ED Course     Initial assessment performed. The patients presenting problems have been discussed, and they are in agreement with the care plan formulated and outlined with them. I have encouraged them to ask questions as they arise throughout their visit. ED Course as of 02/26/23 2142   Sun Feb 26, 2023   0024 EKG reveals sinus rhythm with first-degree AV block, occasional PVCs, occasional PACs, ventricular rate normal at 68, TX interval prolonged at 238, QRS duration normal at 90, QTc normal at 438. Compared to EKG of 10 February 2023, first-degree block is now present and rate is slower, otherwise no significant changes noted. [PS]   0104 CT head and C-spine without evidence of fracture, patient has sinus disease, no acute bleed or hematomas noted. [PS]   2287 Chest x-ray is without acute process. Troponin is elevated at 173, the patient has had elevated troponins in the 130 range in the past frequently. Magnesium is normal at 1.8, lactic acid is normal, CMP is remarkable for sugar of 285, BUN of 24 and creatinine 1.31.   Creatinine is slightly higher than patient's baseline of 1.2 sodium is normal as his potassium and other electrolytes and liver function studies except for slight elevation of the alk phos to 200. CBC is unremarkable and is at patient's baseline. [PS]   0501 Patient is more alert and active, speech is slurred which may be his baseline. Troponin is elevated 173 which is more than his usual baseline of 140. We will repeat troponin. UDS is negative, lactic is normal magnesium is normal CBC is unremarkable and CMP is unremarkable. Urinalysis has no evidence of infection. If troponin is unchanged we will consult hospitalist service [PS]   6522 Altered mental status lasted approximately 3 hours and patient gradually got better. [PS]   [id number] Troponin returned essentially unchanged at 189. Care discussed with Dr. Heraclio Gale, will admit to this facility for further evaluation. [PS]   [id number] Pt signed out to me by  Dr. Laney Huerta. Accepted for admission to the medical floor here. [VG]      ED Course User Index  [PS] Jorge Lee MD  [VG] Queenie Burroughs MD        Orders Placed This Encounter    REASON FOR NOT SELECTING BASAL INSULIN    CT HEAD WO CONT    CT SPINE CERV WO CONT    XR CHEST SNGL V    CBC WITH AUTOMATED DIFF    CMP    TROPONIN-HIGH SENSITIVITY    MAGNESIUM    LACTIC ACID, PLASMA    URINALYSIS W/ RFLX MICROSCOPIC    DRUG SCREEN, URINE    URINE MICROSCOPIC ONLY    TROPONIN-HIGH SENSITIVITY    CK    ADULT DIET Regular; Low Sodium (2 gm)    B/P    NURSING-MISCELLANEOUS: Initiate hypoglycemic protocol if blood glucose is LESS THAN 70 mg/dL CONTINUOUS    NURSING-MISCELLANEOUS: FOR CONSCIOUS PATIENT: Administer 4 ounces fruit juice OR regular soda OR 4 glucose tablets. CONTINUOUS    NURSING-MISCELLANEOUS: If patient NPO, unconscious or unable to swallow and If Blood Glucose between 60 and 70 mg/dL: Follow instructions below If patient NPO, unconscious or unable to swallow and if blood glucose between 60 and 70 mg/dL: Give 25 . ..     NURSING-MISCELLANEOUS: Repeat finger stick blood glucose in 15 minutes AFTER treatment, if LESS THAN 70 repeat hypoglycemic protocol and notify provider. CONTINUOUS    NURSING-MISCELLANEOUS: Following Hypoglycemic Protocol: Once patient is fully alert and BG greater than 70 provide a small snack,  if NPO consider IV fluids with dextrose. CONTINUOUS    NURSING-MISCELLANEOUS: Document all interventions in the Electronic Medical  Record (EMR). CONTINUOUS    NURSING-MISCELLANEOUS: Blood glucose targets -- ICU: 140 - 180 mg/dL;  NON-ICU: 100 - 180 mg/dL CONTINUOUS    VITAL SIGNS PER UNIT ROUTINE    BEDREST, COMPLETE    NEUROLOGIC STATUS ASSESSMENT    NOTIFY PROVIDER: VITAL SIGNS CHANGES    ONE TO ONE    CRITICAL CARE (ASAP ONLY)    DO NOT RESUSCITATE    RT--OXIMETRY, CONTINUOUS    GLUCOSE, POC    EKG, 12 LEAD, INITIAL    SALINE LOCK IV ONE TIME STAT    sodium chloride (NS) flush 5-10 mL    sodium chloride 0.9 % bolus infusion 1,000 mL    acetaminophen (TYLENOL) tablet 650 mg    atorvastatin (LIPITOR) tablet 40 mg    DISCONTD: hydrALAZINE (APRESOLINE) tablet 25 mg    insulin glargine (LANTUS) injection 25 Units    QUEtiapine (SEROquel) tablet 25 mg    tamsulosin (FLOMAX) capsule 0.4 mg    aspirin delayed-release tablet 81 mg    glucose chewable tablet 16 g    glucagon (GLUCAGEN) injection 1 mg    dextrose 10% infusion 0-250 mL    insulin lispro (HUMALOG) injection    sodium chloride (NS) flush 5-40 mL    sodium chloride (NS) flush 5-40 mL    enoxaparin (LOVENOX) injection 40 mg    acetaminophen (TYLENOL) 325 mg tablet    loperamide (IMMODIUM) 2 mg tablet    guaiFENesin (ROBITUSSIN) 100 mg/5 mL liquid    amLODIPine (NORVASC) tablet 2.5 mg    hydrALAZINE (APRESOLINE) tablet 25 mg    0.9% sodium chloride infusion    INITIAL PHYSICIAN ORDER: OBSERVATION/OUTPATIENT IN A BED OBSERVATION; Telemetry;  Yes; altered mental status       MEDICATIONS GIVEN:    Medications   sodium chloride (NS) flush 5-10 mL (has no administration in time range)   acetaminophen (TYLENOL) tablet 650 mg (has no administration in time range)   atorvastatin (LIPITOR) tablet 40 mg (40 mg Oral Given 2/26/23 2127)   insulin glargine (LANTUS) injection 25 Units (has no administration in time range)   QUEtiapine (SEROquel) tablet 25 mg (25 mg Oral Given 2/26/23 1759)   tamsulosin (FLOMAX) capsule 0.4 mg (0.4 mg Oral Given 2/26/23 2127)   aspirin delayed-release tablet 81 mg (has no administration in time range)   glucose chewable tablet 16 g (has no administration in time range)   glucagon (GLUCAGEN) injection 1 mg (has no administration in time range)   dextrose 10% infusion 0-250 mL (has no administration in time range)   insulin lispro (HUMALOG) injection (3 Units SubCUTAneous Given 2/26/23 1800)   sodium chloride (NS) flush 5-40 mL (10 mL IntraVENous Given 2/26/23 2200)   sodium chloride (NS) flush 5-40 mL (has no administration in time range)   enoxaparin (LOVENOX) injection 40 mg (40 mg SubCUTAneous Given 2/26/23 1325)   amLODIPine (NORVASC) tablet 2.5 mg (2.5 mg Oral Given 2/26/23 2127)   hydrALAZINE (APRESOLINE) tablet 25 mg (25 mg Oral Given 2/26/23 2127)   0.9% sodium chloride infusion (75 mL/hr IntraVENous New Bag 2/26/23 2127)   sodium chloride 0.9 % bolus infusion 1,000 mL (0 mL IntraVENous IV Completed 2/26/23 0700)         Diagnosis     Clinical Impression:   1. Altered mental status, unspecified altered mental status type    2. Concussion with unknown loss of consciousness status, initial encounter    3. Hyperglycemia            Disposition     Admission Note    I have reviewed all pertinent and currently available diagnostic test results for this visit including, but not limited to, labs, xrays, and EKGs. I have reviewed all pertinent and currently available medical records, past medical history, social history and family history.  My plan of care and further evaluation and/or disposition is based on these results, as well as the initial, and subsequent, history and physical exam, as well as any additional complaints during the visit. Laboratory tests, medications, x-rays, diagnosis, and need for admission or transfer have been reviewed and discussed with the patient and/or family. Pt and/or family have had the opportunity to ask questions about their care. Patient and/or family verbalized understanding and agreement with care plan. After review of patient's ED evaluation I feel patient will benefit from admission to hospital due to prolonged altered mental status after head injury possibly consistent with concussion, unknown etiology of cause of fall and need for cardiac monitoring, treatment of hyperglycemia, and further evaluation as deemed appropriate by hospitalist    Please note that this dictation was completed with [employer], the computer voice recognition software. Quite often unanticipated grammatical, syntax, homophones, and other interpretive errors are inadvertently transcribed by the computer software. Please disregard these errors. Please excuse any errors that have escaped final proofreading.     Irene Moya MD

## 2023-02-26 NOTE — Clinical Note
Patient Class[de-identified] OBSERVATION [104]   Type of Bed: Telemetry [19]   Cardiac Monitoring Required?: Yes   Reason for Observation: altered mental status   Admitting Diagnosis: Altered mental status, unspecified [[id number]]   Admitting Physician: Jaquelin Rausch [id number] [facility]   Attending Physician: Leatha Kanner

## 2023-02-27 ENCOUNTER — APPOINTMENT (OUTPATIENT)
Dept: ULTRASOUND IMAGING | Age: 88
End: 2023-02-27
Attending: STUDENT IN AN ORGANIZED HEALTH CARE EDUCATION/TRAINING PROGRAM
Payer: MEDICARE

## 2023-02-27 LAB
ANION GAP SERPL CALC-SCNC: 9 MMOL/L (ref 5–15)
ATRIAL RATE: 63 BPM
BUN SERPL-MCNC: 21 MG/DL (ref 6–20)
BUN/CREAT SERPL: 19 (ref 12–20)
CALCIUM SERPL-MCNC: 8.7 MG/DL (ref 8.5–10.1)
CALCULATED P AXIS, ECG09: 11 DEGREES
CALCULATED R AXIS, ECG10: 65 DEGREES
CALCULATED T AXIS, ECG11: 73 DEGREES
CHLORIDE SERPL-SCNC: 104 MMOL/L (ref 97–108)
CO2 SERPL-SCNC: 25 MMOL/L (ref 21–32)
CREAT SERPL-MCNC: 1.08 MG/DL (ref 0.7–1.3)
DIAGNOSIS, 93000: NORMAL
ECHO AO ROOT DIAM: 3.3 CM
ECHO AO ROOT INDEX: 1.73 CM/M2
ECHO AV MEAN GRADIENT: 6 MMHG
ECHO AV MEAN VELOCITY: 1.2 M/S
ECHO AV PEAK GRADIENT: 11 MMHG
ECHO AV PEAK GRADIENT: 13 MMHG
ECHO AV PEAK VELOCITY: 1.6 M/S
ECHO AV PEAK VELOCITY: 1.8 M/S
ECHO AV VTI: 37.6 CM
ECHO EST RA PRESSURE: 10 MMHG
ECHO LA DIAMETER INDEX: 1.99 CM/M2
ECHO LA DIAMETER: 3.8 CM
ECHO LA TO AORTIC ROOT RATIO: 1.15
ECHO LA VOL 2C: 94 ML (ref 18–58)
ECHO LA VOL 4C: 63 ML (ref 18–58)
ECHO LA VOLUME AREA LENGTH: 84 ML
ECHO LA VOLUME INDEX A2C: 49 ML/M2 (ref 16–34)
ECHO LA VOLUME INDEX A4C: 33 ML/M2 (ref 16–34)
ECHO LA VOLUME INDEX AREA LENGTH: 44 ML/M2 (ref 16–34)
ECHO LV E' SEPTAL VELOCITY: 5 CM/S
ECHO LV FRACTIONAL SHORTENING: 38 % (ref 28–44)
ECHO LV INTERNAL DIMENSION DIASTOLE INDEX: 2.51 CM/M2
ECHO LV INTERNAL DIMENSION DIASTOLIC: 4.8 CM (ref 4.2–5.9)
ECHO LV INTERNAL DIMENSION SYSTOLIC INDEX: 1.57 CM/M2
ECHO LV INTERNAL DIMENSION SYSTOLIC: 3 CM
ECHO LV IVSD: 0.8 CM (ref 0.6–1)
ECHO LV MASS 2D: 137.1 G (ref 88–224)
ECHO LV MASS INDEX 2D: 71.8 G/M2 (ref 49–115)
ECHO LV POSTERIOR WALL DIASTOLIC: 0.9 CM (ref 0.6–1)
ECHO LV RELATIVE WALL THICKNESS RATIO: 0.38
ECHO LVOT AREA: 4.2 CM2
ECHO LVOT DIAM: 2.3 CM
ECHO MV A VELOCITY: 1.02 M/S
ECHO MV E DECELERATION TIME (DT): 285 MS
ECHO MV E VELOCITY: 0.87 M/S
ECHO MV E/A RATIO: 0.85
ECHO MV E/E' SEPTAL: 17.4
ECHO RIGHT VENTRICULAR SYSTOLIC PRESSURE (RVSP): 28 MMHG
ECHO TV REGURGITANT MAX VELOCITY: 2.1 M/S
ECHO TV REGURGITANT PEAK GRADIENT: 18 MMHG
GLUCOSE BLD STRIP.AUTO-MCNC: 131 MG/DL (ref 65–117)
GLUCOSE BLD STRIP.AUTO-MCNC: 179 MG/DL (ref 65–117)
GLUCOSE BLD STRIP.AUTO-MCNC: 192 MG/DL (ref 65–117)
GLUCOSE SERPL-MCNC: 218 MG/DL (ref 65–100)
P-R INTERVAL, ECG05: 220 MS
POTASSIUM SERPL-SCNC: 3.6 MMOL/L (ref 3.5–5.1)
Q-T INTERVAL, ECG07: 414 MS
QRS DURATION, ECG06: 92 MS
QTC CALCULATION (BEZET), ECG08: 423 MS
SERVICE CMNT-IMP: ABNORMAL
SODIUM SERPL-SCNC: 138 MMOL/L (ref 136–145)
VENTRICULAR RATE, ECG03: 63 BPM

## 2023-02-27 PROCEDURE — G0378 HOSPITAL OBSERVATION PER HR: HCPCS

## 2023-02-27 PROCEDURE — 80048 BASIC METABOLIC PNL TOTAL CA: CPT

## 2023-02-27 PROCEDURE — 74011250636 HC RX REV CODE- 250/636: Performed by: STUDENT IN AN ORGANIZED HEALTH CARE EDUCATION/TRAINING PROGRAM

## 2023-02-27 PROCEDURE — 36415 COLL VENOUS BLD VENIPUNCTURE: CPT

## 2023-02-27 PROCEDURE — 74011636637 HC RX REV CODE- 636/637: Performed by: STUDENT IN AN ORGANIZED HEALTH CARE EDUCATION/TRAINING PROGRAM

## 2023-02-27 PROCEDURE — 74011250637 HC RX REV CODE- 250/637: Performed by: STUDENT IN AN ORGANIZED HEALTH CARE EDUCATION/TRAINING PROGRAM

## 2023-02-27 PROCEDURE — 97110 THERAPEUTIC EXERCISES: CPT

## 2023-02-27 PROCEDURE — 97161 PT EVAL LOW COMPLEX 20 MIN: CPT

## 2023-02-27 PROCEDURE — 93306 TTE W/DOPPLER COMPLETE: CPT | Performed by: INTERNAL MEDICINE

## 2023-02-27 PROCEDURE — 74011000250 HC RX REV CODE- 250: Performed by: STUDENT IN AN ORGANIZED HEALTH CARE EDUCATION/TRAINING PROGRAM

## 2023-02-27 PROCEDURE — 82962 GLUCOSE BLOOD TEST: CPT

## 2023-02-27 PROCEDURE — 93306 TTE W/DOPPLER COMPLETE: CPT

## 2023-02-27 RX ORDER — METOPROLOL SUCCINATE 25 MG/1
12.5 TABLET, EXTENDED RELEASE ORAL DAILY
Status: DISCONTINUED | OUTPATIENT
Start: 2023-02-27 | End: 2023-03-01 | Stop reason: HOSPADM

## 2023-02-27 RX ADMIN — TAMSULOSIN HYDROCHLORIDE 0.4 MG: 0.4 CAPSULE ORAL at 22:41

## 2023-02-27 RX ADMIN — QUETIAPINE FUMARATE 25 MG: 25 TABLET ORAL at 17:39

## 2023-02-27 RX ADMIN — Medication 2 UNITS: at 17:39

## 2023-02-27 RX ADMIN — HYDRALAZINE HYDROCHLORIDE 25 MG: 25 TABLET, FILM COATED ORAL at 13:18

## 2023-02-27 RX ADMIN — SODIUM CHLORIDE, PRESERVATIVE FREE 10 ML: 5 INJECTION INTRAVENOUS at 22:00

## 2023-02-27 RX ADMIN — Medication 25 UNITS: at 08:21

## 2023-02-27 RX ADMIN — HYDRALAZINE HYDROCHLORIDE 25 MG: 25 TABLET, FILM COATED ORAL at 06:25

## 2023-02-27 RX ADMIN — ENOXAPARIN SODIUM 40 MG: 100 INJECTION SUBCUTANEOUS at 13:13

## 2023-02-27 RX ADMIN — ATORVASTATIN CALCIUM 40 MG: 40 TABLET, FILM COATED ORAL at 22:41

## 2023-02-27 RX ADMIN — HYDRALAZINE HYDROCHLORIDE 25 MG: 25 TABLET, FILM COATED ORAL at 22:41

## 2023-02-27 RX ADMIN — Medication 2 UNITS: at 08:21

## 2023-02-27 NOTE — PROGRESS NOTES
Problem: Pressure Injury - Risk of  Goal: *Prevention of pressure injury  Description: Document Papa Scale and appropriate interventions in the flowsheet. Outcome: Progressing Towards Goal  Note: Pressure Injury Interventions:       Moisture Interventions: Check for incontinence Q2 hours and as needed, Limit adult briefs, Minimize layers, Apply protective barrier, creams and emollients, Absorbent underpads    Activity Interventions: Increase time out of bed, PT/OT evaluation    Mobility Interventions: PT/OT evaluation, Turn and reposition approx. every two hours(pillow and wedges)    Nutrition Interventions: Document food/fluid/supplement intake                     Problem: Patient Education: Go to Patient Education Activity  Goal: Patient/Family Education  Outcome: Progressing Towards Goal     Problem: Falls - Risk of  Goal: *Absence of Falls  Description: Document Marlonmaki Leannin Fall Risk and appropriate interventions in the flowsheet.   Outcome: Progressing Towards Goal  Note: Fall Risk Interventions:                                Problem: Patient Education: Go to Patient Education Activity  Goal: Patient/Family Education  Outcome: Progressing Towards Goal     Problem: General Medical Care Plan  Goal: *Vital signs within specified parameters  Outcome: Progressing Towards Goal  Goal: *Labs within defined limits  Outcome: Progressing Towards Goal  Goal: *Absence of infection signs and symptoms  Outcome: Progressing Towards Goal  Goal: *Skin integrity maintained  Outcome: Progressing Towards Goal  Goal: *Fluid volume balance  Outcome: Progressing Towards Goal  Goal: *Optimize nutritional status  Outcome: Progressing Towards Goal  Goal: *Anxiety reduced or absent  Outcome: Progressing Towards Goal  Goal: *Progressive mobility and function (eg: ADL's)  Outcome: Progressing Towards Goal     Problem: Patient Education: Go to Patient Education Activity  Goal: Patient/Family Education  Outcome: Progressing Towards Goal

## 2023-02-27 NOTE — PROGRESS NOTES
Care Management Interventions  PCP Verified by CM: Yes (Routine follow up by facility clinician)  Palliative Care Criteria Met (RRAT>21 & CHF Dx)?: No  Mode of Transport at Discharge: Other (see comment) (TBD)  Transition of Care Consult (CM Consult): Discharge Planning  MyChart Signup: No  Discharge Durable Medical Equipment: No  Physical Therapy Consult: No  Occupational Therapy Consult: No  Speech Therapy Consult: No  Support Systems: Child(chepe), Assisted Living  Confirm Follow Up Transport: Other (see comment) (TBD)  [address] Provided?: No  Discharge Location  Patient Expects to be Discharged to[de-identified] Unable to determine at this time     Mr. Sherie Cisneros was admitted under Observation Status with AMS 2/26/23. He is a resident of [place] [address]. I talked to his daughter Ivette Lee on the phone, [phone]. She would like to be notified of hospital course, progress, interventions, as [place]. He had an ECHO today. I am emailing her the MOON for signature and the CM introductory letter. Mr. Sherie Cisneros is in the regular assisted living section, but there was supposed to be an evaluation for the memory care unit; this has not been done yet. Family is also considering nursing home placement. Mr. Sheire Cisneros has caregivers from [address] [place] [address] from 7am-3pm at [facility], but it is felt this is insufficient to address his needs. He arrived here at [facility] after an unwitnessed fall. If he returns to [facility], family would like him to go to the memory unit with his [facility] [place] caregivers and home health. We also discussed possibility of a SWING bed. No therapy ordered to date. Mr. Sherie Cisneros is not in original Medicare but in a Medicare plan with [employer]. Ms. Gatica Miguel Angelbrad also mentioned looking into a facility close to where she lives in [place].     Advance Care Planning     General Advance Care Planning (ACP) Conversation      Date of Conversation: 02/27/23    Conducted with: Patient with Decision Making Capacity and Healthcare Decision Maker: Named in Advance Directive or Healthcare Vlad of DEO [address] Decision Maker:     Primary Decision Maker: Mag Torie - Son - [phone]    Secondary Decision Maker: Judith Miguel Angel - Daughter - [phone]  Click here to complete [address] including selection of the Healthcare Decision Maker Relationship (ie \"Primary\")      Content/Action Overview:    Has ACP document(s) on file - reflects the patient's care preferences  Reviewed DNR/DNI and patient confirms current DNR status - completed forms on file (place new order if needed)      Length of Voluntary ACP Conversation in minutes:  <16 minutes (Non-Billable)    Micheal Wood                Reason for Admission:  AMS                     RUR Score:     N/A OBS    RRAT: 24 HIGH                Plan for utilizing home health:    Yes, if patient returns to [address] [place] [address]      PCP: First and Last name:  Nicholas Lei NP     Name of Practice: [facility] clinician   Are you a current patient: Yes/No: YES   Approximate date of last visit: Routine follow up   Can you participate in a virtual visit with your PCP: N/A                    Current Advanced Directive/Advance Care Plan: DNR      Healthcare Decision Darell Rubykey here to complete [address] including selection of the Healthcare Decision Maker Relationship (ie \"Primary\")             Primary Decision Maker: Mag Torie - Son - [phone]    Secondary Decision Maker: Judith Miguel Angel - Daughter - [phone]                  Transition of Care Plan:   TBD

## 2023-02-27 NOTE — PROGRESS NOTES
Pt received Seroquel 30 minutes ago, is continuing to try to climb out of bed. Spoke with primary RN and PCT who both state that the patient would benefit from a  to encourage patient to stay into bed, and prevent any potential falls. Spoke with the provider who is in agreement.  ordered. CORRY Segovia to sit 1:1 with this patient this evening.

## 2023-02-27 NOTE — PROGRESS NOTES
Spiritual Care Assessment/Progress Note  Vignesh Stratton      NAME: Carmelo Poole      MRN: [id number]  AGE: 80 y.o.  SEX: male  Pentecostal Affiliation: Episcopalian   Language: English     2/27/2023     Total Time (in minutes): 10     Spiritual Assessment begun in [facility] through conversation with:         [x]Patient        [] Family    [] Friend(s)        Reason for Consult: Initial/Spiritual assessment, patient floor     Spiritual beliefs: (Please include comment if needed)     [x] Identifies with a ernst tradition:         [] Supported by a ernst community:            [] Claims no spiritual orientation:           [] Seeking spiritual identity:                [] Adheres to an individual form of spirituality:           [] Not able to assess:                           Identified resources for coping:      [] Prayer                               [] Music                  [] Guided Imagery     [x] Family/friends                 [] Pet visits     [] Devotional reading                         [] Unknown     [] Other:                                               Interventions offered during this visit: (See comments for more details)    Patient Interventions: Affirmation of emotions/emotional suffering, Affirmation of ernst, Iconic (affirming the presence of God/Higher Power), Initial/Spiritual assessment, patient floor, Prayer (assurance of)           Plan of Care:     [x] Support spiritual and/or cultural needs    [] Support AMD and/or advance care planning process      [] Support grieving process   [] Coordinate Rites and/or Rituals    [] Coordination with community clergy   [] No spiritual needs identified at this time   [] Detailed Plan of Care below (See Comments)  [] Make referral to Music Therapy  [] Make referral to Pet Therapy     [] Make referral to Addiction services  [] Make referral to [facility]  [] Make referral to Spiritual Care Partner  [] No future visits requested        [] Contact Spiritual Care for further referrals     Comments: Initial spiritual assessment  in Rm 130  Provided empathic listening and spiritual support  Advised of  Availability    [address] [place] [address]

## 2023-02-27 NOTE — PROGRESS NOTES
Problem: Mobility Impaired (Adult and Pediatric)  Goal: *Acute Goals and Plan of Care (Insert Text)  Description: FUNCTIONAL STATUS PRIOR TO ADMISSION: Pt is a limited historian; most of the details about PLOF and SH came from medical record; he lives in [facility]; pt apparently has a PCG that comes to assist him daily at the [facility]; he requires assistance for ADLs and is dependent for IADLs; pt normally walks w/ a RW but due to impaired memory he often leave the AD behind and tries to walk w/o it; he has a hx of multiple falls included with this admission; moderate [place]    HOME SUPPORT PRIOR TO ADMISSION: as noted above    Physical Therapy Goals  Initiated 2/27/2023  1. Patient will move from supine to sit and sit to supine  in bed with modified independence within 7 day(s). 2.  Patient will transfer from bed to chair and chair to bed with SBA using the least restrictive device within 7 day(s). 3.  Patient will perform sit to stand with SBA within 7 day(s). 4.  Patient will ambulate with SBA for 200 feet with the least restrictive device within 7 day(s). Outcome: Not Met     Problem: Patient Education: Go to Patient Education Activity  Goal: Patient/Family Education  Outcome: Not Met  PHYSICAL THERAPY EVALUATION  Patient: Delphine Doyle (24 y.o. male)  Date: 2/27/2023  Primary Diagnosis: Altered mental status, unspecified [R41.82]       Precautions: [place], impaired memory,  Bed Alarm, DNR, Fall risk w/ hx of multiple falls, currently pt has a 1:1 sitter      ASSESSMENT  Based on the objective data described below, the patient presents with B LE weakness, impaired functional mobility, impaired balance, reduced activity tolerance and pt demonstrated gait abnormalities w/ AD. He presented resting in bed w/ sitter Angélica Eye present; pt was [facility] but pleasant and cooperative. He only required limited assistance to transfer to EOB and demonstrated good sitting balance.  Cues offered for technique and safety to transfer from EOB up to standing w/ 2WW and then he was able to walk out into the hallway before c/o fatigue and returning back to bed; no LOB but some instability noted; no SOB. Vitals taken during session included: O2 sats on %, HR 71 and /70. At end of session he was assisted back down into supine and left resting w/ both tray table and call bell in reach; bed alarm on; sitter present. Current Level of Function Impacting Discharge (mobility/balance): transfers CGA to min A; balance w/ AD fair (-)    Functional Outcome Measure: The patient scored Total: 50/100 on the Barthel Index outcome measure which is indicative of being partially dependent in basic self-care. Other factors to consider for discharge: impaired memory; has PCG at [facility]     Patient will benefit from skilled therapy intervention to address the above noted impairments. PLAN :  Recommendations and Planned Interventions: bed mobility training, transfer training, gait training, therapeutic exercises, neuromuscular re-education, patient and family training/education, and therapeutic activities      Frequency/Duration: Patient will be followed by physical therapy:  5 times a week to address goals. Recommendation for discharge: (in order for the patient to meet his/her long term goals)  To be determined: pending progress SNF vs back to [facility] w/ [facility] services    This discharge recommendation:  Has not yet been discussed the attending provider and/or case management    IF patient discharges home will need the following DME: to be determined (TBD)         SUBJECTIVE:   Patient stated I'm doing ok.     OBJECTIVE DATA SUMMARY:   HISTORY:    Past Medical History:   Diagnosis Date    Carotid artery disease ([facility] [address]     Coronary atherosclerosis of native coronary artery     Diabetes mellitus, type II (HCC)     DJD (degenerative joint disease)     HCVD (hypertensive cardiovascular disease)     Pure hypercholesterolemia      Past Surgical History:   Procedure Laterality Date    HX CATARACT REMOVAL Bilateral     HX HEART CATHETERIZATION  2/2010    LVEDP 9, mild ant hypo EF 55-60%, LAD 70% 99%, mod D1, OM3 30%, PDA 90%    HX PTCA  2/2010    Stent-LAD 2.5X18 ANDREA    HX PTCA  3/2010    Stent PDA ANDREA         Home Situation  Home Environment: [address] [place] [address] Name: [facility]  # Steps to Enter: 0  Wheelchair Ramp: Yes  One/Two Story Residence: One story  Living Alone: No  Support Systems: Assisted Living, Caregiver/Home Care Staff (private CG comes to [facility] to assist pt)  Patient Expects to be Discharged to[de-identified] Skilled nursing facility  Current DME Used/Available at Home: Adan Art, rolling  Tub or Shower Type: Shower    EXAMINATION/PRESENTATION/DECISION MAKING:   Critical Behavior:  Neurologic State: Alert  Orientation Level: Oriented to person  Cognition: Memory loss, Follows commands  Safety/Judgement: Decreased awareness of need for safety, Decreased awareness of need for assistance  Hearing: Auditory  Auditory Impairment: None  Range Of Motion:  AROM: Generally decreased, functional                       Strength:    Strength: Generally decreased, functional                    Tone & Sensation:                  Sensation: Intact               Coordination:  Coordination: Generally decreased, functional  Vision:      Functional Mobility:  Bed Mobility:     Supine to Sit: Contact guard assistance;Bed Modified  Sit to Supine: Contact guard assistance  Scooting: Contact guard assistance  Transfers:  Sit to Stand: Minimum assistance;Assist x1  Stand to Sit: Contact guard assistance                       Balance:   Sitting: Intact; Without support  Standing: Impaired; With support  Standing - Static: Constant support; Fair  Standing - Dynamic : Constant support;Fair;Occasional  Ambulation/Gait Training:  Distance (ft): 40 Feet (ft)  Assistive Device: Gait belt;Walker, rolling  Ambulation - Level of Assistance: Minimal assistance;Assist x1     Gait Description (WDL): Exceptions to WDL  Gait Abnormalities: Decreased step clearance;Shuffling gait        Base of Support: Narrowed     Speed/Mary: Slow;Shuffled  Step Length: Left shortened;Right shortened                Functional Measure:  Barthel Index:    Bathin  Bladder: 5  Bowels: 5  Groomin  Dressin  Feeding: 10  Mobility: 10  Stairs: 0  Toilet Use: 5  Transfer (Bed to Chair and Back): 10  Total: 50/100       The Barthel ADL Index: Guidelines  1. The index should be used as a record of what a patient does, not as a record of what a patient could do. 2. The main aim is to establish degree of independence from any help, physical or verbal, however minor and for whatever reason. 3. The need for supervision renders the patient not independent. 4. A patient's performance should be established using the best available evidence. Asking the patient, friends/relatives and nurses are the usual sources, but direct observation and common sense are also important. However direct testing is not needed. 5. Usually the patient's performance over the preceding 24-48 hours is important, but occasionally longer periods will be relevant. 6. Middle categories imply that the patient supplies over 50 per cent of the effort. 7. Use of aids to be independent is allowed. Score Interpretation (from [address] [place] [address])    Independent   60-79 Minimally independent   40-59 Partially dependent   20-39 Very dependent   <20 Totally dependent     -Carlitos Gill., Barthel, D.W. (1965). Functional evaluation: the Barthel Index. [address] [facility] ([address] (). The Barthel activities of daily living index: self-reporting versus actual performance in the old (> or = 75 years). Journal of [address] [place] [address] 45(7), [facility], [facility] HospitalFrancisFrancis, Nely Moseley., Kiana Strickland. (1999).  Measuring the change in disability after inpatient rehabilitation; comparison of the responsiveness of the Barthel Index and Functional Hamblen Measure. Journal of Neurology, Neurosurgery, and Psychiatry, 66(4), 304-104. CHRISTAL Loco.MILE, SARTHAK Mccain, & Hernando Zheng M.A. (2004) Assessment of post-stroke quality of life in cost-effectiveness studies: The usefulness of the Barthel Index and the EuroQoL-5D. Quality of Life Research, 15, 653-51        Physical Therapy Evaluation Charge Determination   History Examination Presentation Decision-Making   LOW Complexity : Zero comorbidities / personal factors that will impact the outcome / POC LOW Complexity : 1-2 Standardized tests and measures addressing body structure, function, activity limitation and / or participation in recreation  LOW Complexity : Stable, uncomplicated  LOW Complexity : FOTO score of       Based on the above components, the patient evaluation is determined to be of the following complexity level: LOW     Pain Rating:  denied    Activity Tolerance:   Fair, SpO2 stable on RA, and requires rest breaks    After treatment patient left in no apparent distress:   Supine in bed, Heels elevated for pressure relief, Call bell within reach, Bed / chair alarm activated, Caregiver / family present, and Side rails x 3    COMMUNICATION/EDUCATION:   The patients plan of care was discussed with: Registered nurse. Fall prevention education was provided and the patient/caregiver indicated understanding., Patient/family have participated as able in goal setting and plan of care. , and Patient/family agree to work toward stated goals and plan of care.     Thank you for this referral.  Tereza Luther, PT   Time Calculation: 30 mins

## 2023-02-27 NOTE — PROGRESS NOTES
Progress Note  Date:2023       Room:[facility]  Patient Name:Luis Fernando [place]     YOB: 1928     Age:89 y.o. Subjective    Patient was somnolent but easily arousable this morning. He reported feeling well denied any new complaints. Patient appears to have dementia at baseline. He is at high risk of recurrent falls due to gait instability, confusion and impulsivity. Vitals Last 24 Hours:  TEMPERATURE:  Temp  Av.2 °F (36.8 °C)  Min: 97.1 °F (36.2 °C)  Max: 98.6 °F (37 °C)  RESPIRATIONS RANGE: Resp  Av  Min: 17  Max: 20  PULSE OXIMETRY RANGE: SpO2  Av.8 %  Min: 95 %  Max: 98 %  PULSE RANGE: Pulse  Av  Min: 64  Max: 75  BLOOD PRESSURE RANGE: Systolic (64JCB), XUR:308 , Min:137 , RSX:352   ; Diastolic (62IAY), XAB:37, Min:63, Max:100    I/O (24Hr): Intake/Output Summary (Last 24 hours) at 2023 1215  Last data filed at 2023 1700  Gross per 24 hour   Intake 240 ml   Output 375 ml   Net -135 ml     Objective  Labs/Imaging/Diagnostics    Labs:  CBC:  Recent Labs     23  0025   WBC 5.9   RBC 3.90*   HGB 12.4   HCT 36.0*   MCV 92.3   RDW 12.7        CHEMISTRIES:  Recent Labs     23  0815 23  0025    138   K 3.6 3.9    103   CO2 25 30   BUN 21* 24*   CA 8.7 9.2   MG  --  1.8   PT/INR:No results for input(s): INR, INREXT in the last 72 hours. No lab exists for component: PROTIME  APTT:No results for input(s): APTT in the last 72 hours. LIVER PROFILE:  Recent Labs     23  0025   AST 21   ALT 39     Lab Results   Component Value Date/Time    ALT (SGPT) 39 2023 12:25 AM    AST (SGOT) 21 2023 12:25 AM    Alk. phosphatase 200 (H) 2023 12:25 AM    Bilirubin, total 0.7 2023 12:25 AM       Imaging Last 24 Hours:  No results found.   Assessment//Plan   Principal Problem:    Altered mental status, unspecified     Active Problems:    Fall   Patient presents with unwitnessed fall with mild traumatic brain injury (concussion). CT head and C-spine without acute findings. His mental status has markedly improved after IV fluids and conservative management. - Monitor Neuro status closely  - Continue gentle IV hydration  - Continue PT  - Patient appears to have dementia at baseline. He will need frequent reorientation. We will avoid  benzodiazepines. Continue Seroquel (home med). - Continue 1:1 sitter due to high risk of falls. Patient will benefit from long-term care placement. CM to discuss with family regarding dc plans. Hx of Carotid artery disease (HCC)   Elevated troponin:  - Likely supply-demand mismatch in the setting of fall/head trauma. - Patient denies chest pain. EKG without acute ischemic change  - Follow up Echo to assess LV function  - Continue ASA and Atorvastatin       Hypertension:   - Continue Hydralazine 25 mg three times daily. Metoprolol XL 12.5 mg daily. Adjust dose as needed. Diabetes mellitus, type II ([facility] [address]  - Continue Insulin glargine 25 units daily and Lispro per sliding scale. Monitor glucose closely      CKD III:  - Baseline Cr ~1.2 (eGF ~50). Creatinine 1.31 on admission, improved to 1.08 with IV fluids. Code Status:DNR  DVT prophylaxis:Lovenox  Family Contact Info:  Primary Emergency Contact: Ester Trevino, Adolfo Phone: [phone]  Disposition: TBD, likely long-term care.   Admission status:  Observation    Plan of care discussed with patient and nursing staff     Total time spent: 40 minutes    Electronically signed by Jason Canchola MD on 2/27/2023 at 12:15 PM

## 2023-02-27 NOTE — PROGRESS NOTES
NLIESH and SIOMARA letter emailed to patient's daughter Nahun Ivy, [email or web address]. Requested that Ms. Holbrook email signed Shalom Dense back to Kaylyn Khan CM, RN. Included her email address in correspondence.

## 2023-02-28 PROBLEM — I10 HTN (HYPERTENSION): Status: ACTIVE | Noted: 2023-02-28

## 2023-02-28 PROBLEM — I10 HTN (HYPERTENSION): Chronic | Status: ACTIVE | Noted: 2023-02-28

## 2023-02-28 PROBLEM — N40.0 BPH (BENIGN PROSTATIC HYPERPLASIA): Status: ACTIVE | Noted: 2023-02-28

## 2023-02-28 PROBLEM — N40.0 BPH (BENIGN PROSTATIC HYPERPLASIA): Chronic | Status: ACTIVE | Noted: 2023-02-28

## 2023-02-28 LAB
GLUCOSE BLD STRIP.AUTO-MCNC: 117 MG/DL (ref 65–117)
GLUCOSE BLD STRIP.AUTO-MCNC: 160 MG/DL (ref 65–117)
GLUCOSE BLD STRIP.AUTO-MCNC: 160 MG/DL (ref 65–117)
GLUCOSE BLD STRIP.AUTO-MCNC: 165 MG/DL (ref 65–117)
GLUCOSE BLD STRIP.AUTO-MCNC: 194 MG/DL (ref 65–117)
SERVICE CMNT-IMP: ABNORMAL
SERVICE CMNT-IMP: NORMAL

## 2023-02-28 PROCEDURE — G0378 HOSPITAL OBSERVATION PER HR: HCPCS

## 2023-02-28 PROCEDURE — 74011636637 HC RX REV CODE- 636/637: Performed by: STUDENT IN AN ORGANIZED HEALTH CARE EDUCATION/TRAINING PROGRAM

## 2023-02-28 PROCEDURE — 74011000250 HC RX REV CODE- 250: Performed by: STUDENT IN AN ORGANIZED HEALTH CARE EDUCATION/TRAINING PROGRAM

## 2023-02-28 PROCEDURE — 97110 THERAPEUTIC EXERCISES: CPT

## 2023-02-28 PROCEDURE — 74011250636 HC RX REV CODE- 250/636: Performed by: STUDENT IN AN ORGANIZED HEALTH CARE EDUCATION/TRAINING PROGRAM

## 2023-02-28 PROCEDURE — 74011250637 HC RX REV CODE- 250/637: Performed by: STUDENT IN AN ORGANIZED HEALTH CARE EDUCATION/TRAINING PROGRAM

## 2023-02-28 PROCEDURE — 82962 GLUCOSE BLOOD TEST: CPT

## 2023-02-28 RX ADMIN — HYDRALAZINE HYDROCHLORIDE 25 MG: 25 TABLET, FILM COATED ORAL at 05:22

## 2023-02-28 RX ADMIN — Medication 25 UNITS: at 09:50

## 2023-02-28 RX ADMIN — METOPROLOL SUCCINATE 12.5 MG: 25 TABLET, EXTENDED RELEASE ORAL at 09:51

## 2023-02-28 RX ADMIN — HYDRALAZINE HYDROCHLORIDE 25 MG: 25 TABLET, FILM COATED ORAL at 13:11

## 2023-02-28 RX ADMIN — ATORVASTATIN CALCIUM 40 MG: 40 TABLET, FILM COATED ORAL at 22:22

## 2023-02-28 RX ADMIN — Medication 2 UNITS: at 12:00

## 2023-02-28 RX ADMIN — TAMSULOSIN HYDROCHLORIDE 0.4 MG: 0.4 CAPSULE ORAL at 22:22

## 2023-02-28 RX ADMIN — SODIUM CHLORIDE, PRESERVATIVE FREE 10 ML: 5 INJECTION INTRAVENOUS at 05:22

## 2023-02-28 RX ADMIN — ASPIRIN 81 MG: 81 TABLET, COATED ORAL at 09:51

## 2023-02-28 RX ADMIN — HYDRALAZINE HYDROCHLORIDE 25 MG: 25 TABLET, FILM COATED ORAL at 22:22

## 2023-02-28 RX ADMIN — Medication 2 UNITS: at 07:53

## 2023-02-28 RX ADMIN — SODIUM CHLORIDE, PRESERVATIVE FREE 5 ML: 5 INJECTION INTRAVENOUS at 14:00

## 2023-02-28 RX ADMIN — QUETIAPINE FUMARATE 25 MG: 25 TABLET ORAL at 17:55

## 2023-02-28 RX ADMIN — ENOXAPARIN SODIUM 40 MG: 100 INJECTION SUBCUTANEOUS at 13:11

## 2023-02-28 RX ADMIN — SODIUM CHLORIDE, PRESERVATIVE FREE 10 ML: 5 INJECTION INTRAVENOUS at 22:22

## 2023-02-28 NOTE — PROGRESS NOTES
Bedside and Verbal shift change report given to Sherin Donnelly RN (oncoming nurse) by Renold Goltz, RN (offgoing nurse). Report included the following information SBAR, Kardex, MAR, Accordion, and Recent Results.

## 2023-02-28 NOTE — PROGRESS NOTES
Problem: Pressure Injury - Risk of  Goal: *Prevention of pressure injury  Description: Document Papa Scale and appropriate interventions in the flowsheet. Outcome: Progressing Towards Goal  Note: Pressure Injury Interventions:  Sensory Interventions: Assess changes in LOC, Check visual cues for pain, Minimize linen layers, Keep linens dry and wrinkle-free    Moisture Interventions: Absorbent underpads    Activity Interventions: Increase time out of bed, PT/OT evaluation    Mobility Interventions: HOB 30 degrees or less, PT/OT evaluation    Nutrition Interventions: Document food/fluid/supplement intake    Friction and Shear Interventions: HOB 30 degrees or less, Lift sheet                Problem: Patient Education: Go to Patient Education Activity  Goal: Patient/Family Education  Outcome: Progressing Towards Goal     Problem: Falls - Risk of  Goal: *Absence of Falls  Description: Document Jonathan Fall Risk and appropriate interventions in the flowsheet.   Outcome: Progressing Towards Goal  Note: Fall Risk Interventions:                                Problem: Patient Education: Go to Patient Education Activity  Goal: Patient/Family Education  Outcome: Progressing Towards Goal     Problem: General Medical Care Plan  Goal: *Vital signs within specified parameters  Outcome: Progressing Towards Goal  Goal: *Labs within defined limits  Outcome: Progressing Towards Goal  Goal: *Absence of infection signs and symptoms  Outcome: Progressing Towards Goal  Goal: *Skin integrity maintained  Outcome: Progressing Towards Goal  Goal: *Fluid volume balance  Outcome: Progressing Towards Goal  Goal: *Optimize nutritional status  Outcome: Progressing Towards Goal  Goal: *Anxiety reduced or absent  Outcome: Progressing Towards Goal  Goal: *Progressive mobility and function (eg: ADL's)  Outcome: Progressing Towards Goal     Problem: Patient Education: Go to Patient Education Activity  Goal: Patient/Family Education  Outcome: Progressing Towards Goal     Problem: Patient Education: Go to Patient Education Activity  Goal: Patient/Family Education  Outcome: Progressing Towards Goal

## 2023-02-28 NOTE — PROGRESS NOTES
Problem: Pressure Injury - Risk of  Goal: *Prevention of pressure injury  Description: Document Papa Scale and appropriate interventions in the flowsheet. Outcome: Progressing Towards Goal  Note: Pressure Injury Interventions:  Sensory Interventions: Assess changes in LOC    Moisture Interventions: Absorbent underpads    Activity Interventions: Increase time out of bed    Mobility Interventions: HOB 30 degrees or less    Nutrition Interventions: Document food/fluid/supplement intake    Friction and Shear Interventions: HOB 30 degrees or less                Problem: Patient Education: Go to Patient Education Activity  Goal: Patient/Family Education  Outcome: Progressing Towards Goal     Problem: Falls - Risk of  Goal: *Absence of Falls  Description: Document Jonathan Fall Risk and appropriate interventions in the flowsheet.   Outcome: Progressing Towards Goal  Note: Fall Risk Interventions:                                Problem: General Medical Care Plan  Goal: *Vital signs within specified parameters  Outcome: Progressing Towards Goal  Goal: *Absence of infection signs and symptoms  Outcome: Progressing Towards Goal  Goal: *Skin integrity maintained  Outcome: Progressing Towards Goal

## 2023-02-28 NOTE — PROGRESS NOTES
Dr Ureña Ip seen patient , aware that patient is in a state of somnolence. More drowsy than the first half of this morning. Rechecked blood glucose that was 160. Rechecked vitals, vitals stable at bp: 128/73 , hr 69, o2 99. Notified charge nurse of drowsy state, charge nurse came in and assisted in assessment. Pt reacted to sternum rub. Pt currently resting. Chest rise and falling symmetrically. Patient is breathing. Will continue to monitor patient condition.

## 2023-02-28 NOTE — PROGRESS NOTES
IDR Team; MD, Care Manager, Pharmacy, Nursing, Therapy,  and Dietitian met to review patient's plan of care. Discussed goals, interventions, barriers and progress. RUR:    NA  Observation  RRAT: 24      Team will continue to monitor progress and report any concerns to the physician and care management as indicated. Transition of Care Plan:  Patient working with therapy to assess function level. Patient is a resident at [facility]. MOON Observation Notification letter was emailed to patient's daughter. She emailed me the front page and not the second page where the signature is to go. I emailed her the 2 pages and asked her to try again.

## 2023-02-28 NOTE — PROGRESS NOTES
IDR Team; MD, Care Manager, Pharmacy, Nursing, Therapy,  and Dietitian met to review patient's plan of care. Discussed goals, interventions, barriers and progress. RUR:  NA  Observation  RRAT: 24        Team will continue to monitor progress and report any concerns to the physician and care management as indicated. Transition of Care Plan:    Continues to work with therapy. Mental status has improved and does not need 1:1 sitter any longer at least last night according to charge nurse. Will consult with therapy to see if patient may benefit from skilled services.

## 2023-02-28 NOTE — PROGRESS NOTES
Follow up visit with patient in Rm 130  Provided empathic listening and spiritual support  Advised of  Availability    [address] [place] [address]

## 2023-02-28 NOTE — PROGRESS NOTES
Problem: Mobility Impaired (Adult and Pediatric)  Goal: *Acute Goals and Plan of Care (Insert Text)  Description: FUNCTIONAL STATUS PRIOR TO ADMISSION: Pt is a limited historian; most of the details about PLOF and SH came from medical record; he lives in [facility]; pt apparently [facility]; he requires assistance for ADLs and is dependent for IADLs; pt normally walks w/ a RW but due to impaired memory he often leave the AD behind and tries to walk w/o it; he has a hx of multiple falls included with this admission; moderate [place]    HOME SUPPORT PRIOR TO ADMISSION: as noted above    Physical Therapy Goals  Initiated 2/27/2023  1. Patient will move from supine to sit and sit to supine  in bed with modified independence within 7 day(s). 2.  Patient will transfer from bed to chair and chair to bed with SBA using the least restrictive device within 7 day(s). 3.  Patient will perform sit to stand with SBA within 7 day(s). 4.  Patient will ambulate with SBA for 200 feet with the least restrictive device within 7 day(s). Outcome: Progressing Towards Goal     Problem: Patient Education: Go to Patient Education Activity  Goal: Patient/Family Education  Outcome: Progressing Towards Goal  PHYSICAL THERAPY TREATMENT  Patient: Ana Gross (61 y.o. male)  Date: 2/28/2023  Diagnosis: Altered mental status, unspecified [R41.82] Altered mental status, unspecified      Precautions: Bed Alarm, DNR, Fall  Chart, physical therapy assessment, plan of care and goals were reviewed. ASSESSMENT  Patient continues with skilled PT services and is progressing towards goals. Pt presented resting in bed; pleasant and cooperative; moderate [place]. Vitals taken during session included: /70, HR 70 and O2 sats on RA 98%. Verbal and tactile cues offered for transferring to EOB; good sitting balance.  Then pt was guided through some assisted B LE therex to promote circulation and joint integrity prior to getting up and walking. Cues for technique and safety when transferring to stand then pt was able to walk 3/4 of the way around the nursing station w/ 2WW and constant min A due to posterior lean; (+) LOB posteriorly x3-4 times w/ min A to recover; no SOB but he did c/o fatigue upon completion. At end of session pt was left resting in recliner chair w/ B LEs elevated and both tray table and call bell in reach; chair alarm on. Current Level of Function Impacting Discharge (mobility/balance): transfers CGA to min A; balance w/ AD fair (-)    Other factors to consider for discharge: advanced age; private CG at [facility]         PLAN :  Patient continues to benefit from skilled intervention to address the above impairments. Continue treatment per established plan of care. to address goals. Recommendation for discharge: (in order for the patient to meet his/her long term goals)  Therapy up to 5 days/week in SNF setting vs. Going back to [facility] w/ Located within [facility] services    This discharge recommendation:  Has not yet been discussed the attending provider and/or case management    IF patient discharges home will need the following DME: to be determined (TBD)       SUBJECTIVE:   Patient stated I'm doing ok.     OBJECTIVE DATA SUMMARY:   Critical Behavior:  Neurologic State: Alert  Orientation Level: Oriented to person  Cognition: Follows commands, Memory loss  Safety/Judgement: Decreased awareness of need for safety, Decreased awareness of need for assistance  Functional Mobility Training:  Bed Mobility:     Supine to Sit: Contact guard assistance;Minimum assistance; Additional time;Bed Modified     Scooting: Contact guard assistance;Minimum assistance        Transfers:  Sit to Stand: Minimum assistance  Stand to Sit: Contact guard assistance;Minimum assistance                             Balance:  Sitting: Intact; Without support  Standing: Impaired; With support  Standing - Static: Constant support;Fair;Occasional  Standing - Dynamic : Constant support;Fair;Occasional  Ambulation/Gait Training:  Distance (ft): 75 Feet (ft)  Assistive Device: Gait belt;Walker, rolling  Ambulation - Level of Assistance: Minimal assistance        Gait Abnormalities: Decreased step clearance; Path deviations; Shuffling gait; Step to gait        Base of Support: Narrowed     Speed/Mary: Shuffled; Slow  Step Length: Left shortened;Right shortened                     Therapeutic Exercises:   Assisted B LE therex completed on EOB including: ankle pumps, LAQs and marching; each completed for 1-2 sets and reps up to 12 as tolerated    Pain Rating:  denied    Activity Tolerance:   Fair, SpO2 stable on RA, and requires rest breaks    After treatment patient left in no apparent distress:   Sitting in chair, Heels elevated for pressure relief, Call bell within reach, and Bed / chair alarm activated    COMMUNICATION/COLLABORATION:   The patients plan of care was discussed with: Registered nurse, Rehabilitation technician, and Certified nursing assistant/patient care technician.      Jose Meraz, PT   Time Calculation: 31 mins

## 2023-03-01 VITALS
WEIGHT: 157.6 LBS | TEMPERATURE: 98.7 F | SYSTOLIC BLOOD PRESSURE: 153 MMHG | BODY MASS INDEX: 22.06 KG/M2 | HEART RATE: 72 BPM | HEIGHT: 71 IN | DIASTOLIC BLOOD PRESSURE: 70 MMHG | RESPIRATION RATE: 18 BRPM | OXYGEN SATURATION: 100 %

## 2023-03-01 LAB
GLUCOSE BLD STRIP.AUTO-MCNC: 193 MG/DL (ref 65–117)
GLUCOSE BLD STRIP.AUTO-MCNC: 205 MG/DL (ref 65–117)
SERVICE CMNT-IMP: ABNORMAL
SERVICE CMNT-IMP: ABNORMAL

## 2023-03-01 PROCEDURE — 74011636637 HC RX REV CODE- 636/637: Performed by: STUDENT IN AN ORGANIZED HEALTH CARE EDUCATION/TRAINING PROGRAM

## 2023-03-01 PROCEDURE — 82962 GLUCOSE BLOOD TEST: CPT

## 2023-03-01 PROCEDURE — 74011000250 HC RX REV CODE- 250: Performed by: STUDENT IN AN ORGANIZED HEALTH CARE EDUCATION/TRAINING PROGRAM

## 2023-03-01 PROCEDURE — 74011250636 HC RX REV CODE- 250/636: Performed by: STUDENT IN AN ORGANIZED HEALTH CARE EDUCATION/TRAINING PROGRAM

## 2023-03-01 PROCEDURE — G0378 HOSPITAL OBSERVATION PER HR: HCPCS

## 2023-03-01 PROCEDURE — 74011250637 HC RX REV CODE- 250/637: Performed by: STUDENT IN AN ORGANIZED HEALTH CARE EDUCATION/TRAINING PROGRAM

## 2023-03-01 PROCEDURE — 97110 THERAPEUTIC EXERCISES: CPT

## 2023-03-01 RX ORDER — METOPROLOL SUCCINATE 25 MG/1
12.5 TABLET, EXTENDED RELEASE ORAL DAILY
Qty: 15 TABLET | Refills: 0 | Status: SHIPPED
Start: 2023-03-01

## 2023-03-01 RX ORDER — INSULIN GLARGINE 100 [IU]/ML
10 INJECTION, SOLUTION SUBCUTANEOUS
Qty: 3 ML | Refills: 0 | Status: SHIPPED
Start: 2023-03-01

## 2023-03-01 RX ADMIN — METOPROLOL SUCCINATE 12.5 MG: 25 TABLET, EXTENDED RELEASE ORAL at 09:19

## 2023-03-01 RX ADMIN — ENOXAPARIN SODIUM 40 MG: 100 INJECTION SUBCUTANEOUS at 13:37

## 2023-03-01 RX ADMIN — HYDRALAZINE HYDROCHLORIDE 25 MG: 25 TABLET, FILM COATED ORAL at 14:10

## 2023-03-01 RX ADMIN — Medication 2 UNITS: at 07:23

## 2023-03-01 RX ADMIN — ASPIRIN 81 MG: 81 TABLET, COATED ORAL at 09:19

## 2023-03-01 RX ADMIN — SODIUM CHLORIDE, PRESERVATIVE FREE 10 ML: 5 INJECTION INTRAVENOUS at 06:00

## 2023-03-01 RX ADMIN — Medication 3 UNITS: at 11:25

## 2023-03-01 RX ADMIN — Medication 25 UNITS: at 09:19

## 2023-03-01 RX ADMIN — HYDRALAZINE HYDROCHLORIDE 25 MG: 25 TABLET, FILM COATED ORAL at 06:36

## 2023-03-01 NOTE — PROGRESS NOTES
Verbal shift change report given to Hipolito  (oncoming nurse) by Noman Mera (offgoing nurse). Report included the following information SBAR, Kardex, Intake/Output, and MAR.

## 2023-03-01 NOTE — PROGRESS NOTES
Problem: Pressure Injury - Risk of  Goal: *Prevention of pressure injury  Description: Document Papa Scale and appropriate interventions in the flowsheet. Outcome: Progressing Towards Goal  Note: Pressure Injury Interventions:  Sensory Interventions: Assess changes in LOC, Keep linens dry and wrinkle-free, Check visual cues for pain    Moisture Interventions: Absorbent underpads, Check for incontinence Q2 hours and as needed    Activity Interventions: PT/OT evaluation    Mobility Interventions: HOB 30 degrees or less, PT/OT evaluation    Nutrition Interventions: Document food/fluid/supplement intake    Friction and Shear Interventions: Lift sheet, HOB 30 degrees or less                Problem: Patient Education: Go to Patient Education Activity  Goal: Patient/Family Education  Outcome: Progressing Towards Goal     Problem: Falls - Risk of  Goal: *Absence of Falls  Description: Document Jonathan Fall Risk and appropriate interventions in the flowsheet.   Outcome: Progressing Towards Goal  Note: Fall Risk Interventions:                                Problem: Patient Education: Go to Patient Education Activity  Goal: Patient/Family Education  Outcome: Progressing Towards Goal     Problem: General Medical Care Plan  Goal: *Vital signs within specified parameters  Outcome: Progressing Towards Goal  Goal: *Labs within defined limits  Outcome: Progressing Towards Goal  Goal: *Absence of infection signs and symptoms  Outcome: Progressing Towards Goal  Goal: *Skin integrity maintained  Outcome: Progressing Towards Goal  Goal: *Fluid volume balance  Outcome: Progressing Towards Goal  Goal: *Optimize nutritional status  Outcome: Progressing Towards Goal  Goal: *Anxiety reduced or absent  Outcome: Progressing Towards Goal  Goal: *Progressive mobility and function (eg: ADL's)  Outcome: Progressing Towards Goal     Problem: Patient Education: Go to Patient Education Activity  Goal: Patient/Family Education  Outcome: Progressing Towards Goal     Problem: Patient Education: Go to Patient Education Activity  Goal: Patient/Family Education  Outcome: Progressing Towards Goal

## 2023-03-01 NOTE — PROGRESS NOTES
IDR Team; MD, Nursing, Care Manager, Nursing Supervisor, , Pharmacy and Dietician, met to review patient's plan of care. Discussed goals, interventions, barriers and progress. Team will continue to monitor progress and report any concerns to the physician and care management as indicated. Transition of Care Plan: Per MD, patient was obtunded yesterday morning. Was sitting in chair. Peaceful. Not on 1:1 anymore.

## 2023-03-01 NOTE — DISCHARGE SUMMARY
[facility]  Hospitalist Discharge Summary    Patient ID:  Vonda Newton  [id number]  86 y.o.  12/30/1928    PCP on record: Emily Riley NP    Admit date: 2/26/2023  Discharge date and time: 3/1/2023     DISCHARGE DIAGNOSIS:    Principal Problem:    Altered mental status, unspecified (2/26/2023)    Active Problems:    Fall (2/26/2023)    Orthostatic hypotension (11/22/2022)    Carotid artery disease (HCC) ()    Type 2 diabetes mellitus with peripheral vascular disease ([facility] [address] (1/19/2021)    Pure hypercholesterolemia ()    BPH (benign prostatic hyperplasia) (2/28/2023)    HTN (hypertension) (2/28/2023)    CONSULTATIONS:  None    Excerpted HPI from H&P of Matt Schmid MD:  Mr. Vonda Newton is a 80 y.o. pleasant male with past medical history of CAD s/p stent, Insulin-dependent diabetes mellitus, Hypertensive cardiovascular disease and hypercholesterolemia who was brought to the ED for evaluation of fall and altered mental status. At the time of evaluation, patient was alert and oriented to self, place, year. He recalls falling down and hitting his head at the nursing home. He reports frequent falls due to generalized weakness and feeling wobbly. He denied dizziness or lightheadedness, chest pain, SOB or palpitation, nausea or diaphoresis prior to the fall. Denies recent fever or chills, abdominal pain, urinary or bowel complaints. On arrival to the ED, patient was altered with a GCS of ~9. Labs notable for elevated troponin and elevated glucose without anion gap. Cr mildly elevated from his baseline. CBC and UA unremarkable. EKG showed sinus rhythm with 1st degree AV block, occasional PVCs/PACs otherwise no acute ST-T wave changes. CT head and C-spine showed no acute findings. CXR showed no acute process. Patient received 1L IV NS.  His mental status started to improve while in the ED.  ______________________________________________________________________  DISCHARGE SUMMARY/HOSPITAL COURSE:  for full details see H&P, daily progress notes, labs, consult notes. Principal Problem:    Altered mental status, unspecified (2/26/2023)  Encephalopathy / Delirium on admission  Improved in the ED during testing and following 1 liter NS bolus, followed by 75cc/hr  Patient was sedate Tue morning but was  more alert and interactive that afternoon  He was aroused easily for meals and therapy  He is ambulatory with his front wheeled walker with contact guard assistance, has tendency to fall backwards  Maintain Seroquel 25 mg nightly     Active Problems:    Fall (2/26/2023)    Orthostatic hypotension (11/22/2022)  Completed IV fluids  Avoid excessive antihypertensives and antipsychotics  Monitor for  orthostatic drop  Pt received Hydralazine during admission, but is discharged to resume his prn schedule  Flomax is dosed at bedtime       Carotid artery disease (HCC) ()  ASA 81mg daily  Tx Lipids, HTN, DM   No c/o chest pain  Stable elevation of Troponin HS       Type 2 diabetes mellitus with peripheral vascular disease ([facility] [address] (1/19/2021)  BS qid ac/hs  CC Humalog was ordered - received 3-5 units daily  Lantus was dosed 25u daily. No hypoglycemia was observed. Pt required assistance with meals  Lantus is tapered to pre-adm dose on 10 units daily, can to titrated upwards if nutritional intake is stable         Pure hypercholesterolemia ()  Lipitor 40mg daily was maintained       HTN  Hydralazine was given on schedule during the admission, resume the prn dosing on d/c  Toprol XL 25mg was tapered to 1/2 tab / 12.5mg daily      BPH  Flomax daily at bedtime  No voiding c/o appreciated    _______________________________________________________________________  Patient seen and examined by me on discharge day. Pertinent Findings:  Visit Vitals  BP (!) 141/63 (BP 1 Location: Left arm, BP Patient Position:  At rest)   Pulse 81   Temp 98.2 °F (36.8 °C)   Resp 16   Ht 5' 11\" (1.803 m)   Wt 71.5 kg (157 lb 9.6 oz)   SpO2 97%   BMI 21.98 kg/m²     Gen:    Not in distress  Chest: Nonlabored respiration, Clear lungs  CVS:   Regular rhythm. No edema  Abd:  Soft, not distended, not tender  Neuro:  Alert, nonfocal, weak    LABS:   2/27/23 07:41 2/27/23 11:23 2/27/23 16:53 2/28/23 07:42 2/28/23 11:47 2/28/23 13:18 2/28/23 16:09 2/28/23 22:40 3/1/23 07:14 3/1/23 11:01   GLU -  (H) 131 (H) 192 (H) 160 (H) 165 (H) 160 (H) 117 194 (H) 193 (H) 205 (H)      2/26/23 00:25   WBC 5.9   NRBC 0.0   RBC 3.90 (L)   HGB 12.4   HCT 36.0 (L)   MCV 92.3   MCH 31.8   MCHC 34.4   RDW 12.7   PLATELET 723   MPV 42.4   NEUTROPHILS 51   LYMPHOCYTES 39   MONOCYTES 8   EOSINOPHILS 1      2/26/23 01:55   Color YELLOW/STRAW   Appearance CLEAR   Specific gravity 1.020   pH (UA) 7.0   Protein 30 ! Glucose >1,000 ! Ketone Negative   Blood TRACE ! Bilirubin Negative   Urobilinogen 0.2   Nitrites Negative   Leukocyte Esterase Negative   Epithelial cells FEW   Mucus TRACE   WBC 0-4   RBC 5-10   Bacteria Negative      2/26/23 00:25 2/26/23 05:30 2/27/23 08:15   Sodium 138  138   Potassium 3.9  3.6   Chloride 103  104   CO2 30  25   Anion gap 5  9   Glucose 285 (H)  218 (H)   BUN 24 (H)  21 (H)   Creatinine 1.31 (H)  1.08   BUN/Creatinine ratio 18  19   Calcium 9.2  8.7   Magnesium 1.8     eGFR 50 (L)  >60   Bilirubin, total 0.7     Protein, total 6.9     Albumin 3.7     Globulin 3.2     A-G Ratio 1.2     ALT 39     AST 21     Alk.  phosphatase 200 (H)     Lactic acid 0.9     CK  120    Troponin-High Sensitivity 173 (HH) 189 (HH)       2/26/23 01:55   AMPHETAMINES Negative   BARBITURATES Negative   BENZODIAZEPINES Negative   COCAINE Negative   METHADONE Negative   OPIATES Negative   PCP(PHENCYCLIDINE) Negative   THC (TH-CANNABINOL) Negative      2/7/23 13:26   TSH 3.27       RADIOLOGY:  CT HEAD 2/26:  Left frontal scalp soft tissue swelling with no acute intracranial hemorrhage. CT C-SPINE 2/26:  There is normal alignment of the cervical spine. Inferior aspect of C7 is not  included on the study. There is no acute fracture or subluxation. The  craniocervical junction is intact. There is no prevertebral soft tissue  swelling. There are no significant degenerative changes. IMPRESSION: No acute fracture to the mid C7 level (lower C7 excluded). pCXR 2/26:  AP portable view of the chest demonstrates a normal cardiomediastinal  silhouette. There is no edema, effusion, consolidation, or pneumothorax. The  osseous structures are unremarkable. IMPRESSION: No acute process. EKG 2/26:NSR 63 bpm, 1st Deg AVB, LVH, No PVCs since prior 10 Feb 2023     ECHO 2/27:    Left Ventricle: Normal left ventricular systolic function with a visually estimated EF of 60 - 65%. Left ventricle size is normal. Normal wall thickness. Normal wall motion. Normal diastolic function for age. Left Atrium: Left atrium is moderately dilated. Left atrial volume index is moderately increased (42-48 mL/m2). Aortic Valve: Valve structure is trileaflet, moderately thickened and calcified. No significant stenosis detected by valvular gradients. Mitral Valve: Mildly thickened leaflet, at the anterior and posterior leaflets. Moderate annular calcification of the mitral valve.     _______________________________________________________________________  DISCHARGE MEDICATIONS:   Current Discharge Medication List        CONTINUE these medications which have CHANGED    Details   metoprolol succinate (TOPROL-XL) 25 mg XL tablet Take 0.5 Tablets by mouth daily. Indications: high blood pressure  Qty: 15 Tablet, Refills: 0  Start date: 3/1/2023      insulin glargine (LANTUS) 100 unit/mL injection 10 Units by SubCUTAneous route daily (with dinner).  Given at 4pm (hold is BS < 180)  Qty: 3 mL, Refills: 0  Start date: 3/1/2023           CONTINUE these medications which have NOT CHANGED Details   hydrALAZINE (APRESOLINE) 25 mg tablet Take 1 Tablet by mouth two (2) times daily as needed for PRN Reason (Other) (blood pressure > 160/90). tamsulosin (FLOMAX) 0.4 mg capsule Take 1 Capsule by mouth nightly. Qty: 1 Capsule, Refills: 0      QUEtiapine (SEROquel) 25 mg tablet Take 25 mg by mouth nightly. multivitamin (ONE A DAY) tablet Take 1 Tablet by mouth daily. polyethylene glycol (MIRALAX) 17 gram packet Take 17 g by mouth daily. acetaminophen (TYLENOL) 325 mg tablet Take 650 mg by mouth three (3) times daily. Indications: chronic back pain      atorvastatin (LIPITOR) 40 mg tablet Take 1 Tab by mouth nightly. Qty: 90 Tab, Refills: 1      aspirin delayed-release 81 mg tablet Take 81 mg by mouth daily.            STOP taking these medications       cefdinir (OMNICEF) 300 mg capsule Comments:   Reason for Stopping:         loperamide (IMMODIUM) 2 mg tablet Comments:   Reason for Stopping:         guaiFENesin (ROBITUSSIN) 100 mg/5 mL liquid Comments:   Reason for Stopping:               My Recommended  Diet: Cardiac  SAAD  Activity: Up only with assistance with walker  Wound Care: none  Follow-up labs: routine    ______________________________________________________________________  DISPOSITION:    Home with Family:    Home with HH/PT/OT/RN:    SNF/LTC: [facility]   TAWNY:    OTHER:        Condition at Discharge:  Stable  _____________________________________________________________________  Follow up with:   PCP : Simone Chino NP  Follow-up Information       Follow up With Specialties Details Why Contact Info    Simone Chino NP Nurse Practitioner Follow up in 3 day(s) [facility] to follow up [address] [place] [address]  [phone]            Total time in minutes spent coordinating this discharge (includes going over instructions, follow-up, prescriptions, and preparing report for sign off to her PCP) :35 minutes    Signed:  Cira Guevara Belle Mckeon, [address] [facility] Hospitalist  [phone]

## 2023-03-01 NOTE — PROGRESS NOTES
Patient has all of his belongings. Discharge paperwork being sent to [facility]. All IV sites removed from patient. Patient leaving with [facility] transportation services. Patient is en route back to facility.

## 2023-03-01 NOTE — PROGRESS NOTES
Problem: Pressure Injury - Risk of  Goal: *Prevention of pressure injury  Description: Document Papa Scale and appropriate interventions in the flowsheet. Outcome: Progressing Towards Goal  Note: Pressure Injury Interventions:  Sensory Interventions: Assess changes in LOC    Moisture Interventions: Absorbent underpads    Activity Interventions: PT/OT evaluation    Mobility Interventions: HOB 30 degrees or less    Nutrition Interventions: Document food/fluid/supplement intake    Friction and Shear Interventions: Lift sheet                Problem: Patient Education: Go to Patient Education Activity  Goal: Patient/Family Education  Outcome: Progressing Towards Goal     Problem: Falls - Risk of  Goal: *Absence of Falls  Description: Document Jonathan Fall Risk and appropriate interventions in the flowsheet.   Outcome: Progressing Towards Goal  Note: Fall Risk Interventions:                                Problem: General Medical Care Plan  Goal: *Vital signs within specified parameters  Outcome: Progressing Towards Goal  Goal: *Absence of infection signs and symptoms  Outcome: Progressing Towards Goal  Goal: *Skin integrity maintained  Outcome: Progressing Towards Goal

## 2023-03-01 NOTE — DISCHARGE INSTRUCTIONS
HOSPITALIST RECOMMENDATIONS    Should be assisted during ambulation with walker,  has tendency to go backwards (poor balance)  Limit dosing for hypertension and diabetes to avoid hypotension and hypoglycemia  Family would like pt placed into memory care, feeling he would be closer to caregiver assistance  Medicines are maintained as prior to admission  Estela Morrison 227

## 2023-03-01 NOTE — PROGRESS NOTES
Problem: Mobility Impaired (Adult and Pediatric)  Goal: *Acute Goals and Plan of Care (Insert Text)  Description: FUNCTIONAL STATUS PRIOR TO ADMISSION: Pt is a limited historian; most of the details about PLOF and SH came from medical record; he lives in [facility]; pt apparently has a PCG that comes to assist him daily at the [facility]; he requires assistance for ADLs and is dependent for IADLs; pt normally walks w/ a RW but due to impaired memory he often leave the AD behind and tries to walk w/o it; he has a hx of multiple falls included with this admission; moderate [place]    HOME SUPPORT PRIOR TO ADMISSION: as noted above    Physical Therapy Goals  Initiated 2/27/2023  1. Patient will move from supine to sit and sit to supine  in bed with modified independence within 7 day(s). 2.  Patient will transfer from bed to chair and chair to bed with SBA using the least restrictive device within 7 day(s). 3.  Patient will perform sit to stand with SBA within 7 day(s). 4.  Patient will ambulate with SBA for 200 feet with the least restrictive device within 7 day(s). Outcome: Progressing Towards Goal  PHYSICAL THERAPY TREATMENT  Patient: Laz Garza (35 y.o. male)  Date: 3/1/2023  Diagnosis: Altered mental status, unspecified [R41.82] Altered mental status, unspecified      Precautions: Bed Alarm, DNR, Fall  Chart, physical therapy assessment, plan of care and goals were reviewed. ASSESSMENT  Patient continues with skilled PT services and is progressing towards goals. Pt presented resting in bed; some confusion noted but pleasant and cooperative overall. Vitals taken prior to mobilization activities included: O2 sats on RA 98% and HR 80. Once guided to EOB pt demonstrated good sitting balance. He was then assisted to standing w/ 2WW and was able to walk all the way around the nursing station w/o stopping.  His overall stability was much better today as he only exhibited a transient posterior lean initially upon standing but as soon as he began walking it resolved. Better balance noted today as well as improved ambulation and activity tolerance. MD arrived during session so he was able to witness pt's progress. At end of session pt was left resting in recliner chair w/ B LEs elevated and both tray table and call bell in reach; chair alarm on. Current Level of Function Impacting Discharge (mobility/balance): transfers min A to CGA; balance w/ AD in standing fair (-) to fair    Other factors to consider for discharge: lives at [facility] and has CG support         PLAN :  Patient continues to benefit from skilled intervention to address the above impairments. Continue treatment per established plan of care. to address goals. Recommendation for discharge: (in order for the patient to meet his/her long term goals)  Physical therapy at least 2 days/week in the home     This discharge recommendation:  Has been made in collaboration with the attending provider and/or case management    IF patient discharges home will need the following DME: patient owns DME required for discharge       SUBJECTIVE:   Patient stated Well at 80years old I finally have cancer.     OBJECTIVE DATA SUMMARY:   Critical Behavior:  Neurologic State: Alert, Confused  Orientation Level: Oriented to person  Cognition: Memory loss, Follows commands  Safety/Judgement: Decreased awareness of need for safety, Decreased awareness of need for assistance  Functional Mobility Training:  Bed Mobility:     Supine to Sit: Contact guard assistance;Minimum assistance; Additional time;Bed Modified     Scooting: Minimum assistance; Additional time        Transfers:  Sit to Stand: Minimum assistance  Stand to Sit: Contact guard assistance;Minimum assistance                             Balance:  Sitting: Intact; Without support  Standing: Impaired; With support  Standing - Static: Constant support; Fair  Standing - Dynamic : Constant support;Fair;Occasional  Ambulation/Gait Training:  Distance (ft): 175 Feet (ft)  Assistive Device: Gait belt;Walker, rolling  Ambulation - Level of Assistance: Contact guard assistance        Gait Abnormalities: Decreased step clearance; Path deviations        Base of Support: Narrowed     Speed/Mary: Pace decreased (<100 feet/min)  Step Length: Left shortened;Right shortened            Pain Rating:  Denied overall     Activity Tolerance:   Good, Fair, SpO2 stable on RA, and requires rest breaks    After treatment patient left in no apparent distress:   Sitting in chair, Heels elevated for pressure relief, Call bell within reach, and Bed / chair alarm activated    COMMUNICATION/COLLABORATION:   The patients plan of care was discussed with: Registered nurse, Physician, Case management, and Certified nursing assistant/patient care technician.      Abdias Ann, PT   Time Calculation: 14 mins

## 2023-03-01 NOTE — PROGRESS NOTES
Follow up visit with patient in Rm 130  Provided empathic listening and spiritual support  Advised of  Availability  [address] [place] [address]

## 2023-03-01 NOTE — PROGRESS NOTES
[facility]  Hospitalist Progress Note    NAME: Carmelo Poole   :  1928   MRN:  [id number]     Total duration of encounter: 2 days      Interim Hospital Summary: 80 y.o. male who presented on 2023 with Altered mental status, unspecified. He has a past medical history of Carotid artery disease ([facility] [address] Coronary atherosclerosis of native coronary artery, Diabetes mellitus, type II ([facility] [address] DJD (degenerative joint disease), HCVD (hypertensive cardiovascular disease), and Pure hypercholesterolemia. .          Subjective:     Chief Complaint / Reason for Physician Visit  \"No c/o\".   Discussed with RN   Pt was sedate and obtunded this AM  More alert sitting in chair and responsive this evenin    Review of Systems:  Symptom Y/N Comments  Symptom Y/N Comments   Fever/Chills n   Chest Pain n    Poor Appetite n   Edema n    Cough n   Abdominal Pain n    Sputum n   Joint Pain y    SOB/FLETCHER n   Pruritis/Rash     Nausea/vomit n   Tolerating PT/OT y    Diarrhea n   Tolerating Diet y    Constipation n   Other         Current Facility-Administered Medications:     metoprolol succinate (TOPROL-XL) XL tablet 12.5 mg, 12.5 mg, Oral, DAILY, Valarie Potter MD, 12.5 mg at 23 0951    acetaminophen (TYLENOL) tablet 650 mg, 650 mg, Oral, Q6H PRN, Jae Levin MD    atorvastatin (LIPITOR) tablet 40 mg, 40 mg, Oral, QHS, Valarie Potter MD, 40 mg at 23 224    insulin glargine (LANTUS) injection 25 Units, 25 Units, SubCUTAneous, DAILY, Disha KEN MD, 25 Units at 23 0950    QUEtiapine (SEROquel) tablet 25 mg, 25 mg, Oral, QHS, Valarie Potter MD, 25 mg at 23 1755    tamsulosin (FLOMAX) capsule 0.4 mg, 0.4 mg, Oral, QHS, Valarie Potter MD, 0.4 mg at 23 2241    aspirin delayed-release tablet 81 mg, 81 mg, Oral, DAILY, Valarie Rapp MD, 81 mg at 23 0951    glucose chewable tablet 16 g, 4 Tablet, Oral, PRN, Disha Mone KEN MD    glucagon (GLUCAGEN) injection 1 mg, 1 mg, IntraMUSCular, PRN, Mary Phelan MD    dextrose 10% infusion 0-250 mL, 0-250 mL, IntraVENous, PRN, Mary Phelan MD    insulin lispro (HUMALOG) injection, , SubCUTAneous, TIDAC, Mone CounterValarie MD, 2 Units at 02/28/23 1200    sodium chloride (NS) flush 5-40 mL, 5-40 mL, IntraVENous, Q8H, Valarie Potter MD, 5 mL at 02/28/23 1400    sodium chloride (NS) flush 5-40 mL, 5-40 mL, IntraVENous, PRN, Mone Counter, Valarie KEN MD    enoxaparin (LOVENOX) injection 40 mg, 40 mg, SubCUTAneous, Q24H, Valarie Potter MD, 40 mg at 02/28/23 1311    hydrALAZINE (APRESOLINE) tablet 25 mg, 25 mg, Oral, Q8H, Valarie Potter MD, 25 mg at 02/28/23 1311    Objective:     VITALS:   Patient Vitals for the past 12 hrs:   Temp Pulse Resp BP SpO2   02/28/23 1945 98.3 °F (36.8 °C) 88 20 (!) 168/75 97 %   02/28/23 1516 98 °F (36.7 °C) 67 20 (!) 147/68 97 %   02/28/23 1311 -- 68 -- -- --   02/28/23 1306 -- -- -- 128/73 --       Intake/Output Summary (Last 24 hours) at 2/28/2023 2112  Last data filed at 2/28/2023 1740  Gross per 24 hour   Intake 360 ml   Output --   Net 360 ml        PHYSICAL EXAM:  General: WD, WN. Alert, cooperative, no acute distress    EENT:  EOMI. Anicteric sclerae. MMM  Resp:  CTA bilaterally, no wheezing or rales. No accessory muscle use  CV:  Regular  rhythm,  No edema  GI:  Soft, Non distended, Non tender. +Bowel sounds  Neurologic:  Alert and oriented X 3, normal speech, no focal weakness  Psych:   Not anxious nor agitated  Skin:  No rashes. No jaundice    LABS:  I reviewed today's most current labs and imaging studies.   Pertinent labs include:  Recent Labs     02/26/23  0025   WBC 5.9   HGB 12.4   HCT 36.0*        Recent Labs     02/27/23  0815 02/26/23  0025    138   K 3.6 3.9    103   CO2 25 30   * 285*   BUN 21* 24*   CREA 1.08 1.31*   CA 8.7 9.2   MG  --  1.8   ALB  --  3.7   TBILI  --  0.7   ALT  --  39       RADIOLOGY:  CT HEAD 2/26:  Left frontal scalp soft tissue swelling with no acute intracranial hemorrhage. CT C-SPINE 2/26:  There is normal alignment of the cervical spine. Inferior aspect of C7 is not  included on the study. There is no acute fracture or subluxation. The  craniocervical junction is intact. There is no prevertebral soft tissue  swelling. There are no significant degenerative changes. IMPRESSION: No acute fracture to the mid C7 level (lower C7 excluded). pCXR 2/26:  AP portable view of the chest demonstrates a normal cardiomediastinal  silhouette. There is no edema, effusion, consolidation, or pneumothorax. The  osseous structures are unremarkable. IMPRESSION: No acute process. EKG 2/26:NSR 63 bpm, 1st Deg AVB, LVH, No PVCs since prior 10 Feb 2023    ECHO 2/27:    Left Ventricle: Normal left ventricular systolic function with a visually estimated EF of 60 - 65%. Left ventricle size is normal. Normal wall thickness. Normal wall motion. Normal diastolic function for age. Left Atrium: Left atrium is moderately dilated. Left atrial volume index is moderately increased (42-48 mL/m2). Aortic Valve: Valve structure is trileaflet, moderately thickened and calcified. No significant stenosis detected by valvular gradients. Mitral Valve: Mildly thickened leaflet, at the anterior and posterior leaflets. Moderate annular calcification of the mitral valve. Procedures: see electronic medical records for all procedures/Xrays and details which were not copied into this note but were reviewed prior to creation of Plan.         Assessment / Plan:    Principal Problem:    Altered mental status, unspecified (2/26/2023)  Encephalopathy / Delirium on admission  Patient was sedate this morning but is more alert and interactive this afternoon  Maintain Seroquel 25 mg nightly    Active Problems:    Fall (2/26/2023)    Orthostatic hypotension (11/22/2022)  Completed IV fluids  Weight excessive antihypertensives and antipsychotics  Monitor for  orthostatic drop      Carotid artery disease ([facility] [address] ()  ASA 81mg daily  Tx Lipids, HTN, DM as able      Type 2 diabetes mellitus with peripheral vascular disease ([facility] [address] (1/19/2021)  BS qid ac/hs  CC Humalog  Adjust       Pure hypercholesterolemia ()  Lipitor 40mg daily      HTN  Hydralazine / Toprol XL     BPH  Flomax daily        ______________________________________________________________________  SAFETY:   Code Status:DNR  DVT prophylaxis:Lovenox  Stress Ulcer prophylaxis: Not Indicated  Bladder catheter:no  Family Contact Info:  Primary Emergency Contact: Huel Habermann, Home Phone: [phone]  Bedded: [facility] Room 130/01  Disposition: TBD, likely home when stable  Admission status:  Observation    Reviewed most current lab test results and cultures  YES  Reviewed most current radiology test results   YES  Review and summation of old records today    NO  Reviewed patient's current orders and MAR    YES  PMH/ reviewed - no change compared to H&P    Care Plan discussed with:                                   Comments  Patient x     Family      RN x     Care Manager x     Consultant                         x  Multidiciplinary team rounds were held today with , nursing, pharmacist and clinical coordinator. Patient's plan of care was discussed; medications were reviewed and discharge planning was addressed.         ____________________________________________    Total NON Critical Care TIME:  35   Minutes        Comments   >50% of visit spent in counseling and coordination of care   x      Signed: Ute Antony MD  [facility] Hospitalist  [phone]

## 2023-03-01 NOTE — PROGRESS NOTES
Care Management Interventions  PCP Verified by CM: Yes (Routine follow up by facility clinician)  Palliative Care Criteria Met (RRAT>21 & CHF Dx)?: No  Mode of Transport at Discharge: Other (see comment) (TBD)  Transition of Care Consult (CM Consult): Discharge Planning  MyChart Signup: No  Discharge Durable Medical Equipment: No  Physical Therapy Consult: No  Occupational Therapy Consult: No  Speech Therapy Consult: No  Support Systems: Child(chepe), Assisted Living  Confirm Follow Up Transport: Other (see comment) (TBD)   Resource Information Provided?: No  Discharge Location  Patient Expects to be Discharged to[de-identified] Assisted Living (w/home health and personal care aides)    Patient is being discharged back to [place] [id number]. He will have home health through [facility]. Nurse can see him tomorrow. They do NOT currently have PT. Patient also has established personal care aides through [facility]. Assisted living/family was made aware to contact CM for any questions or concerns post discharge. RRAT: 24    Transition of Care (TRISTEN) Plan:  Return to [facility] w/ through [facility] in addition to personal care aides through [address] [place] [address] Transportation:       How is patient being transported at discharge? [facility] van      When? Today      Is transport scheduled? N/a     Follow-up appointment and transportation: Physician at facility to follow up with patient         Is transport for follow up appointment scheduled? N/a     Communication plan (with patient/family): AL and family aware and agree with dc plan. Who is being called? Patient or Next of Kin? Responsible party? Patient's son Nikki Edge. What number(s) is to be used? [phone]      What service provider is calling for [employer] services? [facility]       When are they calling?  Unknown       Click here to complete [address] including selection of the Healthcare Decision Maker Relationship (ie \"Primary\")  @[facility]

## 2023-03-03 ENCOUNTER — APPOINTMENT (OUTPATIENT)
Dept: CT IMAGING | Age: 88
End: 2023-03-03
Attending: EMERGENCY MEDICINE
Payer: MEDICARE

## 2023-03-03 ENCOUNTER — HOSPITAL ENCOUNTER (EMERGENCY)
Age: 88
Discharge: HOME | End: 2023-03-03
Attending: EMERGENCY MEDICINE
Payer: MEDICARE

## 2023-03-03 VITALS
TEMPERATURE: 97.7 F | DIASTOLIC BLOOD PRESSURE: 71 MMHG | SYSTOLIC BLOOD PRESSURE: 189 MMHG | OXYGEN SATURATION: 99 % | HEART RATE: 74 BPM | RESPIRATION RATE: 20 BRPM | BODY MASS INDEX: 21.9 KG/M2 | WEIGHT: 157 LBS

## 2023-03-03 DIAGNOSIS — R03.0 ELEVATED BLOOD PRESSURE READING: ICD-10-CM

## 2023-03-03 DIAGNOSIS — R41.82 ALTERED MENTAL STATUS, UNSPECIFIED ALTERED MENTAL STATUS TYPE: Primary | ICD-10-CM

## 2023-03-03 LAB
ANION GAP SERPL CALC-SCNC: 8 MMOL/L (ref 5–15)
APPEARANCE UR: CLEAR
BACTERIA URNS QL MICRO: NEGATIVE /HPF
BASOPHILS # BLD: 0 K/UL (ref 0–0.1)
BASOPHILS NFR BLD: 0 % (ref 0–1)
BILIRUB UR QL: NEGATIVE
BUN SERPL-MCNC: 27 MG/DL (ref 6–20)
BUN/CREAT SERPL: 19 (ref 12–20)
CALCIUM SERPL-MCNC: 8.9 MG/DL (ref 8.5–10.1)
CHLORIDE SERPL-SCNC: 103 MMOL/L (ref 97–108)
CO2 SERPL-SCNC: 28 MMOL/L (ref 21–32)
COLOR UR: ABNORMAL
CREAT SERPL-MCNC: 1.39 MG/DL (ref 0.7–1.3)
DIFFERENTIAL METHOD BLD: ABNORMAL
EOSINOPHIL # BLD: 0.1 K/UL (ref 0–0.4)
EOSINOPHIL NFR BLD: 1 % (ref 0–7)
EPITH CASTS URNS QL MICRO: NORMAL /LPF
ERYTHROCYTE [DISTWIDTH] IN BLOOD BY AUTOMATED COUNT: 13.1 % (ref 11.5–14.5)
GLUCOSE BLD STRIP.AUTO-MCNC: 257 MG/DL (ref 65–117)
GLUCOSE SERPL-MCNC: 297 MG/DL (ref 65–100)
GLUCOSE UR STRIP.AUTO-MCNC: >1000 MG/DL
HCT VFR BLD AUTO: 37.3 % (ref 36.6–50.3)
HGB BLD-MCNC: 12.4 G/DL (ref 12.1–17)
HGB UR QL STRIP: NEGATIVE
IMM GRANULOCYTES # BLD AUTO: 0 K/UL (ref 0–0.04)
IMM GRANULOCYTES NFR BLD AUTO: 0 % (ref 0–0.5)
KETONES UR QL STRIP.AUTO: NEGATIVE MG/DL
LEUKOCYTE ESTERASE UR QL STRIP.AUTO: NEGATIVE
LYMPHOCYTES # BLD: 2.5 K/UL (ref 0.8–3.5)
LYMPHOCYTES NFR BLD: 36 % (ref 12–49)
MCH RBC QN AUTO: 30.8 PG (ref 26–34)
MCHC RBC AUTO-ENTMCNC: 33.2 G/DL (ref 30–36.5)
MCV RBC AUTO: 92.8 FL (ref 80–99)
MONOCYTES # BLD: 0.7 K/UL (ref 0–1)
MONOCYTES NFR BLD: 10 % (ref 5–13)
NEUTS SEG # BLD: 3.7 K/UL (ref 1.8–8)
NEUTS SEG NFR BLD: 53 % (ref 32–75)
NITRITE UR QL STRIP.AUTO: NEGATIVE
NRBC # BLD: 0 K/UL (ref 0–0.01)
NRBC BLD-RTO: 0 PER 100 WBC
PH UR STRIP: 6 (ref 5–8)
PLATELET # BLD AUTO: 175 K/UL (ref 150–400)
PMV BLD AUTO: 10.4 FL (ref 8.9–12.9)
POTASSIUM SERPL-SCNC: 4.1 MMOL/L (ref 3.5–5.1)
PROT UR STRIP-MCNC: ABNORMAL MG/DL
RBC # BLD AUTO: 4.02 M/UL (ref 4.1–5.7)
RBC #/AREA URNS HPF: NORMAL /HPF (ref 0–5)
SERVICE CMNT-IMP: ABNORMAL
SODIUM SERPL-SCNC: 139 MMOL/L (ref 136–145)
SP GR UR REFRACTOMETRY: 1.02 (ref 1–1.03)
UROBILINOGEN UR QL STRIP.AUTO: 4 EU/DL (ref 0.2–1)
WBC # BLD AUTO: 7 K/UL (ref 4.1–11.1)
WBC URNS QL MICRO: NORMAL /HPF (ref 0–4)

## 2023-03-03 PROCEDURE — 85025 COMPLETE CBC W/AUTO DIFF WBC: CPT

## 2023-03-03 PROCEDURE — 82962 GLUCOSE BLOOD TEST: CPT

## 2023-03-03 PROCEDURE — 70450 CT HEAD/BRAIN W/O DYE: CPT

## 2023-03-03 PROCEDURE — 81001 URINALYSIS AUTO W/SCOPE: CPT

## 2023-03-03 PROCEDURE — 36415 COLL VENOUS BLD VENIPUNCTURE: CPT

## 2023-03-03 PROCEDURE — 80048 BASIC METABOLIC PNL TOTAL CA: CPT

## 2023-03-03 PROCEDURE — 99284 EMERGENCY DEPT VISIT MOD MDM: CPT

## 2023-03-03 PROCEDURE — 74011250636 HC RX REV CODE- 250/636: Performed by: EMERGENCY MEDICINE

## 2023-03-03 RX ORDER — SODIUM CHLORIDE 0.9 % (FLUSH) 0.9 %
5-10 SYRINGE (ML) INJECTION ONCE
Status: DISCONTINUED | OUTPATIENT
Start: 2023-03-03 | End: 2023-03-03 | Stop reason: HOSPADM

## 2023-03-03 RX ADMIN — SODIUM CHLORIDE 500 ML: 9 INJECTION, SOLUTION INTRAVENOUS at 13:00

## 2023-03-03 NOTE — ED PROVIDER NOTES
[place]       Pt Name: Kevin Smith  MRN: [id number]  Birthdate 12/30/1928  Date of evaluation: 3/3/2023  Provider: Ad Spivey. MD Scotty   PCP: Roland Ngo, NP  Note Started: 12:49 PM 3/3/23     CHIEF COMPLAINT       Chief Complaint   Patient presents with    Altered mental status        HISTORY OF PRESENT ILLNESS: 1 or more elements      History From: EMS  HPI Limitations : Patient Dylon Sevilla is a 80 y.o. male who presents via EMS for decreased responsiveness. According to paramedics who provided patient's history, he has not been responsive at his local facility with his patient aide today. They noticed his blood sugar at the facility was 330 so EMS rechecked her sugar and noted it to be 412. His vital signs were normal but he was only responsive to painful stimuli. He was nonverbal and not able to cooperate with a neurologic assessment by the paramedics. Of note, the medics did say that patient had a fall after leaving the hospital on the first when back at [facility] living. Nursing Notes were all reviewed and agreed with or any disagreements were addressed in the HPI. REVIEW OF SYSTEMS      Review of Systems   Unable to perform ROS: Acuity of condition      Positives and Pertinent negatives as per HPI.     PAST HISTORY     Past Medical History:  Past Medical History:   Diagnosis Date    Carotid artery disease ([address]     Coronary atherosclerosis of native coronary artery     Diabetes mellitus, type II (HCC)     DJD (degenerative joint disease)     HCVD (hypertensive cardiovascular disease)     Pure hypercholesterolemia        Past Surgical History:  Past Surgical History:   Procedure Laterality Date    HX CATARACT REMOVAL Bilateral     HX HEART CATHETERIZATION  2/2010    LVEDP 9, mild ant hypo EF 55-60%, LAD 70% 99%, mod D1, OM3 30%, PDA 90%    HX PTCA  2/2010    Stent-LAD 2.5X18 ANDREA    HX PTCA  3/2010 Stent PDA ANDREA       Family History:  Family History   Problem Relation Age of Onset    Heart Disease Mother     Cancer Father        Social History:  Social History     Tobacco Use    Smoking status: Former     Types: Cigarettes     Quit date: 10/17/1963     Years since quittin.4    Smokeless tobacco: Never   Vaping Use    Vaping Use: Never used   Substance Use Topics    Alcohol use: Not Currently     Comment: stopped 2018/used to drink heavy    Drug use: Never       Allergies: Allergies   Allergen Reactions    Aspirin Other (comments)     NOSE BLEED  MG IS TAKEN  Can take low dose aspirin    Pcn [Penicillins] Shortness of Breath     Itching/hives       CURRENT MEDICATIONS      Previous Medications    ACETAMINOPHEN (TYLENOL) 325 MG TABLET    Take 650 mg by mouth three (3) times daily. Indications: chronic back pain    ASPIRIN DELAYED-RELEASE 81 MG TABLET    Take 81 mg by mouth daily. ATORVASTATIN (LIPITOR) 40 MG TABLET    Take 1 Tab by mouth nightly. HYDRALAZINE (APRESOLINE) 25 MG TABLET    Take 1 Tablet by mouth two (2) times daily as needed for PRN Reason (Other) (blood pressure > 160/90). INSULIN GLARGINE (LANTUS) 100 UNIT/ML INJECTION    10 Units by SubCUTAneous route daily (with dinner). Given at 4pm (hold is BS < 180)    METOPROLOL SUCCINATE (TOPROL-XL) 25 MG XL TABLET    Take 0.5 Tablets by mouth daily. Indications: high blood pressure    MULTIVITAMIN (ONE A DAY) TABLET    Take 1 Tablet by mouth daily. POLYETHYLENE GLYCOL (MIRALAX) 17 GRAM PACKET    Take 17 g by mouth daily. QUETIAPINE (SEROQUEL) 25 MG TABLET    Take 25 mg by mouth nightly. TAMSULOSIN (FLOMAX) 0.4 MG CAPSULE    Take 1 Capsule by mouth nightly.         PHYSICAL EXAM      ED Triage Vitals [23 1248]   ED Encounter Vitals Group      BP (!) 167/62      Pulse (Heart Rate) 67      Resp Rate 15      Temp 97.7 °F (36.5 °C)      Temp src       O2 Sat (%) 99 %      Weight 157 lb      Height         Physical Exam  Vitals and nursing note reviewed. Constitutional:       Appearance: He is well-developed. He is not diaphoretic. Comments: Elderly male laying comfortably, with slightly elevated systolic pressure, in minimal acute distress   HENT:      Head: Normocephalic and atraumatic. Mouth/Throat:      Comments: Tongue without evidence of abrasion/laceration or edema. Mucous membranes appear slightly moist  Eyes:      General:         Right eye: No discharge. Left eye: No discharge. Conjunctiva/sclera: Conjunctivae normal.      Pupils: Pupils are equal, round, and reactive to light. Comments: Pupils pinpoint bilaterally   Cardiovascular:      Rate and Rhythm: Normal rate and regular rhythm. Heart sounds: Normal heart sounds. No murmur heard. No friction rub. No gallop. Pulmonary:      Effort: Pulmonary effort is normal. No respiratory distress. Breath sounds: Normal breath sounds. No wheezing, rhonchi or rales. Chest:      Chest wall: No tenderness. Abdominal:      General: Bowel sounds are normal. There is no distension. Palpations: Abdomen is soft. Tenderness: There is no abdominal tenderness. Musculoskeletal:         General: Normal range of motion. Cervical back: Normal range of motion and neck supple. Skin:     General: Skin is warm and dry. Findings: No rash. Comments: Healing bruises noted to the right infraorbital and left maxillary region. I do not see any evidence of acute bruising, abrasions, lacerations. Neurological:      Mental Status: He is unresponsive. GCS: GCS eye subscore is 1. GCS verbal subscore is 1. GCS motor subscore is 5. Motor: No abnormal muscle tone.         DIAGNOSTIC RESULTS   LABS:     Recent Results (from the past 12 hour(s))   GLUCOSE, POC    Collection Time: 03/03/23 12:48 PM   Result Value Ref Range    Glucose (POC) 257 (H) 65 - 117 mg/dL    Performed by Selina Friedman)    CBC WITH AUTOMATED DIFF    Collection Time: 03/03/23 12:50 PM   Result Value Ref Range    WBC 7.0 4.1 - 11.1 K/uL    RBC 4.02 (L) 4.10 - 5.70 M/uL    HGB 12.4 12.1 - 17.0 g/dL    HCT 37.3 36.6 - 50.3 %    MCV 92.8 80.0 - 99.0 FL    MCH 30.8 26.0 - 34.0 PG    MCHC 33.2 30.0 - 36.5 g/dL    RDW 13.1 11.5 - 14.5 %    PLATELET 434 943 - 997 K/uL    MPV 10.4 8.9 - 12.9 FL    NRBC 0.0 0  WBC    ABSOLUTE NRBC 0.00 0.00 - 0.01 K/uL    NEUTROPHILS 53 32 - 75 %    LYMPHOCYTES 36 12 - 49 %    MONOCYTES 10 5 - 13 %    EOSINOPHILS 1 0 - 7 %    BASOPHILS 0 0 - 1 %    IMMATURE GRANULOCYTES 0 0.0 - 0.5 %    ABS. NEUTROPHILS 3.7 1.8 - 8.0 K/UL    ABS. LYMPHOCYTES 2.5 0.8 - 3.5 K/UL    ABS. MONOCYTES 0.7 0.0 - 1.0 K/UL    ABS. EOSINOPHILS 0.1 0.0 - 0.4 K/UL    ABS. BASOPHILS 0.0 0.0 - 0.1 K/UL    ABS. IMM.  GRANS. 0.0 0.00 - 0.04 K/UL    DF AUTOMATED     METABOLIC PANEL, BASIC    Collection Time: 03/03/23 12:50 PM   Result Value Ref Range    Sodium 139 136 - 145 mmol/L    Potassium 4.1 3.5 - 5.1 mmol/L    Chloride 103 97 - 108 mmol/L    CO2 28 21 - 32 mmol/L    Anion gap 8 5 - 15 mmol/L    Glucose 297 (H) 65 - 100 mg/dL    BUN 27 (H) 6 - 20 MG/DL    Creatinine 1.39 (H) 0.70 - 1.30 MG/DL    BUN/Creatinine ratio 19 12 - 20      eGFR 47 (L) >60 ml/min/1.73m2    Calcium 8.9 8.5 - 10.1 MG/DL   URINALYSIS W/ RFLX MICROSCOPIC    Collection Time: 03/03/23  1:02 PM   Result Value Ref Range    Color YELLOW/STRAW      Appearance CLEAR CLEAR      Specific gravity 1.020 1.003 - 1.030      pH (UA) 6.0 5.0 - 8.0      Protein TRACE (A) NEG mg/dL    Glucose >1,000 (A) NEG mg/dL    Ketone Negative NEG mg/dL    Bilirubin Negative NEG      Blood Negative NEG      Urobilinogen 4.0 (H) 0.2 - 1.0 EU/dL    Nitrites Negative NEG      Leukocyte Esterase Negative NEG     URINE MICROSCOPIC ONLY    Collection Time: 03/03/23  1:02 PM   Result Value Ref Range    WBC 0-4 0 - 4 /hpf    RBC 0-5 0 - 5 /hpf    Epithelial cells FEW FEW /lpf    Bacteria Negative NEG /hpf RADIOLOGY:     CT HEAD WO CONT    Result Date: 3/3/2023  CLINICAL HISTORY: fall; AMS INDICATION: fall; AMS COMPARISON: None. CT dose reduction was achieved through use of a standardized protocol tailored for this examination and automatic exposure control for dose modulation. TECHNIQUE: Serial axial images with a collimation of 5 mm were obtained from the skull base through the vertex  FINDINGS: There is sulcal and ventricular prominence. Confluent periventricular and scattered foci of hypodensity in the cerebral white matter. There is no evidence of an acute infarction, hemorrhage, or mass-effect. There is no evidence of midline shift or hydrocephalus. Posterior fossa structures are unremarkable. No extra-axial collections are seen. Mastoid air cells are well pneumatized and clear. Left frontal sinus disease. No acute intracranial process. Imaging findings consistent with mild chronic microvascular ischemic change. There is a moderate degree of cerebral atrophy.          PROCEDURES   Unless otherwise noted below, none  Procedures     CRITICAL CARE TIME   0    EMERGENCY DEPARTMENT COURSE and DIFFERENTIAL DIAGNOSIS/MDM   Vitals:    Vitals:    03/03/23 1245 03/03/23 1248 03/03/23 1300 03/03/23 1330   BP: (!) 167/62 (!) 167/62 (!) 180/81 (!) 189/71   Pulse: 72 67 74 74   Resp: 17 15 18 20   Temp:  97.7 °F (36.5 °C)     SpO2: 100% 99% 100% 99%   Weight:  71.2 kg (157 lb)          Patient was given the following medications:  Medications   sodium chloride (NS) flush 5-10 mL (has no administration in time range)   sodium chloride 0.9 % bolus infusion 500 mL (500 mL IntraVENous New Bag 3/3/23 1300)       CONSULTS: (Who and What was discussed)  None    Chronic Conditions: CAD, diabetes, high cholesterol, hypertension    Social Determinants affecting Dx or Tx: None    Records Reviewed (source and summary of external notes): Prior medical records, Previous Radiology studies, Previous Laboratory studies, Nursing notes, and EMS run sheet    CC/HPI Summary, DDx, ED Course, and Reassessment: Elderly male brought from facility for decreased responsiveness. I do not see evidence of acute trauma on exam today. Patient is a DNR and GCS is less than 9 so we will hold intubation. Full evaluation evaluation to be initiated for trauma and electrolyte derangements    ED Course as of 03/03/23 1444   Fri Mar 03, 2023   1320 Chemistry reviewed and notable for mild GINO compared to a few days ago. Sodium however not concerning and CBC is normal.  Await urinalysis and CT scan [JT]   1339 CT reviewed without acute bleed and his urinalysis is clear without infection. We will continue IV fluid infusion and monitoring [JT]   1351 Patient now awake and answering questions. We were able to get him out of bed, getting dressed and he is able to ambulate with 2 assistance down the hallway. One of the assistance was with him when he was discharged in the hospital 2 days ago and she states this is how he was walking at that time. Patient to stay in a chair, will provide a lunch snack and continue monitoring. [JT]      ED Course User Index  [JT] Ling Carnes MD       Disposition Considerations (Tests not done, Shared Decision Making, Pt Expectation of Test or Tx.): Elderly male sent from his local facility for decreased responsiveness who has now awakened, is acting appropriate, is asking appropriate questions and is quite with it. Discussed this daughter that patient is doing well, appears appropriate, does not have new injury and is ready to go home. Patient daughter notes that family is trying to get him into the memory care unit at [facility] but the director has been out on vacation for weeks to they have been unsuccessful. They have an appointment with her on Tuesday. In the meantime explained if they should have any further needs to bring patient back to the hospital for evaluation. FINAL IMPRESSION     1.  Altered mental status, unspecified altered mental status type    2. Elevated blood pressure reading          DISPOSITION/PLAN   Discharged    Discharge Note: The patient is stable for discharge home. The signs, symptoms, diagnosis, and discharge instructions have been discussed, understanding conveyed, and agreed upon. The patient is to follow up as recommended or return to ER should their symptoms worsen. PATIENT REFERRED TO:  Follow-up Information    None           DISCHARGE MEDICATIONS:  Current Discharge Medication List            DISCONTINUED MEDICATIONS:  Current Discharge Medication List          I am the Primary Clinician of Record. Yossi Alcazar. MD Scotty (electronically signed)    (Please note that parts of this dictation were completed with voice recognition software. Quite often unanticipated grammatical, syntax, homophones, and other interpretive errors are inadvertently transcribed by the computer software. Please disregards these errors.  Please excuse any errors that have escaped final proofreading.)

## 2023-03-03 NOTE — ED NOTES
Written and verbal discharge instructions reviewed with staff. . Understanding verbalized, all questions answered.

## 2023-03-03 NOTE — ED NOTES
TRANSFER - OUT REPORT:    Verbal report given to Valerio Berger Director and [facility] [place] on Flaquita Mccall  being transferred to [facility] SELMA DOHERTY  For Discharge Home       Report consisted of patients Situation, Background, Assessment and   Recommendations(SBAR). Information from the following report(s) SBAR, ED Summary, Intake/Output and Recent Results was reviewed with the receiving nurse. Lines:   Peripheral IV 03/03/23 Right Antecubital (Active)   Site Assessment Clean, dry, & intact 03/03/23 1254   Phlebitis Assessment 0 03/03/23 1254   Infiltration Assessment 4 03/03/23 1254   Hub Color/Line Status Capped; Infusing;Green 03/03/23 1254   Action Taken Blood drawn 03/03/23 1254        Opportunity for questions and clarification was provided.

## 2023-03-03 NOTE — ED TRIAGE NOTES
Arrived via rescue for AMS- pt eyes closed, flinches to pain, opened mouth and held thermometer for temp, hx of recent falls.

## 2023-03-22 ENCOUNTER — APPOINTMENT (OUTPATIENT)
Dept: CT IMAGING | Age: 88
End: 2023-03-22
Attending: EMERGENCY MEDICINE
Payer: MEDICARE

## 2023-03-22 ENCOUNTER — APPOINTMENT (OUTPATIENT)
Dept: GENERAL RADIOLOGY | Age: 88
End: 2023-03-22
Attending: EMERGENCY MEDICINE
Payer: MEDICARE

## 2023-03-22 ENCOUNTER — HOSPITAL ENCOUNTER (EMERGENCY)
Age: 88
Discharge: HOME | End: 2023-03-22
Attending: EMERGENCY MEDICINE
Payer: MEDICARE

## 2023-03-22 VITALS
DIASTOLIC BLOOD PRESSURE: 76 MMHG | BODY MASS INDEX: 21.7 KG/M2 | TEMPERATURE: 97.6 F | WEIGHT: 160.2 LBS | HEART RATE: 82 BPM | SYSTOLIC BLOOD PRESSURE: 148 MMHG | RESPIRATION RATE: 16 BRPM | OXYGEN SATURATION: 100 % | HEIGHT: 72 IN

## 2023-03-22 DIAGNOSIS — S09.90XA CLOSED HEAD INJURY, INITIAL ENCOUNTER: ICD-10-CM

## 2023-03-22 DIAGNOSIS — S00.83XA CONTUSION OF FACE, INITIAL ENCOUNTER: Primary | ICD-10-CM

## 2023-03-22 DIAGNOSIS — J01.00 ACUTE MAXILLARY SINUSITIS, RECURRENCE NOT SPECIFIED: ICD-10-CM

## 2023-03-22 LAB
ALBUMIN SERPL-MCNC: 3.5 G/DL (ref 3.5–5)
ALBUMIN/GLOB SERPL: 1.1 (ref 1.1–2.2)
ALP SERPL-CCNC: 122 U/L (ref 45–117)
ALT SERPL-CCNC: 36 U/L (ref 12–78)
ANION GAP SERPL CALC-SCNC: 13 MMOL/L (ref 5–15)
APPEARANCE UR: CLEAR
AST SERPL-CCNC: 23 U/L (ref 15–37)
BACTERIA URNS QL MICRO: NEGATIVE /HPF
BASOPHILS # BLD: 0 K/UL (ref 0–0.1)
BASOPHILS NFR BLD: 0 % (ref 0–1)
BILIRUB SERPL-MCNC: 0.8 MG/DL (ref 0.2–1)
BILIRUB UR QL: NEGATIVE
BUN SERPL-MCNC: 25 MG/DL (ref 6–20)
BUN/CREAT SERPL: 18 (ref 12–20)
CALCIUM SERPL-MCNC: 9.2 MG/DL (ref 8.5–10.1)
CHLORIDE SERPL-SCNC: 104 MMOL/L (ref 97–108)
CO2 SERPL-SCNC: 24 MMOL/L (ref 21–32)
COLOR UR: ABNORMAL
CREAT SERPL-MCNC: 1.4 MG/DL (ref 0.7–1.3)
DIFFERENTIAL METHOD BLD: ABNORMAL
EOSINOPHIL # BLD: 0 K/UL (ref 0–0.4)
EOSINOPHIL NFR BLD: 0 % (ref 0–7)
EPITH CASTS URNS QL MICRO: ABNORMAL /LPF
ERYTHROCYTE [DISTWIDTH] IN BLOOD BY AUTOMATED COUNT: 12.9 % (ref 11.5–14.5)
GLOBULIN SER CALC-MCNC: 3.1 G/DL (ref 2–4)
GLUCOSE SERPL-MCNC: 275 MG/DL (ref 65–100)
GLUCOSE UR STRIP.AUTO-MCNC: 500 MG/DL
HCT VFR BLD AUTO: 35.9 % (ref 36.6–50.3)
HGB BLD-MCNC: 11.8 G/DL (ref 12.1–17)
HGB UR QL STRIP: ABNORMAL
IMM GRANULOCYTES # BLD AUTO: 0.1 K/UL (ref 0–0.04)
IMM GRANULOCYTES NFR BLD AUTO: 1 % (ref 0–0.5)
KETONES UR QL STRIP.AUTO: 15 MG/DL
LEUKOCYTE ESTERASE UR QL STRIP.AUTO: NEGATIVE
LYMPHOCYTES # BLD: 0.7 K/UL (ref 0.8–3.5)
LYMPHOCYTES NFR BLD: 5 % (ref 12–49)
MAGNESIUM SERPL-MCNC: 1.5 MG/DL (ref 1.6–2.4)
MCH RBC QN AUTO: 30.3 PG (ref 26–34)
MCHC RBC AUTO-ENTMCNC: 32.9 G/DL (ref 30–36.5)
MCV RBC AUTO: 92.1 FL (ref 80–99)
MONOCYTES # BLD: 0.8 K/UL (ref 0–1)
MONOCYTES NFR BLD: 6 % (ref 5–13)
NEUTS SEG # BLD: 12.3 K/UL (ref 1.8–8)
NEUTS SEG NFR BLD: 88 % (ref 32–75)
NITRITE UR QL STRIP.AUTO: NEGATIVE
NRBC # BLD: 0 K/UL (ref 0–0.01)
NRBC BLD-RTO: 0 PER 100 WBC
PH UR STRIP: 6 (ref 5–8)
PLATELET # BLD AUTO: 154 K/UL (ref 150–400)
PMV BLD AUTO: 10.3 FL (ref 8.9–12.9)
POTASSIUM SERPL-SCNC: 3.8 MMOL/L (ref 3.5–5.1)
PROT SERPL-MCNC: 6.6 G/DL (ref 6.4–8.2)
PROT UR STRIP-MCNC: 30 MG/DL
RBC # BLD AUTO: 3.9 M/UL (ref 4.1–5.7)
RBC #/AREA URNS HPF: ABNORMAL /HPF (ref 0–5)
RBC MORPH BLD: ABNORMAL
SODIUM SERPL-SCNC: 141 MMOL/L (ref 136–145)
SP GR UR REFRACTOMETRY: 1.02 (ref 1–1.03)
UA: UC IF INDICATED,UAUC: ABNORMAL
UROBILINOGEN UR QL STRIP.AUTO: 0.2 EU/DL (ref 0.2–1)
WBC # BLD AUTO: 13.9 K/UL (ref 4.1–11.1)
WBC URNS QL MICRO: ABNORMAL /HPF (ref 0–4)

## 2023-03-22 PROCEDURE — 51701 INSERT BLADDER CATHETER: CPT

## 2023-03-22 PROCEDURE — 36415 COLL VENOUS BLD VENIPUNCTURE: CPT

## 2023-03-22 PROCEDURE — 85025 COMPLETE CBC W/AUTO DIFF WBC: CPT

## 2023-03-22 PROCEDURE — 74011250637 HC RX REV CODE- 250/637: Performed by: EMERGENCY MEDICINE

## 2023-03-22 PROCEDURE — 70450 CT HEAD/BRAIN W/O DYE: CPT

## 2023-03-22 PROCEDURE — 99285 EMERGENCY DEPT VISIT HI MDM: CPT

## 2023-03-22 PROCEDURE — 77030019903 HC CATH URET INT BARD -A

## 2023-03-22 PROCEDURE — 71045 X-RAY EXAM CHEST 1 VIEW: CPT

## 2023-03-22 PROCEDURE — 81001 URINALYSIS AUTO W/SCOPE: CPT

## 2023-03-22 PROCEDURE — 70486 CT MAXILLOFACIAL W/O DYE: CPT

## 2023-03-22 PROCEDURE — 74011250636 HC RX REV CODE- 250/636: Performed by: EMERGENCY MEDICINE

## 2023-03-22 PROCEDURE — 83735 ASSAY OF MAGNESIUM: CPT

## 2023-03-22 PROCEDURE — 99284 EMERGENCY DEPT VISIT MOD MDM: CPT

## 2023-03-22 PROCEDURE — 80053 COMPREHEN METABOLIC PANEL: CPT

## 2023-03-22 RX ORDER — CEPHALEXIN 250 MG/1
500 CAPSULE ORAL
Status: COMPLETED | OUTPATIENT
Start: 2023-03-22 | End: 2023-03-22

## 2023-03-22 RX ORDER — CEFDINIR 300 MG/1
300 CAPSULE ORAL 2 TIMES DAILY
Qty: 20 CAPSULE | Refills: 0 | Status: SHIPPED | OUTPATIENT
Start: 2023-03-22

## 2023-03-22 RX ADMIN — SODIUM CHLORIDE 500 ML: 9 INJECTION, SOLUTION INTRAVENOUS at 08:10

## 2023-03-22 RX ADMIN — CEPHALEXIN 500 MG: 250 CAPSULE ORAL at 09:41

## 2023-03-22 NOTE — ED TRIAGE NOTES
Observed rolling our of bed struck face on the floor. More sleepy than usual. ? Facial droop per nursing home staff.  Laceration to nose

## 2023-03-22 NOTE — ED NOTES
[facility] staff here to  patient discharged with staff via their wheelchair.  Patient clean and dry

## 2023-03-22 NOTE — ED NOTES
I have reviewed discharge instructions with the caregiver Richardbrandin Rizzojaclyn. The caregiver verbalized understanding. Discharge medications discussed with patient. No questions at this time.  Awaiting [facility] to  pateint

## 2023-03-22 NOTE — ED PROVIDER NOTES
[place]       Pt Name: Sandra Melgoza  MRN: [id number]  Birthdate 12/30/1928  Date of evaluation: 3/22/2023  Provider: Rick Oates DO   PCP: Kingsley Fontanez NP  Note Started: 7:50 AM 3/22/23     CHIEF COMPLAINT       Chief Complaint   Patient presents with    Fall        HISTORY OF PRESENT ILLNESS: 1 or more elements      History From: Patient/EMS, History limited by: Dementia      Sandra Melgoza is a 80 y.o. male presents by EMS from nursing home secondary to ground-level fall. Patient was noted to have rolled out of the bed hitting the right side of his face. Unknown loss of consciousness. However per EMS nursing home was reported that he seems to be much more somnolent this morning. There have been no recent fever or chills. Patient denies any pain or discomfort. There have been no reports of any vomiting or diarrhea. There have been no reports of any cough or cold symptoms. Please See MDM for Additional Details of the HPI/PMH  Nursing Notes were all reviewed and agreed with or any disagreements were addressed in the HPI. REVIEW OF SYSTEMS        Positives and Pertinent negatives as per HPI.     PAST HISTORY     Past Medical History:  Past Medical History:   Diagnosis Date    Carotid artery disease ([address]     Coronary atherosclerosis of native coronary artery     Diabetes mellitus, type II (HCC)     DJD (degenerative joint disease)     HCVD (hypertensive cardiovascular disease)     Pure hypercholesterolemia        Past Surgical History:  Past Surgical History:   Procedure Laterality Date    HX CATARACT REMOVAL Bilateral     HX HEART CATHETERIZATION  2/2010    LVEDP 9, mild ant hypo EF 55-60%, LAD 70% 99%, mod D1, OM3 30%, PDA 90%    HX PTCA  2/2010    Stent-LAD 2.5X18 ANDREA    HX PTCA  3/2010    Stent PDA ANDREA       Family History:  Family History   Problem Relation Age of Onset    Heart Disease Mother     Cancer Father        Social History:  Social History     Tobacco Use    Smoking status: Former     Types: Cigarettes     Quit date: 10/17/1963     Years since quittin.4    Smokeless tobacco: Never   Vaping Use    Vaping Use: Never used   Substance Use Topics    Alcohol use: Not Currently     Comment: stopped 2018/used to drink heavy    Drug use: Never       Allergies: Allergies   Allergen Reactions    Aspirin Other (comments)     NOSE BLEED  MG IS TAKEN  Can take low dose aspirin    Pcn [Penicillins] Shortness of Breath     Itching/hives       CURRENT MEDICATIONS      Previous Medications    ACETAMINOPHEN (TYLENOL) 325 MG TABLET    Take 650 mg by mouth three (3) times daily. Indications: chronic back pain    ASPIRIN DELAYED-RELEASE 81 MG TABLET    Take 81 mg by mouth daily. ATORVASTATIN (LIPITOR) 40 MG TABLET    Take 1 Tab by mouth nightly. HYDRALAZINE (APRESOLINE) 25 MG TABLET    Take 1 Tablet by mouth two (2) times daily as needed for PRN Reason (Other) (blood pressure > 160/90). INSULIN GLARGINE (LANTUS) 100 UNIT/ML INJECTION    10 Units by SubCUTAneous route daily (with dinner). Given at 4pm (hold is BS < 180)    METOPROLOL SUCCINATE (TOPROL-XL) 25 MG XL TABLET    Take 0.5 Tablets by mouth daily. Indications: high blood pressure    MULTIVITAMIN (ONE A DAY) TABLET    Take 1 Tablet by mouth daily. POLYETHYLENE GLYCOL (MIRALAX) 17 GRAM PACKET    Take 17 g by mouth daily. QUETIAPINE (SEROQUEL) 25 MG TABLET    Take 25 mg by mouth nightly. TAMSULOSIN (FLOMAX) 0.4 MG CAPSULE    Take 1 Capsule by mouth nightly. SCREENINGS               No data recorded         PHYSICAL EXAM      ED Triage Vitals [23 0712]   ED Encounter Vitals Group      BP (!) 118/43      Pulse (Heart Rate) 85      Resp Rate 14      Temp 97.6 °F (36.4 °C)      Temp src       O2 Sat (%) 99 %      Weight 160 lb 3.2 oz      Height 6'        Physical Exam  Vitals and nursing note reviewed.    Constitutional:       General: He is not in acute distress. Appearance: He is well-developed. He is not diaphoretic. HENT:      Head: Normocephalic. Comments: Swelling to the right side of face, small abrasion to bridge of nose     Mouth/Throat:      Mouth: Mucous membranes are moist.      Pharynx: No oropharyngeal exudate. Eyes:      Extraocular Movements: Extraocular movements intact. Conjunctiva/sclera: Conjunctivae normal.      Pupils: Pupils are equal, round, and reactive to light. Neck:      Vascular: No JVD. Trachea: No tracheal deviation. Cardiovascular:      Rate and Rhythm: Normal rate and regular rhythm. Heart sounds: Normal heart sounds. No murmur heard. Pulmonary:      Effort: Pulmonary effort is normal. No respiratory distress. Breath sounds: Normal breath sounds. No stridor. No wheezing or rales. Abdominal:      General: There is no distension. Palpations: Abdomen is soft. Tenderness: There is no abdominal tenderness. There is no guarding or rebound. Musculoskeletal:         General: No tenderness. Normal range of motion. Cervical back: Normal range of motion and neck supple. Comments: No C/T/L spine ttp, no ttp to [place] upper and lower extremities   Skin:     General: Skin is warm and dry. Capillary Refill: Capillary refill takes less than 2 seconds. Neurological:      Mental Status: He is alert. Mental status is at baseline. He is disoriented. Cranial Nerves: No cranial nerve deficit.       Comments: No gross motor or sensory deficits    Psychiatric:         Mood and Affect: Mood normal.         Behavior: Behavior normal.          DIAGNOSTIC RESULTS   LABS:     Recent Results (from the past 12 hour(s))   CBC WITH AUTOMATED DIFF    Collection Time: 03/22/23  7:23 AM   Result Value Ref Range    WBC 13.9 (H) 4.1 - 11.1 K/uL    RBC 3.90 (L) 4.10 - 5.70 M/uL    HGB 11.8 (L) 12.1 - 17.0 g/dL    HCT 35.9 (L) 36.6 - 50.3 %    MCV 92.1 80.0 - 99.0 FL    MCH 30.3 26.0 - 34.0 PG    MCHC 32.9 30.0 - 36.5 g/dL    RDW 12.9 11.5 - 14.5 %    PLATELET 856 119 - 842 K/uL    MPV 10.3 8.9 - 12.9 FL    NRBC 0.0 0  WBC    ABSOLUTE NRBC 0.00 0.00 - 0.01 K/uL    NEUTROPHILS PENDING %    LYMPHOCYTES PENDING %    MONOCYTES PENDING %    EOSINOPHILS PENDING %    BASOPHILS PENDING %    IMMATURE GRANULOCYTES PENDING %    ABS. NEUTROPHILS PENDING K/UL    ABS. LYMPHOCYTES PENDING K/UL    ABS. MONOCYTES PENDING K/UL    ABS. EOSINOPHILS PENDING K/UL    ABS. BASOPHILS PENDING K/UL    ABS. IMM. GRANS. PENDING K/UL    DF PENDING    METABOLIC PANEL, COMPREHENSIVE    Collection Time: 03/22/23  7:23 AM   Result Value Ref Range    Sodium 141 136 - 145 mmol/L    Potassium 3.8 3.5 - 5.1 mmol/L    Chloride 104 97 - 108 mmol/L    CO2 24 21 - 32 mmol/L    Anion gap 13 5 - 15 mmol/L    Glucose 275 (H) 65 - 100 mg/dL    BUN 25 (H) 6 - 20 MG/DL    Creatinine 1.40 (H) 0.70 - 1.30 MG/DL    BUN/Creatinine ratio 18 12 - 20      eGFR 47 (L) >60 ml/min/1.73m2    Calcium 9.2 8.5 - 10.1 MG/DL    Bilirubin, total 0.8 0.2 - 1.0 MG/DL    ALT (SGPT) 36 12 - 78 U/L    AST (SGOT) 23 15 - 37 U/L    Alk. phosphatase 122 (H) 45 - 117 U/L    Protein, total 6.6 6.4 - 8.2 g/dL    Albumin 3.5 3.5 - 5.0 g/dL    Globulin 3.1 2.0 - 4.0 g/dL    A-G Ratio 1.1 1.1 - 2.2     MAGNESIUM    Collection Time: 03/22/23  7:23 AM   Result Value Ref Range    Magnesium 1.5 (L) 1.6 - 2.4 mg/dL        EKG: If performed, independent interpretation documented below in the MDM section     RADIOLOGY:  Non-plain film images such as CT, Ultrasound and MRI are read by the radiologist. Plain radiographic images are visualized and preliminarily interpreted by the ED Provider with the findings documented in the MDM section. Interpretation per the Radiologist below, if available at the time of this note:     XR CHEST SNGL V    Result Date: 3/22/2023  EXAM:  XR CHEST SNGL V INDICATION: Fall out of bed this morning.  Coronary artery disease, carotid artery disease, and diabetes. COMPARISON: Portable chest on 2/26/2023 and 12/15/2022. TECHNIQUE: Semiupright portable chest AP view FINDINGS: Cardiac monitoring wires overlie the thorax. The cardiomediastinal and hilar contours are within normal limits. The pulmonary vasculature is within normal limits. The lungs and pleural spaces are clear. Bones are osteopenic. Chondral cartilage is partially calcified. Upper abdomen is within normal limits. No acute process on portable chest. No change. CT HEAD WO CONT    Result Date: 3/22/2023  CLINICAL HISTORY: fall out of bed INDICATION: fall out of bed COMPARISON: 3/3/2023. CT dose reduction was achieved through use of a standardized protocol tailored for this examination and automatic exposure control for dose modulation. TECHNIQUE: Serial axial images with a collimation of 5 mm were obtained from the skull base through the vertex  FINDINGS: There is sulcal and ventricular prominence. Confluent periventricular and scattered foci of hypodensity in the cerebral white matter. There is no evidence of an acute infarction, hemorrhage, or mass-effect. There is no evidence of midline shift or hydrocephalus. Posterior fossa structures are unremarkable. No extra-axial collections are seen. Mastoid air cells are well pneumatized and clear. There is no evidence of depressed skull fractures of soft tissue swelling. No acute intracranial process. Imaging findings consistent with mild chronic microvascular ischemic change. There is a moderate degree of cerebral atrophy. CT MAXILLOFACIAL WO CONT    Result Date: 3/22/2023  EXAM: CT MAXILLOFACIAL WO CONT INDICATION: right side facial sdweelling, s/p fall COMPARISON: None. CONTRAST:   None. TECHNIQUE:  Multislice helical CT of the facial bones was performed in the axial plane without intravenous contrast administration. Coronal and sagittal reformations were generated.   CT dose reduction was achieved through use of a standardized protocol tailored for this examination and automatic exposure control for dose modulation. FINDINGS: Bones: There is no fracture or other osseous abnormality Paranasal sinuses: Clear Orbits: The globes, optic nerves, and extraocular muscles are within normal limits. Base of brain and soft tissues: Soft tissue edema abutting the right orbit. There is left frontal sinus disease. There is mild maxillary sinus disease on the right and on the left. There is fluid in the mastoid air cells on the left. Miscellaneous: N/A     No acute fracture or dislocation. Left frontal and maxillary sinus disease. PROCEDURES   Unless otherwise noted below, none  Procedures     CRITICAL CARE TIME   none    EMERGENCY DEPARTMENT COURSE and DIFFERENTIAL DIAGNOSIS/MDM   Vitals:    Vitals:    03/22/23 0712   BP: (!) 118/43   Pulse: 85   Resp: 14   Temp: 97.6 °F (36.4 °C)   SpO2: 99%   Weight: 72.7 kg (160 lb 3.2 oz)   Height: 6' (1.829 m)        Patient was given the following medications:  Medications   sodium chloride 0.9 % bolus infusion 500 mL (has no administration in time range)       Medical Decision Making  Amount and/or Complexity of Data Reviewed  Labs: ordered. Radiology: ordered. Risk  Prescription drug management. Patient with 2 recent ED visits 1 on 2/26/2023 and 1 on 3/3/2023 both for altered mental status and falls. During both of these visits once he was admitted the other time he was discharged from the ED where patient's somnolence improved after receiving IV fluids. Will obtain lab work to evaluate for anemia leukocytosis. We will send off metabolic panel evaluate for electrolytes in addition to renal functions. We will obtain a CT of the head and face given facial swelling will obtain a urinalysis to see if there is any signs urinary tract infection. Pt CT's no fx, frontal and maxillary sinus congestion which could potentiate falls       FINAL IMPRESSION     1. Contusion of face, initial encounter    2. Closed head injury, initial encounter    3. Acute maxillary sinusitis, recurrence not specified          DISPOSITION/PLAN   Evie Nixon's  results have been reviewed with him. He has been counseled regarding his diagnosis, treatment, and plan. He verbally conveys understanding and agreement of the signs, symptoms, diagnosis, treatment and prognosis and additionally agrees to follow up as discussed. He also agrees with the care-plan and conveys that all of his questions have been answered. I have also provided discharge instructions for him that include: educational information regarding their diagnosis and treatment, and list of reasons why they would want to return to the ED prior to their follow-up appointment, should his condition change. CLINICAL IMPRESSION    Discharge Note: The patient is stable for discharge home. The signs, symptoms, diagnosis, and discharge instructions have been discussed, understanding conveyed, and agreed upon. The patient is to follow up as recommended or return to ER should their symptoms worsen. PATIENT REFERRED TO:  Follow-up Information    None           DISCHARGE MEDICATIONS:  Discharge Medication List as of 3/22/2023  9:39 AM        START taking these medications    Details   cefdinir (OMNICEF) 300 mg capsule Take 1 Capsule by mouth two (2) times a day., Print, Disp-20 Capsule, R-0           CONTINUE these medications which have NOT CHANGED    Details   metoprolol succinate (TOPROL-XL) 25 mg XL tablet Take 0.5 Tablets by mouth daily. Indications: high blood pressure, No Print, Disp-15 Tablet, R-0      insulin glargine (LANTUS) 100 unit/mL injection 10 Units by SubCUTAneous route daily (with dinner). Given at 4pm (hold is BS < 180), No Print, Disp-3 mL, R-0      hydrALAZINE (APRESOLINE) 25 mg tablet Take 1 Tablet by mouth two (2) times daily as needed for PRN Reason (Other) (blood pressure > 160/90). , Historical Med      tamsulosin (FLOMAX) 0.4 mg capsule Take 1 Capsule by mouth nightly., No Print, Disp-1 Capsule, R-0      QUEtiapine (SEROquel) 25 mg tablet Take 25 mg by mouth nightly., Historical Med      multivitamin (ONE A DAY) tablet Take 1 Tablet by mouth daily. , Historical Med      polyethylene glycol (MIRALAX) 17 gram packet Take 17 g by mouth daily. , Historical Med      acetaminophen (TYLENOL) 325 mg tablet Take 650 mg by mouth three (3) times daily. Indications: chronic back pain, OTC      atorvastatin (LIPITOR) 40 mg tablet Take 1 Tab by mouth nightly., Normal, Disp-90 Tab, R-1      aspirin delayed-release 81 mg tablet Take 81 mg by mouth daily. , Historical Med               DISCONTINUED MEDICATIONS:  Current Discharge Medication List          I am the Primary Clinician of Record. Bradley Castellanos DO (electronically signed)    (Please note that parts of this dictation were completed with voice recognition software. Quite often unanticipated grammatical, syntax, homophones, and other interpretive errors are inadvertently transcribed by the computer software. Please disregards these errors.  Please excuse any errors that have escaped final proofreading.)
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
## 2023-05-11 ENCOUNTER — HOSPITAL ENCOUNTER (EMERGENCY)
Facility: HOSPITAL | Age: 88
Discharge: HOME | End: 2023-05-11
Attending: EMERGENCY MEDICINE
Payer: MEDICARE

## 2023-05-11 ENCOUNTER — APPOINTMENT (OUTPATIENT)
Facility: HOSPITAL | Age: 88
End: 2023-05-11
Payer: MEDICARE

## 2023-05-11 VITALS
RESPIRATION RATE: 18 BRPM | HEART RATE: 65 BPM | DIASTOLIC BLOOD PRESSURE: 77 MMHG | TEMPERATURE: 98.2 F | SYSTOLIC BLOOD PRESSURE: 172 MMHG | WEIGHT: 166 LBS | BODY MASS INDEX: 22.51 KG/M2 | OXYGEN SATURATION: 98 %

## 2023-05-11 DIAGNOSIS — S09.90XA INJURY OF HEAD, INITIAL ENCOUNTER: Primary | ICD-10-CM

## 2023-05-11 PROCEDURE — 72125 CT NECK SPINE W/O DYE: CPT

## 2023-05-11 PROCEDURE — 70450 CT HEAD/BRAIN W/O DYE: CPT

## 2023-05-11 PROCEDURE — 99284 EMERGENCY DEPT VISIT MOD MDM: CPT

## 2023-05-11 ASSESSMENT — PAIN - FUNCTIONAL ASSESSMENT: PAIN_FUNCTIONAL_ASSESSMENT: NONE - DENIES PAIN

## 2023-05-11 NOTE — ED PROVIDER NOTES
home. The signs, symptoms, diagnosis, and discharge instructions have been discussed, understanding conveyed, and agreed upon. The patient is to follow up as recommended or return to ER should their symptoms worsen. PATIENT REFERRED TO:  AME Nicholas NP  [id number]  [phone]      As needed         DISCHARGE MEDICATIONS:     Medication List        ASK your doctor about these medications      acetaminophen 325 MG tablet  Commonly known as: TYLENOL     aspirin 81 MG EC tablet     atorvastatin 40 MG tablet  Commonly known as: LIPITOR     cefdinir 300 MG capsule  Commonly known as: OMNICEF     hydrALAZINE 25 MG tablet  Commonly known as: APRESOLINE     insulin glargine 100 UNIT/ML injection vial  Commonly known as: LANTUS     metoprolol succinate 25 MG extended release tablet  Commonly known as: TOPROL XL     polyethylene glycol 17 GM/SCOOP powder  Commonly known as: GLYCOLAX     QUEtiapine 25 MG tablet  Commonly known as: SEROQUEL     tamsulosin 0.4 MG capsule  Commonly known as: FLOMAX                DISCONTINUED MEDICATIONS:  Current Discharge Medication List          I am the Primary Clinician of Record. Bayron Macias MD (electronically signed)      (Please note that parts of this dictation were completed with voice recognition software. Quite often unanticipated grammatical, syntax, homophones, and other interpretive errors are inadvertently transcribed by the computer software. Please disregards these errors.  Please excuse any errors that have escaped final proofreading.)         Bayron Macias MD  05/11/23 9580

## 2023-05-11 NOTE — ED NOTES
Pt waiting for nursing home facility to transport patient home.      Michael Varela RN  05/11/23 0321

## 2023-05-11 NOTE — ED TRIAGE NOTES
Pt had unwitnessed fall at home facility. Unknown LOC. Bump noted to back of head. Pt denies pain. Pt baseline A/Ox 1.

## 2023-05-11 NOTE — ED NOTES
Report to [place] at [facility]. She is requesting the activities bus to come get him. She will call back.      Kemar Sweeney RN  05/11/23 4217

## 2023-05-18 ENCOUNTER — HOSPITAL ENCOUNTER (EMERGENCY)
Facility: HOSPITAL | Age: 88
Discharge: HOME | End: 2023-05-18
Attending: FAMILY MEDICINE | Admitting: FAMILY MEDICINE
Payer: MEDICARE

## 2023-05-18 ENCOUNTER — APPOINTMENT (OUTPATIENT)
Facility: HOSPITAL | Age: 88
End: 2023-05-18
Payer: MEDICARE

## 2023-05-18 VITALS
BODY MASS INDEX: 22.35 KG/M2 | SYSTOLIC BLOOD PRESSURE: 168 MMHG | HEART RATE: 85 BPM | DIASTOLIC BLOOD PRESSURE: 83 MMHG | RESPIRATION RATE: 16 BRPM | OXYGEN SATURATION: 100 % | TEMPERATURE: 98.1 F | HEIGHT: 72 IN | WEIGHT: 165 LBS

## 2023-05-18 DIAGNOSIS — S12.491A OTHER CLOSED NONDISPLACED FRACTURE OF FIFTH CERVICAL VERTEBRA, INITIAL ENCOUNTER (HCC): Primary | ICD-10-CM

## 2023-05-18 PROCEDURE — 72125 CT NECK SPINE W/O DYE: CPT

## 2023-05-18 PROCEDURE — 70450 CT HEAD/BRAIN W/O DYE: CPT

## 2023-05-18 PROCEDURE — 99284 EMERGENCY DEPT VISIT MOD MDM: CPT

## 2023-05-18 ASSESSMENT — PAIN - FUNCTIONAL ASSESSMENT
PAIN_FUNCTIONAL_ASSESSMENT: NONE - DENIES PAIN
PAIN_FUNCTIONAL_ASSESSMENT: NONE - DENIES PAIN

## 2023-05-18 ASSESSMENT — PAIN SCALES - PAIN ASSESSMENT IN ADVANCED DEMENTIA (PAINAD)
TOTALSCORE: 0
CONSOLABILITY: 0
FACIALEXPRESSION: 0
NEGVOCALIZATION: 0
BODYLANGUAGE: 0
BREATHING: 0

## 2023-05-18 ASSESSMENT — LIFESTYLE VARIABLES
HOW MANY STANDARD DRINKS CONTAINING ALCOHOL DO YOU HAVE ON A TYPICAL DAY: PATIENT DOES NOT DRINK
HOW OFTEN DO YOU HAVE A DRINK CONTAINING ALCOHOL: NEVER

## 2023-05-18 NOTE — ED NOTES
Called report to [employer] at for[facility]. I explained that we are going to send him back via [employer] but if they should have someone who could pick him up that would be most helpful.      Silviano Fiore RN  05/18/23 9868

## 2023-05-18 NOTE — ED NOTES
Attempted to call for transport back to [facility] with no answer sent to [address], RN  05/18/23 7553

## 2023-05-18 NOTE — DISCHARGE INSTRUCTIONS
--Wear the soft collar at all times, except bathing/showers. --Tylenol 1000 mg ever 6 hours as needed for pain.

## 2023-05-18 NOTE — ED NOTES
Spoke with Pt's daughter Caitlin Núñez) about his condition and that we were going to send him back to [facility] via [facility]. She expressed understanding and thanked us for treating him.       Teresita Erickson RN  05/18/23 4986

## 2023-05-18 NOTE — ED TRIAGE NOTES
Pt arrived via EMS from [facility] via EMS with report of a fall. Pt found by staff during rounding on the floor. Pt denies pain, neck or back pain. Hematoma noted to right side of head.

## 2023-05-18 NOTE — ED NOTES
Report to S. Doni Oakes, PEG Charge and Edmond Moody RN using [employer].      Irwin Longoria RN  05/18/23 0590

## 2023-05-18 NOTE — PROGRESS NOTES
Writer contacted [facility] and spoke with Willy Santos who provided an ETA of 1000 for this patient to be transported back to [facility]-- ED staff made aware.

## 2023-05-18 NOTE — ED NOTES
Personal hygiene provided. Pt cleaned, dry brief and dry linens and gown provided.      Juan Antonio Swift RN  05/18/23 9675

## 2023-05-19 ENCOUNTER — HOSPITAL ENCOUNTER (EMERGENCY)
Facility: HOSPITAL | Age: 88
Discharge: HOME | End: 2023-05-19
Attending: EMERGENCY MEDICINE
Payer: MEDICARE

## 2023-05-19 ENCOUNTER — APPOINTMENT (OUTPATIENT)
Facility: HOSPITAL | Age: 88
End: 2023-05-19
Payer: MEDICARE

## 2023-05-19 VITALS
BODY MASS INDEX: 22.36 KG/M2 | WEIGHT: 165.1 LBS | SYSTOLIC BLOOD PRESSURE: 155 MMHG | HEIGHT: 72 IN | OXYGEN SATURATION: 97 % | HEART RATE: 71 BPM | DIASTOLIC BLOOD PRESSURE: 71 MMHG | TEMPERATURE: 99 F | RESPIRATION RATE: 16 BRPM
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
## 2023-05-19 DIAGNOSIS — S12.9XXA CLOSED FRACTURE OF CERVICAL VERTEBRA, UNSPECIFIED CERVICAL VERTEBRAL LEVEL, INITIAL ENCOUNTER (HCC): ICD-10-CM

## 2023-05-19 LAB
ALBUMIN SERPL-MCNC: 3.5 G/DL (ref 3.5–5)
ALBUMIN/GLOB SERPL: 1.1 (ref 1.1–2.2)
ALP SERPL-CCNC: 157 U/L (ref 45–117)
ALT SERPL-CCNC: 43 U/L (ref 12–78)
ANION GAP SERPL CALC-SCNC: 9 MMOL/L (ref 5–15)
AST SERPL-CCNC: 37 U/L (ref 15–37)
BASOPHILS # BLD: 0 K/UL (ref 0–0.1)
BASOPHILS NFR BLD: 0 % (ref 0–1)
BILIRUB SERPL-MCNC: 0.7 MG/DL (ref 0.2–1)
BUN SERPL-MCNC: 19 MG/DL (ref 6–20)
BUN/CREAT SERPL: 19 (ref 12–20)
CALCIUM SERPL-MCNC: 9.1 MG/DL (ref 8.5–10.1)
CHLORIDE SERPL-SCNC: 105 MMOL/L (ref 97–108)
CO2 SERPL-SCNC: 27 MMOL/L (ref 21–32)
CREAT SERPL-MCNC: 1.02 MG/DL (ref 0.7–1.3)
DIFFERENTIAL METHOD BLD: NORMAL
EOSINOPHIL # BLD: 0.1 K/UL (ref 0–0.4)
EOSINOPHIL NFR BLD: 2 % (ref 0–7)
ERYTHROCYTE [DISTWIDTH] IN BLOOD BY AUTOMATED COUNT: 13.8 % (ref 11.5–14.5)
GLOBULIN SER CALC-MCNC: 3.2 G/DL (ref 2–4)
GLUCOSE BLD STRIP.AUTO-MCNC: 129 MG/DL (ref 65–117)
GLUCOSE SERPL-MCNC: 136 MG/DL (ref 65–100)
HCT VFR BLD AUTO: 37.9 % (ref 36.6–50.3)
HGB BLD-MCNC: 12.5 G/DL (ref 12.1–17)
IMM GRANULOCYTES # BLD AUTO: 0 K/UL (ref 0–0.04)
IMM GRANULOCYTES NFR BLD AUTO: 0 % (ref 0–0.5)
LYMPHOCYTES # BLD: 2.2 K/UL (ref 0.8–3.5)
LYMPHOCYTES NFR BLD: 31 % (ref 12–49)
MCH RBC QN AUTO: 30.1 PG (ref 26–34)
MCHC RBC AUTO-ENTMCNC: 33 G/DL (ref 30–36.5)
MCV RBC AUTO: 91.3 FL (ref 80–99)
MONOCYTES # BLD: 0.7 K/UL (ref 0–1)
MONOCYTES NFR BLD: 10 % (ref 5–13)
NEUTS SEG # BLD: 4.1 K/UL (ref 1.8–8)
NEUTS SEG NFR BLD: 57 % (ref 32–75)
NRBC # BLD: 0 K/UL (ref 0–0.01)
NRBC BLD-RTO: 0 PER 100 WBC
PLATELET # BLD AUTO: 183 K/UL (ref 150–400)
PMV BLD AUTO: 10 FL (ref 8.9–12.9)
POTASSIUM SERPL-SCNC: 3.7 MMOL/L (ref 3.5–5.1)
PROT SERPL-MCNC: 6.7 G/DL (ref 6.4–8.2)
RBC # BLD AUTO: 4.15 M/UL (ref 4.1–5.7)
SERVICE CMNT-IMP: ABNORMAL
SODIUM SERPL-SCNC: 141 MMOL/L (ref 136–145)
WBC # BLD AUTO: 7.2 K/UL (ref 4.1–11.1)

## 2023-05-19 PROCEDURE — 80053 COMPREHEN METABOLIC PANEL: CPT

## 2023-05-19 PROCEDURE — 72125 CT NECK SPINE W/O DYE: CPT

## 2023-05-19 PROCEDURE — 82962 GLUCOSE BLOOD TEST: CPT

## 2023-05-19 PROCEDURE — 70450 CT HEAD/BRAIN W/O DYE: CPT

## 2023-05-19 PROCEDURE — 99284 EMERGENCY DEPT VISIT MOD MDM: CPT

## 2023-05-19 PROCEDURE — 36415 COLL VENOUS BLD VENIPUNCTURE: CPT

## 2023-05-19 PROCEDURE — 85025 COMPLETE CBC W/AUTO DIFF WBC: CPT

## 2023-05-19 RX ORDER — LANOLIN ALCOHOL/MO/W.PET/CERES
400 CREAM (GRAM) TOPICAL DAILY
COMMUNITY

## 2023-05-19 ASSESSMENT — LIFESTYLE VARIABLES
HOW OFTEN DO YOU HAVE A DRINK CONTAINING ALCOHOL: NEVER
HOW MANY STANDARD DRINKS CONTAINING ALCOHOL DO YOU HAVE ON A TYPICAL DAY: PATIENT DOES NOT DRINK

## 2023-05-19 ASSESSMENT — PAIN SCALES - PAIN ASSESSMENT IN ADVANCED DEMENTIA (PAINAD)
NEGVOCALIZATION: 0
BREATHING: 0
FACIALEXPRESSION: 0
FACIALEXPRESSION: 0
CONSOLABILITY: 0
TOTALSCORE: 0
CONSOLABILITY: 0
BODYLANGUAGE: 0
TOTALSCORE: 0
NEGVOCALIZATION: 0
BODYLANGUAGE: 0
BREATHING: 0

## 2023-05-19 ASSESSMENT — PAIN - FUNCTIONAL ASSESSMENT: PAIN_FUNCTIONAL_ASSESSMENT: PAIN ASSESSMENT IN ADVANCED DEMENTIA (PAINAD)
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
## 2023-05-19 NOTE — PROGRESS NOTES
Spoke with Clement Holder of [facility] to arrange transport back to [place] [address]. ETA of 0877 given. ED made aware.

## 2023-05-19 NOTE — ED TRIAGE NOTES
Patient presents via EMS following fall from wheelchair at nursing home where patient resides. Per EMS report, nursing home staff states patient was \"shaking\" when he fell and had a \"decreased LOC\". Of note, patient was seen in ER yesterday for unwitnessed fall and presents with soft collar which he was discharged with yesterday. Patient has dementia at baseline and appears somnolent but responds to verbal stimuli. VSS at this time.

## 2023-05-19 NOTE — ED PROVIDER NOTES
am the Primary Clinician of Record. Chevy Almendarez DO (electronically signed)    (Please note that parts of this dictation were completed with voice recognition software. Quite often unanticipated grammatical, syntax, homophones, and other interpretive errors are inadvertently transcribed by the computer software. Please disregards these errors.  Please excuse any errors that have escaped final proofreading.)         Chevy Almendarez DO  05/19/23 0407

## 2023-06-16 ENCOUNTER — HOSPITAL ENCOUNTER (EMERGENCY)
Facility: HOSPITAL | Age: 88
Discharge: HOME | End: 2023-06-16
Attending: EMERGENCY MEDICINE
Payer: MEDICARE

## 2023-06-16 ENCOUNTER — APPOINTMENT (OUTPATIENT)
Facility: HOSPITAL | Age: 88
End: 2023-06-16
Payer: MEDICARE

## 2023-06-16 VITALS
TEMPERATURE: 98.5 F | RESPIRATION RATE: 16 BRPM | BODY MASS INDEX: 18.98 KG/M2 | DIASTOLIC BLOOD PRESSURE: 64 MMHG | OXYGEN SATURATION: 98 % | WEIGHT: 140 LBS | HEART RATE: 66 BPM | SYSTOLIC BLOOD PRESSURE: 145 MMHG

## 2023-06-16 DIAGNOSIS — R25.1 EPISODE OF SHAKING: Primary | ICD-10-CM

## 2023-06-16 DIAGNOSIS — G20 PARKINSON'S DISEASE (HCC): ICD-10-CM

## 2023-06-16 LAB
ALBUMIN SERPL-MCNC: 3.2 G/DL (ref 3.5–5)
ALBUMIN/GLOB SERPL: 1 (ref 1.1–2.2)
ALP SERPL-CCNC: 153 U/L (ref 45–117)
ALT SERPL-CCNC: 36 U/L (ref 12–78)
ANION GAP SERPL CALC-SCNC: 10 MMOL/L (ref 5–15)
APPEARANCE UR: CLEAR
AST SERPL-CCNC: 27 U/L (ref 15–37)
BACTERIA URNS QL MICRO: NEGATIVE /HPF
BASOPHILS # BLD: 0 K/UL (ref 0–0.1)
BASOPHILS NFR BLD: 1 % (ref 0–1)
BILIRUB SERPL-MCNC: 0.6 MG/DL (ref 0.2–1)
BILIRUB UR QL: NEGATIVE
BUN SERPL-MCNC: 15 MG/DL (ref 6–20)
BUN/CREAT SERPL: 13 (ref 12–20)
CALCIUM SERPL-MCNC: 9 MG/DL (ref 8.5–10.1)
CHLORIDE SERPL-SCNC: 103 MMOL/L (ref 97–108)
CO2 SERPL-SCNC: 27 MMOL/L (ref 21–32)
COLOR UR: ABNORMAL
CREAT SERPL-MCNC: 1.16 MG/DL (ref 0.7–1.3)
DIFFERENTIAL METHOD BLD: ABNORMAL
EOSINOPHIL # BLD: 0.1 K/UL (ref 0–0.4)
EOSINOPHIL NFR BLD: 1 % (ref 0–7)
EPITH CASTS URNS QL MICRO: ABNORMAL /LPF
ERYTHROCYTE [DISTWIDTH] IN BLOOD BY AUTOMATED COUNT: 13.4 % (ref 11.5–14.5)
GLOBULIN SER CALC-MCNC: 3.3 G/DL (ref 2–4)
GLUCOSE SERPL-MCNC: 180 MG/DL (ref 65–100)
GLUCOSE UR STRIP.AUTO-MCNC: >1000 MG/DL
HCT VFR BLD AUTO: 34.9 % (ref 36.6–50.3)
HGB BLD-MCNC: 11.4 G/DL (ref 12.1–17)
HGB UR QL STRIP: NEGATIVE
IMM GRANULOCYTES # BLD AUTO: 0 K/UL (ref 0–0.04)
IMM GRANULOCYTES NFR BLD AUTO: 0 % (ref 0–0.5)
KETONES UR QL STRIP.AUTO: NEGATIVE MG/DL
LEUKOCYTE ESTERASE UR QL STRIP.AUTO: NEGATIVE
LYMPHOCYTES # BLD: 2.3 K/UL (ref 0.8–3.5)
LYMPHOCYTES NFR BLD: 35 % (ref 12–49)
MCH RBC QN AUTO: 30.2 PG (ref 26–34)
MCHC RBC AUTO-ENTMCNC: 32.7 G/DL (ref 30–36.5)
MCV RBC AUTO: 92.6 FL (ref 80–99)
MONOCYTES # BLD: 0.5 K/UL (ref 0–1)
MONOCYTES NFR BLD: 7 % (ref 5–13)
NEUTS SEG # BLD: 3.6 K/UL (ref 1.8–8)
NEUTS SEG NFR BLD: 56 % (ref 32–75)
NITRITE UR QL STRIP.AUTO: NEGATIVE
NRBC # BLD: 0 K/UL (ref 0–0.01)
NRBC BLD-RTO: 0 PER 100 WBC
PH UR STRIP: 7 (ref 5–8)
PLATELET # BLD AUTO: 189 K/UL (ref 150–400)
PMV BLD AUTO: 10.1 FL (ref 8.9–12.9)
POTASSIUM SERPL-SCNC: 3.9 MMOL/L (ref 3.5–5.1)
PROT SERPL-MCNC: 6.5 G/DL (ref 6.4–8.2)
PROT UR STRIP-MCNC: 30 MG/DL
RBC # BLD AUTO: 3.77 M/UL (ref 4.1–5.7)
RBC #/AREA URNS HPF: ABNORMAL /HPF (ref 0–5)
SODIUM SERPL-SCNC: 140 MMOL/L (ref 136–145)
SP GR UR REFRACTOMETRY: 1.02 (ref 1–1.03)
UROBILINOGEN UR QL STRIP.AUTO: 0.2 EU/DL (ref 0.2–1)
WBC # BLD AUTO: 6.5 K/UL (ref 4.1–11.1)
WBC URNS QL MICRO: ABNORMAL /HPF (ref 0–4)

## 2023-06-16 PROCEDURE — 99284 EMERGENCY DEPT VISIT MOD MDM: CPT

## 2023-06-16 PROCEDURE — 80053 COMPREHEN METABOLIC PANEL: CPT

## 2023-06-16 PROCEDURE — 36415 COLL VENOUS BLD VENIPUNCTURE: CPT

## 2023-06-16 PROCEDURE — 85025 COMPLETE CBC W/AUTO DIFF WBC: CPT

## 2023-06-16 PROCEDURE — 81001 URINALYSIS AUTO W/SCOPE: CPT

## 2023-06-16 PROCEDURE — 70450 CT HEAD/BRAIN W/O DYE: CPT

## 2023-06-16 ASSESSMENT — PAIN SCALES - PAIN ASSESSMENT IN ADVANCED DEMENTIA (PAINAD)
FACIALEXPRESSION: 0
BREATHING: 0
CONSOLABILITY: 0
BODYLANGUAGE: 0
NEGVOCALIZATION: 0
TOTALSCORE: 0

## 2023-06-16 ASSESSMENT — PAIN - FUNCTIONAL ASSESSMENT
PAIN_FUNCTIONAL_ASSESSMENT: PAIN ASSESSMENT IN ADVANCED DEMENTIA (PAINAD)
PAIN_FUNCTIONAL_ASSESSMENT: NONE - DENIES PAIN
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
## 2023-06-16 NOTE — ED TRIAGE NOTES
Hx of parkinsons with a witnessed episode of \"shaking with eyes closed\" Rescue arrived to find pt awake with eyes closed and no complaints, with baseline mental status.  Pt stated \" sometimes I shake\"

## 2023-06-16 NOTE — ED NOTES
Patient was transported back to [facility] by local EMS.       Dannie Fisher RN  06/16/23 2083

## 2023-06-16 NOTE — PROGRESS NOTES
Ronaldo Hu spoke w/son how advised he cannot come  pt to take back to [facility],  son also states his sister is 2-3 hours away and can not come either - updated ED Staff

## 2023-06-16 NOTE — ED PROVIDER NOTES
[address] ENCOUNTER       Pt Name: Merly Moseley  MRN: [id number]  Birthdate 12/30/1928  Date of evaluation: 6/16/2023  Provider: Shirlene Castro MD   PCP: AME Duron NP  Note Started: 7:23 PM 6/16/23     CHIEF COMPLAINT       Chief Complaint   Patient presents with    Tremors        HISTORY OF PRESENT ILLNESS: 1 or more elements      History From: nursing home, EMS  None     Merly Moseley is a 80 y.o. male who presents for shaking episode at the nursing home. Patient was shaking and was not answering their questions while he was on the bedside commode. They moved him over to the bed and he seemed to respond briefly, then began shaking again. Patient reports he is shaking at baseline secondary to Parkinson's. EMS arrived and received report that patient was shaking with eyes closed. They called his name and patient opened his eyes and looked at them. No tongue biting. No postictal period. Nursing Notes were all reviewed and agreed with or any disagreements were addressed in the HPI.      PAST HISTORY     Past Medical History:  Past Medical History:   Diagnosis Date    Carotid artery disease ([address]     Coronary atherosclerosis of native coronary artery     Diabetes mellitus, type II (HCC)     DJD (degenerative joint disease)     HCVD (hypertensive cardiovascular disease)     Pure hypercholesterolemia          Past Surgical History:  Past Surgical History:   Procedure Laterality Date    CARDIAC CATHETERIZATION  2/2010    LVEDP 9, mild ant hypo EF 55-60%, LAD 70% 99%, mod D1, OM3 30%, PDA 90%    CATARACT REMOVAL Bilateral     PTCA  3/2010    Stent PDA ELIF    PTCA  2/2010    Stent-LAD 2.5X18 ELIF       Family History:  Family History   Problem Relation Age of Onset    Heart Disease Mother     Cancer Father        Social History:  Social History     Tobacco Use    Smoking status: Former     Types: Cigarettes     Quit date: 10/17/1963     Years since

## 2023-06-17 ENCOUNTER — HOSPITAL ENCOUNTER (OUTPATIENT)
Facility: HOSPITAL | Age: 88
Setting detail: OBSERVATION
Discharge: NURSING, REHAB OR LONG-TERM CARE | End: 2023-06-20
Attending: EMERGENCY MEDICINE | Admitting: INTERNAL MEDICINE
Payer: MEDICARE

## 2023-06-17 DIAGNOSIS — E11.649 HYPOGLYCEMIA ASSOCIATED WITH DIABETES (HCC): Primary | ICD-10-CM

## 2023-06-17 PROBLEM — Z79.4 CONTROLLED TYPE 2 DIABETES MELLITUS WITHOUT COMPLICATION, WITH LONG-TERM CURRENT USE OF INSULIN (HCC): Status: ACTIVE | Noted: 2021-01-19

## 2023-06-17 PROBLEM — I10 HTN (HYPERTENSION): Status: ACTIVE | Noted: 2023-02-28

## 2023-06-17 PROBLEM — R41.89 COGNITIVE IMPAIRMENT: Chronic | Status: ACTIVE | Noted: 2023-06-17

## 2023-06-17 PROBLEM — E16.2 HYPOGLYCEMIA: Status: ACTIVE | Noted: 2022-11-21

## 2023-06-17 PROBLEM — N40.0 BPH (BENIGN PROSTATIC HYPERPLASIA): Status: ACTIVE | Noted: 2023-02-28

## 2023-06-17 PROBLEM — E11.9 CONTROLLED TYPE 2 DIABETES MELLITUS WITHOUT COMPLICATION, WITH LONG-TERM CURRENT USE OF INSULIN (HCC): Status: ACTIVE | Noted: 2021-01-19

## 2023-06-17 LAB
ANION GAP SERPL CALC-SCNC: 7 MMOL/L (ref 5–15)
BASOPHILS # BLD: 0 K/UL (ref 0–0.1)
BASOPHILS NFR BLD: 0 % (ref 0–1)
BUN SERPL-MCNC: 19 MG/DL (ref 6–20)
BUN/CREAT SERPL: 21 (ref 12–20)
CALCIUM SERPL-MCNC: 9.1 MG/DL (ref 8.5–10.1)
CHLORIDE SERPL-SCNC: 105 MMOL/L (ref 97–108)
CO2 SERPL-SCNC: 30 MMOL/L (ref 21–32)
CREAT SERPL-MCNC: 0.92 MG/DL (ref 0.7–1.3)
DIFFERENTIAL METHOD BLD: ABNORMAL
EOSINOPHIL # BLD: 0.1 K/UL (ref 0–0.4)
EOSINOPHIL NFR BLD: 1 % (ref 0–7)
ERYTHROCYTE [DISTWIDTH] IN BLOOD BY AUTOMATED COUNT: 13.4 % (ref 11.5–14.5)
GLUCOSE BLD STRIP.AUTO-MCNC: 117 MG/DL (ref 65–117)
GLUCOSE BLD STRIP.AUTO-MCNC: 120 MG/DL (ref 65–117)
GLUCOSE BLD STRIP.AUTO-MCNC: 126 MG/DL (ref 65–117)
GLUCOSE BLD STRIP.AUTO-MCNC: 143 MG/DL (ref 65–117)
GLUCOSE BLD STRIP.AUTO-MCNC: 156 MG/DL (ref 65–117)
GLUCOSE BLD STRIP.AUTO-MCNC: 195 MG/DL (ref 65–117)
GLUCOSE BLD STRIP.AUTO-MCNC: 59 MG/DL (ref 65–117)
GLUCOSE BLD STRIP.AUTO-MCNC: 64 MG/DL (ref 65–117)
GLUCOSE BLD STRIP.AUTO-MCNC: 74 MG/DL (ref 65–117)
GLUCOSE BLD STRIP.AUTO-MCNC: 83 MG/DL (ref 65–117)
GLUCOSE SERPL-MCNC: 57 MG/DL (ref 65–100)
HCT VFR BLD AUTO: 34.4 % (ref 36.6–50.3)
HGB BLD-MCNC: 11.5 G/DL (ref 12.1–17)
IMM GRANULOCYTES # BLD AUTO: 0.1 K/UL (ref 0–0.04)
IMM GRANULOCYTES NFR BLD AUTO: 0 % (ref 0–0.5)
LYMPHOCYTES # BLD: 2.7 K/UL (ref 0.8–3.5)
LYMPHOCYTES NFR BLD: 23 % (ref 12–49)
MCH RBC QN AUTO: 30.7 PG (ref 26–34)
MCHC RBC AUTO-ENTMCNC: 33.4 G/DL (ref 30–36.5)
MCV RBC AUTO: 91.7 FL (ref 80–99)
MONOCYTES # BLD: 0.9 K/UL (ref 0–1)
MONOCYTES NFR BLD: 8 % (ref 5–13)
NEUTS SEG # BLD: 7.9 K/UL (ref 1.8–8)
NEUTS SEG NFR BLD: 68 % (ref 32–75)
NRBC # BLD: 0 K/UL (ref 0–0.01)
NRBC BLD-RTO: 0 PER 100 WBC
PLATELET # BLD AUTO: 195 K/UL (ref 150–400)
PMV BLD AUTO: 9.8 FL (ref 8.9–12.9)
POTASSIUM SERPL-SCNC: 4.1 MMOL/L (ref 3.5–5.1)
RBC # BLD AUTO: 3.75 M/UL (ref 4.1–5.7)
SERVICE CMNT-IMP: ABNORMAL
SERVICE CMNT-IMP: NORMAL
SODIUM SERPL-SCNC: 142 MMOL/L (ref 136–145)
WBC # BLD AUTO: 11.7 K/UL (ref 4.1–11.1)

## 2023-06-17 PROCEDURE — 96372 THER/PROPH/DIAG INJ SC/IM: CPT

## 2023-06-17 PROCEDURE — 99285 EMERGENCY DEPT VISIT HI MDM: CPT

## 2023-06-17 PROCEDURE — 96361 HYDRATE IV INFUSION ADD-ON: CPT

## 2023-06-17 PROCEDURE — 96360 HYDRATION IV INFUSION INIT: CPT

## 2023-06-17 PROCEDURE — 36415 COLL VENOUS BLD VENIPUNCTURE: CPT

## 2023-06-17 PROCEDURE — 82962 GLUCOSE BLOOD TEST: CPT

## 2023-06-17 PROCEDURE — 96366 THER/PROPH/DIAG IV INF ADDON: CPT

## 2023-06-17 PROCEDURE — 85025 COMPLETE CBC W/AUTO DIFF WBC: CPT

## 2023-06-17 PROCEDURE — 6370000000 HC RX 637 (ALT 250 FOR IP): Performed by: INTERNAL MEDICINE

## 2023-06-17 PROCEDURE — 96368 THER/DIAG CONCURRENT INF: CPT

## 2023-06-17 PROCEDURE — G0378 HOSPITAL OBSERVATION PER HR: HCPCS

## 2023-06-17 PROCEDURE — 96365 THER/PROPH/DIAG IV INF INIT: CPT

## 2023-06-17 PROCEDURE — 6360000002 HC RX W HCPCS: Performed by: INTERNAL MEDICINE

## 2023-06-17 PROCEDURE — 2580000003 HC RX 258: Performed by: EMERGENCY MEDICINE

## 2023-06-17 PROCEDURE — 80048 BASIC METABOLIC PNL TOTAL CA: CPT

## 2023-06-17 PROCEDURE — 2580000003 HC RX 258: Performed by: INTERNAL MEDICINE

## 2023-06-17 RX ORDER — SODIUM CHLORIDE 0.9 % (FLUSH) 0.9 %
5-40 SYRINGE (ML) INJECTION EVERY 12 HOURS SCHEDULED
Status: DISCONTINUED | OUTPATIENT
Start: 2023-06-17 | End: 2023-06-20 | Stop reason: HOSPADM

## 2023-06-17 RX ORDER — HYDRALAZINE HYDROCHLORIDE 25 MG/1
25 TABLET, FILM COATED ORAL 2 TIMES DAILY
Status: DISCONTINUED | OUTPATIENT
Start: 2023-06-17 | End: 2023-06-20 | Stop reason: HOSPADM

## 2023-06-17 RX ORDER — TAMSULOSIN HYDROCHLORIDE 0.4 MG/1
0.4 CAPSULE ORAL NIGHTLY
Status: DISCONTINUED | OUTPATIENT
Start: 2023-06-17 | End: 2023-06-20 | Stop reason: HOSPADM

## 2023-06-17 RX ORDER — METOPROLOL SUCCINATE 25 MG/1
12.5 TABLET, EXTENDED RELEASE ORAL DAILY
Status: DISCONTINUED | OUTPATIENT
Start: 2023-06-17 | End: 2023-06-20 | Stop reason: HOSPADM

## 2023-06-17 RX ORDER — POLYETHYLENE GLYCOL 3350 17 G/17G
17 POWDER, FOR SOLUTION ORAL DAILY
Status: DISCONTINUED | OUTPATIENT
Start: 2023-06-17 | End: 2023-06-20 | Stop reason: HOSPADM

## 2023-06-17 RX ORDER — ENOXAPARIN SODIUM 100 MG/ML
40 INJECTION SUBCUTANEOUS DAILY
Status: DISCONTINUED | OUTPATIENT
Start: 2023-06-17 | End: 2023-06-20 | Stop reason: HOSPADM

## 2023-06-17 RX ORDER — ACETAMINOPHEN 325 MG/1
650 TABLET ORAL 3 TIMES DAILY
Status: DISCONTINUED | OUTPATIENT
Start: 2023-06-17 | End: 2023-06-20 | Stop reason: HOSPADM

## 2023-06-17 RX ORDER — DEXTROSE AND SODIUM CHLORIDE 5; .45 G/100ML; G/100ML
INJECTION, SOLUTION INTRAVENOUS CONTINUOUS
Status: DISCONTINUED | OUTPATIENT
Start: 2023-06-17 | End: 2023-06-17

## 2023-06-17 RX ORDER — SODIUM CHLORIDE 0.9 % (FLUSH) 0.9 %
5-40 SYRINGE (ML) INJECTION PRN
Status: DISCONTINUED | OUTPATIENT
Start: 2023-06-17 | End: 2023-06-20 | Stop reason: HOSPADM

## 2023-06-17 RX ORDER — ATORVASTATIN CALCIUM 40 MG/1
40 TABLET, FILM COATED ORAL NIGHTLY
Status: DISCONTINUED | OUTPATIENT
Start: 2023-06-17 | End: 2023-06-20 | Stop reason: HOSPADM

## 2023-06-17 RX ORDER — SODIUM CHLORIDE 9 MG/ML
INJECTION, SOLUTION INTRAVENOUS PRN
Status: DISCONTINUED | OUTPATIENT
Start: 2023-06-17 | End: 2023-06-20 | Stop reason: HOSPADM

## 2023-06-17 RX ORDER — ONDANSETRON 2 MG/ML
4 INJECTION INTRAMUSCULAR; INTRAVENOUS EVERY 6 HOURS PRN
Status: DISCONTINUED | OUTPATIENT
Start: 2023-06-17 | End: 2023-06-20 | Stop reason: HOSPADM

## 2023-06-17 RX ORDER — LANOLIN ALCOHOL/MO/W.PET/CERES
400 CREAM (GRAM) TOPICAL DAILY
Status: DISCONTINUED | OUTPATIENT
Start: 2023-06-17 | End: 2023-06-20 | Stop reason: HOSPADM

## 2023-06-17 RX ORDER — ONDANSETRON 4 MG/1
4 TABLET, ORALLY DISINTEGRATING ORAL EVERY 8 HOURS PRN
Status: DISCONTINUED | OUTPATIENT
Start: 2023-06-17 | End: 2023-06-20 | Stop reason: HOSPADM

## 2023-06-17 RX ORDER — HYDROXYZINE PAMOATE 50 MG/1
50 CAPSULE ORAL NIGHTLY
Status: ON HOLD | COMMUNITY
End: 2023-06-20 | Stop reason: HOSPADM

## 2023-06-17 RX ADMIN — DEXTROSE AND SODIUM CHLORIDE: 5; 450 INJECTION, SOLUTION INTRAVENOUS at 07:18

## 2023-06-17 RX ADMIN — SODIUM CHLORIDE, PRESERVATIVE FREE 10 ML: 5 INJECTION INTRAVENOUS at 21:49

## 2023-06-17 RX ADMIN — ATORVASTATIN CALCIUM 40 MG: 40 TABLET, FILM COATED ORAL at 21:44

## 2023-06-17 RX ADMIN — TAMSULOSIN HYDROCHLORIDE 0.4 MG: 0.4 CAPSULE ORAL at 21:44

## 2023-06-17 RX ADMIN — DEXTROSE MONOHYDRATE 125 ML: 100 INJECTION, SOLUTION INTRAVENOUS at 07:09

## 2023-06-17 RX ADMIN — DEXTROSE MONOHYDRATE 250 ML: 100 INJECTION, SOLUTION INTRAVENOUS at 04:39

## 2023-06-17 RX ADMIN — ACETAMINOPHEN 650 MG: 325 TABLET ORAL at 14:11

## 2023-06-17 RX ADMIN — METOPROLOL SUCCINATE 12.5 MG: 25 TABLET, EXTENDED RELEASE ORAL at 14:11

## 2023-06-17 RX ADMIN — POLYETHYLENE GLYCOL 3350 17 G: 17 POWDER, FOR SOLUTION ORAL at 14:13

## 2023-06-17 RX ADMIN — DEXTROSE MONOHYDRATE 125 ML: 100 INJECTION, SOLUTION INTRAVENOUS at 02:33

## 2023-06-17 RX ADMIN — ACETAMINOPHEN 650 MG: 325 TABLET ORAL at 21:44

## 2023-06-17 RX ADMIN — DEXTROSE MONOHYDRATE 125 ML: 100 INJECTION, SOLUTION INTRAVENOUS at 03:03

## 2023-06-17 RX ADMIN — Medication 400 MG: at 14:11

## 2023-06-17 RX ADMIN — ENOXAPARIN SODIUM 40 MG: 100 INJECTION SUBCUTANEOUS at 14:15

## 2023-06-17 ASSESSMENT — PAIN SCALES - PAIN ASSESSMENT IN ADVANCED DEMENTIA (PAINAD)
BODYLANGUAGE: 0
FACIALEXPRESSION: 0
TOTALSCORE: 1
BREATHING: 0
FACIALEXPRESSION: 0
CONSOLABILITY: 0
NEGVOCALIZATION: 0
CONSOLABILITY: 0
BODYLANGUAGE: 1
BREATHING: 0
TOTALSCORE: 0
NEGVOCALIZATION: 0

## 2023-06-17 ASSESSMENT — PATIENT HEALTH QUESTIONNAIRE - PHQ9: SUM OF ALL RESPONSES TO PHQ QUESTIONS 1-9: 0

## 2023-06-17 ASSESSMENT — ENCOUNTER SYMPTOMS: VOMITING: 0

## 2023-06-17 ASSESSMENT — PAIN - FUNCTIONAL ASSESSMENT: PAIN_FUNCTIONAL_ASSESSMENT: PAIN ASSESSMENT IN ADVANCED DEMENTIA (PAINAD)

## 2023-06-18 LAB
ANION GAP SERPL CALC-SCNC: 8 MMOL/L (ref 5–15)
BASOPHILS # BLD: 0 K/UL (ref 0–0.1)
BASOPHILS NFR BLD: 1 % (ref 0–1)
BUN SERPL-MCNC: 19 MG/DL (ref 6–20)
BUN/CREAT SERPL: 16 (ref 12–20)
CALCIUM SERPL-MCNC: 8.7 MG/DL (ref 8.5–10.1)
CHLORIDE SERPL-SCNC: 104 MMOL/L (ref 97–108)
CO2 SERPL-SCNC: 27 MMOL/L (ref 21–32)
CREAT SERPL-MCNC: 1.17 MG/DL (ref 0.7–1.3)
DIFFERENTIAL METHOD BLD: ABNORMAL
EOSINOPHIL # BLD: 0.2 K/UL (ref 0–0.4)
EOSINOPHIL NFR BLD: 3 % (ref 0–7)
ERYTHROCYTE [DISTWIDTH] IN BLOOD BY AUTOMATED COUNT: 13.4 % (ref 11.5–14.5)
GLUCOSE BLD STRIP.AUTO-MCNC: 227 MG/DL (ref 65–117)
GLUCOSE BLD STRIP.AUTO-MCNC: 258 MG/DL (ref 65–117)
GLUCOSE BLD STRIP.AUTO-MCNC: 310 MG/DL (ref 65–117)
GLUCOSE SERPL-MCNC: 137 MG/DL (ref 65–100)
HCT VFR BLD AUTO: 36 % (ref 36.6–50.3)
HGB BLD-MCNC: 11.8 G/DL (ref 12.1–17)
IMM GRANULOCYTES # BLD AUTO: 0 K/UL (ref 0–0.04)
IMM GRANULOCYTES NFR BLD AUTO: 0 % (ref 0–0.5)
LYMPHOCYTES # BLD: 2.3 K/UL (ref 0.8–3.5)
LYMPHOCYTES NFR BLD: 39 % (ref 12–49)
MCH RBC QN AUTO: 30.2 PG (ref 26–34)
MCHC RBC AUTO-ENTMCNC: 32.8 G/DL (ref 30–36.5)
MCV RBC AUTO: 92.1 FL (ref 80–99)
MONOCYTES # BLD: 0.6 K/UL (ref 0–1)
MONOCYTES NFR BLD: 9 % (ref 5–13)
NEUTS SEG # BLD: 2.8 K/UL (ref 1.8–8)
NEUTS SEG NFR BLD: 48 % (ref 32–75)
NRBC # BLD: 0 K/UL (ref 0–0.01)
NRBC BLD-RTO: 0 PER 100 WBC
PLATELET # BLD AUTO: 191 K/UL (ref 150–400)
PMV BLD AUTO: 10.1 FL (ref 8.9–12.9)
POTASSIUM SERPL-SCNC: 4.5 MMOL/L (ref 3.5–5.1)
RBC # BLD AUTO: 3.91 M/UL (ref 4.1–5.7)
SERVICE CMNT-IMP: ABNORMAL
SODIUM SERPL-SCNC: 139 MMOL/L (ref 136–145)
WBC # BLD AUTO: 5.9 K/UL (ref 4.1–11.1)

## 2023-06-18 PROCEDURE — 2580000003 HC RX 258: Performed by: INTERNAL MEDICINE

## 2023-06-18 PROCEDURE — G0378 HOSPITAL OBSERVATION PER HR: HCPCS

## 2023-06-18 PROCEDURE — 36415 COLL VENOUS BLD VENIPUNCTURE: CPT

## 2023-06-18 PROCEDURE — 82962 GLUCOSE BLOOD TEST: CPT

## 2023-06-18 PROCEDURE — 6360000002 HC RX W HCPCS: Performed by: INTERNAL MEDICINE

## 2023-06-18 PROCEDURE — 96372 THER/PROPH/DIAG INJ SC/IM: CPT

## 2023-06-18 PROCEDURE — 80048 BASIC METABOLIC PNL TOTAL CA: CPT

## 2023-06-18 PROCEDURE — 6370000000 HC RX 637 (ALT 250 FOR IP): Performed by: INTERNAL MEDICINE

## 2023-06-18 PROCEDURE — 85025 COMPLETE CBC W/AUTO DIFF WBC: CPT

## 2023-06-18 RX ADMIN — SODIUM CHLORIDE, PRESERVATIVE FREE 10 ML: 5 INJECTION INTRAVENOUS at 09:25

## 2023-06-18 RX ADMIN — TAMSULOSIN HYDROCHLORIDE 0.4 MG: 0.4 CAPSULE ORAL at 20:37

## 2023-06-18 RX ADMIN — POLYETHYLENE GLYCOL 3350 17 G: 17 POWDER, FOR SOLUTION ORAL at 09:20

## 2023-06-18 RX ADMIN — ACETAMINOPHEN 650 MG: 325 TABLET ORAL at 09:21

## 2023-06-18 RX ADMIN — HYDRALAZINE HYDROCHLORIDE 25 MG: 25 TABLET, FILM COATED ORAL at 09:22

## 2023-06-18 RX ADMIN — Medication 400 MG: at 09:22

## 2023-06-18 RX ADMIN — METOPROLOL SUCCINATE 12.5 MG: 25 TABLET, EXTENDED RELEASE ORAL at 09:24

## 2023-06-18 RX ADMIN — ENOXAPARIN SODIUM 40 MG: 100 INJECTION SUBCUTANEOUS at 09:20

## 2023-06-18 RX ADMIN — ACETAMINOPHEN 650 MG: 325 TABLET ORAL at 16:10

## 2023-06-18 RX ADMIN — ATORVASTATIN CALCIUM 40 MG: 40 TABLET, FILM COATED ORAL at 20:37

## 2023-06-18 RX ADMIN — HYDRALAZINE HYDROCHLORIDE 25 MG: 25 TABLET, FILM COATED ORAL at 17:22

## 2023-06-18 RX ADMIN — SODIUM CHLORIDE, PRESERVATIVE FREE 10 ML: 5 INJECTION INTRAVENOUS at 20:37

## 2023-06-18 RX ADMIN — ACETAMINOPHEN 650 MG: 325 TABLET ORAL at 20:37

## 2023-06-18 ASSESSMENT — PAIN SCALES - PAIN ASSESSMENT IN ADVANCED DEMENTIA (PAINAD)
FACIALEXPRESSION: 0
CONSOLABILITY: 0
NEGVOCALIZATION: 0
TOTALSCORE: 0
BREATHING: 0
BODYLANGUAGE: 0

## 2023-06-18 ASSESSMENT — PAIN SCALES - GENERAL: PAINLEVEL_OUTOF10: 5

## 2023-06-19 LAB
ANION GAP SERPL CALC-SCNC: 8 MMOL/L (ref 5–15)
BUN SERPL-MCNC: 21 MG/DL (ref 6–20)
BUN/CREAT SERPL: 18 (ref 12–20)
CALCIUM SERPL-MCNC: 8.8 MG/DL (ref 8.5–10.1)
CHLORIDE SERPL-SCNC: 101 MMOL/L (ref 97–108)
CO2 SERPL-SCNC: 27 MMOL/L (ref 21–32)
CREAT SERPL-MCNC: 1.16 MG/DL (ref 0.7–1.3)
GLUCOSE BLD STRIP.AUTO-MCNC: 253 MG/DL (ref 65–117)
GLUCOSE BLD STRIP.AUTO-MCNC: 297 MG/DL (ref 65–117)
GLUCOSE BLD STRIP.AUTO-MCNC: 374 MG/DL (ref 65–117)
GLUCOSE SERPL-MCNC: 286 MG/DL (ref 65–100)
MAGNESIUM SERPL-MCNC: 1.7 MG/DL (ref 1.6–2.4)
POTASSIUM SERPL-SCNC: 4 MMOL/L (ref 3.5–5.1)
SERVICE CMNT-IMP: ABNORMAL
SODIUM SERPL-SCNC: 136 MMOL/L (ref 136–145)

## 2023-06-19 PROCEDURE — 2580000003 HC RX 258: Performed by: INTERNAL MEDICINE

## 2023-06-19 PROCEDURE — 6360000002 HC RX W HCPCS: Performed by: INTERNAL MEDICINE

## 2023-06-19 PROCEDURE — 36415 COLL VENOUS BLD VENIPUNCTURE: CPT

## 2023-06-19 PROCEDURE — G0378 HOSPITAL OBSERVATION PER HR: HCPCS

## 2023-06-19 PROCEDURE — 83735 ASSAY OF MAGNESIUM: CPT

## 2023-06-19 PROCEDURE — 96372 THER/PROPH/DIAG INJ SC/IM: CPT

## 2023-06-19 PROCEDURE — 97167 OT EVAL HIGH COMPLEX 60 MIN: CPT

## 2023-06-19 PROCEDURE — 97161 PT EVAL LOW COMPLEX 20 MIN: CPT

## 2023-06-19 PROCEDURE — 80048 BASIC METABOLIC PNL TOTAL CA: CPT

## 2023-06-19 PROCEDURE — 6370000000 HC RX 637 (ALT 250 FOR IP): Performed by: INTERNAL MEDICINE

## 2023-06-19 PROCEDURE — 97535 SELF CARE MNGMENT TRAINING: CPT

## 2023-06-19 PROCEDURE — 97110 THERAPEUTIC EXERCISES: CPT

## 2023-06-19 PROCEDURE — 82962 GLUCOSE BLOOD TEST: CPT

## 2023-06-19 RX ORDER — INSULIN GLARGINE 100 [IU]/ML
6 INJECTION, SOLUTION SUBCUTANEOUS ONCE
Status: COMPLETED | OUTPATIENT
Start: 2023-06-19 | End: 2023-06-19

## 2023-06-19 RX ADMIN — POLYETHYLENE GLYCOL 3350 17 G: 17 POWDER, FOR SOLUTION ORAL at 08:24

## 2023-06-19 RX ADMIN — ATORVASTATIN CALCIUM 40 MG: 40 TABLET, FILM COATED ORAL at 20:51

## 2023-06-19 RX ADMIN — Medication 400 MG: at 08:25

## 2023-06-19 RX ADMIN — ENOXAPARIN SODIUM 40 MG: 100 INJECTION SUBCUTANEOUS at 08:24

## 2023-06-19 RX ADMIN — HYDRALAZINE HYDROCHLORIDE 25 MG: 25 TABLET, FILM COATED ORAL at 08:25

## 2023-06-19 RX ADMIN — METOPROLOL SUCCINATE 12.5 MG: 25 TABLET, EXTENDED RELEASE ORAL at 08:25

## 2023-06-19 RX ADMIN — ACETAMINOPHEN 650 MG: 325 TABLET ORAL at 08:24

## 2023-06-19 RX ADMIN — HYDRALAZINE HYDROCHLORIDE 25 MG: 25 TABLET, FILM COATED ORAL at 18:05

## 2023-06-19 RX ADMIN — SODIUM CHLORIDE, PRESERVATIVE FREE 10 ML: 5 INJECTION INTRAVENOUS at 20:55

## 2023-06-19 RX ADMIN — SODIUM CHLORIDE, PRESERVATIVE FREE 10 ML: 5 INJECTION INTRAVENOUS at 08:26

## 2023-06-19 RX ADMIN — ACETAMINOPHEN 650 MG: 325 TABLET ORAL at 20:51

## 2023-06-19 RX ADMIN — TAMSULOSIN HYDROCHLORIDE 0.4 MG: 0.4 CAPSULE ORAL at 20:51

## 2023-06-19 RX ADMIN — ACETAMINOPHEN 650 MG: 325 TABLET ORAL at 13:20

## 2023-06-19 RX ADMIN — INSULIN GLARGINE 6 UNITS: 100 INJECTION, SOLUTION SUBCUTANEOUS at 11:13

## 2023-06-19 ASSESSMENT — PAIN SCALES - GENERAL: PAINLEVEL_OUTOF10: 0

## 2023-06-20 VITALS
SYSTOLIC BLOOD PRESSURE: 143 MMHG | OXYGEN SATURATION: 95 % | HEART RATE: 73 BPM | BODY MASS INDEX: 19.99 KG/M2 | DIASTOLIC BLOOD PRESSURE: 62 MMHG | HEIGHT: 72 IN | TEMPERATURE: 97.4 F | RESPIRATION RATE: 20 BRPM | WEIGHT: 147.6 LBS

## 2023-06-20 LAB
GLUCOSE BLD STRIP.AUTO-MCNC: 243 MG/DL (ref 65–117)
GLUCOSE BLD STRIP.AUTO-MCNC: 272 MG/DL (ref 65–117)
SERVICE CMNT-IMP: ABNORMAL
SERVICE CMNT-IMP: ABNORMAL

## 2023-06-20 PROCEDURE — 96372 THER/PROPH/DIAG INJ SC/IM: CPT

## 2023-06-20 PROCEDURE — 97110 THERAPEUTIC EXERCISES: CPT

## 2023-06-20 PROCEDURE — 6360000002 HC RX W HCPCS: Performed by: INTERNAL MEDICINE

## 2023-06-20 PROCEDURE — 82962 GLUCOSE BLOOD TEST: CPT

## 2023-06-20 PROCEDURE — 97535 SELF CARE MNGMENT TRAINING: CPT

## 2023-06-20 PROCEDURE — G0378 HOSPITAL OBSERVATION PER HR: HCPCS

## 2023-06-20 PROCEDURE — 2580000003 HC RX 258: Performed by: INTERNAL MEDICINE

## 2023-06-20 PROCEDURE — 6370000000 HC RX 637 (ALT 250 FOR IP): Performed by: INTERNAL MEDICINE

## 2023-06-20 RX ORDER — INSULIN GLARGINE 100 [IU]/ML
8 INJECTION, SOLUTION SUBCUTANEOUS ONCE
Status: COMPLETED | OUTPATIENT
Start: 2023-06-20 | End: 2023-06-20

## 2023-06-20 RX ORDER — INSULIN GLARGINE 100 [IU]/ML
8 INJECTION, SOLUTION SUBCUTANEOUS
Qty: 10 ML | Refills: 3 | Status: SHIPPED
Start: 2023-06-20

## 2023-06-20 RX ADMIN — INSULIN GLARGINE 8 UNITS: 100 INJECTION, SOLUTION SUBCUTANEOUS at 11:06

## 2023-06-20 RX ADMIN — Medication 400 MG: at 09:01

## 2023-06-20 RX ADMIN — METOPROLOL SUCCINATE 12.5 MG: 25 TABLET, EXTENDED RELEASE ORAL at 09:01

## 2023-06-20 RX ADMIN — ENOXAPARIN SODIUM 40 MG: 100 INJECTION SUBCUTANEOUS at 09:01

## 2023-06-20 RX ADMIN — POLYETHYLENE GLYCOL 3350 17 G: 17 POWDER, FOR SOLUTION ORAL at 09:01

## 2023-06-20 RX ADMIN — HYDRALAZINE HYDROCHLORIDE 25 MG: 25 TABLET, FILM COATED ORAL at 09:01

## 2023-06-20 RX ADMIN — SODIUM CHLORIDE, PRESERVATIVE FREE 10 ML: 5 INJECTION INTRAVENOUS at 09:02

## 2023-06-20 RX ADMIN — ACETAMINOPHEN 650 MG: 325 TABLET ORAL at 09:01

## 2023-06-20 ASSESSMENT — PAIN SCALES - GENERAL: PAINLEVEL_OUTOF10: 0

## 2023-06-20 NOTE — PROGRESS NOTES
Discharge Summary    Apryl Ojeda  :  1928  MRN:  [id number]    ADMIT DATE:  2023  DISCHARGE DATE:  2023      Discharge instruction reviewed with caregiver    Home med's returned No n/a    Personal belongings returned Yes    Patient Wheeled to front entrance via wheelcair with Nurse from [facility]        SIGNED:    Luna Cruz RN
Follow up visit with patient in Rm 108  Provided empathic listening and spiritual support  Advised of  Availability  [address] [place] [address]
INITIAL SPIRITUAL ASSESSMENT in Rm  108  Provided empathic listening and spiritual support  Advised of  Availability   [address] [place] [address]
REMOVAL Bilateral     PTCA  3/2010    Stent PDA ELIF    PTCA  2010    Stent-LAD 2.5X18 ELIF       Home Situation: lives in [place], has  private duty, family leaves in [place] and reported were here today to visit patient. Cognitive/Behavioral Status: Alert to self only           Skin: intact has large growth on the L arm     Edema: none noted     Hearing: minimally impaired with conversation       Vision/Perceptual:  Patient was reaching for objects that were not present. Strength: Strength was not formally assessed but did move delvin LE on command. Tone & Sensation: normal and intact           Coordination: limited on eval       Range Of Motion: [facility]          Functional Mobility:  Bed Mobility: mod to max assistance with all bed mobility         Transfers: reported max assistance x 2 from bed to chair         Balance: not assessed                 Ambulation/Gait Training: not assessed today. Patient has just been returned to bed when I arrvied.                                             Pain Ratin/10  patient denies any pain and caregiver states patient does not c/o pain     Activity Tolerance:   Poor    After treatment:   Patient left in no apparent distress in bed, Call bell within reach, Bed/ chair alarm activated, Caregiver / family present, and Side rails x3    COMMUNICATION/EDUCATION:   The patient's plan of care was discussed with: occupational therapist and          Thank you for this referral.  Ranjana Light, KAT         Physical Therapy Evaluation Charge Determination   History Examination Presentation Decision-Making   LOW Complexity : Zero comorbidities / personal factors that will impact the outcome / POC LOW Complexity : 1-2 Standardized tests and measures addressing body structure, function, activity limitation and / or participation in recreation  LOW Complexity : Stable, uncomplicated     LOW    Based on the above components, the patient evaluation is
Bedtime readings. BS levels have increased since admission off all treatment. Begin to resume tx with Lantus 6u this AM.  Diabetic 4 carb choices. Hold Insulin for FBS below 200. Titrate Lantus dose gradually pending FBS reviewed daily. Will contact family with plan. Pt is not Rx Glucophage likely because it was not effective in controlling BS. Several falls noted in May a/w C-spine fractures. No falls since initiating 24/7 sitters. Pure hypercholesterolemia  Patient remains on Lipitor 40 mg daily. HTN (hypertension)  Monitor vitals. History of orthostasis. Currently on hydralazine 25 mg twice daily and metoprolol XL 12.5 mg daily. BPH (benign prostatic hyperplasia)  Monitor with bladder checks as needed. Maintain Flomax 0.4 mg nightly. Known orthostatic condition. SAFETY:   Code Status:  DNR  DVT prophylaxis: Lovenox  Stress Ulcer prophylaxis: Not indicated  Bladder catheter: none  Family Contact Info:  Primary Emergency Contact: Radha Munoz., Home Phone: [phone]  Bedded: [facility] Room ED01/01  Goals of Care: *  Disposition: TBD, likely return to [facility] if stable  Admission status:  OBS      Reviewed most current lab test results and cultures  YES  Reviewed most current radiology test results   YES  Review and summation of old records today    NO  Reviewed patient's current orders and MAR    YES  PMH/SH reviewed - no change compared to H&P    Care Plan discussed with:                                   Comments  Patient x     Family  x   Son Elizabeth Love) by cell phone    RN x     Care Manager  x     Consultant                          x Multidiciplinary team rounds were held today with , nursing, pharmacist and clinical coordinator. Patient's plan of care was discussed; medications were reviewed and discharge planning was addressed.         ____________________________________________    Total NON Critical Care TIME:  35   Minutes        Comments   >50% of visit

## 2023-06-20 NOTE — DISCHARGE INSTRUCTIONS
HOSPTIALIST RECOMMENDATIONS    Understand the diabetic management has been complicated  Inconsistent nutritional intake day to day  Feed mealtime and bedtime snake - 4 feeding times daily  Check  BS at least twice daily before breakfast and supper (in fasting state - no food x 3hrs)  Begin Lantus 8u with breakfast if BS is > 200, titrate slowly  Hold insulin for now unless BS is > 200  STACIE BORJAS MD   [phone]  DISCHARGE SUMMARY from Nurse    PATIENT INSTRUCTIONS:    After general anesthesia or intravenous sedation, for 24 hours or while taking prescription Narcotics:  Limit your activities  Do not drive and operate hazardous machinery  Do not make important personal or business decisions  Do  not drink alcoholic beverages  If you have not urinated within 8 hours after discharge, please contact your surgeon on call. Report the following to your surgeon:  Excessive pain, swelling, redness or odor of or around the surgical area  Temperature over 100.5  Nausea and vomiting lasting longer than 4 hours or if unable to take medications  Any signs of decreased circulation or nerve impairment to extremity: change in color, persistent  numbness, tingling, coldness or increase pain  Any questions    What to do at Home:  Recommended activity: activity as tolerated        *  Please give a list of your current medications to your Primary Care Provider. *  Please update this list whenever your medications are discontinued, doses are      changed, or new medications (including over-the-counter products) are added. *  Please carry medication information at all times in case of emergency situations. These are general instructions for a healthy lifestyle:    No smoking/ No tobacco products/ Avoid exposure to second hand smoke  Surgeon General's Warning:  Quitting smoking now greatly reduces serious risk to your health.     Obesity, smoking, and sedentary lifestyle greatly increases your risk for illness    A healthy diet,

## 2023-06-20 NOTE — CARE COORDINATION
06/20/23 [facility] Discharge   Transition of Care Consult (CM Consult) [address] Discharge Aide services  (24/7 personal care aides)   Mode of Transport at Discharge Todd Calles 6 Follow Up Transport Other (see comment)  (Aides)         Transition of Care Plan: Return to [facility]     RUR: n/a in obs   Prior Level of Functioning: Needed assistance w/adls. Good and bad days. Disposition: Return to [facility]   If SNF or IPR: Date FOC offered: n/a   Date FOC received: n/a   Accepting facility: n/a   Date authorization started with reference number: n/a   Date authorization received and expires: n/a   Follow up appointments: [facility] to arrange f/u apps  DME needed: Possible rachel lift and hospital bed   [facility]   IM/IMM Medicare/ letter given: n/a in obs   Is patient a [place] and connected with [facility]? N/a    If yes, was [place] transfer form completed and VA notified? Caregiver Contact: n/a  Discharge Caregiver contacted prior to discharge? Personal care aide aware. Could not get in touch with son   Care Conference needed? N/a  Barriers to discharge: None           1150: Spoke with César Franco from [employer]. Asked her if she can arrange transport for Mr. Antoinette Lindsay back to [facility] since he is being discharged today. She said she would have to check with her boss Echo Baker. Also told her we are going to order a hospital bed and a rachel lift for patient. César Franco said she is not sure if family has purchased them or not. Called patients son Leatha Esteban junior. No answer. Left a message. 1200: Spoke with César Franco again from [employer]. Said patient was able to walk at [facility]??? Wondering if they should  because rachel lift was recommended. Confirmed with aidanna Colon that she can get patient from bed to wheelchair, etc. Just needs someone to assist him back in bed. Maria Ines Jain he walks sometimes has his good and bad days. Told César Franco this.  She said today wouldn't be
patient/family needs anything. 1600 Medicare Outpatient Observation Notice provided to [facility] Oral explanation was provided and all questions answered. Signed document placed on the bedside chart to be scanned under the media tab.  Copy provided to [facility]

## 2023-06-20 NOTE — DISCHARGE SUMMARY
[facility]  Hospitalist Discharge Summary    Patient ID:  Lance Rubio  [id number]  3 y.o.  12/30/1928    PCP on record: AME Smith NP    Admit date: 6/17/2023  Discharge date and time: 6/20/2023     DISCHARGE DIAGNOSIS:    Principal Problem:    Hypoglycemia  Active Problems:    Controlled type 2 diabetes mellitus without complication, with long-term current use of insulin (HCC)    Cognitive impairment    Pure hypercholesterolemia    BPH (benign prostatic hyperplasia)    HTN (hypertension)    CONSULTATIONS:  None    Excerpted HPI from H&P of Madina Boykin MD:   80 y.o. male presenting for admission to [facility] for further evaluation and treatment for Hypoglycemia. He  has a past medical history of Carotid artery disease ([facility] Coronary atherosclerosis of native coronary artery, Diabetes mellitus, type II ([facility] DJD (degenerative joint disease), HCVD (hypertensive cardiovascular disease), and Pure hypercholesterolemia. Pt returned to the ED at 2 AM from [facility] for evaluation of decreased responsiveness and a blood glucose of 68. Patient had been seen midday on Friday with complaints of reduced responsiveness and tremor. Extensive work-up was fairly unremarkable and no adjustments in treatment were made. During that assessment the random glucose was 180. He received his usual Lantus 10 units subcu last evening. On return to the ED the patient was awake and answers simple questions. He denied pain. There was no acute gastrointestinal complaint. His visiting Artie aides report that he has been eating well. He has not had vomiting or diarrhea. On return the patient received D10 125 cc at 2 AM, repeat 125 cc at 3 AM repeat to 50 cc at 4 AM, and repeat 125 cc 7 AM.  Patient's blood glucose remained relatively low but stable in the 70s and 80s prior to retreatment. At the time of admission for observation the random glucose is 120.   Prior

## 2023-06-20 NOTE — PLAN OF CARE
OCCUPATIONAL THERAPY EVALUATION    Patient: Hafsa Hilton (89 y.o. male)  Date: 6/19/2023  Primary Diagnosis: Hypoglycemia [E16.2]  Hypoglycemia associated with diabetes ([facility] [E11.649]         Precautions: Up as Tolerated, Other (comment) (up with assistance)                  FUNCTIONAL STATUS PRIOR TO ADMISSION:    Receives Help From: Personal care attendant, ADL Assistance: Needs assistance, Bath: Dependent/Total, Dressing: Dependent/Total, Grooming: Maximum assistance, Feeding: Dependent/Total (min to dependent), Toileting: Needs assistance, Homemaking Assistance: Needs assistance, Ambulation Assistance: Needs assistance, Transfer Assistance: Needs assistance, Active : No     HOME SUPPORT: Patient lived alone with no local support. and Patient lived in [address] [place] [address] with 24 hr care giver from Visiting nurse. Occupational Therapy Goals:  Initiated 6/19/2023  1. Patient will perform self-feeding with Moderate Assist within 7 day(s). 2.  Caregiver will be educated on proper body mechanics using  adl strategies and transfer tech to decrease risk for falls and injury  3   caregiver will be educated in upper extremity therapeutic exercise/activities with Rush for 8 minutes within 7 day(s). ASSESSMENT :  The patient is limited by decreased functional mobility, independence in ADLs, strength, activity tolerance, endurance, cognition, attention/concentration, coordination, balance, posture, fine-motor control. Based on the impairments listed above patient will require extensive assistance with his self care and mobility which will vary based on his medical status complicated by his . Functional Outcome Measure: The patient scored 5/100 on the Barthel index score outcome measure which is indicative of extensive assistance with ADLs.          PLAN :  Recommendations and Planned Interventions:   self care training, therapeutic activities, and functional mobility training    Frequency/Duration:
Problem: Chronic Conditions and Co-morbidities  Goal: Patient's chronic conditions and co-morbidity symptoms are monitored and maintained or improved  Outcome: Progressing  Flowsheets (Taken 6/19/2023 0730)  Care Plan - Patient's Chronic Conditions and Co-Morbidity Symptoms are Monitored and Maintained or Improved:   Monitor and assess patient's chronic conditions and comorbid symptoms for stability, deterioration, or improvement   Collaborate with multidisciplinary team to address chronic and comorbid conditions and prevent exacerbation or deterioration   Update acute care plan with appropriate goals if chronic or comorbid symptoms are exacerbated and prevent overall improvement and discharge     Problem: Safety - Adult  Goal: Free from fall injury  Recent Flowsheet Documentation  Taken 6/19/2023 0730 by Nya Joyner RN  Free From Fall Injury: Instruct family/caregiver on patient safety
SUBJECTIVE:   Patient did not state anything  OBJECTIVE DATA SUMMARY:   Cognitive/Behavioral Status:  Orientation  Overall Orientation Status: Impaired  Orientation Level: Disoriented X4  Cognition  Overall Cognitive Status: Exceptions  Arousal/Alertness: Inconsistent responses to stimuli  Following Commands: Does not follow commands  Attention Span: Unable to maintain attention  Memory: Decreased recall of biographical Information;Decreased short term memory;Decreased long term memory;Decreased recall of recent events  Problem Solving: Decreased awareness of errors;Assistance required to correct errors made;Assistance required to identify errors made  Insights: Not aware of deficits  Initiation: Requires cues for all  Sequencing: Requires cues for all    Functional Mobility and Transfers for ADLs:  Bed Mobility:    Total A    Transfers: total A          Balance:            ADL Intervention:         Feeding: Dependent/Total         Grooming: Dependent/Total        UE Bathing: Dependent/Total       LE Bathing: Dependent/Total       UE Dressing: Dependent/Total       LE Dressing: Dependent/Total       Toileting: Dependent/Total              Pain Ratin/10   Pain Intervention(s): Activity Tolerance:   Poor  Please refer to the flowsheet for vital signs taken during this treatment. After treatment:   Patient left in no apparent distress in bed and Caregiver / family present        COMMUNICATION/EDUCATION:   The patient's plan of care was discussed with: registered nurse    Patient Education  Education Given To: Caregiver  Education Provided: ADL Adaptive Strategies;Transfer Training  Education Method: Demonstration  Barriers to Learning: None  Education Outcome: Verbalized understanding    2:20pm-2:35pm educated caregiver on P/AROMEs on BUE and BLE to decrease risk of weakness and contracture.  Per caregiver, she usually takes him to an exercise group in his wheelchair when he is able to

## 2023-09-19 NOTE — PROGRESS NOTES
Bedside shift change report given to [address] [place] [address] (oncoming nurse) by castro De La Torre RN (offgoing nurse). Report included the following information SBAR, Kardex and MAR. Picato Counseling:  I discussed with the patient the risks of Picato including but not limited to erythema, scaling, itching, weeping, crusting, and pain.

## 2023-10-13 ENCOUNTER — HOSPITAL ENCOUNTER (EMERGENCY)
Facility: HOSPITAL | Age: 88
Discharge: HOME | End: 2023-10-13
Attending: FAMILY MEDICINE | Admitting: FAMILY MEDICINE
Payer: MEDICARE

## 2023-10-13 VITALS
OXYGEN SATURATION: 99 % | HEART RATE: 55 BPM | TEMPERATURE: 97.6 F | DIASTOLIC BLOOD PRESSURE: 50 MMHG | RESPIRATION RATE: 17 BRPM | SYSTOLIC BLOOD PRESSURE: 127 MMHG

## 2023-10-13 DIAGNOSIS — E16.2 HYPOGLYCEMIA: Primary | ICD-10-CM

## 2023-10-13 LAB
EKG ATRIAL RATE: 57 BPM
EKG DIAGNOSIS: NORMAL
EKG P AXIS: 85 DEGREES
EKG P-R INTERVAL: 252 MS
EKG Q-T INTERVAL: 454 MS
EKG QRS DURATION: 100 MS
EKG QTC CALCULATION (BAZETT): 441 MS
EKG R AXIS: 59 DEGREES
EKG T AXIS: 75 DEGREES
EKG VENTRICULAR RATE: 57 BPM
GLUCOSE BLD STRIP.AUTO-MCNC: 125 MG/DL (ref 65–117)
SERVICE CMNT-IMP: ABNORMAL

## 2023-10-13 PROCEDURE — 82962 GLUCOSE BLOOD TEST: CPT

## 2023-10-13 PROCEDURE — 99283 EMERGENCY DEPT VISIT LOW MDM: CPT

## 2023-10-13 NOTE — ED PROVIDER NOTES
[facility]       Pt Name: Alyssa Bradford  MRN: [id number]  Birthdate 12/30/1928  Date of evaluation: 10/13/2023  Provider: Satya Miranda MD   PCP: AME Fuller NP  Note Started: 12:48 AM EDT 10/13/23     CHIEF COMPLAINT       Chief Complaint   Patient presents with    Hypoglycemia        HISTORY OF PRESENT ILLNESS: 1 or more elements      History From: EMS and Nursing Home/SNF/Rehab Center  HPI Limitations: None     Alyssa Bradford is a 80 y.o. male who was brought to the ED by EMS because of hypoglycemia. His blood sugar was checked about an hour pta, and it was found to be 58. He was given two glucose tabs, and then he was able to take a half a peanut butter sandwich and some orange juice, Once EMS arrived, they obtained a FSBS of 99; they gave him a choice of coming to the ED or staying there, and he chose to come to the ED. On arrival his FSBS was 125. He is sleeping quietly. Nursing Notes were all reviewed and agreed with or any disagreements were addressed in the HPI. REVIEW OF SYSTEMS      Review of Systems     Positives and Pertinent negatives as per HPI.     PAST HISTORY     Past Medical History:  Past Medical History:   Diagnosis Date    Carotid artery disease ([address])     Coronary atherosclerosis of native coronary artery     Diabetes mellitus, type II (HCC)     DJD (degenerative joint disease)     HCVD (hypertensive cardiovascular disease)     Pure hypercholesterolemia          Past Surgical History:  Past Surgical History:   Procedure Laterality Date    CARDIAC CATHETERIZATION  2/2010    LVEDP 9, mild ant hypo EF 55-60%, LAD 70% 99%, mod D1, OM3 30%, PDA 90%    CATARACT REMOVAL Bilateral     PTCA  3/2010    Stent PDA ELIF    PTCA  2/2010    Stent-LAD 2.5X18 ELIF       Family History:  Family History   Problem Relation Age of Onset    Heart Disease Mother     Cancer Father        Social History:  Social History     Tobacco Use    Smoking

## 2023-10-13 NOTE — PROGRESS NOTES
Contacted [facility] to arrange BLS transport of patient back to [facility]. Discussed patient condition, and need for transport.

## 2023-10-13 NOTE — ED NOTES
Discharge instructions giving and care giver is informed.       Shelbie Gonzales, RN  10/13/23 7912 Facial Treatment Head Used?: Facial: Medium (80 grit) 6 mm Consent: Written consent obtained, risks reviewed including but not limited to crusting, scabbing, blistering, scarring, darker or lighter pigmentary change, bruising, and/or incomplete response. Intelliflow Setting: 60% Detail Level: Zone Body Treatment Head Used?: Not Used Number Of Passes: 2 Vacuum Pressure In Inches: 3.5 Post-Care Instructions: I reviewed with the patient in detail post-care instructions. Patient should stay away from the sun and wear sun protection until treated areas are fully healed. Solution Used: Brightening Serum

## 2023-10-13 NOTE — ED NOTES
Patients care giver is at bedside. Patient is awaiting transport back to [facility].       Laverne Willoughby RN  10/13/23 0201

## 2023-10-18 LAB
EKG ATRIAL RATE: 57 BPM
EKG DIAGNOSIS: NORMAL
EKG P AXIS: 85 DEGREES
EKG P-R INTERVAL: 252 MS
EKG Q-T INTERVAL: 454 MS
EKG QRS DURATION: 100 MS
EKG QTC CALCULATION (BAZETT): 441 MS
EKG R AXIS: 59 DEGREES
EKG T AXIS: 75 DEGREES
EKG VENTRICULAR RATE: 57 BPM

## 2023-12-23 ENCOUNTER — APPOINTMENT (OUTPATIENT)
Facility: HOSPITAL | Age: 88
End: 2023-12-23
Payer: MEDICARE

## 2023-12-23 ENCOUNTER — HOSPITAL ENCOUNTER (EMERGENCY)
Facility: HOSPITAL | Age: 88
Discharge: HOME | End: 2023-12-23
Attending: EMERGENCY MEDICINE
Payer: MEDICARE

## 2023-12-23 VITALS
RESPIRATION RATE: 18 BRPM | BODY MASS INDEX: 21.29 KG/M2 | DIASTOLIC BLOOD PRESSURE: 88 MMHG | WEIGHT: 157 LBS | TEMPERATURE: 97.8 F | SYSTOLIC BLOOD PRESSURE: 158 MMHG | OXYGEN SATURATION: 98 % | HEART RATE: 63 BPM

## 2023-12-23 DIAGNOSIS — J10.1 INFLUENZA A: ICD-10-CM

## 2023-12-23 DIAGNOSIS — N39.0 URINARY TRACT INFECTION WITHOUT HEMATURIA, SITE UNSPECIFIED: Primary | ICD-10-CM

## 2023-12-23 DIAGNOSIS — R41.82 ALTERED MENTAL STATUS, UNSPECIFIED ALTERED MENTAL STATUS TYPE: ICD-10-CM

## 2023-12-23 LAB
ALBUMIN SERPL-MCNC: 2.8 G/DL (ref 3.5–5)
ALBUMIN/GLOB SERPL: 0.8 (ref 1.1–2.2)
ALP SERPL-CCNC: 98 U/L (ref 45–117)
ALT SERPL-CCNC: 21 U/L (ref 12–78)
ANION GAP SERPL CALC-SCNC: 7 MMOL/L (ref 5–15)
APPEARANCE UR: CLEAR
AST SERPL-CCNC: 25 U/L (ref 15–37)
BACTERIA URNS QL MICRO: ABNORMAL /HPF
BASOPHILS # BLD: 0 K/UL (ref 0–0.1)
BASOPHILS NFR BLD: 0 % (ref 0–1)
BILIRUB SERPL-MCNC: 0.6 MG/DL (ref 0.2–1)
BILIRUB UR QL: NEGATIVE
BUN SERPL-MCNC: 26 MG/DL (ref 6–20)
BUN/CREAT SERPL: 22 (ref 12–20)
CALCIUM SERPL-MCNC: 8.9 MG/DL (ref 8.5–10.1)
CHLORIDE SERPL-SCNC: 102 MMOL/L (ref 97–108)
CO2 SERPL-SCNC: 29 MMOL/L (ref 21–32)
COLOR UR: ABNORMAL
CREAT SERPL-MCNC: 1.18 MG/DL (ref 0.7–1.3)
DIFFERENTIAL METHOD BLD: ABNORMAL
EOSINOPHIL # BLD: 0 K/UL (ref 0–0.4)
EOSINOPHIL NFR BLD: 0 % (ref 0–7)
EPITH CASTS URNS QL MICRO: ABNORMAL /LPF
ERYTHROCYTE [DISTWIDTH] IN BLOOD BY AUTOMATED COUNT: 13.2 % (ref 11.5–14.5)
GLOBULIN SER CALC-MCNC: 3.5 G/DL (ref 2–4)
GLUCOSE SERPL-MCNC: 215 MG/DL (ref 65–100)
GLUCOSE UR STRIP.AUTO-MCNC: 100 MG/DL
HCT VFR BLD AUTO: 33.9 % (ref 36.6–50.3)
HGB BLD-MCNC: 11.4 G/DL (ref 12.1–17)
HGB UR QL STRIP: ABNORMAL
IMM GRANULOCYTES # BLD AUTO: 0 K/UL (ref 0–0.04)
IMM GRANULOCYTES NFR BLD AUTO: 0 % (ref 0–0.5)
KETONES UR QL STRIP.AUTO: NEGATIVE MG/DL
LACTATE SERPL-SCNC: 0.6 MMOL/L (ref 0.4–2)
LEUKOCYTE ESTERASE UR QL STRIP.AUTO: ABNORMAL
LYMPHOCYTES # BLD: 2.3 K/UL (ref 0.8–3.5)
LYMPHOCYTES NFR BLD: 33 % (ref 12–49)
MAGNESIUM SERPL-MCNC: 1.9 MG/DL (ref 1.6–2.4)
MCH RBC QN AUTO: 30.3 PG (ref 26–34)
MCHC RBC AUTO-ENTMCNC: 33.6 G/DL (ref 30–36.5)
MCV RBC AUTO: 90.2 FL (ref 80–99)
MONOCYTES # BLD: 0.9 K/UL (ref 0–1)
MONOCYTES NFR BLD: 12 % (ref 5–13)
NEUTS SEG # BLD: 3.9 K/UL (ref 1.8–8)
NEUTS SEG NFR BLD: 55 % (ref 32–75)
NITRITE UR QL STRIP.AUTO: POSITIVE
NRBC # BLD: 0 K/UL (ref 0–0.01)
NRBC BLD-RTO: 0 PER 100 WBC
PH UR STRIP: 7 (ref 5–8)
PLATELET # BLD AUTO: 142 K/UL (ref 150–400)
PMV BLD AUTO: 9.5 FL (ref 8.9–12.9)
POTASSIUM SERPL-SCNC: 4 MMOL/L (ref 3.5–5.1)
PROT SERPL-MCNC: 6.3 G/DL (ref 6.4–8.2)
PROT UR STRIP-MCNC: 30 MG/DL
RBC # BLD AUTO: 3.76 M/UL (ref 4.1–5.7)
RBC #/AREA URNS HPF: ABNORMAL /HPF (ref 0–5)
SODIUM SERPL-SCNC: 138 MMOL/L (ref 136–145)
SP GR UR REFRACTOMETRY: 1.02 (ref 1–1.03)
URINE CULTURE IF INDICATED: ABNORMAL
UROBILINOGEN UR QL STRIP.AUTO: 0.2 EU/DL (ref 0.2–1)
WBC # BLD AUTO: 7.1 K/UL (ref 4.1–11.1)
WBC URNS QL MICRO: ABNORMAL /HPF (ref 0–4)

## 2023-12-23 PROCEDURE — 99284 EMERGENCY DEPT VISIT MOD MDM: CPT

## 2023-12-23 PROCEDURE — 2580000003 HC RX 258: Performed by: EMERGENCY MEDICINE

## 2023-12-23 PROCEDURE — 80053 COMPREHEN METABOLIC PANEL: CPT

## 2023-12-23 PROCEDURE — 85025 COMPLETE CBC W/AUTO DIFF WBC: CPT

## 2023-12-23 PROCEDURE — 71045 X-RAY EXAM CHEST 1 VIEW: CPT

## 2023-12-23 PROCEDURE — 83605 ASSAY OF LACTIC ACID: CPT

## 2023-12-23 PROCEDURE — 81001 URINALYSIS AUTO W/SCOPE: CPT

## 2023-12-23 PROCEDURE — 6360000002 HC RX W HCPCS: Performed by: EMERGENCY MEDICINE

## 2023-12-23 PROCEDURE — 96374 THER/PROPH/DIAG INJ IV PUSH: CPT

## 2023-12-23 PROCEDURE — 36415 COLL VENOUS BLD VENIPUNCTURE: CPT

## 2023-12-23 PROCEDURE — 83735 ASSAY OF MAGNESIUM: CPT

## 2023-12-23 PROCEDURE — 96361 HYDRATE IV INFUSION ADD-ON: CPT

## 2023-12-23 PROCEDURE — 87086 URINE CULTURE/COLONY COUNT: CPT

## 2023-12-23 RX ORDER — CEPHALEXIN 500 MG/1
500 CAPSULE ORAL 3 TIMES DAILY
Qty: 21 CAPSULE | Refills: 0 | Status: SHIPPED | OUTPATIENT
Start: 2023-12-23 | End: 2023-12-30

## 2023-12-23 RX ORDER — 0.9 % SODIUM CHLORIDE 0.9 %
1000 INTRAVENOUS SOLUTION INTRAVENOUS ONCE
Status: COMPLETED | OUTPATIENT
Start: 2023-12-23 | End: 2023-12-23

## 2023-12-23 RX ADMIN — SODIUM CHLORIDE 1000 ML: 9 INJECTION, SOLUTION INTRAVENOUS at 14:54

## 2023-12-23 RX ADMIN — CEFTRIAXONE SODIUM 1000 MG: 1 INJECTION, POWDER, FOR SOLUTION INTRAMUSCULAR; INTRAVENOUS at 18:26

## 2023-12-23 NOTE — ED TRIAGE NOTES
Pt arrived with c/o non responsiveness to verbal stimuli starting around 1330 today. Pt is apparently usually responsive to stimuli but today was not answering questions. Was recently discharged from this facility last night with flu.  Upon arrival pt is responsive to verbal stimuli answered questions appropriately

## 2023-12-23 NOTE — PROGRESS NOTES
Writer contacted [facility] and spoke with Shyanne Carey to arrange BLS transport for this patient back to [facility]. Requested information provided (Dx, wt, room air, FLU+ and insurance information verified) ETA 1840-- ED staff made aware.

## 2023-12-25 LAB
BACTERIA SPEC CULT: NORMAL
SERVICE CMNT-IMP: NORMAL

## 2023-12-27 NOTE — ED PROVIDER NOTES
capsule  Commonly known as: KEFLEX  Take 1 capsule by mouth 3 times daily for 7 days            ASK your doctor about these medications      acetaminophen 325 MG tablet  Commonly known as: TYLENOL     atorvastatin 40 MG tablet  Commonly known as: LIPITOR     hydrALAZINE 25 MG tablet  Commonly known as: APRESOLINE     insulin glargine 100 UNIT/ML injection vial  Commonly known as: LANTUS  Inject 8 Units into the skin daily (with breakfast)     magnesium oxide 400 (240 Mg) MG tablet  Commonly known as: MAG-OX     metoprolol succinate 25 MG extended release tablet  Commonly known as: TOPROL XL     MULTI COMPLETE PO     polyethylene glycol 17 GM/SCOOP powder  Commonly known as: GLYCOLAX     tamsulosin 0.4 MG capsule  Commonly known as: FLOMAX               Where to Get Your Medications        Information about where to get these medications is not yet available    Ask your nurse or doctor about these medications  cephALEXin 500 MG capsule           DISCONTINUED MEDICATIONS:  Discharge Medication List as of 12/23/2023  6:15 PM          I am the Primary Clinician of Record. Stephanie Simon MD (electronically signed)      (Please note that parts of this dictation were completed with voice recognition software. Quite often unanticipated grammatical, syntax, homophones, and other interpretive errors are inadvertently transcribed by the computer software. Please disregards these errors.  Please excuse any errors that have escaped final proofreading.)         Marivel Granger MD  12/27/23 9856

## 2024-03-30 ENCOUNTER — APPOINTMENT (OUTPATIENT)
Facility: HOSPITAL | Age: 89
End: 2024-03-30
Payer: MEDICARE

## 2024-03-30 ENCOUNTER — HOSPITAL ENCOUNTER (EMERGENCY)
Facility: HOSPITAL | Age: 89
Discharge: HOME | End: 2024-03-30
Attending: EMERGENCY MEDICINE
Payer: MEDICARE

## 2024-03-30 VITALS
RESPIRATION RATE: 21 BRPM | WEIGHT: 151.1 LBS | OXYGEN SATURATION: 97 % | TEMPERATURE: 97.9 F | DIASTOLIC BLOOD PRESSURE: 64 MMHG | HEART RATE: 71 BPM | SYSTOLIC BLOOD PRESSURE: 168 MMHG | BODY MASS INDEX: 20.49 KG/M2

## 2024-03-30 DIAGNOSIS — N30.00 ACUTE CYSTITIS WITHOUT HEMATURIA: ICD-10-CM

## 2024-03-30 DIAGNOSIS — R40.4 TRANSIENT ALTERATION OF AWARENESS: Primary | ICD-10-CM

## 2024-03-30 LAB
AMORPH CRY URNS QL MICRO: ABNORMAL
ANION GAP SERPL CALC-SCNC: 11 MMOL/L (ref 5–15)
APPEARANCE UR: CLEAR
BACTERIA URNS QL MICRO: ABNORMAL /HPF
BASOPHILS # BLD: 0 K/UL (ref 0–0.1)
BASOPHILS NFR BLD: 0 % (ref 0–1)
BILIRUB UR QL: NEGATIVE
BUN SERPL-MCNC: 22 MG/DL (ref 6–20)
BUN/CREAT SERPL: 19 (ref 12–20)
CALCIUM SERPL-MCNC: 8.9 MG/DL (ref 8.5–10.1)
CHLORIDE SERPL-SCNC: 103 MMOL/L (ref 97–108)
CO2 SERPL-SCNC: 27 MMOL/L (ref 21–32)
COLOR UR: ABNORMAL
CREAT SERPL-MCNC: 1.17 MG/DL (ref 0.7–1.3)
DIFFERENTIAL METHOD BLD: ABNORMAL
EKG ATRIAL RATE: 85 BPM
EKG DIAGNOSIS: NORMAL
EKG P-R INTERVAL: 272 MS
EKG Q-T INTERVAL: 384 MS
EKG QRS DURATION: 92 MS
EKG QTC CALCULATION (BAZETT): 446 MS
EKG R AXIS: 55 DEGREES
EKG T AXIS: 47 DEGREES
EKG VENTRICULAR RATE: 81 BPM
EOSINOPHIL # BLD: 0 K/UL (ref 0–0.4)
EOSINOPHIL NFR BLD: 0 % (ref 0–7)
EPITH CASTS URNS QL MICRO: ABNORMAL /LPF
ERYTHROCYTE [DISTWIDTH] IN BLOOD BY AUTOMATED COUNT: 13.5 % (ref 11.5–14.5)
GLUCOSE BLD STRIP.AUTO-MCNC: 249 MG/DL (ref 65–117)
GLUCOSE SERPL-MCNC: 317 MG/DL (ref 65–100)
GLUCOSE UR STRIP.AUTO-MCNC: >1000 MG/DL
HCT VFR BLD AUTO: 34.9 % (ref 36.6–50.3)
HGB BLD-MCNC: 11.6 G/DL (ref 12.1–17)
HGB UR QL STRIP: ABNORMAL
IMM GRANULOCYTES # BLD AUTO: 0 K/UL (ref 0–0.04)
IMM GRANULOCYTES NFR BLD AUTO: 0 % (ref 0–0.5)
KETONES UR QL STRIP.AUTO: 15 MG/DL
LEUKOCYTE ESTERASE UR QL STRIP.AUTO: NEGATIVE
LYMPHOCYTES # BLD: 1.1 K/UL (ref 0.8–3.5)
LYMPHOCYTES NFR BLD: 17 % (ref 12–49)
MAGNESIUM SERPL-MCNC: 1.7 MG/DL (ref 1.6–2.4)
MCH RBC QN AUTO: 30.4 PG (ref 26–34)
MCHC RBC AUTO-ENTMCNC: 33.2 G/DL (ref 30–36.5)
MCV RBC AUTO: 91.6 FL (ref 80–99)
MONOCYTES # BLD: 0.7 K/UL (ref 0–1)
MONOCYTES NFR BLD: 10 % (ref 5–13)
NEUTS SEG # BLD: 4.9 K/UL (ref 1.8–8)
NEUTS SEG NFR BLD: 73 % (ref 32–75)
NITRITE UR QL STRIP.AUTO: POSITIVE
NRBC # BLD: 0 K/UL (ref 0–0.01)
NRBC BLD-RTO: 0 PER 100 WBC
PH UR STRIP: 6 (ref 5–8)
PLATELET # BLD AUTO: 175 K/UL (ref 150–400)
PMV BLD AUTO: 9.3 FL (ref 8.9–12.9)
POTASSIUM SERPL-SCNC: 3.9 MMOL/L (ref 3.5–5.1)
PROT UR STRIP-MCNC: 100 MG/DL
RBC # BLD AUTO: 3.81 M/UL (ref 4.1–5.7)
RBC #/AREA URNS HPF: ABNORMAL /HPF (ref 0–5)
SERVICE CMNT-IMP: ABNORMAL
SODIUM SERPL-SCNC: 141 MMOL/L (ref 136–145)
SP GR UR REFRACTOMETRY: 1.02 (ref 1–1.03)
URINE CULTURE IF INDICATED: ABNORMAL
UROBILINOGEN UR QL STRIP.AUTO: 0.2 EU/DL (ref 0.2–1)
WBC # BLD AUTO: 6.7 K/UL (ref 4.1–11.1)
WBC URNS QL MICRO: ABNORMAL /HPF (ref 0–4)

## 2024-03-30 PROCEDURE — 99284 EMERGENCY DEPT VISIT MOD MDM: CPT

## 2024-03-30 PROCEDURE — 6360000002 HC RX W HCPCS: Performed by: EMERGENCY MEDICINE

## 2024-03-30 PROCEDURE — 2580000003 HC RX 258: Performed by: EMERGENCY MEDICINE

## 2024-03-30 PROCEDURE — 81001 URINALYSIS AUTO W/SCOPE: CPT

## 2024-03-30 PROCEDURE — 70450 CT HEAD/BRAIN W/O DYE: CPT

## 2024-03-30 PROCEDURE — 93005 ELECTROCARDIOGRAM TRACING: CPT | Performed by: EMERGENCY MEDICINE

## 2024-03-30 PROCEDURE — 96361 HYDRATE IV INFUSION ADD-ON: CPT

## 2024-03-30 PROCEDURE — 80048 BASIC METABOLIC PNL TOTAL CA: CPT

## 2024-03-30 PROCEDURE — 83735 ASSAY OF MAGNESIUM: CPT

## 2024-03-30 PROCEDURE — 82962 GLUCOSE BLOOD TEST: CPT

## 2024-03-30 PROCEDURE — 96374 THER/PROPH/DIAG INJ IV PUSH: CPT

## 2024-03-30 PROCEDURE — 85025 COMPLETE CBC W/AUTO DIFF WBC: CPT

## 2024-03-30 PROCEDURE — 36415 COLL VENOUS BLD VENIPUNCTURE: CPT

## 2024-03-30 RX ORDER — CEPHALEXIN 500 MG/1
500 CAPSULE ORAL 3 TIMES DAILY
Qty: 21 CAPSULE | Refills: 0 | Status: SHIPPED | OUTPATIENT
Start: 2024-03-30 | End: 2024-04-06

## 2024-03-30 RX ORDER — CEPHALEXIN 500 MG/1
500 CAPSULE ORAL 3 TIMES DAILY
Qty: 21 CAPSULE | Refills: 0 | Status: SHIPPED | OUTPATIENT
Start: 2024-03-30 | End: 2024-03-30

## 2024-03-30 RX ORDER — 0.9 % SODIUM CHLORIDE 0.9 %
500 INTRAVENOUS SOLUTION INTRAVENOUS ONCE
Status: COMPLETED | OUTPATIENT
Start: 2024-03-30 | End: 2024-03-30

## 2024-03-30 RX ADMIN — SODIUM CHLORIDE 500 ML: 9 INJECTION, SOLUTION INTRAVENOUS at 09:40

## 2024-03-30 RX ADMIN — WATER 1000 MG: 1 INJECTION INTRAMUSCULAR; INTRAVENOUS; SUBCUTANEOUS at 11:33

## 2024-03-30 ASSESSMENT — ENCOUNTER SYMPTOMS
ABDOMINAL PAIN: 0
VOMITING: 0
EYE REDNESS: 0
SORE THROAT: 0
NAUSEA: 0
DIARRHEA: 0
SHORTNESS OF BREATH: 0
COUGH: 0

## 2024-03-30 ASSESSMENT — PAIN SCALES - GENERAL: PAINLEVEL_OUTOF10: 0

## 2024-03-30 NOTE — ED PROVIDER NOTES
Patient afebrile.  Will obtain CT head to rule out any possible ICH.  Will obtain basic blood work to eval for any metabolic abnormalities/JOSUÉ/electrolyte abnormalities.  Patient previously has had urinary tract infection on a visit with similar clear complaints in December of last year.    ED Course:   Initial assessment performed. The patients presenting problems have been discussed, and they are in agreement with the care plan formulated and outlined with them.  I have encouraged them to ask questions as they arise throughout their visit.           PROGRESS NOTE    Pt reevaluated.  Patient back to baseline mental status after IV fluids.  Urinalysis significant for mild ketonuria, nitrite positive with 2+ bacteria.  Treat as UTI and gave IV ceftriaxone here.  Will discharge with Keflex x 7 days.  BMP unremarkable other than hyperglycemia which improved.  CBC unremarkable outside of hemoglobin at 11.6 which is at baseline.  CT head without any acute findings.  Will discharge back to assisted living facility.  Written by Alexei Mcpherson MD     Progress note:       Will follow up as instructed. All questions have been answered, pt voiced understanding and agreement with plan.         Specific return precautions provided as well as instructions to return to the ED should sx worsen at any time. Vital signs stable for discharge.       I have also put together some discharge instructions for them that include: 1) educational information regarding their diagnosis, 2) how to care for their diagnosis at home, as well a 3) list of reasons why they would want to return to the ED prior to their follow-up appointment, should their condition change.  Written by Alexei Mcpherson MD        Critical Care Time:   0    Disposition:  Discharge    PLAN:  1.      Medication List        START taking these medications      cephALEXin 500 MG capsule  Commonly known as: Keflex  Take 1 capsule by mouth 3 times daily for 7 days            ASK  your doctor about these medications      acetaminophen 325 MG tablet  Commonly known as: TYLENOL     atorvastatin 40 MG tablet  Commonly known as: LIPITOR     hydrALAZINE 25 MG tablet  Commonly known as: APRESOLINE     insulin glargine 100 UNIT/ML injection vial  Commonly known as: LANTUS  Inject 8 Units into the skin daily (with breakfast)     magnesium oxide 400 (240 Mg) MG tablet  Commonly known as: MAG-OX     metoprolol succinate 25 MG extended release tablet  Commonly known as: TOPROL XL     MULTI COMPLETE PO     polyethylene glycol 17 GM/SCOOP powder  Commonly known as: GLYCOLAX     tamsulosin 0.4 MG capsule  Commonly known as: FLOMAX               Where to Get Your Medications        Information about where to get these medications is not yet available    Ask your nurse or doctor about these medications  cephALEXin 500 MG capsule       2. @[facility]@  Return to ED if worse     Diagnosis     Clinical Impression:   1. Transient alteration of awareness    2. Acute cystitis without hematuria              Please note that this dictation was completed with [facility], the computer voice recognition software.  Quite often unanticipated grammatical, syntax, homophones, and other interpretive errors are inadvertently transcribed by the computer software.  Please disregard these errors.  Please excuse any errors that have escaped final proofreading.       Alexei Mcpherson MD  03/30/24 [id number]

## 2024-03-30 NOTE — ED TRIAGE NOTES
Pt sent from Nursing facility because staff felt like pt was hard to arouse this morning.  EMS reports pt was awake and alert upon their arrival. Pt arrives to ED at his baseline. Awake, speaking. Pt has dementia.

## 2024-03-30 NOTE — PROGRESS NOTES
Contacted [facility] and spoke with Pat to arrange BLS transport for this patient to [facility] after ED discharge. Requested information provided ( Dx, wt, demographics, room air, no isolation and insurance information given ) ETA [id number] RN made aware.

## 2024-03-30 NOTE — ED NOTES
Patients daughter updated on patient status via telephone. Pt verbalized that it was okay to speak with her.   oral

## 2024-03-30 NOTE — ED NOTES
Patient resting.   For discharge but since patient is not ambulatory an additional life care crew has been requested.

## 2024-03-30 NOTE — ED NOTES
Patient a little more alert but sleeping most of the time. Lunch tray available set up patient for lunch. Aide is at bedside.

## 2024-03-30 NOTE — ED NOTES
IV site assessed at this time. IV site noted to have blood return and show no signs of infiltration

## 2024-03-30 NOTE — ED NOTES
Call back received from [facility]. Report given to Nabila Osuna, med tech and new medications were reviewed. Ms. Osuna' questions were answered.

## 2024-04-02 LAB
EKG ATRIAL RATE: 85 BPM
EKG DIAGNOSIS: NORMAL
EKG P-R INTERVAL: 272 MS
EKG Q-T INTERVAL: 384 MS
EKG QRS DURATION: 92 MS
EKG QTC CALCULATION (BAZETT): 446 MS
EKG R AXIS: 55 DEGREES
EKG T AXIS: 47 DEGREES
EKG VENTRICULAR RATE: 81 BPM

## (undated) DEVICE — Device

## (undated) DEVICE — SUTURE SZ 0 27IN 5/8 CIR UR-6  TAPER PT VIOLET ABSRB VICRYL J603H

## (undated) DEVICE — STAPLER INT L340MM 45MM STD 12 FIRING B FRM PWR + GRIPPING

## (undated) DEVICE — MARYLAND JAW LAPAROSCOPIC SEALER/DIVIDER COATED: Brand: LIGASURE

## (undated) DEVICE — GLOVE SURG SZ 8 L12IN FNGR THK79MIL GRN LTX FREE

## (undated) DEVICE — GENERAL LAPAROSCOPY-MRMC: Brand: MEDLINE INDUSTRIES, INC.

## (undated) DEVICE — 3M™ TEGADERM™ TRANSPARENT FILM DRESSING FRAME STYLE, 1626W, 4 IN X 4-3/4 IN (10 CM X 12 CM), 50/CT 4CT/CASE: Brand: 3M™ TEGADERM™

## (undated) DEVICE — 40580 - THE PINK PAD - ADVANCED TRENDELENBURG POSITIONING KIT: Brand: 40580 - THE PINK PAD - ADVANCED TRENDELENBURG POSITIONING KIT

## (undated) DEVICE — GARMENT,MEDLINE,DVT,INT,CALF,MED, GEN2: Brand: MEDLINE

## (undated) DEVICE — DRAIN SURG 10FR L1/8IN DIA3.2MM SIL CHN RND FULL FLUT TRCR

## (undated) DEVICE — AEGIS 1" DISK 4MM HOLE, PEEL OPEN: Brand: MEDLINE

## (undated) DEVICE — GOWN,PREVENTION PLUS,XL,ST,24/CS: Brand: MEDLINE

## (undated) DEVICE — TOTAL TRAY, 16FR 10ML SIL FOLEY, URN: Brand: MEDLINE

## (undated) DEVICE — SUTURE MCRYL SZ 4-0 L27IN ABSRB UD L19MM PS-2 1/2 CIR PRIM Y426H

## (undated) DEVICE — NEEDLE HYPO 25GA L1.5IN BVL ORIENTED ECLIPSE

## (undated) DEVICE — SUTURE ETHLN SZ 2-0 L18IN NONABSORBABLE BLK L19MM PS-2 PRIM 593H

## (undated) DEVICE — KIT,1200CC CANISTER,3/16"X6' TUBING: Brand: MEDLINE INDUSTRIES, INC.

## (undated) DEVICE — TISSUE RETRIEVAL SYSTEM: Brand: INZII RETRIEVAL SYSTEM

## (undated) DEVICE — TROCARS: Brand: KII® BALLOON BLUNT TIP SYSTEM

## (undated) DEVICE — GLOVE SURG SZ 8 CRM LTX FREE POLYISOPRENE POLYMER BEAD ANTI

## (undated) DEVICE — SYR 10ML LUER LOK 1/5ML GRAD --

## (undated) DEVICE — TROCAR: Brand: KII SLEEVE

## (undated) DEVICE — RELOAD STPL L45MM H1-2.6MM MESENTERY THN TISS WHT GRIPPING

## (undated) DEVICE — GLOVE SURG SZ 75 CRM LTX FREE POLYISOPRENE POLYMER BEAD ANTI

## (undated) DEVICE — TROCAR: Brand: KII FIOS FIRST ENTRY